# Patient Record
Sex: FEMALE | Race: WHITE | NOT HISPANIC OR LATINO | Employment: OTHER | URBAN - NONMETROPOLITAN AREA
[De-identification: names, ages, dates, MRNs, and addresses within clinical notes are randomized per-mention and may not be internally consistent; named-entity substitution may affect disease eponyms.]

---

## 2017-01-09 RX ORDER — LOSARTAN POTASSIUM 100 MG/1
100 TABLET ORAL DAILY
Qty: 30 TABLET | Refills: 5 | Status: SHIPPED | OUTPATIENT
Start: 2017-01-09 | End: 2017-03-06 | Stop reason: SDUPTHER

## 2017-01-25 ENCOUNTER — OFFICE VISIT (OUTPATIENT)
Dept: INTERNAL MEDICINE | Facility: CLINIC | Age: 82
End: 2017-01-25

## 2017-01-25 VITALS
BODY MASS INDEX: 19.78 KG/M2 | WEIGHT: 126 LBS | OXYGEN SATURATION: 97 % | TEMPERATURE: 97.4 F | HEIGHT: 67 IN | SYSTOLIC BLOOD PRESSURE: 120 MMHG | DIASTOLIC BLOOD PRESSURE: 76 MMHG | HEART RATE: 68 BPM | RESPIRATION RATE: 14 BRPM

## 2017-01-25 DIAGNOSIS — K21.9 GASTROESOPHAGEAL REFLUX DISEASE WITHOUT ESOPHAGITIS: ICD-10-CM

## 2017-01-25 DIAGNOSIS — E03.9 HYPOTHYROIDISM, UNSPECIFIED TYPE: ICD-10-CM

## 2017-01-25 DIAGNOSIS — E55.9 VITAMIN D DEFICIENCY: ICD-10-CM

## 2017-01-25 DIAGNOSIS — F41.9 ANXIETY: ICD-10-CM

## 2017-01-25 DIAGNOSIS — R63.4 WEIGHT LOSS, NON-INTENTIONAL: ICD-10-CM

## 2017-01-25 DIAGNOSIS — I10 ESSENTIAL HYPERTENSION: ICD-10-CM

## 2017-01-25 DIAGNOSIS — D64.9 ANEMIA, UNSPECIFIED TYPE: ICD-10-CM

## 2017-01-25 DIAGNOSIS — E78.5 HYPERLIPIDEMIA, UNSPECIFIED HYPERLIPIDEMIA TYPE: ICD-10-CM

## 2017-01-25 DIAGNOSIS — I25.10 CHRONIC CORONARY ARTERY DISEASE: Primary | ICD-10-CM

## 2017-01-25 DIAGNOSIS — J43.8 OTHER EMPHYSEMA (HCC): ICD-10-CM

## 2017-01-25 PROCEDURE — 99214 OFFICE O/P EST MOD 30 MIN: CPT | Performed by: INTERNAL MEDICINE

## 2017-01-25 RX ORDER — MIRTAZAPINE 15 MG/1
15 TABLET, FILM COATED ORAL NIGHTLY
Qty: 30 TABLET | Refills: 1 | Status: SHIPPED | OUTPATIENT
Start: 2017-01-25 | End: 2017-02-17

## 2017-01-25 NOTE — MR AVS SNAPSHOT
Mi Tejeda   1/25/2017 10:30 AM   Office Visit    Provider:  Adiel Awad MD   Department:  [facility]   Dept Phone:  [phone]                Your Full Care Plan              Today's Medication Changes          These changes are accurate as of: 1/25/17 11:59 PM.  If you have any questions, ask your nurse or doctor.               New Medication(s)Ordered:     mirtazapine 15 MG tablet   Commonly known as:  REMERON   Take 1 tablet by mouth Every Night.   Started by:  Adiel Awad MD            Where to Get Your Medications      These medications were sent to [id number] - [place], [place] - [address] [facility] [phone]  - [phone]   [address] [facility] [address]     Phone:  [phone]     mirtazapine 15 MG tablet                  Your Updated Medication List          This list is accurate as of: 1/25/17 11:59 PM.  Always use your most recent med list.                albuterol 108 (90 BASE) MCG/ACT inhaler   Commonly known as:  PROAIR HFA   Inhale 2 puffs Every 4 (Four) Hours As Needed for wheezing or shortness of air.       baclofen 10 MG tablet   Commonly known as:  LIORESAL   Take 1 tablet by mouth 3 (three) times a day.       budesonide-formoterol 160-4.5 MCG/ACT inhaler   Commonly known as:  SYMBICORT   Inhale 2 puffs 2 (Two) Times a Day.       ferrous gluconate 324 (37.5 FE) MG tablet tablet   Take 1 tablet by mouth Daily With Breakfast.       hydrochlorothiazide 25 MG tablet   Commonly known as:  HYDRODIURIL       HYDROcodone-acetaminophen 7.5-325 MG per tablet   Commonly known as:  NORCO       levothyroxine 100 MCG tablet   Commonly known as:  SYNTHROID, LEVOTHROID   Take 1 tablet by mouth Daily.       losartan 100 MG tablet   Commonly known as:  COZAAR   Take 1 tablet by mouth Daily.       metoprolol succinate XL 50 MG 24 hr tablet   Commonly known as:  TOPROL-XL       mirtazapine 15 MG tablet   Commonly known as:   REMERON   Take 1 tablet by mouth Every Night.       Morphine 30 MG 12 hr tablet   Commonly known as:  MS CONTIN       pantoprazole 40 MG EC tablet   Commonly known as:  PROTONIX   Take 1 tablet by mouth daily.       pentoxifylline 400 MG CR tablet   Commonly known as:  TRENtal       sertraline 25 MG tablet   Commonly known as:  ZOLOFT   Take 1 tablet by mouth Daily.       sulfamethoxazole-trimethoprim 400-80 MG tablet   Commonly known as:  BACTRIM,SEPTRA       tiotropium 18 MCG per inhalation capsule   Commonly known as:  SPIRIVA HANDIHALER   Place 2 puffs into inhaler and inhale Daily.       vitamin D3 5000 UNITS capsule capsule   Take 1 capsule by mouth Daily.               You Were Diagnosed With        Codes Comments    Chronic coronary artery disease    -  Primary ICD-10-CM: I25.10  ICD-9-CM: 414.00     Hyperlipidemia, unspecified hyperlipidemia type     ICD-10-CM: E78.5  ICD-9-CM: 272.4     Essential hypertension     ICD-10-CM: I10  ICD-9-CM: 401.9     Other emphysema     ICD-10-CM: J43.8  ICD-9-CM: 492.8     Gastroesophageal reflux disease without esophagitis     ICD-10-CM: K21.9  ICD-9-CM: 530.81     Vitamin D deficiency     ICD-10-CM: E55.9  ICD-9-CM: 268.9     Hypothyroidism, unspecified type     ICD-10-CM: E03.9  ICD-9-CM: 244.9     Anemia, unspecified type     ICD-10-CM: D64.9  ICD-9-CM: 285.9     Weight loss, non-intentional     ICD-10-CM: R63.4  ICD-9-CM: 783.21     Anxiety     ICD-10-CM: F41.9  ICD-9-CM: 300.00       Instructions     None    Patient Instructions History      MyChart Signup     Our records indicate that your [employer] account has been deactivated. If you would like to reactivate your account, please email [email or web address] or call [phone] to talk to our [facility] staff.             Other Info from Your Visit           Your Appointments     Feb 17, 2017 10:15 AM EST   Follow Up with Adiel Awad MD   [facility] (--)    [address] [address] [phone]           Arrive 15 minutes prior to appointment.              Allergies     Niacin      Statins        Reason for Visit     Hypertension Here for follow up - needs anxiety med - no benzos      Vital Signs     Blood Pressure Pulse Temperature Respirations Height Weight    120/76 68 97.4 °F (36.3 °C) 14 66.5\" (168.9 cm) 126 lb (57.2 kg)    Oxygen Saturation Body Mass Index Smoking Status             97% 20.03 kg/m2 Current Every Day Smoker         Problems and Diagnoses Noted     Anemia    Anxiety problem    Heart disease due to blocked artery    Chronic airway obstruction    Acid reflux disease    High cholesterol or triglycerides    High blood pressure    Underactive thyroid    Vitamin D deficiency    Unintentional weight loss      "

## 2017-01-25 NOTE — PROGRESS NOTES
Subjective   Mi Tejeda is a 81 y.o. female.     Chief Complaint   Patient presents with   • Hypertension     Here for follow up - needs anxiety med - no benzos       History of Present Illness   swallow problem early satiety epigastric improved.  EGD showed gastritis heart burn recently though better now. History of coronary artery disease CABG denies any chest pain mildly short of breath at baseline. COPD stable on medication. Patient still smokes. Weight is stable now.. Depression anxiety stable on medication. Low back pain follow-up with pain clinic patient is taking narcotic pain medicine    Current Outpatient Prescriptions:   •  albuterol (PROAIR HFA) 108 (90 BASE) MCG/ACT inhaler, Inhale 2 puffs Every 4 (Four) Hours As Needed for wheezing or shortness of air., Disp: 2 inhaler, Rfl: 5  •  baclofen (LIORESAL) 10 MG tablet, Take 1 tablet by mouth 3 (three) times a day., Disp: 60 tablet, Rfl: 1  •  budesonide-formoterol (SYMBICORT) 160-4.5 MCG/ACT inhaler, Inhale 2 puffs 2 (Two) Times a Day., Disp: 1 inhaler, Rfl: 5  •  Cholecalciferol (VITAMIN D3) 5000 UNITS capsule capsule, Take 1 capsule by mouth Daily., Disp: 90 capsule, Rfl: 3  •  ferrous gluconate 324 (37.5 FE) MG tablet tablet, Take 1 tablet by mouth Daily With Breakfast., Disp: 90 tablet, Rfl: 3  •  hydrochlorothiazide (HYDRODIURIL) 25 MG tablet, Take  by mouth., Disp: , Rfl:   •  HYDROcodone-acetaminophen (NORCO) 7.5-325 MG per tablet, Take 1 tablet by mouth every 8 (eight) hours as needed for moderate pain (4-6) (TAKE 1 TABLET AS NEEDED 2-3 TIMES DAY)., Disp: , Rfl:   •  levothyroxine (SYNTHROID, LEVOTHROID) 100 MCG tablet, Take 1 tablet by mouth Daily., Disp: 90 tablet, Rfl: 3  •  losartan (COZAAR) 100 MG tablet, Take 1 tablet by mouth Daily., Disp: 30 tablet, Rfl: 5  •  metoprolol succinate XL (TOPROL-XL) 50 MG 24 hr tablet, Take  by mouth., Disp: , Rfl:   •  morphine (MS CONTIN) 30 MG 12 hr tablet, Take  by mouth., Disp: , Rfl:   •   pentoxifylline (TRENTal) 400 MG CR tablet, Take  by mouth 2 (two) times a day., Disp: , Rfl:   •  sertraline (ZOLOFT) 25 MG tablet, Take 1 tablet by mouth Daily., Disp: 90 tablet, Rfl: 3  •  sulfamethoxazole-trimethoprim (BACTRIM,SEPTRA) 400-80 MG tablet, Take  by mouth 2 (two) times a day., Disp: , Rfl:   •  tiotropium (SPIRIVA HANDIHALER) 18 MCG per inhalation capsule, Place 2 puffs into inhaler and inhale Daily., Disp: 30 capsule, Rfl: 11  •  pantoprazole (PROTONIX) 40 MG EC tablet, Take 1 tablet by mouth daily., Disp: 30 tablet, Rfl: 5    The following portions of the patient's history were reviewed and updated as appropriate: allergies, current medications, past family history, past medical history, past social history, past surgical history and problem list.    Review of Systems   Constitutional: Negative.    Respiratory: Positive for shortness of breath and wheezing.    Cardiovascular: Negative.    Gastrointestinal: Negative.    Musculoskeletal: Negative.    Skin: Negative.    Neurological: Negative.    Psychiatric/Behavioral: Negative.        Objective   Physical Exam   Constitutional: She is oriented to person, place, and time. She appears well-nourished.   Neck: Neck supple.   Cardiovascular: Normal rate, regular rhythm and normal heart sounds.    Pulmonary/Chest: Effort normal and breath sounds normal.   Abdominal: Bowel sounds are normal.   Neurological: She is alert and oriented to person, place, and time.   Skin: Skin is warm.   Psychiatric: She has a normal mood and affect.       All tests have been reviewed.    Assessment/Plan   There are no diagnoses linked to this encounter.          CAD CABG history no cp EKG old MI , refuse stress test and EKG, no sig soa, only baseline. Unable to take a cholesterol medicine patient is allergic to it. PCK9, insurance not covering  weight loss 3 Ib again watch  early satirety improved seen GI, s/p EGD  pulmonary CT scan showed a small nodules, repeat NSC  CT scan  does show 4.3 cm uterine fibroid s/p ultrasound utero mass, seen by gyn, stable  TIA history of possible TIA left eye cholesterol deposits by ophthalmologist. carotid duplex 50%, CTA neck artery 70% need repeat next, echocardiogram unremarkable, unable to tolerate plavix sick in stomach, take ASA  Abnormal EKG short of breath with exertion refused stress test,   Claudication possible PAD history of stents left leg artery. ultrasound again stenosis continue pentoxifylline  Tobacco encourage patient to quit   muscle cramp improved continue flexeril  plantar faciatis banks's neuroma, seen dr Caceres continue to followup. continue Neurontin  LBParthritis follow up with pain clinic and on Pain medicine, on MS  Fe def anemia normal after fe supplement obtain colon report from dr leung done 4/2013,, SBFT normal. EGD ulcer per patietnt 10/31/13, continue iron supplements.  colonoscopy 4/9/2013.colon polyp(tubular adenoma), AVM, and divertic  vit D low,continue supplement.   hyperglycemia good diet  idioppathic neuropathy continue gabapentin  depression/anxiety continue medications improved on neurontin, xanax d/c'd by pain doctor, start remeron --  empty bladder problem stable now  dizzy resolved  weight stable  HTN continue continue medicine  hypothyroidism continue medication   LBP spinal stenosis. continue medicine from pain clinic on narcotics  copd severe restrictive disease on PFT, unable to use spiriva and advair due to ins reason.  refuse prevnar 13, refuse zostavax, flushot 2016, pneumovax done--  Muscle cramp in toes follow up with podiatrist. Baclofen no help, stretch exercise  pending dexa---  Hypernatremia and high BUN, increase fluids intake--  Urine WBC 3-5 without sx, watch for now  Followup in 3 weeks after labs

## 2017-02-15 ENCOUNTER — LAB (OUTPATIENT)
Dept: LAB | Facility: HOSPITAL | Age: 82
End: 2017-02-15

## 2017-02-15 DIAGNOSIS — D64.9 ANEMIA, UNSPECIFIED TYPE: ICD-10-CM

## 2017-02-15 DIAGNOSIS — I10 ESSENTIAL HYPERTENSION: ICD-10-CM

## 2017-02-15 LAB
ALBUMIN SERPL-MCNC: 4 G/DL (ref 3.2–4.8)
ALBUMIN/GLOB SERPL: 1.6 G/DL (ref 1.5–2.5)
ALP SERPL-CCNC: 91 U/L (ref 25–100)
ALT SERPL W P-5'-P-CCNC: 18 U/L (ref 7–40)
ANION GAP SERPL CALCULATED.3IONS-SCNC: 7 MMOL/L (ref 3–11)
AST SERPL-CCNC: 23 U/L (ref 0–33)
BASOPHILS # BLD AUTO: 0.03 10*3/MM3 (ref 0–0.2)
BASOPHILS NFR BLD AUTO: 0.4 % (ref 0–1)
BILIRUB SERPL-MCNC: 0.3 MG/DL (ref 0.3–1.2)
BUN BLD-MCNC: 28 MG/DL (ref 9–23)
BUN/CREAT SERPL: 35 (ref 7–25)
CALCIUM SPEC-SCNC: 9.9 MG/DL (ref 8.7–10.4)
CHLORIDE SERPL-SCNC: 106 MMOL/L (ref 99–109)
CO2 SERPL-SCNC: 30 MMOL/L (ref 20–31)
CREAT BLD-MCNC: 0.8 MG/DL (ref 0.6–1.3)
CRP SERPL-MCNC: 1.1 MG/DL (ref 0–10)
DEPRECATED RDW RBC AUTO: 45.6 FL (ref 37–54)
EOSINOPHIL # BLD AUTO: 0.16 10*3/MM3 (ref 0.1–0.3)
EOSINOPHIL NFR BLD AUTO: 2 % (ref 0–3)
ERYTHROCYTE [DISTWIDTH] IN BLOOD BY AUTOMATED COUNT: 12.9 % (ref 11.3–14.5)
FERRITIN SERPL-MCNC: 62 NG/ML (ref 10–291)
GFR SERPL CREATININE-BSD FRML MDRD: 69 ML/MIN/1.73
GLOBULIN UR ELPH-MCNC: 2.5 GM/DL
GLUCOSE BLD-MCNC: 92 MG/DL (ref 70–100)
HCT VFR BLD AUTO: 41.6 % (ref 34.5–44)
HGB BLD-MCNC: 13.5 G/DL (ref 11.5–15.5)
IMM GRANULOCYTES # BLD: 0.01 10*3/MM3 (ref 0–0.03)
IMM GRANULOCYTES NFR BLD: 0.1 % (ref 0–0.6)
IRON 24H UR-MRATE: 50 MCG/DL (ref 50–175)
IRON SATN MFR SERPL: 15 % (ref 15–50)
LYMPHOCYTES # BLD AUTO: 2.47 10*3/MM3 (ref 0.6–4.8)
LYMPHOCYTES NFR BLD AUTO: 31.3 % (ref 24–44)
MCH RBC QN AUTO: 31.3 PG (ref 27–31)
MCHC RBC AUTO-ENTMCNC: 32.5 G/DL (ref 32–36)
MCV RBC AUTO: 96.3 FL (ref 80–99)
MONOCYTES # BLD AUTO: 0.59 10*3/MM3 (ref 0–1)
MONOCYTES NFR BLD AUTO: 7.5 % (ref 0–12)
NEUTROPHILS # BLD AUTO: 4.63 10*3/MM3 (ref 1.5–8.3)
NEUTROPHILS NFR BLD AUTO: 58.7 % (ref 41–71)
PLATELET # BLD AUTO: 238 10*3/MM3 (ref 150–450)
PMV BLD AUTO: 11.1 FL (ref 6–12)
POTASSIUM BLD-SCNC: 3.8 MMOL/L (ref 3.5–5.5)
PROT SERPL-MCNC: 6.5 G/DL (ref 5.7–8.2)
RBC # BLD AUTO: 4.32 10*6/MM3 (ref 3.89–5.14)
SODIUM BLD-SCNC: 143 MMOL/L (ref 132–146)
TIBC SERPL-MCNC: 339 MCG/DL (ref 250–450)
WBC NRBC COR # BLD: 7.89 10*3/MM3 (ref 3.5–10.8)

## 2017-02-15 PROCEDURE — 82728 ASSAY OF FERRITIN: CPT | Performed by: INTERNAL MEDICINE

## 2017-02-15 PROCEDURE — 86140 C-REACTIVE PROTEIN: CPT | Performed by: INTERNAL MEDICINE

## 2017-02-15 PROCEDURE — 80053 COMPREHEN METABOLIC PANEL: CPT | Performed by: INTERNAL MEDICINE

## 2017-02-15 PROCEDURE — 85025 COMPLETE CBC W/AUTO DIFF WBC: CPT | Performed by: INTERNAL MEDICINE

## 2017-02-15 PROCEDURE — 83550 IRON BINDING TEST: CPT | Performed by: INTERNAL MEDICINE

## 2017-02-15 PROCEDURE — 83540 ASSAY OF IRON: CPT | Performed by: INTERNAL MEDICINE

## 2017-02-17 ENCOUNTER — OFFICE VISIT (OUTPATIENT)
Dept: INTERNAL MEDICINE | Facility: CLINIC | Age: 82
End: 2017-02-17

## 2017-02-17 VITALS
SYSTOLIC BLOOD PRESSURE: 142 MMHG | WEIGHT: 126 LBS | OXYGEN SATURATION: 97 % | DIASTOLIC BLOOD PRESSURE: 88 MMHG | TEMPERATURE: 97.6 F | RESPIRATION RATE: 14 BRPM | BODY MASS INDEX: 19.78 KG/M2 | HEART RATE: 64 BPM | HEIGHT: 67 IN

## 2017-02-17 DIAGNOSIS — E78.5 HYPERLIPIDEMIA, UNSPECIFIED HYPERLIPIDEMIA TYPE: ICD-10-CM

## 2017-02-17 DIAGNOSIS — E03.9 HYPOTHYROIDISM, UNSPECIFIED TYPE: ICD-10-CM

## 2017-02-17 DIAGNOSIS — I77.9 PERIPHERAL ARTERIAL OCCLUSIVE DISEASE (HCC): ICD-10-CM

## 2017-02-17 DIAGNOSIS — E55.9 VITAMIN D DEFICIENCY: ICD-10-CM

## 2017-02-17 DIAGNOSIS — D64.9 ANEMIA, UNSPECIFIED TYPE: ICD-10-CM

## 2017-02-17 DIAGNOSIS — I25.10 CHRONIC CORONARY ARTERY DISEASE: Primary | ICD-10-CM

## 2017-02-17 DIAGNOSIS — I10 ESSENTIAL HYPERTENSION: ICD-10-CM

## 2017-02-17 DIAGNOSIS — R91.8 MULTIPLE PULMONARY NODULES: ICD-10-CM

## 2017-02-17 DIAGNOSIS — F41.9 ANXIETY: ICD-10-CM

## 2017-02-17 DIAGNOSIS — J43.8 OTHER EMPHYSEMA (HCC): ICD-10-CM

## 2017-02-17 DIAGNOSIS — K21.9 GASTROESOPHAGEAL REFLUX DISEASE WITHOUT ESOPHAGITIS: ICD-10-CM

## 2017-02-17 PROCEDURE — 99214 OFFICE O/P EST MOD 30 MIN: CPT | Performed by: INTERNAL MEDICINE

## 2017-02-17 RX ORDER — HYDROXYZINE HYDROCHLORIDE 25 MG/1
25 TABLET, FILM COATED ORAL 3 TIMES DAILY PRN
Qty: 30 TABLET | Refills: 1 | Status: SHIPPED | OUTPATIENT
Start: 2017-02-17 | End: 2017-05-02

## 2017-02-17 RX ORDER — AZITHROMYCIN 250 MG/1
TABLET, FILM COATED ORAL
Qty: 6 TABLET | Refills: 0 | Status: SHIPPED | OUTPATIENT
Start: 2017-02-17 | End: 2017-03-31

## 2017-02-17 NOTE — PROGRESS NOTES
Subjective   Mi Tejeda is a 81 y.o. female.     Chief Complaint   Patient presents with   • Hypertension     Here for follow up - discuss meds   • Fatigue   • URI       Fatigue   Associated symptoms include congestion, coughing, fatigue, headaches and a sore throat. Pertinent negatives include no chest pain.   URI    This is a new problem. The current episode started in the past 7 days. The problem has been unchanged. There has been no fever. Associated symptoms include congestion, coughing, diarrhea, headaches, a plugged ear sensation, rhinorrhea, sinus pain, a sore throat and wheezing. Pertinent negatives include no chest pain or dysuria. She has tried NSAIDs for the symptoms. The treatment provided mild relief.    Patient here for follow-up.  Complaining nervousness is still Xanax discontinued by pain clinic anxious and nervous and caused itchy.  Low back pain spinal stenosis stable on pain medicine.  Patient is smoking again.  COPD stable on medications.  Iron deficient anemia resolved after supplement iron supplement.  Hypernatremia resolved after increase fluids.      Current Outpatient Prescriptions:   •  albuterol (PROAIR HFA) 108 (90 BASE) MCG/ACT inhaler, Inhale 2 puffs Every 4 (Four) Hours As Needed for wheezing or shortness of air., Disp: 2 inhaler, Rfl: 5  •  baclofen (LIORESAL) 10 MG tablet, Take 1 tablet by mouth 3 (three) times a day., Disp: 60 tablet, Rfl: 1  •  budesonide-formoterol (SYMBICORT) 160-4.5 MCG/ACT inhaler, Inhale 2 puffs 2 (Two) Times a Day., Disp: 1 inhaler, Rfl: 5  •  Cholecalciferol (VITAMIN D3) 5000 UNITS capsule capsule, Take 1 capsule by mouth Daily., Disp: 90 capsule, Rfl: 3  •  ferrous gluconate 324 (37.5 FE) MG tablet tablet, Take 1 tablet by mouth Daily With Breakfast., Disp: 90 tablet, Rfl: 3  •  hydrochlorothiazide (HYDRODIURIL) 25 MG tablet, Take  by mouth., Disp: , Rfl:   •  HYDROcodone-acetaminophen (NORCO) 7.5-325 MG per tablet, Take 1 tablet by mouth every 8  (eight) hours as needed for moderate pain (4-6) (TAKE 1 TABLET AS NEEDED 2-3 TIMES DAY)., Disp: , Rfl:   •  levothyroxine (SYNTHROID, LEVOTHROID) 100 MCG tablet, Take 1 tablet by mouth Daily., Disp: 90 tablet, Rfl: 3  •  losartan (COZAAR) 100 MG tablet, Take 1 tablet by mouth Daily., Disp: 30 tablet, Rfl: 5  •  metoprolol succinate XL (TOPROL-XL) 50 MG 24 hr tablet, Take  by mouth., Disp: , Rfl:   •  morphine (MS CONTIN) 30 MG 12 hr tablet, Take  by mouth., Disp: , Rfl:   •  pantoprazole (PROTONIX) 40 MG EC tablet, Take 1 tablet by mouth daily., Disp: 30 tablet, Rfl: 5  •  pentoxifylline (TRENTal) 400 MG CR tablet, Take  by mouth 2 (two) times a day., Disp: , Rfl:   •  tiotropium (SPIRIVA HANDIHALER) 18 MCG per inhalation capsule, Place 2 puffs into inhaler and inhale Daily., Disp: 30 capsule, Rfl: 11    The following portions of the patient's history were reviewed and updated as appropriate: allergies, current medications, past family history, past medical history, past social history, past surgical history and problem list.    Review of Systems   Constitutional: Positive for fatigue.   HENT: Positive for congestion, rhinorrhea and sore throat.    Respiratory: Positive for cough and wheezing.    Cardiovascular: Negative.  Negative for chest pain.   Gastrointestinal: Positive for diarrhea.   Genitourinary: Negative for dysuria.   Musculoskeletal: Negative.    Skin: Negative.    Neurological: Positive for headaches.   Psychiatric/Behavioral: Negative.        Objective   Physical Exam   Constitutional: She is oriented to person, place, and time. She appears well-developed and well-nourished.   HENT:   Head: Normocephalic and atraumatic.   Tm bulge   Eyes: Conjunctivae are normal. Pupils are equal, round, and reactive to light.   Neck: Normal range of motion. Neck supple.   Cardiovascular: Normal rate, regular rhythm and normal heart sounds.    Pulmonary/Chest: Effort normal and breath sounds normal.   Abdominal: Bowel  sounds are normal.   Musculoskeletal: She exhibits tenderness.   Neurological: She is alert and oriented to person, place, and time.   Skin: Skin is warm.   Psychiatric:   anxious       All tests have been reviewed.    Assessment/Plan   There are no diagnoses linked to this encounter.          CAD CABG history no cp EKG old MI , refuse stress test and EKG, no sig soa, only baseline. Unable to take a cholesterol medicine patient is allergic to it. PCK9, insurance not covering  weight loss 3 Ib again watch  early satirety improved seen GI, s/p EGD  pulmonary CT scan showed a small nodules, repeat NSC  CT scan does show 4.3 cm uterine fibroid s/p ultrasound utero mass, seen by gyn, stable  TIA history of possible TIA left eye cholesterol deposits by ophthalmologist. carotid duplex 50%, CTA neck artery 70% need repeat next, echocardiogram unremarkable, unable to tolerate plavix sick in stomach, take ASA  Abnormal EKG short of breath with exertion refused stress test,   Claudication possible PAD history of stents left leg artery. ultrasound again stenosis continue pentoxifylline  Tobacco encourage patient to quit   muscle cramp improved continue flexeril  plantar faciatis banks's neuroma, seen dr Caceres continue to followup. continue Neurontin  LBParthritis follow up with pain clinic and on Pain medicine, on MS  Fe def anemia normal after fe supplement obtain colon report from dr leung done 4/2013,, SBFT normal. EGD ulcer per patietnt 10/31/13, continue iron supplements.  colonoscopy 4/9/2013.colon polyp(tubular adenoma), AVM, and divertic  vit D low,continue supplement.   hyperglycemia good diet  idioppathic neuropathy continue gabapentin  depression/anxiety continue medications improved on neurontin, xanax d/c'd by pain doctor, remeron no help, xanax d/c'd by pain clinic, hydroxyzine start  empty bladder problem stable now  dizzy resolved  weight stable  HTN continue continue medicine  hypothyroidism continue medication    LBP spinal stenosis. continue medicine from pain clinic on narcotics  copd severe restrictive disease on PFT, unable to use spiriva and advair due to ins reason.  refuse prevnar 13, refuse zostavax, flushot 2016, pneumovax done--  Muscle cramp in toes follow up with podiatrist. Baclofen no help, stretch exercise  Osteopenia on dexa vit d and calcium continue  Hypernatremia and high BUN, increase fluids intake--  Urine WBC 3-5 without sx, watch for now  sinisitis zpak mucinex and zyrtec tylehol  Followup in 6 weeks after labs

## 2017-02-27 ENCOUNTER — HOSPITAL ENCOUNTER (EMERGENCY)
Facility: HOSPITAL | Age: 82
Discharge: HOME | End: 2017-02-27
Attending: STUDENT IN AN ORGANIZED HEALTH CARE EDUCATION/TRAINING PROGRAM | Admitting: STUDENT IN AN ORGANIZED HEALTH CARE EDUCATION/TRAINING PROGRAM

## 2017-02-27 ENCOUNTER — APPOINTMENT (OUTPATIENT)
Dept: CT IMAGING | Facility: HOSPITAL | Age: 82
End: 2017-02-27

## 2017-02-27 VITALS
RESPIRATION RATE: 16 BRPM | TEMPERATURE: 98.4 F | DIASTOLIC BLOOD PRESSURE: 89 MMHG | HEART RATE: 59 BPM | SYSTOLIC BLOOD PRESSURE: 184 MMHG | BODY MASS INDEX: 21.34 KG/M2 | WEIGHT: 125 LBS | OXYGEN SATURATION: 95 % | HEIGHT: 64 IN

## 2017-02-27 DIAGNOSIS — K21.9 GASTROESOPHAGEAL REFLUX DISEASE, ESOPHAGITIS PRESENCE NOT SPECIFIED: Primary | ICD-10-CM

## 2017-02-27 LAB
ANION GAP SERPL CALCULATED.3IONS-SCNC: 9 MMOL/L
BASOPHILS # BLD AUTO: 0.05 10*3/MM3 (ref 0–0.2)
BASOPHILS NFR BLD AUTO: 0.5 % (ref 0–2.5)
BUN BLD-MCNC: 29 MG/DL (ref 7–20)
BUN/CREAT SERPL: 29 (ref 7.1–23.5)
CALCIUM SPEC-SCNC: 9.4 MG/DL (ref 8.4–10.2)
CHLORIDE SERPL-SCNC: 105 MMOL/L (ref 98–107)
CO2 SERPL-SCNC: 32 MMOL/L (ref 26–30)
CREAT BLD-MCNC: 1 MG/DL (ref 0.6–1.3)
DEPRECATED RDW RBC AUTO: 43.6 FL (ref 37–54)
EOSINOPHIL # BLD AUTO: 0.19 10*3/MM3 (ref 0–0.7)
EOSINOPHIL NFR BLD AUTO: 2.1 % (ref 0–7)
ERYTHROCYTE [DISTWIDTH] IN BLOOD BY AUTOMATED COUNT: 12.5 % (ref 11.5–14.5)
GFR SERPL CREATININE-BSD FRML MDRD: 53 ML/MIN/1.73
GLUCOSE BLD-MCNC: 96 MG/DL (ref 74–98)
HCT VFR BLD AUTO: 39.1 % (ref 37–47)
HGB BLD-MCNC: 12.8 G/DL (ref 12–16)
IMM GRANULOCYTES # BLD: 0.03 10*3/MM3 (ref 0–0.06)
IMM GRANULOCYTES NFR BLD: 0.3 % (ref 0–0.6)
LYMPHOCYTES # BLD AUTO: 3.16 10*3/MM3 (ref 0.6–3.4)
LYMPHOCYTES NFR BLD AUTO: 34.3 % (ref 10–50)
MCH RBC QN AUTO: 31 PG (ref 27–31)
MCHC RBC AUTO-ENTMCNC: 32.7 G/DL (ref 30–37)
MCV RBC AUTO: 94.7 FL (ref 81–99)
MONOCYTES # BLD AUTO: 0.83 10*3/MM3 (ref 0–0.9)
MONOCYTES NFR BLD AUTO: 9 % (ref 0–12)
NEUTROPHILS # BLD AUTO: 4.95 10*3/MM3 (ref 2–6.9)
NEUTROPHILS NFR BLD AUTO: 53.8 % (ref 37–80)
NRBC BLD MANUAL-RTO: 0 /100 WBC (ref 0–0)
PLATELET # BLD AUTO: 202 10*3/MM3 (ref 130–400)
PMV BLD AUTO: 10.7 FL (ref 6–12)
POTASSIUM BLD-SCNC: 4 MMOL/L (ref 3.5–5.1)
RBC # BLD AUTO: 4.13 10*6/MM3 (ref 4.2–5.4)
SODIUM BLD-SCNC: 142 MMOL/L (ref 137–145)
WBC NRBC COR # BLD: 9.21 10*3/MM3 (ref 4.8–10.8)

## 2017-02-27 PROCEDURE — 74176 CT ABD & PELVIS W/O CONTRAST: CPT

## 2017-02-27 PROCEDURE — 85025 COMPLETE CBC W/AUTO DIFF WBC: CPT | Performed by: PHYSICIAN ASSISTANT

## 2017-02-27 PROCEDURE — 99285 EMERGENCY DEPT VISIT HI MDM: CPT

## 2017-02-27 PROCEDURE — 80048 BASIC METABOLIC PNL TOTAL CA: CPT | Performed by: PHYSICIAN ASSISTANT

## 2017-02-27 PROCEDURE — 93005 ELECTROCARDIOGRAM TRACING: CPT | Performed by: PHYSICIAN ASSISTANT

## 2017-02-27 RX ORDER — MAGNESIUM HYDROXIDE/ALUMINUM HYDROXICE/SIMETHICONE 120; 1200; 1200 MG/30ML; MG/30ML; MG/30ML
30 SUSPENSION ORAL ONCE
Status: COMPLETED | OUTPATIENT
Start: 2017-02-27 | End: 2017-02-27

## 2017-02-27 RX ORDER — SODIUM CHLORIDE 0.9 % (FLUSH) 0.9 %
10 SYRINGE (ML) INJECTION AS NEEDED
Status: DISCONTINUED | OUTPATIENT
Start: 2017-02-27 | End: 2017-02-28 | Stop reason: HOSPADM

## 2017-02-27 RX ADMIN — LIDOCAINE HYDROCHLORIDE 15 ML: 20 SOLUTION ORAL; TOPICAL at 22:27

## 2017-02-27 RX ADMIN — ALUMINUM HYDROXIDE, MAGNESIUM HYDROXIDE, AND SIMETHICONE 30 ML: 200; 200; 20 SUSPENSION ORAL at 22:27

## 2017-02-28 LAB
HOLD SPECIMEN: NORMAL
WHOLE BLOOD HOLD SPECIMEN: NORMAL

## 2017-03-06 RX ORDER — LOSARTAN POTASSIUM 100 MG/1
100 TABLET ORAL DAILY
Qty: 30 TABLET | Refills: 5 | Status: SHIPPED | OUTPATIENT
Start: 2017-03-06 | End: 2018-03-28 | Stop reason: SDUPTHER

## 2017-03-07 ENCOUNTER — TRANSCRIBE ORDERS (OUTPATIENT)
Dept: CT IMAGING | Facility: HOSPITAL | Age: 82
End: 2017-03-07

## 2017-03-07 ENCOUNTER — TRANSCRIBE ORDERS (OUTPATIENT)
Dept: GENERAL RADIOLOGY | Facility: HOSPITAL | Age: 82
End: 2017-03-07

## 2017-03-07 DIAGNOSIS — M51.16 NEURITIS OR RADICULITIS DUE TO RUPTURE OF LUMBAR INTERVERTEBRAL DISC: Primary | ICD-10-CM

## 2017-03-08 RX ORDER — NISOLDIPINE 8.5 MG/1
8.5 TABLET, FILM COATED, EXTENDED RELEASE ORAL DAILY
Qty: 30 TABLET | Refills: 5 | Status: SHIPPED | OUTPATIENT
Start: 2017-03-08 | End: 2017-12-15 | Stop reason: SDUPTHER

## 2017-03-14 ENCOUNTER — HOSPITAL ENCOUNTER (OUTPATIENT)
Dept: CT IMAGING | Facility: HOSPITAL | Age: 82
Discharge: HOME | End: 2017-03-14
Attending: ORTHOPAEDIC SURGERY

## 2017-03-14 ENCOUNTER — HOSPITAL ENCOUNTER (OUTPATIENT)
Dept: GENERAL RADIOLOGY | Facility: HOSPITAL | Age: 82
Discharge: HOME | End: 2017-03-14
Attending: ORTHOPAEDIC SURGERY | Admitting: ORTHOPAEDIC SURGERY

## 2017-03-14 DIAGNOSIS — M51.16 NEURITIS OR RADICULITIS DUE TO RUPTURE OF LUMBAR INTERVERTEBRAL DISC: ICD-10-CM

## 2017-03-14 LAB
APTT PPP: 29.1 SECONDS (ref 25–36)
INR PPP: 1.06 (ref 0.9–1.1)
PLATELET # BLD AUTO: 217 10*3/MM3 (ref 130–400)
PROTHROMBIN TIME: 11.6 SECONDS (ref 9.3–12.1)

## 2017-03-14 PROCEDURE — 0 IOPAMIDOL 41 % SOLUTION: Performed by: ORTHOPAEDIC SURGERY

## 2017-03-14 PROCEDURE — 72132 CT LUMBAR SPINE W/DYE: CPT

## 2017-03-14 PROCEDURE — 85610 PROTHROMBIN TIME: CPT | Performed by: RADIOLOGY

## 2017-03-14 PROCEDURE — 72265 MYELOGRAPHY L-S SPINE: CPT

## 2017-03-14 PROCEDURE — 85730 THROMBOPLASTIN TIME PARTIAL: CPT | Performed by: RADIOLOGY

## 2017-03-14 PROCEDURE — 85049 AUTOMATED PLATELET COUNT: CPT | Performed by: RADIOLOGY

## 2017-03-14 RX ORDER — LIDOCAINE HYDROCHLORIDE 10 MG/ML
20 INJECTION, SOLUTION INFILTRATION; PERINEURAL ONCE
Status: COMPLETED | OUTPATIENT
Start: 2017-03-14 | End: 2017-03-14

## 2017-03-14 RX ADMIN — LIDOCAINE HYDROCHLORIDE 10 ML: 10 INJECTION, SOLUTION INFILTRATION; PERINEURAL at 13:15

## 2017-03-14 RX ADMIN — IOPAMIDOL 20 ML: 408 INJECTION, SOLUTION INTRATHECAL at 13:00

## 2017-03-31 ENCOUNTER — OFFICE VISIT (OUTPATIENT)
Dept: INTERNAL MEDICINE | Facility: CLINIC | Age: 82
End: 2017-03-31

## 2017-03-31 VITALS
HEART RATE: 56 BPM | RESPIRATION RATE: 14 BRPM | OXYGEN SATURATION: 97 % | DIASTOLIC BLOOD PRESSURE: 88 MMHG | HEIGHT: 64 IN | WEIGHT: 123 LBS | TEMPERATURE: 97.7 F | SYSTOLIC BLOOD PRESSURE: 130 MMHG | BODY MASS INDEX: 21 KG/M2

## 2017-03-31 DIAGNOSIS — M72.2 PLANTAR FASCIITIS: ICD-10-CM

## 2017-03-31 DIAGNOSIS — K21.9 GASTROESOPHAGEAL REFLUX DISEASE WITHOUT ESOPHAGITIS: ICD-10-CM

## 2017-03-31 DIAGNOSIS — E55.9 VITAMIN D DEFICIENCY: ICD-10-CM

## 2017-03-31 DIAGNOSIS — F41.9 ANXIETY: ICD-10-CM

## 2017-03-31 DIAGNOSIS — I10 ESSENTIAL HYPERTENSION: ICD-10-CM

## 2017-03-31 DIAGNOSIS — J43.8 OTHER EMPHYSEMA (HCC): ICD-10-CM

## 2017-03-31 DIAGNOSIS — E03.9 HYPOTHYROIDISM, UNSPECIFIED TYPE: ICD-10-CM

## 2017-03-31 DIAGNOSIS — Z23 NEED FOR PNEUMOCOCCAL VACCINE: ICD-10-CM

## 2017-03-31 DIAGNOSIS — I25.10 CHRONIC CORONARY ARTERY DISEASE: Primary | ICD-10-CM

## 2017-03-31 DIAGNOSIS — R91.8 MULTIPLE PULMONARY NODULES: ICD-10-CM

## 2017-03-31 DIAGNOSIS — E78.5 HYPERLIPIDEMIA, UNSPECIFIED HYPERLIPIDEMIA TYPE: ICD-10-CM

## 2017-03-31 DIAGNOSIS — M48.061 SPINAL STENOSIS OF LUMBAR REGION: ICD-10-CM

## 2017-03-31 PROCEDURE — 99214 OFFICE O/P EST MOD 30 MIN: CPT | Performed by: INTERNAL MEDICINE

## 2017-03-31 RX ORDER — ALBUTEROL SULFATE 90 UG/1
2 AEROSOL, METERED RESPIRATORY (INHALATION) EVERY 4 HOURS PRN
Qty: 2 INHALER | Refills: 11 | Status: SHIPPED | OUTPATIENT
Start: 2017-03-31 | End: 2017-10-04 | Stop reason: SDUPTHER

## 2017-03-31 RX ORDER — CLOPIDOGREL BISULFATE 75 MG/1
75 TABLET ORAL DAILY
Qty: 30 TABLET | Refills: 11 | Status: SHIPPED | OUTPATIENT
Start: 2017-03-31 | End: 2017-05-03 | Stop reason: HOSPADM

## 2017-03-31 RX ORDER — BUDESONIDE AND FORMOTEROL FUMARATE DIHYDRATE 160; 4.5 UG/1; UG/1
2 AEROSOL RESPIRATORY (INHALATION)
Qty: 1 INHALER | Refills: 5 | Status: SHIPPED | OUTPATIENT
Start: 2017-03-31 | End: 2017-05-02

## 2017-03-31 RX ORDER — BUPROPION HYDROCHLORIDE 150 MG/1
150 TABLET ORAL DAILY
Qty: 30 TABLET | Refills: 2 | Status: SHIPPED | OUTPATIENT
Start: 2017-03-31 | End: 2017-12-13 | Stop reason: SDUPTHER

## 2017-03-31 NOTE — PROGRESS NOTES
Subjective   Mi Tejeda is a 81 y.o. female.     Chief Complaint   Patient presents with   • Hypertension     Here for follow up        Hypertension     Abdominal Pain   This is a new problem. The current episode started more than 1 month ago. The onset quality is sudden. The problem occurs intermittently. The problem has been unchanged. The pain is located in the epigastric region. The pain is moderate. The quality of the pain is dull. The abdominal pain does not radiate. Associated symptoms include constipation and weight loss. Pertinent negatives include no anorexia, belching, diarrhea, hematochezia, nausea or vomiting. The pain is aggravated by eating. The pain is relieved by nothing. She has tried proton pump inhibitors for the symptoms. The treatment provided no relief.    Patient here for follow-up.  Low back pain still taking narcotic pain medicine.  Hypertension stable medication.  Hypothyroidism stable medication.  Coronary artery disease a CABG patient decline any new cholesterol medication.  Continues to complain anxious nervous.  COPD occasional wheezing request more medication patient is not taking Symbicort or Spiriva.      Current Outpatient Prescriptions:   •  albuterol (PROAIR HFA) 108 (90 BASE) MCG/ACT inhaler, Inhale 2 puffs Every 4 (Four) Hours As Needed for wheezing or shortness of air., Disp: 2 inhaler, Rfl: 5  •  baclofen (LIORESAL) 10 MG tablet, Take 1 tablet by mouth 3 (three) times a day., Disp: 60 tablet, Rfl: 1  •  budesonide-formoterol (SYMBICORT) 160-4.5 MCG/ACT inhaler, Inhale 2 puffs 2 (Two) Times a Day., Disp: 1 inhaler, Rfl: 5  •  Cholecalciferol (VITAMIN D3) 5000 UNITS capsule capsule, Take 1 capsule by mouth Daily., Disp: 90 capsule, Rfl: 3  •  ferrous gluconate 324 (37.5 FE) MG tablet tablet, Take 1 tablet by mouth Daily With Breakfast., Disp: 90 tablet, Rfl: 3  •  hydrochlorothiazide (HYDRODIURIL) 25 MG tablet, Take  by mouth., Disp: , Rfl:   •  HYDROcodone-acetaminophen  (NORCO) 7.5-325 MG per tablet, Take 1 tablet by mouth every 8 (eight) hours as needed for moderate pain (4-6) (TAKE 1 TABLET AS NEEDED 2-3 TIMES DAY)., Disp: , Rfl:   •  hydrOXYzine (ATARAX) 25 MG tablet, Take 1 tablet by mouth 3 (Three) Times a Day As Needed for itching., Disp: 30 tablet, Rfl: 1  •  levothyroxine (SYNTHROID, LEVOTHROID) 100 MCG tablet, Take 1 tablet by mouth Daily., Disp: 90 tablet, Rfl: 3  •  losartan (COZAAR) 100 MG tablet, Take 1 tablet by mouth Daily., Disp: 30 tablet, Rfl: 5  •  metoprolol succinate XL (TOPROL-XL) 50 MG 24 hr tablet, Take  by mouth., Disp: , Rfl:   •  morphine (MS CONTIN) 30 MG 12 hr tablet, Take  by mouth., Disp: , Rfl:   •  nisoldipine (SULAR) 8.5 MG 24 hr tablet, Take 1 tablet by mouth Daily., Disp: 30 tablet, Rfl: 5  •  pantoprazole (PROTONIX) 40 MG EC tablet, Take 1 tablet by mouth daily., Disp: 30 tablet, Rfl: 5  •  pentoxifylline (TRENTal) 400 MG CR tablet, Take  by mouth 2 (two) times a day., Disp: , Rfl:   •  tiotropium (SPIRIVA HANDIHALER) 18 MCG per inhalation capsule, Place 2 puffs into inhaler and inhale Daily., Disp: 30 capsule, Rfl: 11    The following portions of the patient's history were reviewed and updated as appropriate: allergies, current medications, past family history, past medical history, past social history, past surgical history and problem list.    Review of Systems   Constitutional: Positive for weight loss.   Respiratory: Negative.    Cardiovascular: Negative.    Gastrointestinal: Positive for abdominal pain and constipation. Negative for anorexia, diarrhea, hematochezia, nausea and vomiting.   Musculoskeletal: Negative.    Skin: Negative.    Neurological: Negative.    Psychiatric/Behavioral: Negative.        Objective   Physical Exam   Constitutional: She is oriented to person, place, and time. She appears well-nourished.   Neck: Neck supple.   Cardiovascular: Normal rate, regular rhythm and normal heart sounds.    Pulmonary/Chest: Effort  normal and breath sounds normal.   Abdominal: Bowel sounds are normal.   Neurological: She is alert and oriented to person, place, and time.   Skin: Skin is warm.   Psychiatric: She has a normal mood and affect.       All tests have been reviewed.    Assessment/Plan   There are no diagnoses linked to this encounter.          CAD CABG history no cp EKG old MI , refuse stress test and EKG, no sig soa, only baseline. Unable to take a cholesterol medicine patient is allergic to it.refuse PCK9 even ins covers.start plavix, refer to cardio--  weight loss 2 Ib again watch  early satirety improved seen GI, s/p EGD, now epigastric pain pending to see dr duarte, continue protonix--  pulmonary CT scan showed a small nodules, repeat NSC  CT scan does show 4.3 cm uterine fibroid s/p ultrasound utero mass, seen by gyn, stable  TIA history of possible TIA left eye cholesterol deposits by ophthalmologist. carotid duplex 50%, CTA neck artery 70% need repeat next, echocardiogram unremarkable, unable to tolerate plavix sick in stomach, take ASA  Abnormal EKG short of breath with exertion refused stress test,   Claudication possible PAD history of stents left leg artery. ultrasound again stenosis continue pentoxifylline  Tobacco encourage patient to quit   muscle cramp improved continue flexeril  plantar faciatis banks's neuroma, seen dr Caceres continue to followup. continue Neurontin  LBParthritis follow up with pain clinic and on Pain medicine, on MS  Fe def anemia normal after fe supplement obtain colon report from dr leung done 4/2013,, SBFT normal. EGD ulcer per patietnt 10/31/13, continue iron supplements.  colonoscopy 4/9/2013.colon polyp(tubular adenoma), AVM, and divertic  vit D low,continue supplement.   hyperglycemia good diet  idioppathic neuropathy continue gabapentin  depression/anxiety continue medications improved on neurontin, xanax d/c'd by pain doctor, remeron no help, xanax d/c'd by pain clinic,continue hydroxyzine  start, wellbutrin--  empty bladder problem stable now  dizzy resolved  weight stable  HTN continue continue medicine  hypothyroidism continue medication   LBP spinal stenosis. continue medicine from pain clinic on narcotics  copd severe restrictive disease on PFT, unable to use spiriva, start symbicort  refuse prevnar 13, refuse zostavax, flushot 2016, pneumovax done--  Muscle cramp in toes follow up with podiatrist. Baclofen no help, stretch exercise  Osteopenia on dexa vit d and calcium continue   high BUN, increase fluids intake--  Urine WBC 3-5 without sx, watch for now   Followup in 3 mo

## 2017-04-12 RX ORDER — METOPROLOL SUCCINATE 50 MG/1
50 TABLET, EXTENDED RELEASE ORAL DAILY
Qty: 90 TABLET | Refills: 3 | Status: SHIPPED | OUTPATIENT
Start: 2017-04-12 | End: 2018-04-26 | Stop reason: SDUPTHER

## 2017-04-17 ENCOUNTER — OFFICE VISIT (OUTPATIENT)
Dept: GASTROENTEROLOGY | Facility: CLINIC | Age: 82
End: 2017-04-17

## 2017-04-17 VITALS
SYSTOLIC BLOOD PRESSURE: 137 MMHG | WEIGHT: 124 LBS | TEMPERATURE: 97 F | HEART RATE: 58 BPM | DIASTOLIC BLOOD PRESSURE: 63 MMHG | RESPIRATION RATE: 14 BRPM | HEIGHT: 64 IN | BODY MASS INDEX: 21.17 KG/M2

## 2017-04-17 DIAGNOSIS — K30 INDIGESTION: ICD-10-CM

## 2017-04-17 DIAGNOSIS — R19.06 EPIGASTRIC FULLNESS: ICD-10-CM

## 2017-04-17 DIAGNOSIS — K59.00 CONSTIPATION, UNSPECIFIED CONSTIPATION TYPE: ICD-10-CM

## 2017-04-17 DIAGNOSIS — R10.13 EPIGASTRIC PAIN: Primary | ICD-10-CM

## 2017-04-17 DIAGNOSIS — R63.4 WEIGHT LOSS: ICD-10-CM

## 2017-04-17 PROCEDURE — 99214 OFFICE O/P EST MOD 30 MIN: CPT | Performed by: INTERNAL MEDICINE

## 2017-04-17 NOTE — PROGRESS NOTES
Chief Complaint   Patient presents with   • Follow-up     History of Present Illness    There is history of epigastric abdominal pain off and on for the last several years.  The pain is gradual in onset, moderate in severity and burning in character.  There is associated indigestion.  Frequency being 2-3 times a week.  The pain may last for several minutes.  There is no radiation of abdominal pain.  Eating worsens the abdominal pain.  No definite relieving factors of abdominal pain.  Of interest that the patient has been taking pantoprazole in the morning.  It use to work well however for the last few months the patient has abdominal pain despite taking Protonix.  For the last several months her symptoms have worsened.  The patient also complains of epigastric abdominal fullness and perhaps early satiety over the last few months.  There is unintentional weight loss of about 80 pounds over 3 years.  The patient also has reflux type symptoms.  However she never had retrosternal burning sensation or regurgitative symptoms.  Most of her reflux is described as indigestion.  The patient denies associated dysphagia.  There is no odynophagia.    The patient has a history of constipation off and on for the last several years. Severity is moderate. This is described as hard stools perhaps one bowel movement twice a week. The patient takes occasional stool softeners and laxatives to have a bowel movement. There is no associated rectal pain.  There is no recent change in bowel habits.  The patient has history of anemia. There is no history of overt GI bleed (Hematemesis, melena or hematochezia).  There is no liver or pancreatic disease.     Review of Systems   Constitutional: Negative for appetite change, chills, fatigue, fever and unexpected weight change.   HENT: Negative for mouth sores, nosebleeds and trouble swallowing.    Eyes: Negative for discharge and redness.   Respiratory: Negative for apnea, cough and shortness of  breath.    Cardiovascular: Negative for chest pain, palpitations and leg swelling.   Gastrointestinal: Positive for abdominal pain. Negative for abdominal distention, anal bleeding, blood in stool, constipation, diarrhea, nausea and vomiting.   Endocrine: Positive for cold intolerance. Negative for heat intolerance and polydipsia.   Genitourinary: Negative for dysuria, hematuria and urgency.   Musculoskeletal: Positive for arthralgias. Negative for joint swelling and myalgias.   Skin: Negative for rash.   Allergic/Immunologic: Negative for food allergies and immunocompromised state.   Neurological: Positive for dizziness. Negative for seizures, syncope and headaches.   Hematological: Negative for adenopathy. Bruises/bleeds easily.   Psychiatric/Behavioral: Negative for dysphoric mood. The patient is not nervous/anxious and is not hyperactive.      Patient Active Problem List   Diagnosis   • Anxiety   • Anemia   • Chronic coronary artery disease   • Carotid bruit   • Chronic obstructive pulmonary disease   • Dysphagia   • Diverticulum of esophagus   • Fatigue   • Gastroesophageal reflux disease   • Hyperlipidemia   • Hypertension   • Hypothyroidism   • Multiple pulmonary nodules   • Peripheral arterial occlusive disease   • Plantar fasciitis   • Spinal stenosis   • Temporary cerebral vascular dysfunction   • Uterine leiomyoma   • Vitamin D deficiency   • Abnormal electrocardiogram   • Cramp in lower leg   • Lower esophageal ring     Past Medical History:   Diagnosis Date   • Abdominal pain     Improving. Likely secondary to gastritis.   • Abnormal electrocardiogram    • Abnormal urinalysis    • Acute UTI    • Anemia    • Anxiety    • Asthma    • Atrial fibrillation    • Backache    • CAD (coronary artery disease)    • COPD (chronic obstructive pulmonary disease)    • Depression    • DVT (deep venous thrombosis)    • Fracture    • Gastric ulcer    • High cholesterol    • HTN (hypertension)    • Hypertriglyceridemia     • Hypothyroidism    • Multiple lung nodules    • Osteoarthritis    • Peripheral neuropathy    • Personal history of tobacco use    • Plantar fasciitis    • Pneumonia    • Sinus problem    • Sinusitis    • TIA (transient ischemic attack)    • Viral gastroenteritis    • Vitamin D deficiency    • Weight loss, non-intentional      Past Surgical History:   Procedure Laterality Date   • CARDIAC SURGERY     • CATARACT EXTRACTION Bilateral 2009   • CHOLECYSTECTOMY  2002   • CORONARY ARTERY BYPASS GRAFT  1998    2 vessels   • JOINT REPLACEMENT Left 2010    hip     Family History   Problem Relation Age of Onset   • Cancer Mother      uterus   • Cancer Sister      colon; kidney   • Cancer Brother      prostate   • Arthritis Other    • Cancer Other    • Kidney disease Other    • Stroke Other    • Hypertension Other      Social History   Substance Use Topics   • Smoking status: Current Every Day Smoker     Types: Cigarettes   • Smokeless tobacco: Never Used   • Alcohol use No       Current Outpatient Prescriptions:   •  albuterol (PROAIR HFA) 108 (90 BASE) MCG/ACT inhaler, Inhale 2 puffs Every 4 (Four) Hours As Needed for Wheezing or Shortness of Air., Disp: 2 inhaler, Rfl: 11  •  baclofen (LIORESAL) 10 MG tablet, Take 1 tablet by mouth 3 (three) times a day., Disp: 60 tablet, Rfl: 1  •  budesonide-formoterol (SYMBICORT) 160-4.5 MCG/ACT inhaler, Inhale 2 puffs 2 (Two) Times a Day., Disp: 1 inhaler, Rfl: 5  •  buPROPion XL (WELLBUTRIN XL) 150 MG 24 hr tablet, Take 1 tablet by mouth Daily., Disp: 30 tablet, Rfl: 2  •  Cholecalciferol (VITAMIN D3) 5000 UNITS capsule capsule, Take 1 capsule by mouth Daily., Disp: 90 capsule, Rfl: 3  •  clopidogrel (PLAVIX) 75 MG tablet, Take 1 tablet by mouth Daily., Disp: 30 tablet, Rfl: 11  •  ferrous gluconate 324 (37.5 FE) MG tablet tablet, Take 1 tablet by mouth Daily With Breakfast., Disp: 90 tablet, Rfl: 3  •  hydrochlorothiazide (HYDRODIURIL) 25 MG tablet, Take  by mouth., Disp: , Rfl:   •  " HYDROcodone-acetaminophen (NORCO) 7.5-325 MG per tablet, Take 1 tablet by mouth every 8 (eight) hours as needed for moderate pain (4-6) (TAKE 1 TABLET AS NEEDED 2-3 TIMES DAY)., Disp: , Rfl:   •  hydrOXYzine (ATARAX) 25 MG tablet, Take 1 tablet by mouth 3 (Three) Times a Day As Needed for itching., Disp: 30 tablet, Rfl: 1  •  levothyroxine (SYNTHROID, LEVOTHROID) 100 MCG tablet, Take 1 tablet by mouth Daily., Disp: 90 tablet, Rfl: 3  •  losartan (COZAAR) 100 MG tablet, Take 1 tablet by mouth Daily., Disp: 30 tablet, Rfl: 5  •  metoprolol succinate XL (TOPROL-XL) 50 MG 24 hr tablet, Take 1 tablet by mouth Daily., Disp: 90 tablet, Rfl: 3  •  morphine (MS CONTIN) 30 MG 12 hr tablet, Take  by mouth., Disp: , Rfl:   •  nisoldipine (SULAR) 8.5 MG 24 hr tablet, Take 1 tablet by mouth Daily., Disp: 30 tablet, Rfl: 5  •  pantoprazole (PROTONIX) 40 MG EC tablet, Take 1 tablet by mouth daily., Disp: 30 tablet, Rfl: 5  •  pentoxifylline (TRENTal) 400 MG CR tablet, Take  by mouth 2 (two) times a day., Disp: , Rfl:   •  tiotropium (SPIRIVA HANDIHALER) 18 MCG per inhalation capsule, Place 1 capsule into inhaler and inhale Daily., Disp: 30 capsule, Rfl: 11    Allergies   Allergen Reactions   • Keflex [Cephalexin] Nausea Only   • Niacin    • Statins        Blood pressure 137/63, pulse 58, temperature 97 °F (36.1 °C), resp. rate 14, height 64\" (162.6 cm), weight 124 lb (56.2 kg).    Physical Exam   Constitutional: She is oriented to person, place, and time. She appears well-developed and well-nourished. No distress.   HENT:   Head: Normocephalic and atraumatic.   Right Ear: Hearing and external ear normal.   Left Ear: Hearing and external ear normal.   Nose: Nose normal.   Mouth/Throat: Oropharynx is clear and moist and mucous membranes are normal. Mucous membranes are not pale, not dry and not cyanotic. No oral lesions. No oropharyngeal exudate.   Eyes: Conjunctivae and EOM are normal. Right eye exhibits no discharge. Left eye " exhibits no discharge. No scleral icterus.   Neck: Trachea normal. Neck supple. No JVD present. No edema present. No thyroid mass and no thyromegaly present.   Cardiovascular: Normal rate, regular rhythm, S2 normal and normal heart sounds.  Exam reveals no gallop, no S3 and no friction rub.    No murmur heard.  Pulmonary/Chest: Effort normal and breath sounds normal. No respiratory distress. She has no wheezes. She has no rales. She exhibits no tenderness.   Abdominal: Soft. Normal appearance and bowel sounds are normal. She exhibits no distension, no ascites and no mass. There is no splenomegaly or hepatomegaly. There is tenderness in the epigastric area. There is no rigidity, no rebound and no guarding. No hernia.   Musculoskeletal: She exhibits no tenderness or deformity.       Vascular Status -  Her exam exhibits no right foot edema. Her exam exhibits no left foot edema.  Lymphadenopathy:     She has no cervical adenopathy.        Left: No supraclavicular adenopathy present.   Neurological: She is alert and oriented to person, place, and time. She has normal strength. No cranial nerve deficit or sensory deficit. She exhibits normal muscle tone. Coordination normal.   Skin: No rash noted. She is not diaphoretic. No cyanosis. No pallor. Nails show no clubbing.   Psychiatric: She has a normal mood and affect. Her behavior is normal. Judgment and thought content normal.   Nursing note and vitals reviewed.    Stigmata of chronic liver disease:  None.  Asterixis:  None.       Laboratory tests:  Upon review of medical records:    Dated February 15, 2017 sodium 143 potassium 3.8 chloride 106 CO2 30 BUN 28 serum creatinine 0.80 glucose 92.  Calcium 9.9.  Albumin 4.00.  T bili 0.3 AST 23 ALT 18 alkaline phosphatase 91.  C-reactive protein 1.10.  Serum iron 50.  TIBC 339.  Iron saturation 15%.  Ferritin 62.00.  WBC 7.89 hemoglobin 13.5 hematocrit 41.6 MCV 96.3 platelet count 238.    Abdominal Imaging:  Upon Review of  medical records:    The patient underwent a CT of the abdomen and pelvis without contrast dated July 24, 2015 which revealed: Abdomen: Clear lung bases. Heart size is normal. The limited noncontrast images of the liver are normal. Patient is status post cholecystectomy. The spleen is normal. No adrenal masses are seen. The aorta is normal in caliber. There is no significant free fluid or adenopathy. There is no nephrolithiasis. There is no hydronephrosis. Extensive vascular calcifications are present within the renal arteries. Pelvis: Appendix is normal. There are colonic diverticula present without evidence of diverticulitis. The urinary bladder is unremarkable. There is a 4.3 cm mass adjacent to the uterus which is 6 suspected to reflect a fibroid, however this should be followed up with pelvic ultrasound. There is no free fluid, free air or adenopathy. Bone windows reveal left hip arthroplasty. There are no acute osseous abnormalities.     Dated February 27, 2017 the patient underwent a CT of the abdomen and pelvis without contrast which revealed: No renal or ureteral stone or hydronephrosis.  Bladder is normal in contour.  Liver, spleen, pancreas and adrenal glands appear normal.  The bowel gas pattern is nonobstructive.  No focal inflammatory changes are present.  The appendix is not visualized but there are no secondary signs to suggest appendicitis.  There is scattered diverticulosis without diverticulitis.  There is diffuse vascular calcifications.    Procedures:  Upon review of medical records:    Dated April 10, 2013 the patient underwent a colonoscopy by Dr. Thompson from surgical service which revealed: Colon polyp.  Arteriovenous malformations, cecum.  Diverticulosis coli.  Colon, polyp at 10 cm, biopsy revealed tubular adenoma.    EGD dated 10/31/2013: Schatzki's ring post dilation to 18 mm. Small sliding hiatal hernia (less than 3 cm). Severe, erythematous, erosive gastritis 0.5 cm clean base prepyloric  ulcer. Erythematous duodenitis. Second portion of duodenum biopsies revealed focal acute duodenitis. Stomach, antrum and body, biopsies revealed chronic reactive antritis. CFV stain-negative for Helicobacter.     EGD dated 12/14/2015: Schatzki's ring status post dilation to 18 mm. Small sliding hiatal hernia less than 3 cm. Early diverticulum within the distal esophagus. Erythematous gastritis. Bile reflux within the stomach. Significant tortuosity and tertiary contractions of the esophageal body were noted. This test does no explain the cause of anemia. The patient is at high risk for pill esophagitis. Second portion of duodenum biopsy revealed preserved villous architecture with no significant histopathologic change. No parasites noted. No villous blunting or increased intraepithelial lymphocytes suggestive of celiac disease. Antrum and body, biopsies, and angulus revealed minimal chronic gastritis with reactive features. No intestinal metaplasia or dysplasia identified. No Helicobacter pylori -like organisms noted on routine stain. Due to limited chronic inflammation present and lack of active inflammation, special stains for Helicobacter pylori were not undertaken. If there is a high clinical index of suspicion of Helicobacter infection, please notify this laboratory and additional studies will be performed. Esophagus, biopsies, distal and proximal revealed squamous mucosa showing mild basal layer hyperplasia and minimal increase in intraepithelial leukocytes without eosinophils. Histologic features are mild and felt to be nonspecific. No glandular mucosa or intestinal metaplasia identified.         Assessment and Plan:      Mi was seen today for follow-up.    Diagnoses and all orders for this visit:    Epigastric pain  Comments:  Possible peptic ulcer disease.  With history of weight loss, epigastric abdominal fullness a possible underlying neoplastic disease.    Indigestion  Comments:  Recurrent  long-standing indigestion with recent worsening.    Constipation, unspecified constipation type  Comments:  Multifactorial.    Weight loss  Comments:  Progressive unintentional weight loss.    Epigastric fullness  Comments:  A possible gastric neoplastic disease.        Plan  and Patient Instructions:    Patient Instructions   1. The patient should take thyroid medication first thing in the morning on an empty stomach, wait for half hour, then take pantoprazole, wait for half hour and then eat.  2. Pantoprazole 40 mg 1 by mouth every morning one half hour before breakfast.  3. Iron supplements with vitamin C.  Orange juice upsets her stomach.  4. Upper endoscopy (EGD) counseling:  Description of the procedure, risks, benefits, alternatives and options including nonoperative options were discussed with the patient in detail.  The patient understands and wishes to proceed.  5. The patient has been advised to hold Plavix (clopidogrel) 3 days prior to her upper endoscopy.  The patient however may take one baby aspirin upon discontinuation of Plavix.  Of note the patient has history suggestive of TIAs in the past.  6. The patient was counseled for smoking cessation (Smoking cessation counseling/symptomatic/07392/3 minutes).          Yared Castellanos MD

## 2017-04-17 NOTE — PATIENT INSTRUCTIONS
1. The patient should take thyroid medication first thing in the morning on an empty stomach, wait for half hour, then take pantoprazole, wait for half hour and then eat.  2. Pantoprazole 40 mg 1 by mouth every morning one half hour before breakfast.  3. Iron supplements with vitamin C.  Orange juice upsets her stomach.  4. Upper endoscopy (EGD) counseling:  Description of the procedure, risks, benefits, alternatives and options including nonoperative options were discussed with the patient in detail.  The patient understands and wishes to proceed.  5. The patient has been advised to hold Plavix (clopidogrel) 3 days prior to her upper endoscopy.  The patient however may take one baby aspirin upon discontinuation of Plavix.  Of note the patient has history suggestive of TIAs in the past.  6. The patient was counseled for smoking cessation (Smoking cessation counseling/symptomatic/64302/3 minutes).

## 2017-04-27 ENCOUNTER — PREP FOR SURGERY (OUTPATIENT)
Dept: GASTROENTEROLOGY | Facility: CLINIC | Age: 82
End: 2017-04-27

## 2017-04-27 DIAGNOSIS — R63.4 WEIGHT LOSS: Primary | ICD-10-CM

## 2017-04-27 DIAGNOSIS — R19.06 EPIGASTRIC FULLNESS: ICD-10-CM

## 2017-04-27 RX ORDER — SODIUM CHLORIDE 9 MG/ML
70 INJECTION, SOLUTION INTRAVENOUS CONTINUOUS PRN
Status: CANCELLED | OUTPATIENT
Start: 2017-05-03

## 2017-04-27 RX ORDER — SODIUM CHLORIDE 0.9 % (FLUSH) 0.9 %
1-10 SYRINGE (ML) INJECTION AS NEEDED
Status: CANCELLED | OUTPATIENT
Start: 2017-05-03

## 2017-05-01 RX ORDER — SODIUM CHLORIDE 9 MG/ML
70 INJECTION, SOLUTION INTRAVENOUS CONTINUOUS PRN
Status: CANCELLED | OUTPATIENT
Start: 2017-05-01

## 2017-05-01 RX ORDER — SODIUM CHLORIDE 0.9 % (FLUSH) 0.9 %
1-10 SYRINGE (ML) INJECTION AS NEEDED
Status: CANCELLED | OUTPATIENT
Start: 2017-05-01

## 2017-05-03 ENCOUNTER — ANESTHESIA (OUTPATIENT)
Dept: GASTROENTEROLOGY | Facility: HOSPITAL | Age: 82
End: 2017-05-03

## 2017-05-03 ENCOUNTER — ANESTHESIA EVENT (OUTPATIENT)
Dept: GASTROENTEROLOGY | Facility: HOSPITAL | Age: 82
End: 2017-05-03

## 2017-05-03 ENCOUNTER — HOSPITAL ENCOUNTER (OUTPATIENT)
Facility: HOSPITAL | Age: 82
Setting detail: HOSPITAL OUTPATIENT SURGERY
Discharge: HOME | End: 2017-05-03
Attending: INTERNAL MEDICINE | Admitting: INTERNAL MEDICINE

## 2017-05-03 VITALS
DIASTOLIC BLOOD PRESSURE: 57 MMHG | RESPIRATION RATE: 18 BRPM | HEIGHT: 65 IN | BODY MASS INDEX: 20.49 KG/M2 | SYSTOLIC BLOOD PRESSURE: 196 MMHG | WEIGHT: 123 LBS | OXYGEN SATURATION: 93 % | HEART RATE: 55 BPM | TEMPERATURE: 97.2 F

## 2017-05-03 DIAGNOSIS — R63.4 WEIGHT LOSS: ICD-10-CM

## 2017-05-03 DIAGNOSIS — R19.06 EPIGASTRIC FULLNESS: ICD-10-CM

## 2017-05-03 PROCEDURE — 25010000002 PROPOFOL 10 MG/ML EMULSION: Performed by: NURSE ANESTHETIST, CERTIFIED REGISTERED

## 2017-05-03 PROCEDURE — 43239 EGD BIOPSY SINGLE/MULTIPLE: CPT | Performed by: INTERNAL MEDICINE

## 2017-05-03 PROCEDURE — 88305 TISSUE EXAM BY PATHOLOGIST: CPT | Performed by: INTERNAL MEDICINE

## 2017-05-03 PROCEDURE — 25010000002 ONDANSETRON PER 1 MG: Performed by: NURSE ANESTHETIST, CERTIFIED REGISTERED

## 2017-05-03 RX ORDER — PROPOFOL 10 MG/ML
VIAL (ML) INTRAVENOUS AS NEEDED
Status: DISCONTINUED | OUTPATIENT
Start: 2017-05-03 | End: 2017-05-03 | Stop reason: SURG

## 2017-05-03 RX ORDER — SODIUM CHLORIDE 9 MG/ML
70 INJECTION, SOLUTION INTRAVENOUS CONTINUOUS PRN
Status: DISCONTINUED | OUTPATIENT
Start: 2017-05-03 | End: 2017-05-03 | Stop reason: HOSPADM

## 2017-05-03 RX ORDER — SODIUM CHLORIDE 0.9 % (FLUSH) 0.9 %
1-10 SYRINGE (ML) INJECTION AS NEEDED
Status: DISCONTINUED | OUTPATIENT
Start: 2017-05-03 | End: 2017-05-03 | Stop reason: HOSPADM

## 2017-05-03 RX ORDER — ONDANSETRON 2 MG/ML
INJECTION INTRAMUSCULAR; INTRAVENOUS AS NEEDED
Status: DISCONTINUED | OUTPATIENT
Start: 2017-05-03 | End: 2017-05-03 | Stop reason: SURG

## 2017-05-03 RX ORDER — PANTOPRAZOLE SODIUM 40 MG/10ML
40 INJECTION, POWDER, LYOPHILIZED, FOR SOLUTION INTRAVENOUS ONCE
Status: DISCONTINUED | OUTPATIENT
Start: 2017-05-03 | End: 2017-05-03 | Stop reason: HOSPADM

## 2017-05-03 RX ADMIN — PROPOFOL 30 MG: 10 INJECTION, EMULSION INTRAVENOUS at 10:35

## 2017-05-03 RX ADMIN — PROPOFOL 30 MG: 10 INJECTION, EMULSION INTRAVENOUS at 10:37

## 2017-05-03 RX ADMIN — SODIUM CHLORIDE 70 ML/HR: 9 INJECTION, SOLUTION INTRAVENOUS at 09:25

## 2017-05-03 RX ADMIN — LIDOCAINE HYDROCHLORIDE 60 MG: 20 INJECTION, SOLUTION INTRAVENOUS at 10:35

## 2017-05-03 RX ADMIN — ONDANSETRON 4 MG: 2 INJECTION INTRAMUSCULAR; INTRAVENOUS at 10:31

## 2017-05-08 LAB
LAB AP CASE REPORT: NORMAL
Lab: NORMAL
PATH REPORT.FINAL DX SPEC: NORMAL

## 2017-05-18 ENCOUNTER — TELEPHONE (OUTPATIENT)
Dept: INTERNAL MEDICINE | Facility: CLINIC | Age: 82
End: 2017-05-18

## 2017-05-19 RX ORDER — PANTOPRAZOLE SODIUM 40 MG/1
40 TABLET, DELAYED RELEASE ORAL DAILY
Qty: 30 TABLET | Refills: 11 | Status: SHIPPED | OUTPATIENT
Start: 2017-05-19 | End: 2018-05-18 | Stop reason: SDUPTHER

## 2017-06-19 RX ORDER — HYDROCHLOROTHIAZIDE 25 MG/1
TABLET ORAL
Qty: 30 TABLET | Refills: 5 | Status: SHIPPED | OUTPATIENT
Start: 2017-06-19 | End: 2018-07-20 | Stop reason: SDUPTHER

## 2017-06-30 ENCOUNTER — OFFICE VISIT (OUTPATIENT)
Dept: INTERNAL MEDICINE | Facility: CLINIC | Age: 82
End: 2017-06-30

## 2017-06-30 VITALS
SYSTOLIC BLOOD PRESSURE: 118 MMHG | TEMPERATURE: 97.8 F | HEIGHT: 65 IN | RESPIRATION RATE: 14 BRPM | BODY MASS INDEX: 20.33 KG/M2 | HEART RATE: 64 BPM | OXYGEN SATURATION: 96 % | WEIGHT: 122 LBS | DIASTOLIC BLOOD PRESSURE: 60 MMHG

## 2017-06-30 DIAGNOSIS — J43.8 OTHER EMPHYSEMA (HCC): ICD-10-CM

## 2017-06-30 DIAGNOSIS — D64.9 ANEMIA, UNSPECIFIED TYPE: ICD-10-CM

## 2017-06-30 DIAGNOSIS — I10 ESSENTIAL HYPERTENSION: ICD-10-CM

## 2017-06-30 DIAGNOSIS — I25.10 CHRONIC CORONARY ARTERY DISEASE: Primary | ICD-10-CM

## 2017-06-30 DIAGNOSIS — E03.9 HYPOTHYROIDISM, UNSPECIFIED TYPE: ICD-10-CM

## 2017-06-30 DIAGNOSIS — F41.9 ANXIETY: ICD-10-CM

## 2017-06-30 DIAGNOSIS — E78.5 HYPERLIPIDEMIA, UNSPECIFIED HYPERLIPIDEMIA TYPE: ICD-10-CM

## 2017-06-30 DIAGNOSIS — E55.9 VITAMIN D DEFICIENCY: ICD-10-CM

## 2017-06-30 PROCEDURE — 99214 OFFICE O/P EST MOD 30 MIN: CPT | Performed by: INTERNAL MEDICINE

## 2017-06-30 RX ORDER — HYDROXYZINE HYDROCHLORIDE 25 MG/1
TABLET, FILM COATED ORAL
Refills: 1 | COMMUNITY
Start: 2017-05-18 | End: 2019-05-15

## 2017-06-30 NOTE — PROGRESS NOTES
Subjective   Mi Tejeda is a 81 y.o. female.     Chief Complaint   Patient presents with   • Hypertension     Her for follow up        History of Present Illness   Patient here for follow-up.  History of coronary artery disease a CABG denies any chest pain except  baseline short of breath.  Weight lost 1 pound.  Patient declined to see cardiologist.  Upper endoscopy showed gastritis stomach ulcer patient is follow-up with gastroenterologist.  Patient still smokes cigarettes.  Depression anxiety on medication stable.  Hypertension stable medication.  Hypothyroidism stable medication.  COPD improved after Symbicort.    Current Outpatient Prescriptions:   •  albuterol (PROAIR HFA) 108 (90 BASE) MCG/ACT inhaler, Inhale 2 puffs Every 4 (Four) Hours As Needed for Wheezing or Shortness of Air., Disp: 2 inhaler, Rfl: 11  •  buPROPion XL (WELLBUTRIN XL) 150 MG 24 hr tablet, Take 1 tablet by mouth Daily. (Patient taking differently: Take 150 mg by mouth Daily As Needed.), Disp: 30 tablet, Rfl: 2  •  Cholecalciferol (VITAMIN D3) 5000 UNITS capsule capsule, Take 1 capsule by mouth Daily., Disp: 90 capsule, Rfl: 3  •  ferrous gluconate 324 (37.5 FE) MG tablet tablet, Take 1 tablet by mouth Daily With Breakfast., Disp: 90 tablet, Rfl: 3  •  hydrochlorothiazide (HYDRODIURIL) 25 MG tablet, 1QD, Disp: 30 tablet, Rfl: 5  •  HYDROcodone-acetaminophen (NORCO) 7.5-325 MG per tablet, Take 1 tablet by mouth 2 (Two) Times a Day As Needed for Mild Pain (1-3) or Moderate Pain (4-6)., Disp: , Rfl:   •  hydrOXYzine (ATARAX) 25 MG tablet, , Disp: , Rfl: 1  •  levothyroxine (SYNTHROID, LEVOTHROID) 100 MCG tablet, Take 1 tablet by mouth Daily., Disp: 90 tablet, Rfl: 3  •  losartan (COZAAR) 100 MG tablet, Take 1 tablet by mouth Daily., Disp: 30 tablet, Rfl: 5  •  metoprolol succinate XL (TOPROL-XL) 50 MG 24 hr tablet, Take 1 tablet by mouth Daily., Disp: 90 tablet, Rfl: 3  •  morphine (MS CONTIN) 30 MG 12 hr tablet, Take 30 mg by mouth 2  (Two) Times a Day., Disp: , Rfl:   •  nisoldipine (SULAR) 8.5 MG 24 hr tablet, Take 1 tablet by mouth Daily., Disp: 30 tablet, Rfl: 5  •  pantoprazole (PROTONIX) 40 MG EC tablet, Take 1 tablet by mouth Daily., Disp: 30 tablet, Rfl: 11    The following portions of the patient's history were reviewed and updated as appropriate: allergies, current medications, past family history, past medical history, past social history, past surgical history and problem list.    Review of Systems   Constitutional: Negative.    Respiratory: Positive for shortness of breath.    Cardiovascular: Negative.  Negative for chest pain.   Gastrointestinal: Negative.    Musculoskeletal: Negative.    Skin: Negative.    Neurological: Negative.    Psychiatric/Behavioral: Negative.        Objective   Physical Exam   Constitutional: She is oriented to person, place, and time. She appears well-nourished.   Neck: Neck supple.   Cardiovascular: Normal rate, regular rhythm and normal heart sounds.    Pulmonary/Chest: Effort normal and breath sounds normal.   Abdominal: Bowel sounds are normal.   Neurological: She is alert and oriented to person, place, and time.   Skin: Skin is warm.   Psychiatric: She has a normal mood and affect.       All tests have been reviewed.    Assessment/Plan   There are no diagnoses linked to this encounter.          CAD CABG history no cp EKG old MI , refuse stress test and EKG, no sig soa. Unable to take a cholesterol medicine patient is allergic to it.refuse PCK9 even ins covers.start plavix, refuses to cardio--  weight loss 2 Ib again watch  early satirety improved seen GI, s/p EGD5/2017: 0.75 cm clean base gastric ulcer at the body of the stomach. Risks of bleeding less than 5%.Non-bleeding duodenal bulb vascular ectasia.Erythematous gastritis.Small sliding hiatal hernia less than 3 cm.Nonobstructive Schatzki's-type ring, now epigastric pain pending to see dr duarte, continue protonix--  pulmonary CT scan showed a small  nodules, repeat NSC  CT scan does show 4.3 cm uterine fibroid s/p ultrasound utero mass, seen by gyn, stable  TIA history of possible TIA left eye cholesterol deposits by ophthalmologist. carotid duplex 50%, CTA neck artery 70% need repeat next, echocardiogram unremarkable, unable to tolerate plavix sick in stomach, take ASA  Abnormal EKG short of breath with exertion refused stress test,   Claudication possible PAD history of stents left leg artery. ultrasound again stenosis continue pentoxifylline  Tobacco encourage patient to quit   muscle cramp improved continue flexeril  plantar faciatis banks's neuroma, seen dr Caceres continue to followup. continue Neurontin  LBParthritis follow up with pain clinic and on Pain medicine, on MS  Fe def anemia normal after fe supplement obtain colon report from dr leung done 4/2013,, SBFT normal, continue iron supplements.  colonoscopy 4/9/2013.colon polyp(tubular adenoma), AVM, and divertic  vit D low,continue supplement.   hyperglycemia good diet  idioppathic neuropathy continue gabapentin  depression/anxiety continue medications improved on neurontin, xanax d/c'd by pain doctor, remeron no help, xanax d/c'd by pain clinic,continue hydroxyzine wellbutrin--  empty bladder problem stable now  dizzy resolved  weight stable  HTN continue medicine  hypothyroidism continue medication   LBP spinal stenosis. continue medicine from pain clinic on narcotics  copd severe restrictive disease on PFT, unable to use spiriva, continue symbicort  refuse prevnar 13, refuse zostavax, flushot 2016, pneumovax done--  Muscle cramp in toes follow up with podiatrist. Baclofen no help, stretch exercise  Osteopenia on dexa vit d and calcium continue  high BUN, increase fluids intake--  Urine WBC 3-5 without sx, watch for now   Followup in 3 mo wellness

## 2017-07-18 ENCOUNTER — OFFICE VISIT (OUTPATIENT)
Dept: GASTROENTEROLOGY | Facility: CLINIC | Age: 82
End: 2017-07-18

## 2017-07-18 VITALS
WEIGHT: 121 LBS | HEART RATE: 71 BPM | SYSTOLIC BLOOD PRESSURE: 117 MMHG | BODY MASS INDEX: 20.16 KG/M2 | HEIGHT: 65 IN | TEMPERATURE: 97 F | DIASTOLIC BLOOD PRESSURE: 66 MMHG | RESPIRATION RATE: 14 BRPM

## 2017-07-18 DIAGNOSIS — K31.819 ANGIODYSPLASIA OF DUODENUM: ICD-10-CM

## 2017-07-18 DIAGNOSIS — K59.03 DRUG-INDUCED CONSTIPATION: ICD-10-CM

## 2017-07-18 DIAGNOSIS — K25.7 CHRONIC GASTRIC ULCER: Primary | ICD-10-CM

## 2017-07-18 PROCEDURE — 99214 OFFICE O/P EST MOD 30 MIN: CPT | Performed by: INTERNAL MEDICINE

## 2017-07-18 NOTE — PROGRESS NOTES
Chief Complaint   Patient presents with   • Follow-up       History of Present Illness     The patient came back for follow visit today.  The patient feels better.  Her upper abdominal pain has resolved.  There is no history of overt GI bleed (hematemesis melena or hematochezia).  The patient denies nausea or vomiting.  There is no history of reflux.  The patient denies dysphagia or odynophagia.  There is no history of recent significant weight loss.  There is no history of liver or pancreatic disease.    The patient has a history of constipation off and on for the last several years. Severity is moderate. This is described as hard stools perhaps one bowel movement twice a week. The patient takes  stool softeners and laxatives to have a bowel movement. There is no associated rectal pain. There is no history of diarrhea.  There is no liver or pancreatic disease.  Review of Systems   Constitutional: Negative for appetite change, chills, fatigue, fever and unexpected weight change.   HENT: Negative for mouth sores, nosebleeds and trouble swallowing.    Eyes: Negative for discharge and redness.   Respiratory: Negative for apnea, cough and shortness of breath.    Cardiovascular: Negative for chest pain, palpitations and leg swelling.   Gastrointestinal: Negative for abdominal distention, abdominal pain, anal bleeding, blood in stool, constipation, diarrhea, nausea and vomiting.   Endocrine: Negative for cold intolerance, heat intolerance and polydipsia.   Genitourinary: Negative for dysuria, hematuria and urgency.   Musculoskeletal: Positive for arthralgias. Negative for joint swelling and myalgias.   Skin: Negative for rash.   Allergic/Immunologic: Negative for food allergies and immunocompromised state.   Neurological: Positive for weakness. Negative for dizziness, seizures, syncope and headaches.   Hematological: Negative for adenopathy. Bruises/bleeds easily.   Psychiatric/Behavioral: Negative for dysphoric mood. The  patient is not nervous/anxious and is not hyperactive.      Patient Active Problem List   Diagnosis   • Anxiety   • Anemia   • Chronic coronary artery disease   • Carotid bruit   • Chronic obstructive pulmonary disease   • Dysphagia   • Diverticulum of esophagus   • Fatigue   • Gastroesophageal reflux disease   • Hyperlipidemia   • Hypertension   • Hypothyroidism   • Multiple pulmonary nodules   • Peripheral arterial occlusive disease   • Plantar fasciitis   • Spinal stenosis   • Temporary cerebral vascular dysfunction   • Uterine leiomyoma   • Vitamin D deficiency   • Abnormal electrocardiogram   • Cramp in lower leg   • Lower esophageal ring     Past Medical History:   Diagnosis Date   • Abdominal pain     Improving. Likely secondary to gastritis.   • Abnormal electrocardiogram    • Abnormal urinalysis    • Acute UTI    • Anemia    • Anomaly cardiac     REPORTS WILL SHOW ON LEFT VENTRICLE IN A 12 LEAD EKG AS BEING A MI BUT REPORTS SHE HAS NEVER HAD THIS    • Anxiety    • Aphagia     HX OF THIS, REPORTS WAS CLEARED OF A CVA BUT WAS TOLD WAS RELATED TO FATIGUE   • Asthma    • Atrial fibrillation    • Backache    • CAD (coronary artery disease)    • COPD (chronic obstructive pulmonary disease)    • Depression    • Dysphagia     REPORTS RESOLVED AFTER DILITATION   • Fracture    • Full dentures    • Gastric ulcer    • High cholesterol    • HTN (hypertension)    • Hypertriglyceridemia    • Hypothyroidism    • Multiple lung nodules    • Osteoarthritis    • Peripheral neuropathy    • Personal history of tobacco use    • Plantar fasciitis    • Pneumonia    • Sinus problem    • Sinusitis    • TIA (transient ischemic attack)    • Viral gastroenteritis    • Vitamin D deficiency    • Wears glasses    • Weight loss, non-intentional      Past Surgical History:   Procedure Laterality Date   • CARDIAC SURGERY     • CATARACT EXTRACTION Bilateral 2009   • CHOLECYSTECTOMY  2002   • COLONOSCOPY     • CORONARY ARTERY BYPASS GRAFT  1998     2 vessels   • ENDOSCOPY     • ENDOSCOPY N/A 5/3/2017    Procedure: ESOPHAGOGASTRODUODENOSCOPY with biopsies;  Surgeon: Yared Castellanos MD;  Location: [facility] ENDOSCOPY;  Service:    • JOINT REPLACEMENT Left 2010    hip     Family History   Problem Relation Age of Onset   • Cancer Mother      uterus   • Cancer Sister      colon; kidney   • Cancer Brother      prostate   • Arthritis Other    • Cancer Other    • Kidney disease Other    • Stroke Other    • Hypertension Other      Social History   Substance Use Topics   • Smoking status: Current Every Day Smoker     Packs/day: 1.00     Years: 60.00     Types: Cigarettes, Electronic Cigarette   • Smokeless tobacco: Former User   • Alcohol use No       Current Outpatient Prescriptions:   •  albuterol (PROAIR HFA) 108 (90 BASE) MCG/ACT inhaler, Inhale 2 puffs Every 4 (Four) Hours As Needed for Wheezing or Shortness of Air., Disp: 2 inhaler, Rfl: 11  •  buPROPion XL (WELLBUTRIN XL) 150 MG 24 hr tablet, Take 1 tablet by mouth Daily. (Patient taking differently: Take 150 mg by mouth Daily As Needed.), Disp: 30 tablet, Rfl: 2  •  Cholecalciferol (VITAMIN D3) 5000 UNITS capsule capsule, Take 1 capsule by mouth Daily., Disp: 90 capsule, Rfl: 3  •  ferrous gluconate 324 (37.5 FE) MG tablet tablet, Take 1 tablet by mouth Daily With Breakfast., Disp: 90 tablet, Rfl: 3  •  hydrochlorothiazide (HYDRODIURIL) 25 MG tablet, 1QD, Disp: 30 tablet, Rfl: 5  •  HYDROcodone-acetaminophen (NORCO) 7.5-325 MG per tablet, Take 1 tablet by mouth 2 (Two) Times a Day As Needed for Mild Pain (1-3) or Moderate Pain (4-6)., Disp: , Rfl:   •  hydrOXYzine (ATARAX) 25 MG tablet, , Disp: , Rfl: 1  •  levothyroxine (SYNTHROID, LEVOTHROID) 100 MCG tablet, Take 1 tablet by mouth Daily., Disp: 90 tablet, Rfl: 3  •  losartan (COZAAR) 100 MG tablet, Take 1 tablet by mouth Daily., Disp: 30 tablet, Rfl: 5  •  metoprolol succinate XL (TOPROL-XL) 50 MG 24 hr tablet, Take 1 tablet by mouth Daily., Disp: 90 tablet,  "Rfl: 3  •  morphine (MS CONTIN) 30 MG 12 hr tablet, Take 30 mg by mouth 2 (Two) Times a Day., Disp: , Rfl:   •  nisoldipine (SULAR) 8.5 MG 24 hr tablet, Take 1 tablet by mouth Daily., Disp: 30 tablet, Rfl: 5  •  pantoprazole (PROTONIX) 40 MG EC tablet, Take 1 tablet by mouth Daily., Disp: 30 tablet, Rfl: 11    Allergies   Allergen Reactions   • Keflex [Cephalexin] Nausea Only   • Niacin    • Statins        Blood pressure 117/66, pulse 71, temperature 97 °F (36.1 °C), resp. rate 14, height 64.5\" (163.8 cm), weight 121 lb (54.9 kg).    Physical Exam   Constitutional: She is oriented to person, place, and time. She appears well-developed and well-nourished. No distress.   HENT:   Head: Normocephalic and atraumatic.   Right Ear: Hearing and external ear normal.   Left Ear: Hearing and external ear normal.   Nose: Nose normal.   Mouth/Throat: Oropharynx is clear and moist and mucous membranes are normal. Mucous membranes are not pale, not dry and not cyanotic. No oral lesions. No oropharyngeal exudate.   Eyes: Conjunctivae and EOM are normal. Right eye exhibits no discharge. Left eye exhibits no discharge. No scleral icterus.   Neck: Trachea normal. Neck supple. No JVD present. No edema present. No thyroid mass and no thyromegaly present.   Cardiovascular: Normal rate, regular rhythm, S2 normal and normal heart sounds.  Exam reveals no gallop, no S3 and no friction rub.    No murmur heard.  Pulmonary/Chest: Effort normal and breath sounds normal. No respiratory distress. She has no wheezes. She has no rales. She exhibits no tenderness.   Abdominal: Soft. Normal appearance and bowel sounds are normal. She exhibits no distension, no ascites and no mass. There is no splenomegaly or hepatomegaly. There is no tenderness. There is no rigidity, no rebound and no guarding. No hernia.   Musculoskeletal: She exhibits no tenderness or deformity.       Vascular Status -  Her exam exhibits no right foot edema. Her exam exhibits no " left foot edema.  Lymphadenopathy:     She has no cervical adenopathy.        Left: No supraclavicular adenopathy present.   Neurological: She is alert and oriented to person, place, and time. She has normal strength. No cranial nerve deficit or sensory deficit. She exhibits normal muscle tone. Coordination normal.   Skin: No rash noted. She is not diaphoretic. No cyanosis. No pallor. Nails show no clubbing.   Psychiatric: She has a normal mood and affect. Her behavior is normal. Judgment and thought content normal.   Nursing note and vitals reviewed.    Stigmata of chronic liver disease:  Positive palmar erythema.    Asterixis:  None.    Laboratory Testing:  Upon review of medical records:    Dated February 15, 2017 sodium 143 potassium 3.8 chloride 106 CO2 30 BUN 28 serum creatinine 0.80 glucose 92. Calcium 9.9. Albumin 4.00. T bili 0.3 AST 23 ALT 18 alkaline phosphatase 91. C-reactive protein 1.10. Serum iron 50. TIBC 339. Iron saturation 15%. Ferritin 62.00. WBC 7.89 hemoglobin 13.5 hematocrit 41.6 MCV 96.3 platelet count 238.     Abdominal Imaging:  Upon Review of medical records:     The patient underwent a CT of the abdomen and pelvis without contrast dated July 24, 2015 which revealed: Abdomen: Clear lung bases. Heart size is normal. The limited noncontrast images of the liver are normal. Patient is status post cholecystectomy. The spleen is normal. No adrenal masses are seen. The aorta is normal in caliber. There is no significant free fluid or adenopathy. There is no nephrolithiasis. There is no hydronephrosis. Extensive vascular calcifications are present within the renal arteries. Pelvis: Appendix is normal. There are colonic diverticula present without evidence of diverticulitis. The urinary bladder is unremarkable. There is a 4.3 cm mass adjacent to the uterus which is 6 suspected to reflect a fibroid, however this should be followed up with pelvic ultrasound. There is no free fluid, free air or  adenopathy. Bone windows reveal left hip arthroplasty. There are no acute osseous abnormalities.     Dated February 27, 2017 the patient underwent a CT of the abdomen and pelvis without contrast which revealed: No renal or ureteral stone or hydronephrosis. Bladder is normal in contour. Liver, spleen, pancreas and adrenal glands appear normal. The bowel gas pattern is nonobstructive. No focal inflammatory changes are present. The appendix is not visualized but there are no secondary signs to suggest appendicitis. There is scattered diverticulosis without diverticulitis. There is diffuse vascular calcifications.     Procedures:  Upon review of medical records:     Dated April 10, 2013 the patient underwent a colonoscopy by Dr. Thompson from surgical service which revealed: Colon polyp. Arteriovenous malformations, cecum. Diverticulosis coli. Colon, polyp at 10 cm, biopsy revealed tubular adenoma.        EGD dated 12/14/2015: Schatzki's ring status post dilation to 18 mm. Small sliding hiatal hernia less than 3 cm. Early diverticulum within the distal esophagus. Erythematous gastritis. Bile reflux within the stomach. Significant tortuosity and tertiary contractions of the esophageal body were noted. This test does no explain the cause of anemia. The patient is at high risk for pill esophagitis. Second portion of duodenum biopsy revealed preserved villous architecture with no significant histopathologic change. No parasites noted. No villous blunting or increased intraepithelial lymphocytes suggestive of celiac disease. Antrum and body, biopsies, and angulus revealed minimal chronic gastritis with reactive features. No intestinal metaplasia or dysplasia identified. No Helicobacter pylori -like organisms noted on routine stain. Due to limited chronic inflammation present and lack of active inflammation, special stains for Helicobacter pylori were not undertaken. If there is a high clinical index of suspicion of Helicobacter  infection, please notify this laboratory and additional studies will be performed. Esophagus, biopsies, distal and proximal revealed squamous mucosa showing mild basal layer hyperplasia and minimal increase in intraepithelial leukocytes without eosinophils. Histologic features are mild and felt to be nonspecific. No glandular mucosa or intestinal metaplasia identified.     Dated May 3, 2017 the patient underwent an upper endoscopy which revealed: 0.75 cm clean base gastric ulcer at the body of the stomach. Risks of bleeding less than 5%. Non-bleeding duodenal bulb vascular ectasia.  Erythematous gastritis.  Small sliding hiatal hernia less than 3 cm.  Nonobstructive Schatzki's-type ring.  Second portion, duodenum, biopsies revealed benign small bowel mucosa with no diagnostic abnormality.  Stomach, body, ulcer, biopsies revealed compatible with reactive gastropathy.     Assessment and Plan:      Mi was seen today for follow-up.    Diagnoses and all orders for this visit:    Chronic gastric ulcer  Comments:  Improving.    Angiodysplasia of duodenum  Comments:  This may be contributing to iron deficiency.    Drug-induced constipation  Comments:  Multifactorial including opioid medication.              Plan  and Patient Instructions:    Patient Instructions   1. Low-fat-high-fiber diet with liberal water intake.  2. The patient may take Longoria magnesium caplet one at night and if necessary one twice a day.  Adjust the dose to have 1 bowel movement a day or every other day.  3. Pantoprazole 40 mg 1 by mouth every morning one half hour before breakfast.  4. It is generally recommended that the patient should undergo an upper endoscopy in 3 months to insure gastric ulcer healing.  However, the patient will think about it and will get back to us.  5. Discussed with the patient in detail.  Opportunity was given to ask questions.  6. Follow-up:  The patient will call back.        Yared Castellanos MD

## 2017-07-18 NOTE — PATIENT INSTRUCTIONS
1. Low-fat-high-fiber diet with liberal water intake.  2. The patient may take Longoria magnesium caplet one at night and if necessary one twice a day.  Adjust the dose to have 1 bowel movement a day or every other day.  3. Pantoprazole 40 mg 1 by mouth every morning one half hour before breakfast.  4. It is generally recommended that the patient should undergo an upper endoscopy in 3 months to insure gastric ulcer healing.  However, the patient will think about it and will get back to us.  5. Discussed with the patient in detail.  Opportunity was given to ask questions.  6. Follow-up:  The patient will call back.

## 2017-08-21 ENCOUNTER — OFFICE VISIT (OUTPATIENT)
Dept: INTERNAL MEDICINE | Facility: CLINIC | Age: 82
End: 2017-08-21

## 2017-08-21 VITALS
BODY MASS INDEX: 20.36 KG/M2 | HEART RATE: 59 BPM | SYSTOLIC BLOOD PRESSURE: 114 MMHG | RESPIRATION RATE: 12 BRPM | HEIGHT: 65 IN | OXYGEN SATURATION: 97 % | DIASTOLIC BLOOD PRESSURE: 60 MMHG | TEMPERATURE: 98 F | WEIGHT: 122.2 LBS

## 2017-08-21 DIAGNOSIS — S80.812A CAT SCRATCH OF LOWER LEG, LEFT, INITIAL ENCOUNTER: Primary | ICD-10-CM
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
## 2017-08-21 PROCEDURE — 99213 OFFICE O/P EST LOW 20 MIN: CPT | Performed by: NURSE PRACTITIONER

## 2017-08-21 NOTE — PROGRESS NOTES
Chief Complaint / Reason:      Chief Complaint   Patient presents with   • cat scratch back of left LE     x 3 months       Subjective     HPI  Patient presents today with complaints of Scratch on the back of her left lower extremity.  The onset was 3 months ago and it has not healed.  She states that the site did look worse that she has tried Peroxide, Neosporin, Epsom salt, and alcohol with minimal relief.  She states that she has not been running fever, no weight loss, or any neurological symptoms.  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 Patient lives alone and does have some nieces close by but normally will not contact them unless she really needs them.  She states she is still independent.  History taken from: patient    PMH/FH/Social History were reviewed and updated appropriately in the electronic medical record.     Review of Systems:   Review of Systems   Constitutional: Negative.    HENT: Negative.    Respiratory: Negative.    Cardiovascular: Negative.    Gastrointestinal: Negative.    Genitourinary: Negative.    Skin: Positive for color change and wound.   Hematological: Negative for adenopathy. Bruises/bleeds easily.     All other systems were reviewed and are negative.  Exceptions are noted in the subjective or above.      Objective     Vital Signs  Vitals:    08/21/17 1500   BP: 114/60   Pulse: 59   Resp: 12   Temp: 98 °F (36.7 °C)   SpO2: 97%       Body mass index is 20.65 kg/(m^2).    Physical Exam   Constitutional: She is oriented to person, place, and time. She appears well-developed and well-nourished. No distress.   HENT:   Head: Normocephalic.   Mouth/Throat: Mucous membranes are pale and dry.   Oral mucosa very dry   Neck: Neck supple.   Cardiovascular: Regular rhythm, normal heart sounds and intact distal pulses.  Bradycardia present.     Pulmonary/Chest: Effort normal and breath sounds normal.   Abdominal: Bowel sounds are normal.   Neurological: She is alert and oriented to person, place, and time.   Skin: Skin is warm and dry.        Psychiatric: She has a normal mood and affect. Her behavior is normal. Judgment and thought content normal.   Nursing note and vitals reviewed.      Medication Review:     Current Outpatient Prescriptions:   •  albuterol (PROAIR HFA) 108 (90 BASE) MCG/ACT inhaler, Inhale 2 puffs Every 4 (Four) Hours As Needed for Wheezing or Shortness of Air., Disp: 2 inhaler, Rfl: 11  •  buPROPion XL (WELLBUTRIN XL) 150 MG 24 hr tablet, Take 1 tablet by mouth Daily. (Patient taking differently: Take 150 mg by mouth Daily As Needed.), Disp: 30 tablet, Rfl: 2  •  Cholecalciferol (VITAMIN D3) 5000 UNITS capsule capsule, Take 1 capsule by mouth Daily., Disp: 90 capsule, Rfl: 3  •  ferrous gluconate 324 (37.5 FE) MG tablet tablet, Take 1 tablet by mouth Daily With Breakfast., Disp: 90 tablet, Rfl: 3  •  hydrochlorothiazide (HYDRODIURIL) 25 MG tablet, 1QD, Disp: 30 tablet, Rfl: 5  •  HYDROcodone-acetaminophen (NORCO) 7.5-325 MG per tablet, Take 1 tablet by mouth 2 (Two) Times a Day As Needed for Mild Pain (1-3) or Moderate Pain (4-6)., Disp: , Rfl:   •  hydrOXYzine (ATARAX) 25 MG tablet, , Disp: , Rfl: 1  •  levothyroxine (SYNTHROID, LEVOTHROID) 100 MCG tablet, Take 1 tablet by mouth Daily., Disp: 90 tablet, Rfl: 3  •  losartan (COZAAR) 100 MG tablet, Take 1 tablet by mouth Daily., Disp: 30 tablet, Rfl: 5  •  metoprolol succinate XL (TOPROL-XL) 50 MG 24 hr tablet, Take 1 tablet by mouth Daily., Disp: 90 tablet, Rfl: 3  •  morphine (MS CONTIN) 30 MG 12 hr tablet, Take 30 mg by mouth 2 (Two) Times a Day., Disp: , Rfl:   •  nisoldipine (SULAR) 8.5 MG 24 hr tablet, Take 1 tablet by mouth Daily., Disp: 30 tablet, Rfl: 5  •  pantoprazole (PROTONIX) 40 MG EC tablet, Take 1 tablet by mouth Daily., Disp: 30 tablet, Rfl: 11    Assessment/Plan    Mi was seen today for cat scratch back of left leg.    Diagnoses and all orders for this visit:    Cat scratch of lower leg, left, initial encounter  -     Ambulatory Referral to Dermatology  Patient has several skin lesions that need to be assessed  Advised patient to get Tdap especially being around cats and higher risk of falls.  Increase fluid intake.  Discussed worsening signs and symptoms.  Recommend patient use good hand washing and avoid scratching inflamed areas.  Discussed worsening signs and symptoms and safety.  Follow up in 4 weeks and PRN  Rachel Byrnes, CHARITO  08/21/2017

## 2017-10-06 RX ORDER — ALBUTEROL SULFATE 90 UG/1
2 AEROSOL, METERED RESPIRATORY (INHALATION) EVERY 4 HOURS PRN
Qty: 2 INHALER | Refills: 11 | Status: SHIPPED | OUTPATIENT
Start: 2017-10-06 | End: 2018-01-19 | Stop reason: SDUPTHER

## 2017-11-07 ENCOUNTER — OFFICE VISIT (OUTPATIENT)
Dept: INTERNAL MEDICINE | Facility: CLINIC | Age: 82
End: 2017-11-07

## 2017-11-07 VITALS
OXYGEN SATURATION: 95 % | SYSTOLIC BLOOD PRESSURE: 122 MMHG | RESPIRATION RATE: 14 BRPM | HEART RATE: 64 BPM | DIASTOLIC BLOOD PRESSURE: 68 MMHG | WEIGHT: 120 LBS | HEIGHT: 65 IN | BODY MASS INDEX: 19.99 KG/M2 | TEMPERATURE: 98 F

## 2017-11-07 DIAGNOSIS — J43.8 OTHER EMPHYSEMA (HCC): ICD-10-CM

## 2017-11-07 DIAGNOSIS — R91.1 LUNG NODULE: ICD-10-CM

## 2017-11-07 DIAGNOSIS — E55.9 VITAMIN D DEFICIENCY: ICD-10-CM

## 2017-11-07 DIAGNOSIS — D64.9 ANEMIA, UNSPECIFIED TYPE: ICD-10-CM

## 2017-11-07 DIAGNOSIS — I65.21 STENOSIS OF RIGHT CAROTID ARTERY: ICD-10-CM

## 2017-11-07 DIAGNOSIS — I25.10 CHRONIC CORONARY ARTERY DISEASE: Primary | ICD-10-CM

## 2017-11-07 DIAGNOSIS — M48.00 SPINAL STENOSIS, UNSPECIFIED SPINAL REGION: ICD-10-CM

## 2017-11-07 DIAGNOSIS — F41.9 ANXIETY: ICD-10-CM

## 2017-11-07 DIAGNOSIS — K21.9 GASTROESOPHAGEAL REFLUX DISEASE WITHOUT ESOPHAGITIS: ICD-10-CM

## 2017-11-07 DIAGNOSIS — I10 ESSENTIAL HYPERTENSION: ICD-10-CM

## 2017-11-07 DIAGNOSIS — E78.5 HYPERLIPIDEMIA, UNSPECIFIED HYPERLIPIDEMIA TYPE: ICD-10-CM

## 2017-11-07 PROCEDURE — G0439 PPPS, SUBSEQ VISIT: HCPCS | Performed by: INTERNAL MEDICINE

## 2017-11-07 PROCEDURE — 99213 OFFICE O/P EST LOW 20 MIN: CPT | Performed by: INTERNAL MEDICINE

## 2017-11-07 NOTE — PROGRESS NOTES
QUICK REFERENCE INFORMATION:  The ABCs of the Annual Wellness Visit    Subsequent Medicare Wellness Visit    HEALTH RISK ASSESSMENT    1935    Recent Hospitalizations:  No hospitalization(s) within the last year..        Current Medical Providers:  Patient Care Team:  Adiel Awad MD as PCP - General  Adiel Awad MD as PCP - Family Medicine  Laz Vallejo MD as PCP - Claims Attributed        Smoking Status:  History   Smoking Status   • Current Every Day Smoker   • Packs/day: 1.00   • Years: 60.00   • Types: Cigarettes, Electronic Cigarette   Smokeless Tobacco   • Former User       Alcohol Consumption:  History   Alcohol Use No       Depression Screen:   PHQ-2/PHQ-9 Depression Screening 11/7/2017   Little interest or pleasure in doing things 1   Feeling down, depressed, or hopeless 1   Trouble falling or staying asleep, or sleeping too much 3   Feeling tired or having little energy 3   Poor appetite or overeating 0   Feeling bad about yourself - or that you are a failure or have let yourself or your family down 0   Trouble concentrating on things, such as reading the newspaper or watching television 0   Moving or speaking so slowly that other people could have noticed. Or the opposite - being so fidgety or restless that you have been moving around a lot more than usual 0   Thoughts that you would be better off dead, or of hurting yourself in some way 0   Total Score 8   If you checked off any problems, how difficult have these problems made it for you to do your work, take care of things at home, or get along with other people? Not difficult at all       Health Habits and Functional and Cognitive Screening:  Functional & Cognitive Status 11/7/2017   Do you have difficulty preparing food and eating? No   Do you have difficulty bathing yourself, getting dressed or grooming yourself? No   Do you have difficulty using the toilet? No   Do you have difficulty moving around from place to place? No   Do you have  trouble with steps or getting out of a bed or a chair? No   In the past year have you fallen or experienced a near fall? No   Current Diet Well Balanced Diet   Dental Exam Up to date   Eye Exam Up to date   Exercise (times per week) 0 times per week   Current Exercise Activities Include None   Do you need help using the phone?  No   Are you deaf or do you have serious difficulty hearing?  No   Do you need help with transportation? No   Do you need help shopping? No   Do you need help preparing meals?  No   Do you need help with housework?  No   Do you need help with laundry? No   Do you need help taking your medications? No   Do you need help managing money? No   Have you felt unusual stress, anger or loneliness in the last month? No   Who do you live with? Alone   If you need help, do you have trouble finding someone available to you? No   Have you been bothered in the last four weeks by sexual problems? No   Do you have difficulty concentrating, remembering or making decisions? No           Does the patient have evidence of cognitive impairment? No    Aspirin use counseling: Contraindicated from taking ASA      Recent Lab Results:  CMP:  Lab Results   Component Value Date     (H) 11/05/2015    BUN 29 (H) 02/27/2017    CREATININE 1.00 02/27/2017    EGFRIFNONA 53 (L) 02/27/2017    BCR 29.0 (H) 02/27/2017     02/27/2017    K 4.0 02/27/2017    CO2 32.0 (H) 02/27/2017    CALCIUM 9.4 02/27/2017    ALBUMIN 4.00 02/15/2017    LABIL2 1.6 02/15/2017    BILITOT 0.3 02/15/2017    ALKPHOS 91 02/15/2017    AST 23 02/15/2017    ALT 18 02/15/2017     Lipid Panel:  Lab Results   Component Value Date    CHOL 199 10/20/2016    TRIG 140 10/20/2016    HDL 48 10/20/2016    LDLDIRECT 129 10/20/2016     HbA1c:       Visual Acuity:  No exam data present    Age-appropriate Screening Schedule:  Refer to the list below for future screening recommendations based on patient's age, sex and/or medical conditions. Orders for these  recommended tests are listed in the plan section. The patient has been provided with a written plan.    Health Maintenance   Topic Date Due   • PNEUMOCOCCAL VACCINES (65+ LOW/MEDIUM RISK) (2 of 2 - PCV13) 11/13/2016   • INFLUENZA VACCINE  08/01/2017   • LIPID PANEL  10/20/2017   • MAMMOGRAM  10/25/2018   • DXA SCAN  01/27/2019   • TDAP/TD VACCINES (2 - Td) 10/25/2026   • ZOSTER VACCINE  Addressed        Subjective   History of Present Illness    Mi Tejeda is a 81 y.o. female who presents for an Subsequent Wellness Visit.Patient here for annual wellness check and also medical problems follow-up.  Coronary artery disease is stable without chest pain.  Neck artery stenosis and needs a repeat.  Lung nodule needs to repeat.  Patient still smokes tobacco.  Anemia needs blood tests.  Hyperglycemia we will do A1c.  Hypertension stable medication.  Hypothyroidism is stable medication.  Patient still complains stiffness arthritis naproxen helps.    The following portions of the patient's history were reviewed and updated as appropriate: allergies, current medications, past family history, past medical history, past social history, past surgical history and problem list.    Outpatient Medications Prior to Visit   Medication Sig Dispense Refill   • albuterol (PROAIR HFA) 108 (90 Base) MCG/ACT inhaler Inhale 2 puffs Every 4 (Four) Hours As Needed for Wheezing or Shortness of Air. 2 inhaler 11   • buPROPion XL (WELLBUTRIN XL) 150 MG 24 hr tablet Take 1 tablet by mouth Daily. (Patient taking differently: Take 150 mg by mouth Daily As Needed.) 30 tablet 2   • Cholecalciferol (VITAMIN D3) 5000 UNITS capsule capsule Take 1 capsule by mouth Daily. 90 capsule 3   • ferrous gluconate 324 (37.5 FE) MG tablet tablet Take 1 tablet by mouth Daily With Breakfast. 90 tablet 3   • hydrochlorothiazide (HYDRODIURIL) 25 MG tablet 1QD 30 tablet 5   • HYDROcodone-acetaminophen (NORCO) 7.5-325 MG per tablet Take 1 tablet by mouth 2 (Two)  Times a Day As Needed for Mild Pain (1-3) or Moderate Pain (4-6).     • hydrOXYzine (ATARAX) 25 MG tablet   1   • levothyroxine (SYNTHROID, LEVOTHROID) 100 MCG tablet Take 1 tablet by mouth Daily. 90 tablet 3   • losartan (COZAAR) 100 MG tablet Take 1 tablet by mouth Daily. 30 tablet 5   • metoprolol succinate XL (TOPROL-XL) 50 MG 24 hr tablet Take 1 tablet by mouth Daily. 90 tablet 3   • morphine (MS CONTIN) 30 MG 12 hr tablet Take 30 mg by mouth 2 (Two) Times a Day.     • nisoldipine (SULAR) 8.5 MG 24 hr tablet Take 1 tablet by mouth Daily. 30 tablet 5   • pantoprazole (PROTONIX) 40 MG EC tablet Take 1 tablet by mouth Daily. 30 tablet 11     No facility-administered medications prior to visit.        Patient Active Problem List   Diagnosis   • Anxiety   • Anemia   • Chronic coronary artery disease   • Carotid bruit   • Chronic obstructive pulmonary disease   • Dysphagia   • Diverticulum of esophagus   • Fatigue   • Gastroesophageal reflux disease   • Hyperlipidemia   • Hypertension   • Hypothyroidism   • Multiple pulmonary nodules   • Peripheral arterial occlusive disease   • Plantar fasciitis   • Spinal stenosis   • Temporary cerebral vascular dysfunction   • Uterine leiomyoma   • Vitamin D deficiency   • Abnormal electrocardiogram   • Cramp in lower leg   • Lower esophageal ring       Advance Care Planning:  has an advance directive - a copy HAS NOT been provided    Identification of Risk Factors:  Risk factors include: tobacco use.    Review of Systems   Constitutional: Negative.    Respiratory: Negative.    Cardiovascular: Negative.    Gastrointestinal: Negative.    Musculoskeletal: Negative.    Skin: Negative.    Neurological: Negative.    Psychiatric/Behavioral: Negative.        Compared to one year ago, the patient feels her physical health is the same.  Compared to one year ago, the patient feels her mental health is the same.    Objective     Physical Exam   Constitutional: She is oriented to person,  "place, and time. She appears well-nourished.   Neck: Neck supple.   Cardiovascular: Normal rate, regular rhythm and normal heart sounds.    Right carotid bruit   Pulmonary/Chest: Effort normal and breath sounds normal.   Abdominal: Bowel sounds are normal.   Neurological: She is alert and oriented to person, place, and time.   Skin: Skin is warm.   Psychiatric: She has a normal mood and affect.       Vitals:    11/07/17 1119   BP: 122/68   Pulse: 64   Resp: 14   Temp: 98 °F (36.7 °C)   SpO2: 95%   Weight: 120 lb (54.4 kg)   Height: 64.5\" (163.8 cm)       Body mass index is 20.28 kg/(m^2).  Discussed the patient's BMI with her. The BMI is in the acceptable range.    Assessment/Plan   Patient Self-Management and Personalized Health Advice  The patient has been provided with information about: exercise and preventive services including:   · Advance directive.    Visit Diagnoses:  No diagnosis found.    No orders of the defined types were placed in this encounter.      Outpatient Encounter Prescriptions as of 11/7/2017   Medication Sig Dispense Refill   • albuterol (PROAIR HFA) 108 (90 Base) MCG/ACT inhaler Inhale 2 puffs Every 4 (Four) Hours As Needed for Wheezing or Shortness of Air. 2 inhaler 11   • buPROPion XL (WELLBUTRIN XL) 150 MG 24 hr tablet Take 1 tablet by mouth Daily. (Patient taking differently: Take 150 mg by mouth Daily As Needed.) 30 tablet 2   • Cholecalciferol (VITAMIN D3) 5000 UNITS capsule capsule Take 1 capsule by mouth Daily. 90 capsule 3   • ferrous gluconate 324 (37.5 FE) MG tablet tablet Take 1 tablet by mouth Daily With Breakfast. 90 tablet 3   • hydrochlorothiazide (HYDRODIURIL) 25 MG tablet 1QD 30 tablet 5   • HYDROcodone-acetaminophen (NORCO) 7.5-325 MG per tablet Take 1 tablet by mouth 2 (Two) Times a Day As Needed for Mild Pain (1-3) or Moderate Pain (4-6).     • hydrOXYzine (ATARAX) 25 MG tablet   1   • levothyroxine (SYNTHROID, LEVOTHROID) 100 MCG tablet Take 1 tablet by mouth Daily. 90 " tablet 3   • losartan (COZAAR) 100 MG tablet Take 1 tablet by mouth Daily. 30 tablet 5   • metoprolol succinate XL (TOPROL-XL) 50 MG 24 hr tablet Take 1 tablet by mouth Daily. 90 tablet 3   • morphine (MS CONTIN) 30 MG 12 hr tablet Take 30 mg by mouth 2 (Two) Times a Day.     • nisoldipine (SULAR) 8.5 MG 24 hr tablet Take 1 tablet by mouth Daily. 30 tablet 5   • pantoprazole (PROTONIX) 40 MG EC tablet Take 1 tablet by mouth Daily. 30 tablet 11     No facility-administered encounter medications on file as of 11/7/2017.        Reviewed use of high risk medication in the elderly: yes  Reviewed for potential of harmful drug interactions in the elderly: no    Follow Up:  No Follow-up on file.     An After Visit Summary and PPPS with all of these plans were given to the patient.        CAD CABG history no cp EKG old MI , refuse stress test and EKG. Unable to take a cholesterol medicine patient is allergic to it.refuse PCK9 even ins covers.cont plavix, refuses to cardio--  early satirety improved seen GI, s/p EGD5/2017: 0.75 cm clean base gastric ulcer at the body of the stomach. Risks of bleeding less than 5%.Non-bleeding duodenal bulb vascular ectasia.Erythematous gastritis.Small sliding hiatal hernia less than 3 cm.Nonobstructive Schatzki's-type ring, now epigastric pain pending to see dr duarte, continue protonix--  pulmonary CT scan showed a small nodules, repeat  CT scan does show 4.3 cm uterine fibroid s/p ultrasound utero mass, seen by gyn, stable  TIA history of possible TIA left eye cholesterol deposits by ophthalmologist. carotid duplex 50%, CTA neck artery 70% repeat , echocardiogram unremarkable, able to tolerate plavix sick, ASA hurts stomach.   Claudication possible PAD history of stents left leg artery. ultrasound again stenosis continue pentoxifylline  Tobacco encourage patient to quit   muscle cramp improved continue flexeril  plantar faciatis banks's neuroma, seen dr Caceres continue to followup. continue  Neurontin  LBParthritis follow up with pain clinic and on Pain medicine, on MS  Fe def anemia normal after fe supplement obtain colon report from dr leung done 4/2013,, SBFT normal, continue iron supplements.  colonoscopy 4/9/2013.colon polyp(tubular adenoma), AVM, and divertic  vit D low,continue supplement.   hyperglycemia good diet  idioppathic neuropathy continue gabapentin  depression/anxiety continue medications improved on neurontin, xanax d/c'd by pain doctor, remeron no help, xanax d/c'd by pain clinic,continue hydroxyzine wellbutrin--  empty bladder problem stable now  HTN continue medicine  hypothyroidism continue medication   LBP spinal stenosis. continue medicine from pain clinic on narcotics  copd severe restrictive disease on PFT, unable to use spiriva, continue symbicort  refuse prevnar 13, refuse zostavax, flushot 2016, pneumovax done--  Muscle cramp in toes follow up with podiatrist. Baclofen no help, stretch exercise  Osteopenia on dexa vit d and calcium continue  Followup in 1 mo after tests

## 2017-11-14 ENCOUNTER — HOSPITAL ENCOUNTER (OUTPATIENT)
Dept: CT IMAGING | Facility: HOSPITAL | Age: 82
Discharge: HOME | End: 2017-11-14
Attending: INTERNAL MEDICINE | Admitting: INTERNAL MEDICINE

## 2017-11-14 ENCOUNTER — HOSPITAL ENCOUNTER (OUTPATIENT)
Dept: CT IMAGING | Facility: HOSPITAL | Age: 82
Discharge: HOME | End: 2017-11-14
Attending: INTERNAL MEDICINE

## 2017-11-14 DIAGNOSIS — I65.21 STENOSIS OF RIGHT CAROTID ARTERY: ICD-10-CM

## 2017-11-14 DIAGNOSIS — R91.1 LUNG NODULE: ICD-10-CM

## 2017-11-14 LAB — CREAT BLDA-MCNC: 1.1 MG/DL (ref 0.6–1.3)

## 2017-11-14 PROCEDURE — 82565 ASSAY OF CREATININE: CPT

## 2017-11-14 PROCEDURE — 70498 CT ANGIOGRAPHY NECK: CPT

## 2017-11-14 PROCEDURE — 0 IOPAMIDOL PER 1 ML: Performed by: INTERNAL MEDICINE

## 2017-11-14 PROCEDURE — 71250 CT THORAX DX C-: CPT

## 2017-11-14 RX ADMIN — IOPAMIDOL 70 ML: 755 INJECTION, SOLUTION INTRAVENOUS at 10:45

## 2017-12-08 LAB
25(OH)D3+25(OH)D2 SERPL-MCNC: 52.4 NG/ML
ALBUMIN SERPL-MCNC: 3.8 G/DL (ref 3.5–5)
ALBUMIN/GLOB SERPL: 1.4 G/DL (ref 1–2)
ALP SERPL-CCNC: 92 U/L (ref 38–126)
ALT SERPL-CCNC: 33 U/L (ref 13–69)
AST SERPL-CCNC: 25 U/L (ref 15–46)
BASOPHILS # BLD AUTO: 0.06 10*3/MM3 (ref 0–0.2)
BASOPHILS NFR BLD AUTO: 0.6 % (ref 0–2.5)
BILIRUB SERPL-MCNC: 0.3 MG/DL (ref 0.2–1.3)
BUN SERPL-MCNC: 35 MG/DL (ref 7–20)
BUN/CREAT SERPL: 31.8 (ref 7.1–23.5)
CALCIUM SERPL-MCNC: 9.8 MG/DL (ref 8.4–10.2)
CHLORIDE SERPL-SCNC: 108 MMOL/L (ref 98–107)
CHOLEST SERPL-MCNC: 185 MG/DL (ref 0–199)
CK SERPL-CCNC: 32 U/L (ref 30–170)
CO2 SERPL-SCNC: 25 MMOL/L (ref 26–30)
CREAT SERPL-MCNC: 1.1 MG/DL (ref 0.6–1.3)
EOSINOPHIL # BLD AUTO: 0.29 10*3/MM3 (ref 0–0.7)
EOSINOPHIL NFR BLD AUTO: 2.7 % (ref 0–7)
ERYTHROCYTE [DISTWIDTH] IN BLOOD BY AUTOMATED COUNT: 13.5 % (ref 11.5–14.5)
FERRITIN SERPL-MCNC: 30 NG/ML (ref 11.1–264)
GFR SERPLBLD CREATININE-BSD FMLA CKD-EPI: 48 ML/MIN/1.73
GFR SERPLBLD CREATININE-BSD FMLA CKD-EPI: 58 ML/MIN/1.73
GLOBULIN SER CALC-MCNC: 2.7 GM/DL
GLUCOSE SERPL-MCNC: 82 MG/DL (ref 74–98)
HCT VFR BLD AUTO: 40.7 % (ref 37–47)
HDLC SERPL-MCNC: 37 MG/DL (ref 40–60)
HGB BLD-MCNC: 12.8 G/DL (ref 12–16)
IMM GRANULOCYTES # BLD: 0.04 10*3/MM3 (ref 0–0.06)
IMM GRANULOCYTES NFR BLD: 0.4 % (ref 0–0.6)
IRON SATN MFR SERPL: 13 % (ref 11–46)
IRON SERPL-MCNC: 43 MCG/DL (ref 37–181)
LDLC SERPL CALC-MCNC: 121 MG/DL (ref 0–99)
LYMPHOCYTES # BLD AUTO: 2.47 10*3/MM3 (ref 0.6–3.4)
LYMPHOCYTES NFR BLD AUTO: 23.4 % (ref 10–50)
MCH RBC QN AUTO: 30.5 PG (ref 27–31)
MCHC RBC AUTO-ENTMCNC: 31.4 G/DL (ref 30–37)
MCV RBC AUTO: 96.9 FL (ref 81–99)
MONOCYTES # BLD AUTO: 0.91 10*3/MM3 (ref 0–0.9)
MONOCYTES NFR BLD AUTO: 8.6 % (ref 0–12)
NEUTROPHILS # BLD AUTO: 6.8 10*3/MM3 (ref 2–6.9)
NEUTROPHILS NFR BLD AUTO: 64.3 % (ref 37–80)
NRBC BLD AUTO-RTO: 0 /100 WBC (ref 0–0)
PLATELET # BLD AUTO: 256 10*3/MM3 (ref 130–400)
POTASSIUM SERPL-SCNC: 4.6 MMOL/L (ref 3.5–5.1)
PROT SERPL-MCNC: 6.5 G/DL (ref 6.3–8.2)
RBC # BLD AUTO: 4.2 10*6/MM3 (ref 4.2–5.4)
SODIUM SERPL-SCNC: 145 MMOL/L (ref 137–145)
TIBC SERPL-MCNC: 339 MCG/DL (ref 261–497)
TRIGL SERPL-MCNC: 135 MG/DL
TSH SERPL DL<=0.005 MIU/L-ACNC: 1.77 MIU/ML (ref 0.47–4.68)
UIBC SERPL-MCNC: 296 MCG/DL
VLDLC SERPL CALC-MCNC: 27 MG/DL
WBC # BLD AUTO: 10.57 10*3/MM3 (ref 4.8–10.8)

## 2017-12-13 ENCOUNTER — OFFICE VISIT (OUTPATIENT)
Dept: INTERNAL MEDICINE | Facility: CLINIC | Age: 82
End: 2017-12-13

## 2017-12-13 VITALS
TEMPERATURE: 98.2 F | SYSTOLIC BLOOD PRESSURE: 140 MMHG | DIASTOLIC BLOOD PRESSURE: 82 MMHG | RESPIRATION RATE: 14 BRPM | OXYGEN SATURATION: 92 % | WEIGHT: 122 LBS | HEIGHT: 65 IN | BODY MASS INDEX: 20.33 KG/M2 | HEART RATE: 68 BPM

## 2017-12-13 DIAGNOSIS — D64.9 ANEMIA, UNSPECIFIED TYPE: ICD-10-CM

## 2017-12-13 DIAGNOSIS — I10 ESSENTIAL HYPERTENSION: ICD-10-CM

## 2017-12-13 DIAGNOSIS — K21.9 GASTROESOPHAGEAL REFLUX DISEASE WITHOUT ESOPHAGITIS: ICD-10-CM

## 2017-12-13 DIAGNOSIS — E78.5 HYPERLIPIDEMIA, UNSPECIFIED HYPERLIPIDEMIA TYPE: ICD-10-CM

## 2017-12-13 DIAGNOSIS — R09.89 CAROTID BRUIT, UNSPECIFIED LATERALITY: ICD-10-CM

## 2017-12-13 DIAGNOSIS — I25.10 CHRONIC CORONARY ARTERY DISEASE: Primary | ICD-10-CM

## 2017-12-13 DIAGNOSIS — J43.8 OTHER EMPHYSEMA (HCC): ICD-10-CM

## 2017-12-13 DIAGNOSIS — M48.00 SPINAL STENOSIS, UNSPECIFIED SPINAL REGION: ICD-10-CM

## 2017-12-13 PROCEDURE — 99214 OFFICE O/P EST MOD 30 MIN: CPT | Performed by: INTERNAL MEDICINE

## 2017-12-13 RX ORDER — BUPROPION HYDROCHLORIDE 150 MG/1
150 TABLET ORAL DAILY
Qty: 30 TABLET | Refills: 11 | Status: SHIPPED | OUTPATIENT
Start: 2017-12-13 | End: 2020-01-29

## 2017-12-13 RX ORDER — CLOPIDOGREL BISULFATE 75 MG/1
75 TABLET ORAL DAILY
Qty: 30 TABLET | Refills: 3 | Status: SHIPPED | OUTPATIENT
Start: 2017-12-13 | End: 2019-05-15

## 2017-12-13 RX ORDER — BUPROPION HYDROCHLORIDE 150 MG/1
150 TABLET ORAL DAILY
Qty: 30 TABLET | Refills: 11 | Status: SHIPPED | OUTPATIENT
Start: 2017-12-13 | End: 2017-12-13 | Stop reason: SDUPTHER

## 2017-12-13 NOTE — PROGRESS NOTES
Subjective   Mi Tejeda is a 82 y.o. female.     Chief Complaint   Patient presents with   • Follow-up   • Hypertension       History of Present Illness   Patient here for follow-up.  Coronary artery disease patient denies any chest pain.  Patient still smokes.  Back pain stable medication.  Anemia resolved after iron supplement.  Vitamin D low stable on supplement.  Depression anxiety stable medication.  Hypertension stable medication.  Hypothyroidism stable medication.    Current Outpatient Prescriptions:   •  albuterol (PROAIR HFA) 108 (90 Base) MCG/ACT inhaler, Inhale 2 puffs Every 4 (Four) Hours As Needed for Wheezing or Shortness of Air., Disp: 2 inhaler, Rfl: 11  •  buPROPion XL (WELLBUTRIN XL) 150 MG 24 hr tablet, Take 1 tablet by mouth Daily., Disp: 30 tablet, Rfl: 11  •  Cholecalciferol (VITAMIN D3) 5000 UNITS capsule capsule, Take 1 capsule by mouth Daily., Disp: 90 capsule, Rfl: 3  •  ferrous gluconate 324 (37.5 FE) MG tablet tablet, Take 1 tablet by mouth Daily With Breakfast., Disp: 90 tablet, Rfl: 3  •  hydrochlorothiazide (HYDRODIURIL) 25 MG tablet, 1QD, Disp: 30 tablet, Rfl: 5  •  HYDROcodone-acetaminophen (NORCO) 7.5-325 MG per tablet, Take 1 tablet by mouth 2 (Two) Times a Day As Needed for Mild Pain (1-3) or Moderate Pain (4-6)., Disp: , Rfl:   •  hydrOXYzine (ATARAX) 25 MG tablet, , Disp: , Rfl: 1  •  levothyroxine (SYNTHROID, LEVOTHROID) 100 MCG tablet, Take 1 tablet by mouth Daily., Disp: 90 tablet, Rfl: 3  •  losartan (COZAAR) 100 MG tablet, Take 1 tablet by mouth Daily., Disp: 30 tablet, Rfl: 5  •  metoprolol succinate XL (TOPROL-XL) 50 MG 24 hr tablet, Take 1 tablet by mouth Daily., Disp: 90 tablet, Rfl: 3  •  morphine (MS CONTIN) 30 MG 12 hr tablet, Take 30 mg by mouth 2 (Two) Times a Day., Disp: , Rfl:   •  nisoldipine (SULAR) 8.5 MG 24 hr tablet, Take 1 tablet by mouth Daily., Disp: 30 tablet, Rfl: 5  •  pantoprazole (PROTONIX) 40 MG EC tablet, Take 1 tablet by mouth Daily., Disp:  30 tablet, Rfl: 11    The following portions of the patient's history were reviewed and updated as appropriate: allergies, current medications, past family history, past medical history, past social history, past surgical history and problem list.    Review of Systems   Constitutional: Negative.    Respiratory: Negative.    Cardiovascular: Negative.    Gastrointestinal: Negative.    Musculoskeletal: Negative.    Skin: Negative.    Neurological: Negative.    Psychiatric/Behavioral: Negative.        Objective   Physical Exam   Constitutional: She is oriented to person, place, and time. She appears well-nourished.   Neck: Neck supple.   Cardiovascular: Normal rate, regular rhythm and normal heart sounds.    Pulmonary/Chest: Effort normal and breath sounds normal.   Abdominal: Bowel sounds are normal.   Neurological: She is alert and oriented to person, place, and time.   Skin: Skin is warm.   Psychiatric: She has a normal mood and affect.       All tests have been reviewed.    Assessment/Plan   There are no diagnoses linked to this encounter.            CAD CABG history no cp EKG old MI , refused stress test and EKG. Unable to take a cholesterol medicine patient is allergic to it.refuse PCK9 even ins covers.cont plavix, refuses to cardio--  early satirety improved seen GI, s/p EGD5/2017: 0.75 cm clean base gastric ulcer at the body of the stomach. Risks of bleeding less than 5%.Non-bleeding duodenal bulb vascular ectasia.Erythematous gastritis.Small sliding hiatal hernia less than 3 cm.Nonobstructive Schatzki's-type ring, now epigastric pain pending to see dr duarte, no dysphagia continue protonix--  pulmonary CT scan showed a small nodules, repeat sl increase in size, repeat 5/10/2018  CT scan does show 4.3 cm uterine fibroid s/p ultrasound utero mass, seen by gyn, stable  TIA history of possible TIA left eye cholesterol deposits by ophthalmologist. carotid duplex 50%, CTA neck artery 70% repeat , echocardiogram  unremarkable, able to tolerate plavix, self discontinued.  Encourage patient to resume Plavix.  ASA hurts stomach.   Claudication possible PAD history of stents left leg artery. ultrasound again stenosis continue pentoxifylline  Tobacco encourage patient to quit   muscle cramp improved continue flexeril  plantar faciatis banks's neuroma, seen dr Caceres continue to followup. continue Neurontin  LBParthritis follow up with pain clinic and on Pain medicine, on MS  Fe def anemia normal after fe supplement obtain colon report from dr leung done 4/2013,, SBFT normal, continue iron supplements.  colonoscopy 4/9/2013.colon polyp(tubular adenoma), AVM, and divertic  vit D low,continue supplement.   hyperglycemia good diet  idioppathic neuropathy continue gabapentin  depression/anxiety continue medications improved on neurontin, xanax d/c'd by pain doctor, remeron no help, xanax d/c'd by pain clinic,continue hydroxyzine wellbutrin--  empty bladder problem stable now  HTN continue medicine  hypothyroidism continue medication   LBP spinal stenosis. continue medicine from pain clinic on narcotics  copd severe restrictive disease on PFT, unable to use spiriva, continue symbicort  refuse prevnar 13, refuse zostavax, flushot 2016, pneumovax done--  Muscle cramp in toes follow up with podiatrist. Baclofen no help, stretch exercise  Osteopenia on dexa vit d and calcium continue  Followup in 4 mo

## 2017-12-15 RX ORDER — NISOLDIPINE 8.5 MG/1
TABLET, FILM COATED, EXTENDED RELEASE ORAL
Qty: 30 TABLET | Refills: 5 | Status: SHIPPED | OUTPATIENT
Start: 2017-12-15 | End: 2018-12-19 | Stop reason: SDUPTHER

## 2017-12-15 RX ORDER — LEVOTHYROXINE SODIUM 0.1 MG/1
TABLET ORAL
Qty: 90 TABLET | Refills: 3 | Status: SHIPPED | OUTPATIENT
Start: 2017-12-15 | End: 2019-03-20 | Stop reason: SDUPTHER

## 2017-12-15 RX ORDER — FERROUS GLUCONATE 324(37.5)
TABLET ORAL
Qty: 90 TABLET | Refills: 3 | Status: SHIPPED | OUTPATIENT
Start: 2017-12-15 | End: 2018-12-19 | Stop reason: SDUPTHER

## 2017-12-22 ENCOUNTER — OFFICE VISIT (OUTPATIENT)
Dept: NEUROLOGY | Facility: CLINIC | Age: 82
End: 2017-12-22

## 2017-12-22 ENCOUNTER — TELEPHONE (OUTPATIENT)
Dept: INTERNAL MEDICINE | Facility: CLINIC | Age: 82
End: 2017-12-22

## 2017-12-22 VITALS
SYSTOLIC BLOOD PRESSURE: 128 MMHG | HEART RATE: 59 BPM | HEIGHT: 65 IN | OXYGEN SATURATION: 94 % | BODY MASS INDEX: 20.33 KG/M2 | DIASTOLIC BLOOD PRESSURE: 74 MMHG | WEIGHT: 122 LBS

## 2017-12-22 DIAGNOSIS — G47.61 PERIODIC LIMB MOVEMENT DISORDER (PLMD): ICD-10-CM

## 2017-12-22 DIAGNOSIS — G25.81 RESTLESS LEGS SYNDROME (RLS): Primary | ICD-10-CM

## 2017-12-22 PROCEDURE — 99214 OFFICE O/P EST MOD 30 MIN: CPT | Performed by: PSYCHIATRY & NEUROLOGY

## 2017-12-22 RX ORDER — ROPINIROLE 0.5 MG/1
TABLET, FILM COATED ORAL
Qty: 30 TABLET | Refills: 3 | Status: SHIPPED | OUTPATIENT
Start: 2017-12-22 | End: 2019-05-15

## 2017-12-23 NOTE — PROGRESS NOTES
[facility] NEUROLOGY [place] CONSULTATION   History of Present Illness     Date: 12/22/2017    Patient Identification  Mi Tejeda is a 82 y.o. female.    Patient information was obtained from patient and relative(s).  History/Exam limitations: none.    CONSULTATION requested by: Adiel Awad MD      Chief Complaint   Extremity Pain (NP, in office today for consult. Pt c/o pain in bilateral lower extermities. Sx started in approx 2010, have worsened) and Back Pain      History of Present Illness   Patient is a delightful 82-year-old referred to [facility] neurology [place] for evaluation of painful sensation in her feet is 2010.  Patient described a cramp feeling in bilateral lower extremities been progressively worse.  Patient reported that it usually occur when she is lying down or sitting down.  Her symptoms only relief when she is standing up and moving around.  Patient had extensive blood work and they have been unremarkable.  CT scan of her lumbar sacral spine in March 7, 2017 showed no evidence of canal stenosis or nerve root compression degenerative changes is noted with mild disc bulging at L2-3 and L3 4 and L4 5 and L5-S1.  Her ferritin level is 30 can lead to Restless Legs Syndrome.    PMH:   Past Medical History:   Diagnosis Date   • Abdominal pain     Improving. Likely secondary to gastritis.   • Abnormal electrocardiogram    • Abnormal urinalysis    • Acute UTI    • Anemia    • Anomaly cardiac     REPORTS WILL SHOW ON LEFT VENTRICLE IN A 12 LEAD EKG AS BEING A MI BUT REPORTS SHE HAS NEVER HAD THIS    • Anxiety    • Aphagia     HX OF THIS, REPORTS WAS CLEARED OF A CVA BUT WAS TOLD WAS RELATED TO FATIGUE   • Asthma    • Atrial fibrillation    • Backache    • CAD (coronary artery disease)    • COPD (chronic obstructive pulmonary disease)    • Depression    • Dysphagia     REPORTS RESOLVED AFTER DILITATION   • Fracture    • Full dentures    • Gastric ulcer    • High cholesterol    • HTN  (hypertension)    • Hypertriglyceridemia    • Hypothyroidism    • Multiple lung nodules    • Osteoarthritis    • Peripheral neuropathy    • Personal history of tobacco use    • Plantar fasciitis    • Pneumonia    • Sinus problem    • Sinusitis    • TIA (transient ischemic attack)    • Viral gastroenteritis    • Vitamin D deficiency    • Wears glasses    • Weight loss, non-intentional        Past Surgical History:   Past Surgical History:   Procedure Laterality Date   • CARDIAC SURGERY     • CATARACT EXTRACTION Bilateral 2009   • CHOLECYSTECTOMY  2002   • COLONOSCOPY     • CORONARY ARTERY BYPASS GRAFT  1998    2 vessels   • CORONARY ARTERY BYPASS GRAFT     • ENDOSCOPY     • ENDOSCOPY N/A 5/3/2017    Procedure: ESOPHAGOGASTRODUODENOSCOPY with biopsies;  Surgeon: Yared Castellanos MD;  Location: [facility] ENDOSCOPY;  Service:    • JOINT REPLACEMENT Left 2010    hip       Family Hisotry:   Family History   Problem Relation Age of Onset   • Cancer Mother      uterus   • Cancer Sister      colon; kidney   • Hypertension Sister    • Stroke Sister    • Cancer Brother      prostate   • Arthritis Other    • Cancer Other    • Kidney disease Other    • Stroke Other    • Hypertension Other        Social History:   Social History     Social History   • Marital status:      Spouse name: N/A   • Number of children: N/A   • Years of education: N/A     Occupational History   • Not on file.     Social History Main Topics   • Smoking status: Current Every Day Smoker     Packs/day: 1.00     Years: 60.00     Types: Cigarettes, Electronic Cigarette   • Smokeless tobacco: Former User   • Alcohol use No   • Drug use: No   • Sexual activity: Defer     Other Topics Concern   • Not on file     Social History Narrative       Medications:   Current Outpatient Prescriptions   Medication Sig Dispense Refill   • albuterol (PROAIR HFA) 108 (90 Base) MCG/ACT inhaler Inhale 2 puffs Every 4 (Four) Hours As Needed for Wheezing or Shortness of Air. 2  inhaler 11   • buPROPion XL (WELLBUTRIN XL) 150 MG 24 hr tablet Take 1 tablet by mouth Daily. 30 tablet 11   • Cholecalciferol (VITAMIN D3) 5000 UNITS capsule capsule Take 1 capsule by mouth Daily. 90 capsule 3   • clopidogrel (PLAVIX) 75 MG tablet Take 1 tablet by mouth Daily. 30 tablet 3   • ferrous gluconate 324 (37.5 Fe) MG tablet tablet TAKE ONE TABLET DAILY WITH BREAKFAST 90 tablet 3   • hydrochlorothiazide (HYDRODIURIL) 25 MG tablet 1QD 30 tablet 5   • HYDROcodone-acetaminophen (NORCO) 7.5-325 MG per tablet Take 1 tablet by mouth 2 (Two) Times a Day As Needed for Mild Pain (1-3) or Moderate Pain (4-6).     • hydrOXYzine (ATARAX) 25 MG tablet   1   • levothyroxine (SYNTHROID, LEVOTHROID) 100 MCG tablet TAKE ONE TABLET EVERY DAY 90 tablet 3   • losartan (COZAAR) 100 MG tablet Take 1 tablet by mouth Daily. 30 tablet 5   • metoprolol succinate XL (TOPROL-XL) 50 MG 24 hr tablet Take 1 tablet by mouth Daily. 90 tablet 3   • morphine (MS CONTIN) 30 MG 12 hr tablet Take 30 mg by mouth 2 (Two) Times a Day.     • nisoldipine (SULAR) 8.5 MG 24 hr tablet TAKE ONE TABLET EVERY DAY 30 tablet 5   • pantoprazole (PROTONIX) 40 MG EC tablet Take 1 tablet by mouth Daily. 30 tablet 11     No current facility-administered medications for this visit.        Allergy:   Allergies   Allergen Reactions   • Keflex [Cephalexin] Nausea Only   • Niacin    • Statins        Review of Systems:  Review of Systems   Constitutional: Negative for chills and fever.   HENT: Negative for congestion, ear pain, hearing loss, rhinorrhea and sore throat.    Eyes: Negative for pain, discharge and redness.   Respiratory: Negative for cough, shortness of breath, wheezing and stridor.    Cardiovascular: Negative for chest pain, palpitations and leg swelling.   Gastrointestinal: Negative for abdominal pain, constipation, nausea and vomiting.   Endocrine: Negative for cold intolerance, heat intolerance and polyphagia.   Genitourinary: Negative for dysuria,  "flank pain, frequency and urgency.   Musculoskeletal: Positive for back pain and myalgias. Negative for joint swelling, neck pain and neck stiffness.   Skin: Negative for pallor, rash and wound.   Allergic/Immunologic: Negative for environmental allergies.   Neurological: Negative for dizziness, tremors, seizures, syncope, facial asymmetry, speech difficulty, weakness, light-headedness, numbness and headaches.   Hematological: Negative for adenopathy.   Psychiatric/Behavioral: Positive for sleep disturbance. Negative for confusion and hallucinations. The patient is not nervous/anxious.        Physical Exam     Vitals:    12/22/17 1110   BP: 128/74   Pulse: 59   SpO2: 94%   Weight: 55.3 kg (122 lb)   Height: 163.8 cm (64.5\")     GENERAL: Patient is pleasant, cooperative, appears to be stated age.  Body habitus is endomorphic.  SKIN AND EXTREMITIES:  No skin rashes or lesions are noted.  No cyanosis, clubbing or edema of the extremities.    HEAD:  Head is normocephalic and atraumatic.    NECK: Neck are non-tender without thyromegaly or adenopathy.  Carotic upstrokes are 1+/4.  No cranial or cervical bruits.  The neck is supple with a full range of motion.   ENT: palate elevate symmetrically, no evidence of high arch palate, tongue midline erythema in posterior pharynx, Mallampati Classification Class III   CARDIOVASCULAR:  Regular rate and rhythm with normal S1 and S2 without rub or gallop.  RESPIRATORY:  Clear to auscultation without wheezes or crackle   ABDOMEN:  Soft and non-tender, positive bowel sound without hepatosplenomegaly  BACK:  Back is straight without midline defect.    PSYCH:  Higher cortical function/mental status:  The patient is alert.  She is oriented x3 to time, place and person.  Recent and the remote memory appear normal.  The patient has a good fund of knowledge.  There is no visual or auditory hallucination or suicidal or homicidal ideation.  SPEECH:There is no gross evidence of aphasia, " dysarthria or agnosia.      CRANIAL NERVES:  Pupils are 4mm, equal round reactive to light, reacting briskly to 2mm without afferent pupillary defect.  Visual fields are intact to confrontation testing.  Fundoscopic examination reveals sharp disk margins with normal vasculature.  No papilledema, hemorrhages or exudates.  Extraocular movements are full and smooth with normal pursuits and saccades.  No nystagmus noted.  The face is symmetric. palate elevate symmetrically, Tongue midline, positive gag reflex. The remainder of the cranial nerves are intact and symmetrical.    MOTOR: Strength is 5/5 throughout with normal tone and bulk with the following exceptions, 4/5 intrinsic muscles of the hands and feet.  No involuntary movements noted.    Deep Tendon Reflexes: are 2/4 and symmetrical in the upper extremities, 2/4 and symmetrical at the knees and 1/4 and symmetrical at the Achilles tendon.  Plantar responses were down-going bilaterally.    SENSATION:  Intact to pinprick, light touch, vibration and proprioception.  Coordination:  The patient normally performs finger-nose-finger, heel-to-knee-to-shin and rapid alternating movements in symmetrical fashion.    COORDINATION AND GAIT:  The patient walks with a narrow-based gait.  Patient is able to heel-toe and tandem walk forward and backwards without difficulty.  Romberg and monopedal  Romberg are negative.    MUSCULOSKELETAL: Range of motion normal, no clubbing, cyanosis, or edema.  No joint swelling.              Records Reviewed: I have personally reviewed her previous medical record.    Mi was seen today for extremity pain and back pain.    Diagnoses and all orders for this visit:    Restless legs syndrome (RLS)    Periodic limb movement disorder (PLMD)    Other orders  -     Calcium, Ionized; Future  -     rOPINIRole (REQUIP) 0.5 MG tablet; Take 1 tablet by mouth 3 (Three) Times a Day. Take 1 hour before bedtime.        Discussion:  1.  We'll check an ionized  calcium level  2.  Encourage patient should take iron supplements with vitamin C  3.  We'll recheck her ferritin level in 3 months  4.  Counseled patient extensively on restless leg syndrome  I have counseled patient extensively on the pathophysiology of Restless Legs Syndrome(RLS).  I have explained to the patient how D3 receptor dysfunction can give rise to RLS.  We have also discussed how iron deficiency can contribute to RLS.   Nevertheless, the first line of defense against restless legs syndrome is to avoid substances or foods that may be causing or worsening the problem such as alcohol, caffeine , and nicotine. In addition, we have reviewed all medications could exacerbate the problem and checking Ferritin level today.      I have also reviewed with my patient any underlying medical conditions that could give rise to secondary RLS, such as anemia , diabetes , nutritional deficiencies, kidney disease, thyroid  disease, varicose veins, or Parkinson's disease.     We have discussed FDA approved pharmacologic intervention such as dopamine agonists.  These are most often the first line treatment used to treat RLS including Mirapex (pramipexole), Neupro patch (rotigotine), and Requip (ropinirole). I have also counseled patient on side effects of Dopamine agonists include daytime sleepiness, nausea, hypotension and lightheadedness.    We have also discussed the use of non-dopaminergic medication such as Horizant (gabapentin enacarbil), as an adjunct to relieve the symptoms of RLS especially painful RLS symptoms.  5.  Would like to start this patient on Requip 0.5 mg 1 by mouth 2 hours before bedtime    This Document is signed by Alpesh Manrique MD, FAAN, FAASM December 22, 201710:34 PM

## 2018-01-16 DIAGNOSIS — M25.551 RIGHT HIP PAIN: Primary | ICD-10-CM

## 2018-01-17 ENCOUNTER — OFFICE VISIT (OUTPATIENT)
Dept: ORTHOPEDIC SURGERY | Facility: CLINIC | Age: 83
End: 2018-01-17

## 2018-01-17 VITALS — WEIGHT: 119 LBS | HEIGHT: 65 IN | RESPIRATION RATE: 16 BRPM | BODY MASS INDEX: 19.83 KG/M2

## 2018-01-17 DIAGNOSIS — M51.36 LUMBAR DEGENERATIVE DISC DISEASE: ICD-10-CM

## 2018-01-17 DIAGNOSIS — Z96.642 HISTORY OF TOTAL HIP ARTHROPLASTY, LEFT: ICD-10-CM

## 2018-01-17 DIAGNOSIS — M16.11 PRIMARY OSTEOARTHRITIS OF RIGHT HIP: Primary | ICD-10-CM

## 2018-01-17 DIAGNOSIS — M25.551 ARTHRALGIA OF RIGHT HIP: ICD-10-CM

## 2018-01-17 PROCEDURE — 73502 X-RAY EXAM HIP UNI 2-3 VIEWS: CPT | Performed by: ORTHOPAEDIC SURGERY

## 2018-01-17 PROCEDURE — 99213 OFFICE O/P EST LOW 20 MIN: CPT | Performed by: ORTHOPAEDIC SURGERY

## 2018-01-17 NOTE — PROGRESS NOTES
Subjective   Patient ID: Mi Tejeda is a 82 y.o. right hand dominant female is being seen for orthopaedic evaluation today for right hip pain Pain of the Right Hip         Pain   This is a new problem. The current episode started more than 1 month ago. The problem occurs intermittently. The problem has been gradually worsening. Associated symptoms include arthralgias. Pertinent negatives include no abdominal pain, chest pain, fever, joint swelling or rash. The symptoms are aggravated by walking, standing and bending. She has tried rest and oral narcotics (Patient takes chronic pain medication morphine and hydrocodone for back pain prescribed by Dr. Vallejo . This doesn't help with her right hip pain.) for the symptoms. The treatment provided mild relief.   Patient has used icy hot in the past which has helped with the pain.  She reports that previous to her left total hip replacement several years ago in 2010 that in the couple years prior she did receive left hip joint injections under fluoroscopy that provided good sustained relief for her left hip prior to the eventual hip replacement.    Pain Score: 9  Pain Location: Hip  Pain Orientation: Right  Pain Descriptors: Aching, Radiating  Pain Frequency: Intermittent  Pain Onset: Gradual  Date Pain First Started: (S)  (six months)  Aggravating Factors: Walking, Standing, Squatting  Pain Intervention(s): Rest  Result of Injury: No  Work-Related Injury: No       Past Medical History:   Diagnosis Date   • Abdominal pain     Improving. Likely secondary to gastritis.   • Abnormal electrocardiogram    • Abnormal urinalysis    • Acute UTI    • Anemia    • Anomaly cardiac     REPORTS WILL SHOW ON LEFT VENTRICLE IN A 12 LEAD EKG AS BEING A MI BUT REPORTS SHE HAS NEVER HAD THIS    • Anxiety    • Aphagia     HX OF THIS, REPORTS WAS CLEARED OF A CVA BUT WAS TOLD WAS RELATED TO FATIGUE   • Arthritis    • Asthma    • Atrial fibrillation    • Backache    • Bursitis of right  shoulder    • CAD (coronary artery disease)    • COPD (chronic obstructive pulmonary disease)    • Depression    • Diverticulitis    • Dysphagia     REPORTS RESOLVED AFTER DILITATION   • Fracture    • Full dentures    • Gastric ulcer    • GERD (gastroesophageal reflux disease)    • Gout    • High cholesterol    • HTN (hypertension)    • Hypertriglyceridemia    • Hypothyroidism    • Multiple lung nodules    • Osteoarthritis    • Osteoporosis    • Peripheral neuropathy    • Personal history of tobacco use    • Plantar fasciitis    • Pneumonia    • Sinus problem    • Sinusitis    • Tear of meniscus of knee     right   • TIA (transient ischemic attack)    • Viral gastroenteritis    • Vitamin D deficiency    • Wears glasses    • Weight loss, non-intentional         Past Surgical History:   Procedure Laterality Date   • CARDIAC SURGERY     • CATARACT EXTRACTION Bilateral 2009   • CHOLECYSTECTOMY  2002   • COLONOSCOPY     • CORONARY ARTERY BYPASS GRAFT  1998    2 vessels   • CORONARY ARTERY BYPASS GRAFT     • ENDOSCOPY     • ENDOSCOPY N/A 5/3/2017    Procedure: ESOPHAGOGASTRODUODENOSCOPY with biopsies;  Surgeon: aYred Castellanos MD;  Location: [facility] ENDOSCOPY;  Service:    • TOTAL HIP ARTHROPLASTY Left 02/02/2010    PREETI Stanley MD       Family History   Problem Relation Age of Onset   • Cancer Mother      uterus   • Cancer Sister      colon; kidney   • Hypertension Sister    • Stroke Sister    • Cancer Brother      prostate   • Arthritis Other    • Cancer Other    • Kidney disease Other    • Stroke Other    • Hypertension Other    • Gout Other        Social History     Social History   • Marital status:      Spouse name: N/A   • Number of children: N/A   • Years of education: N/A     Occupational History   • Not on file.     Social History Main Topics   • Smoking status: Current Every Day Smoker     Packs/day: 1.00     Years: 65.00     Types: Cigarettes   • Smokeless tobacco: Never Used   • Alcohol use No   • Drug  "use: No   • Sexual activity: Defer     Other Topics Concern   • Not on file     Social History Narrative       Allergies   Allergen Reactions   • Keflex [Cephalexin] Nausea Only   • Niacin    • Percocet [Oxycodone-Acetaminophen]      (NOTE: does tolerate hydrocodone and morphine)   • Statins        Review of Systems   Constitutional: Negative for fever.   HENT: Negative for voice change.    Eyes: Negative for visual disturbance.   Respiratory: Negative for shortness of breath.    Cardiovascular: Negative for chest pain.   Gastrointestinal: Negative for abdominal distention and abdominal pain.   Genitourinary: Negative for dysuria.   Musculoskeletal: Positive for arthralgias. Negative for gait problem and joint swelling.   Skin: Negative for rash.   Neurological: Negative for speech difficulty.   Hematological: Does not bruise/bleed easily.   Psychiatric/Behavioral: Negative for confusion.       Objective   Resp 16  Ht 163.8 cm (64.5\")  Wt 54 kg (119 lb)  BMI 20.11 kg/m2   Physical Exam   Constitutional: She appears well-developed. No distress.   Neurological: She is alert.   Skin: Skin is warm and dry. No rash noted. No erythema.   Psychiatric: She has a normal mood and affect. Her speech is normal.   Vitals reviewed.    Right Hip Exam     Tenderness   The patient is experiencing tenderness in the posterior and anterior.    Range of Motion   Flexion: 100     Muscle Strength   The patient has normal right hip strength.    Tests   MARCO A: positive (for hip anterior, groin and posterior joint pain, not lateral bursal pain)    Other   Erythema: absent  Pulse: present      Left Hip Exam     Tenderness   The patient is experiencing no tenderness.         Range of Motion   The patient has normal left hip ROM.    Muscle Strength   The patient has normal left hip strength.     Tests   MARCO A: negative    Other   Erythema: absent  Scars: present (well healed)  Pulse: present      Back Exam     Tenderness   The patient is " experiencing tenderness in the lumbar (occasional).    Muscle Strength   The patient has normal back strength.    Tests   Straight leg raise right: negative  Straight leg raise left: negative        Extremity DVT signs are Negative on physical exam with negative Teresa sign, with no calf pain, with no palpable cords, with no increased pain with passive stretch/extension and with no skin tone change   Neurologic Exam     Mental Status   Attention: normal.   Speech: speech is normal   Level of consciousness: alert  Knowledge: good.     Motor Exam   Overall muscle tone: normal    Gait, Coordination, and Reflexes     Gait  Gait: (stable cane assist ambulation.)     Left Hip Exam     Range of Motion   Normal left hip ROM    Muscle Strength   Normal left hip strength    Right Hip Exam     Muscle Strength   Normal right hip strength    Back Exam     Muscle Strength   Normal back strength         Assessment/Plan   Independent Review of Radiographic Studies:    Indication to evaluate right hip pain and with prior comparison views, shows no acute fracture or disclocation evident.  Indication to evaluate both hips joint condition, and compared with prior images, shows evident chronic advanced right hip osteoarthritis with bone-on-bone wear of the weight bearing superior and lateral dome of the hip.  Continued good position and alignment of the left total hip replacement implants with no radiographic sign of loosening or any concern.  Laboratory and Other Studies:  No new results reviewed today.   Medical Decision Making:    No complications of procedure and treatments.  Excellent progress 8 years after left total hip replacement.  May resume routine activity, work, sports and/or exercise as tolerated.  Limited progress with persistent and/or progressive symptoms of right hip advanced osteoarthritis.  Continue current management and any additional treatments and workup as outlined in plan.  Initial visit for right hip  pain  Procedures  [x] No procedures were performed in office today.     Mi was seen today for pain.    Diagnoses and all orders for this visit:    Primary osteoarthritis of right hip  -     FL guided pain management large joint    Arthralgia of right hip    History of total hip arthroplasty, left    Lumbar degenerative disc disease       Regular exercise as tolerated  Orthopedic activities reviewed and patient expressed appreciation  Discussion of orthopedic goals  Risk, benefits, and merits of treatment alternatives reviewed with the patient and questions answered  Call or notify for any adverse effect from injection therapy  Ice, heat, and/or modalities as beneficial  Guided on proper techniques for mobility, strength, agility and/or conditioning exercises  continue use of cane for safety and support.  Discussed option of and recommendation for right hip injection under fluoroscopy and patient would like to proceed.    Recommendations/Plan:  Exercise, medications, injections, other patient advice, and return appointment as noted.  Brace: No brace was given at today's visit  Referral: No referrals made at today's visit  Test/Studies: No additional studies ordered.  Surgery: No surgery proposed at this visit. and For intractable painful limiting condition, patient to consider elective surgical options.  Work/Activity Status: May perform usual activities as tolerated and No strenuous activity.  Patient is encouraged to call or return for any issues or concerns.    Return in about 3 months (around 4/17/2018) for Recheck.  Patient agreeable to call or return sooner for any concerns.      Scribed for Derick Stanley MD by Majo Menendez R.N.. 1/17/2018  5:11 PM

## 2018-01-19 RX ORDER — ALBUTEROL SULFATE 90 UG/1
2 AEROSOL, METERED RESPIRATORY (INHALATION) EVERY 4 HOURS PRN
Qty: 2 INHALER | Refills: 11 | Status: SHIPPED | OUTPATIENT
Start: 2018-01-19 | End: 2018-01-23 | Stop reason: SDUPTHER

## 2018-01-23 RX ORDER — ALBUTEROL SULFATE 90 UG/1
2 AEROSOL, METERED RESPIRATORY (INHALATION) EVERY 4 HOURS PRN
Qty: 3 INHALER | Refills: 3 | Status: SHIPPED | OUTPATIENT
Start: 2018-01-23 | End: 2018-03-19 | Stop reason: SDUPTHER

## 2018-01-26 ENCOUNTER — HOSPITAL ENCOUNTER (OUTPATIENT)
Dept: GENERAL RADIOLOGY | Facility: HOSPITAL | Age: 83
Discharge: HOME | End: 2018-01-26
Attending: ORTHOPAEDIC SURGERY | Admitting: ORTHOPAEDIC SURGERY

## 2018-01-26 PROCEDURE — 25010000002 METHYLPREDNISOLONE PER 80 MG: Performed by: ORTHOPAEDIC SURGERY

## 2018-01-26 PROCEDURE — 20610 DRAIN/INJ JOINT/BURSA W/O US: CPT | Performed by: ORTHOPAEDIC SURGERY

## 2018-01-26 PROCEDURE — 77002 NEEDLE LOCALIZATION BY XRAY: CPT | Performed by: ORTHOPAEDIC SURGERY

## 2018-01-26 PROCEDURE — 77002 NEEDLE LOCALIZATION BY XRAY: CPT

## 2018-01-26 RX ORDER — LIDOCAINE HYDROCHLORIDE 10 MG/ML
20 INJECTION, SOLUTION INFILTRATION; PERINEURAL ONCE
Status: COMPLETED | OUTPATIENT
Start: 2018-01-26 | End: 2018-01-26

## 2018-01-26 RX ORDER — METHYLPREDNISOLONE ACETATE 80 MG/ML
80 INJECTION, SUSPENSION INTRA-ARTICULAR; INTRALESIONAL; INTRAMUSCULAR; SOFT TISSUE ONCE
Status: COMPLETED | OUTPATIENT
Start: 2018-01-26 | End: 2018-01-26

## 2018-01-26 RX ADMIN — METHYLPREDNISOLONE ACETATE 80 MG: 80 INJECTION, SUSPENSION INTRA-ARTICULAR; INTRALESIONAL; INTRAMUSCULAR; SOFT TISSUE at 15:00

## 2018-01-26 RX ADMIN — LIDOCAINE HYDROCHLORIDE 9 ML: 10 INJECTION, SOLUTION INFILTRATION; PERINEURAL at 15:15

## 2018-01-26 NOTE — POST-PROCEDURE NOTE
[facility]  [address], [address]  [place] [address]  [phone]        PROCEDURE REPORT        DIAGNOSIS:  Right hip osteoarthritis, symptomatic    PROCEDURE: Right  hip injection under flouroscopy      Mi Tejeda with date of birth 1935 presents to [facility] Radiology Department today for injection therapy.        Patient presents to [facility] Radiology Department Flouroscopy Suite on 1/26/2018 for planned elective right hip injection under flouroscopy for symptomatic osteoarthritis.    Procedure:     After consent was obtained, and using ethyl chloride topical local anesthetic, the right hip was then prepped and draped with sterile technique. With an anterior hip approach, flouroscopy guidance, and care to stay lateral of the femoral artery, the hip joint was entered via a 20 gauge spinal needle.  A mixture of 80 mg methylprednisolone in one ml plus 9 ml of 1% plain Lidocaine was injected and the needle withdrawn. The procedure was well tolerated and without complication. The patient noted relief of focal hip joint pain.  The patient did remain stable and with baseline ambulation. The patient is asked to rest the joint for a few more days before resuming full regular activities. It may be painful for the first few days. Watch for fever, skin issues, increased swelling or persistent pain in the joint. Call or return to clinic if such symptoms occur, other concerns or if there is lack of improvement as anticipated.    Impression: Symptomatic right hip osteoarthritis.      Recommendations/Plan:      Treatment and patient advice as noted here and in office visit report.  Orthopedic activities reviewed and patient expressed appreciation.  Discussion of orthopedic goals.   Risk, benefits, and merits of treatment options reviewed and questions answered.  Call or notify for any adverse effect from injection therapy.    Exercise: As tolerated.  No strenuous  activity for a few days as appropriate.  Brace:  No brace was given at today's visit  Referral: No referrals made at today's visit  Studies: No additional studies ordered.  Surgery: No surgery proposed at this visit.  Activity:  May perform usual activities as tolerated.      Patient will return to our clinic at scheduled appointment.  Patient agreeable to call or return sooner for any concerns.

## 2018-03-19 ENCOUNTER — TELEPHONE (OUTPATIENT)
Dept: INTERNAL MEDICINE | Facility: CLINIC | Age: 83
End: 2018-03-19

## 2018-03-19 RX ORDER — ALBUTEROL SULFATE 90 UG/1
2 AEROSOL, METERED RESPIRATORY (INHALATION) EVERY 4 HOURS PRN
Qty: 3 INHALER | Refills: 3 | Status: SHIPPED | OUTPATIENT
Start: 2018-03-19 | End: 2018-03-20 | Stop reason: SDUPTHER

## 2018-03-20 RX ORDER — ALBUTEROL SULFATE 90 UG/1
2 AEROSOL, METERED RESPIRATORY (INHALATION) EVERY 4 HOURS PRN
Qty: 54 G | Refills: 3 | Status: SHIPPED | OUTPATIENT
Start: 2018-03-20 | End: 2019-08-21 | Stop reason: SDUPTHER

## 2018-03-20 RX ORDER — ALBUTEROL SULFATE 90 UG/1
2 AEROSOL, METERED RESPIRATORY (INHALATION) EVERY 4 HOURS PRN
Qty: 3 INHALER | Refills: 3 | Status: SHIPPED | OUTPATIENT
Start: 2018-03-20 | End: 2018-03-20 | Stop reason: SDUPTHER

## 2018-03-20 NOTE — TELEPHONE ENCOUNTER
Medication refill request for a 90 day supply to the patients mail order pharmacy. Patient has been notified that this has been taken care of.

## 2018-03-20 NOTE — TELEPHONE ENCOUNTER
Needs her inhaler called in for a 90 day supply.  She gets charged more if it is not a 90 day supply.

## 2018-03-29 RX ORDER — LOSARTAN POTASSIUM 100 MG/1
TABLET ORAL
Qty: 30 TABLET | Refills: 5 | Status: SHIPPED | OUTPATIENT
Start: 2018-03-29 | End: 2019-05-02 | Stop reason: SDUPTHER

## 2018-04-12 ENCOUNTER — OFFICE VISIT (OUTPATIENT)
Dept: INTERNAL MEDICINE | Facility: CLINIC | Age: 83
End: 2018-04-12

## 2018-04-12 VITALS
RESPIRATION RATE: 14 BRPM | HEART RATE: 96 BPM | DIASTOLIC BLOOD PRESSURE: 60 MMHG | BODY MASS INDEX: 19.66 KG/M2 | SYSTOLIC BLOOD PRESSURE: 110 MMHG | WEIGHT: 118 LBS | OXYGEN SATURATION: 94 % | TEMPERATURE: 97.3 F | HEIGHT: 65 IN

## 2018-04-12 DIAGNOSIS — J43.8 OTHER EMPHYSEMA (HCC): ICD-10-CM

## 2018-04-12 DIAGNOSIS — E78.5 HYPERLIPIDEMIA, UNSPECIFIED HYPERLIPIDEMIA TYPE: ICD-10-CM

## 2018-04-12 DIAGNOSIS — I25.10 CHRONIC CORONARY ARTERY DISEASE: Primary | ICD-10-CM

## 2018-04-12 DIAGNOSIS — R42 DIZZINESS: ICD-10-CM

## 2018-04-12 DIAGNOSIS — R91.1 LUNG NODULE: ICD-10-CM

## 2018-04-12 DIAGNOSIS — F41.9 ANXIETY: ICD-10-CM

## 2018-04-12 DIAGNOSIS — M48.00 SPINAL STENOSIS, UNSPECIFIED SPINAL REGION: ICD-10-CM

## 2018-04-12 DIAGNOSIS — K21.9 GASTROESOPHAGEAL REFLUX DISEASE WITHOUT ESOPHAGITIS: ICD-10-CM

## 2018-04-12 DIAGNOSIS — N39.0 URINARY TRACT INFECTION WITHOUT HEMATURIA, SITE UNSPECIFIED: ICD-10-CM

## 2018-04-12 DIAGNOSIS — R41.3 MEMORY LOSS: ICD-10-CM

## 2018-04-12 DIAGNOSIS — I10 ESSENTIAL HYPERTENSION: ICD-10-CM

## 2018-04-12 DIAGNOSIS — D64.9 ANEMIA, UNSPECIFIED TYPE: ICD-10-CM

## 2018-04-12 DIAGNOSIS — E55.9 VITAMIN D DEFICIENCY: ICD-10-CM

## 2018-04-12 LAB
BILIRUB BLD-MCNC: ABNORMAL MG/DL
CLARITY, POC: CLEAR
COLOR UR: YELLOW
GLUCOSE UR STRIP-MCNC: NEGATIVE MG/DL
KETONES UR QL: NEGATIVE
LEUKOCYTE EST, POC: ABNORMAL
NITRITE UR-MCNC: NEGATIVE MG/ML
PH UR: 5 [PH] (ref 5–8)
PROT UR STRIP-MCNC: ABNORMAL MG/DL
RBC # UR STRIP: NEGATIVE /UL
SP GR UR: 1.02 (ref 1–1.03)
UROBILINOGEN UR QL: ABNORMAL

## 2018-04-12 PROCEDURE — 81003 URINALYSIS AUTO W/O SCOPE: CPT | Performed by: INTERNAL MEDICINE

## 2018-04-12 PROCEDURE — 99214 OFFICE O/P EST MOD 30 MIN: CPT | Performed by: INTERNAL MEDICINE

## 2018-04-12 RX ORDER — CIPROFLOXACIN 500 MG/1
500 TABLET, FILM COATED ORAL EVERY 12 HOURS SCHEDULED
Qty: 14 TABLET | Refills: 0 | Status: SHIPPED | OUTPATIENT
Start: 2018-04-12 | End: 2018-05-24

## 2018-04-12 NOTE — PROGRESS NOTES
Subjective   Mi Tejeda is a 82 y.o. female.     Chief Complaint   Patient presents with   • Follow-up       History of Present Illness   Patient here for follow-up.  Patient complains of dysuria recently denies any frequency or urgency no fever chill no flank pain increase water no help.  Weight is stable now.  Pulmonary nodule due for repeat.  Patient still smokes.  On test stable.  Hyperglycemia on diet to stable now.  Depression anxiety stable medication.  Hypertension stable medication.  Hypothyroidism stable medication.  Low back pain stable on medication.  Patient complains mild shaking and memory loss dizziness denies any loss of control of bladder.  Also hip pain improved after steroid shot by orthopedist.    Current Outpatient Prescriptions:   •  albuterol (PROAIR HFA) 108 (90 Base) MCG/ACT inhaler, Inhale 2 puffs Every 4 (Four) Hours As Needed for Wheezing or Shortness of Air., Disp: 54 g, Rfl: 3  •  buPROPion XL (WELLBUTRIN XL) 150 MG 24 hr tablet, Take 1 tablet by mouth Daily., Disp: 30 tablet, Rfl: 11  •  Cholecalciferol (VITAMIN D3) 5000 units capsule capsule, TAKE ONE CAPSULE BY MOUTH EVERY DAY, Disp: 90 capsule, Rfl: 3  •  clopidogrel (PLAVIX) 75 MG tablet, Take 1 tablet by mouth Daily., Disp: 30 tablet, Rfl: 3  •  ferrous gluconate 324 (37.5 Fe) MG tablet tablet, TAKE ONE TABLET DAILY WITH BREAKFAST, Disp: 90 tablet, Rfl: 3  •  hydrochlorothiazide (HYDRODIURIL) 25 MG tablet, 1QD, Disp: 30 tablet, Rfl: 5  •  HYDROcodone-acetaminophen (NORCO) 7.5-325 MG per tablet, Take 1 tablet by mouth 2 (Two) Times a Day As Needed for Mild Pain (1-3) or Moderate Pain (4-6)., Disp: , Rfl:   •  hydrOXYzine (ATARAX) 25 MG tablet, , Disp: , Rfl: 1  •  levothyroxine (SYNTHROID, LEVOTHROID) 100 MCG tablet, TAKE ONE TABLET EVERY DAY, Disp: 90 tablet, Rfl: 3  •  losartan (COZAAR) 100 MG tablet, TAKE ONE TABLET EVERY DAY, Disp: 30 tablet, Rfl: 5  •  metoprolol succinate XL (TOPROL-XL) 50 MG 24 hr tablet, Take 1  tablet by mouth Daily., Disp: 90 tablet, Rfl: 3  •  morphine (MS CONTIN) 30 MG 12 hr tablet, Take 30 mg by mouth 2 (Two) Times a Day., Disp: , Rfl:   •  nisoldipine (SULAR) 8.5 MG 24 hr tablet, TAKE ONE TABLET EVERY DAY, Disp: 30 tablet, Rfl: 5  •  pantoprazole (PROTONIX) 40 MG EC tablet, Take 1 tablet by mouth Daily., Disp: 30 tablet, Rfl: 11  •  rOPINIRole (REQUIP) 0.5 MG tablet, Take one pill two hours before bedtime., Disp: 30 tablet, Rfl: 3    The following portions of the patient's history were reviewed and updated as appropriate: allergies, current medications, past family history, past medical history, past social history, past surgical history and problem list.    Review of Systems   Constitutional: Negative.    Respiratory: Negative.    Cardiovascular: Negative.    Gastrointestinal: Negative.    Genitourinary: Positive for dysuria.   Musculoskeletal: Positive for arthralgias.   Skin: Negative.    Neurological: Positive for dizziness.        Memory problem   Psychiatric/Behavioral: Negative.        Objective   Physical Exam   Constitutional: She is oriented to person, place, and time. She appears well-nourished.   Neck: Neck supple.   Cardiovascular: Normal rate, regular rhythm and normal heart sounds.    Pulmonary/Chest: Effort normal and breath sounds normal.   Abdominal: Bowel sounds are normal.   Musculoskeletal: She exhibits tenderness.   Neurological: She is alert and oriented to person, place, and time.   CN 2-12 normal, muscle strengh normal   Skin: Skin is warm.   Psychiatric: She has a normal mood and affect.       All tests have been reviewed.    Assessment/Plan   There are no diagnoses linked to this encounter.             CAD CABG history no cp EKG old MI , refused stress test and EKG. Unable to take a cholesterol medicine patient is allergic to it.refuse PCK9 even ins covers.cont plavix, refuses to cardio--  early satirety improved seen GI, s/p EGD5/2017: 0.75 cm clean base gastric ulcer at  the body of the stomach. Risks of bleeding less than 5%.Non-bleeding duodenal bulb vascular ectasia.Erythematous gastritis.Small sliding hiatal hernia less than 3 cm.Nonobstructive Schatzki's-type ring, now epigastric pain pending to see dr duarte, no dysphagia continue protonix--  pulmonary CT scan showed a small nodules, repeat sl increase in size, repeat   CT scan does show 4.3 cm uterine fibroid s/p ultrasound utero mass, seen by gyn, stable  TIA history of possible TIA left eye cholesterol deposits by ophthalmologist. carotid duplex 50%, CTA neck artery 70% repeat , echocardiogram unremarkable, able to tolerate plavix, self discontinued.  Encourage patient to resume Plavix.  ASA hurts stomach.   Claudication possible PAD history of stents left leg artery. ultrasound again stenosis continue pentoxifylline  Tobacco encourage patient to quit   muscle cramp improved continue flexeril  plantar faciatis banks's neuroma, seen dr Caceres continue to followup. continue Neurontin  LBP arthritis follow up with pain clinic and on Pain medicine, on MS  Fe def anemia normal after fe supplement obtain colon report from dr leung done 4/2013,, SBFT normal, continue iron supplements.  colonoscopy 4/9/2013.colon polyp(tubular adenoma), AVM, and divertic  vit D low,continue supplement.   hyperglycemia good diet  idioppathic neuropathy continue gabapentin  depression/anxiety continue medications improved on neurontin, xanax d/c'd by pain doctor, remeron no help, xanax d/c'd by pain clinic,continue hydroxyzine wellbutrin--  empty bladder problem stable now  HTN continue medicine  hypothyroidism continue medication   LBP spinal stenosis. continue medicine from pain clinic on narcotics  copd severe restrictive disease on PFT, unable to use spiriva, continue symbicort  refuse prevnar 13, refuse zostavax, flushot 2017, pneumovax done--  Muscle cramp in toes follow up with podiatrist. Baclofen no help, stretch exercise  Hip pain follow  ortho  Mild tremor watch for now  Memory problem and dizzy, do CT head  Dysuria , do lab showed UTI start cipro  Osteopenia on dexa vit d and calcium continue  Followup in 1 mo

## 2018-04-26 RX ORDER — METOPROLOL SUCCINATE 50 MG/1
TABLET, EXTENDED RELEASE ORAL
Qty: 90 TABLET | Refills: 3 | Status: SHIPPED | OUTPATIENT
Start: 2018-04-26 | End: 2019-05-02 | Stop reason: SDUPTHER

## 2018-05-02 ENCOUNTER — HOSPITAL ENCOUNTER (OUTPATIENT)
Dept: CT IMAGING | Facility: HOSPITAL | Age: 83
End: 2018-05-02
Attending: INTERNAL MEDICINE

## 2018-05-02 ENCOUNTER — HOSPITAL ENCOUNTER (OUTPATIENT)
Dept: CT IMAGING | Facility: HOSPITAL | Age: 83
Discharge: HOME | End: 2018-05-02
Attending: INTERNAL MEDICINE | Admitting: INTERNAL MEDICINE

## 2018-05-02 DIAGNOSIS — R41.3 MEMORY LOSS: ICD-10-CM

## 2018-05-02 DIAGNOSIS — R42 DIZZINESS: ICD-10-CM

## 2018-05-02 PROCEDURE — 70450 CT HEAD/BRAIN W/O DYE: CPT

## 2018-05-02 PROCEDURE — 71250 CT THORAX DX C-: CPT

## 2018-05-18 RX ORDER — PANTOPRAZOLE SODIUM 40 MG/1
TABLET, DELAYED RELEASE ORAL
Qty: 30 TABLET | Refills: 11 | Status: SHIPPED | OUTPATIENT
Start: 2018-05-18 | End: 2019-05-02 | Stop reason: SDUPTHER

## 2018-05-24 ENCOUNTER — OFFICE VISIT (OUTPATIENT)
Dept: INTERNAL MEDICINE | Facility: CLINIC | Age: 83
End: 2018-05-24

## 2018-05-24 VITALS
SYSTOLIC BLOOD PRESSURE: 130 MMHG | RESPIRATION RATE: 14 BRPM | DIASTOLIC BLOOD PRESSURE: 80 MMHG | HEART RATE: 58 BPM | BODY MASS INDEX: 20.16 KG/M2 | OXYGEN SATURATION: 96 % | HEIGHT: 65 IN | WEIGHT: 121 LBS | TEMPERATURE: 97.2 F

## 2018-05-24 DIAGNOSIS — I77.9 PERIPHERAL ARTERIAL OCCLUSIVE DISEASE (HCC): ICD-10-CM

## 2018-05-24 DIAGNOSIS — M48.00 SPINAL STENOSIS, UNSPECIFIED SPINAL REGION: ICD-10-CM

## 2018-05-24 DIAGNOSIS — F41.9 ANXIETY: ICD-10-CM

## 2018-05-24 DIAGNOSIS — I25.10 CHRONIC CORONARY ARTERY DISEASE: Primary | ICD-10-CM

## 2018-05-24 DIAGNOSIS — J43.8 OTHER EMPHYSEMA (HCC): ICD-10-CM

## 2018-05-24 DIAGNOSIS — I10 ESSENTIAL HYPERTENSION: ICD-10-CM

## 2018-05-24 DIAGNOSIS — E78.5 HYPERLIPIDEMIA, UNSPECIFIED HYPERLIPIDEMIA TYPE: ICD-10-CM

## 2018-05-24 DIAGNOSIS — D64.9 ANEMIA, UNSPECIFIED TYPE: ICD-10-CM

## 2018-05-24 PROCEDURE — 99214 OFFICE O/P EST MOD 30 MIN: CPT | Performed by: INTERNAL MEDICINE

## 2018-05-24 NOTE — PROGRESS NOTES
Subjective   Mi Tejeda is a 82 y.o. female.     Chief Complaint   Patient presents with   • Follow-up       History of Present Illness   Patient here for follow-up.  Lung nodule CT scan showed a stable.  CT of the head that showed microvascular changes.  Patient self discontinued Plavix due to bruise easy.  Coronary artery disease is stable now.  Patient still smokes.  Hyperglycemia on diet to stable.  Hypertension stable medication.  Hypothyroidism stable medication.  Dysuria resolved after Cipro.    Current Outpatient Prescriptions:   •  albuterol (PROAIR HFA) 108 (90 Base) MCG/ACT inhaler, Inhale 2 puffs Every 4 (Four) Hours As Needed for Wheezing or Shortness of Air., Disp: 54 g, Rfl: 3  •  buPROPion XL (WELLBUTRIN XL) 150 MG 24 hr tablet, Take 1 tablet by mouth Daily., Disp: 30 tablet, Rfl: 11  •  Cholecalciferol (VITAMIN D3) 5000 units capsule capsule, TAKE ONE CAPSULE BY MOUTH EVERY DAY, Disp: 90 capsule, Rfl: 3  •  clopidogrel (PLAVIX) 75 MG tablet, Take 1 tablet by mouth Daily., Disp: 30 tablet, Rfl: 3  •  ferrous gluconate 324 (37.5 Fe) MG tablet tablet, TAKE ONE TABLET DAILY WITH BREAKFAST, Disp: 90 tablet, Rfl: 3  •  hydrochlorothiazide (HYDRODIURIL) 25 MG tablet, 1QD, Disp: 30 tablet, Rfl: 5  •  HYDROcodone-acetaminophen (NORCO) 7.5-325 MG per tablet, Take 1 tablet by mouth 2 (Two) Times a Day As Needed for Mild Pain (1-3) or Moderate Pain (4-6)., Disp: , Rfl:   •  hydrOXYzine (ATARAX) 25 MG tablet, , Disp: , Rfl: 1  •  levothyroxine (SYNTHROID, LEVOTHROID) 100 MCG tablet, TAKE ONE TABLET EVERY DAY, Disp: 90 tablet, Rfl: 3  •  losartan (COZAAR) 100 MG tablet, TAKE ONE TABLET EVERY DAY, Disp: 30 tablet, Rfl: 5  •  metoprolol succinate XL (TOPROL-XL) 50 MG 24 hr tablet, TAKE ONE TABLET EVERY DAY, Disp: 90 tablet, Rfl: 3  •  morphine (MS CONTIN) 30 MG 12 hr tablet, Take 30 mg by mouth 2 (Two) Times a Day., Disp: , Rfl:   •  nisoldipine (SULAR) 8.5 MG 24 hr tablet, TAKE ONE TABLET EVERY DAY, Disp:  30 tablet, Rfl: 5  •  pantoprazole (PROTONIX) 40 MG EC tablet, TAKE ONE TABLET BY MOUTH EVERY DAY, Disp: 30 tablet, Rfl: 11  •  rOPINIRole (REQUIP) 0.5 MG tablet, Take one pill two hours before bedtime., Disp: 30 tablet, Rfl: 3    The following portions of the patient's history were reviewed and updated as appropriate: allergies, current medications, past family history, past medical history, past social history, past surgical history and problem list.    Review of Systems   Constitutional: Negative.    Respiratory: Negative.    Cardiovascular: Negative.    Gastrointestinal: Negative.    Musculoskeletal: Negative.    Skin: Negative.    Neurological: Negative.    Psychiatric/Behavioral: Negative.        Objective   Physical Exam   Constitutional: She is oriented to person, place, and time. She appears well-nourished.   Neck: Neck supple.   Cardiovascular: Normal rate, regular rhythm and normal heart sounds.    Pulmonary/Chest: Effort normal and breath sounds normal.   Abdominal: Bowel sounds are normal.   Neurological: She is alert and oriented to person, place, and time.   Skin: Skin is warm.   Psychiatric: She has a normal mood and affect.       All tests have been reviewed.    Assessment/Plan   There are no diagnoses linked to this encounter.           CAD CABG history no cp EKG old MI , refused stress test and EKG. Unable to take a cholesterol medicine patient is allergic to it.refuse PCK9 even ins covers.cont plavix, refuses to cardio--  early satirety improved seen GI, s/p EGD5/2017: 0.75 cm clean base gastric ulcer at the body of the stomach. Risks of bleeding less than 5%.Non-bleeding duodenal bulb vascular ectasia.Erythematous gastritis.Small sliding hiatal hernia less than 3 cm.Nonobstructive Schatzki's-type ring, now epigastric pain pending to see dr duarte, no dysphagia continue protonix--  pulmonary CT scan showed a small nodules, repeat stable  CT scan does show 4.3 cm uterine fibroid s/p ultrasound utero  mass, seen by gyn, stable  TIA history of possible TIA left eye cholesterol deposits by ophthalmologist. carotid duplex 50%, CTA neck artery 70% repeat , echocardiogram unremarkable, able to tolerate plavix, restart.   patient to resume Plavix.  ASA hurts stomach.   CT head showed microvasc changes start plavix  Claudication possible PAD history of stents left leg artery. ultrasound again stenosis continue pentoxifylline  Tobacco encourage patient to quit   muscle cramp improved continue flexeril  plantar faciatis banks's neuroma, seen dr Caceres continue to followup. continue Neurontin  LBP arthritis follow up with pain clinic and on Pain medicine, on MS  Fe def anemia normal after fe supplement obtain colon report from dr leung done 4/2013,, SBFT normal, continue iron supplements.  colonoscopy 4/9/2013.colon polyp(tubular adenoma), AVM, and divertic  vit D low,continue supplement.   hyperglycemia good diet  idioppathic neuropathy continue gabapentin  depression/anxiety continue medications improved on neurontin, xanax d/c'd by pain doctor, remeron no help, xanax d/c'd by pain clinic,continue hydroxyzine wellbutrin--  empty bladder problem stable now  HTN continue medicine  hypothyroidism continue medication   LBP spinal stenosis. continue medicine from pain clinic on narcotics  copd severe restrictive disease on PFT, unable to use spiriva, continue symbicort  refuse prevnar 13, refuse zostavax and shingrix,  flushot 2017, pneumovax done--  Muscle cramp in toes follow up with podiatrist. Baclofen no help, stretch exercise  Hip pain follow ortho  Mild tremor watch for now  Memory problem and dizzy,reviewed  CT head  Dysuria , resolved after cipro  Osteopenia on dexa vit d and calcium continue  Followup in 6 mo, do lab now

## 2018-06-28 DIAGNOSIS — M16.11 PRIMARY OSTEOARTHRITIS OF RIGHT HIP: Primary | ICD-10-CM

## 2018-07-20 ENCOUNTER — HOSPITAL ENCOUNTER (OUTPATIENT)
Dept: GENERAL RADIOLOGY | Facility: HOSPITAL | Age: 83
Discharge: HOME | End: 2018-07-20
Attending: ORTHOPAEDIC SURGERY | Admitting: ORTHOPAEDIC SURGERY

## 2018-07-20 PROCEDURE — 25010000002 METHYLPREDNISOLONE PER 80 MG: Performed by: ORTHOPAEDIC SURGERY

## 2018-07-20 PROCEDURE — 77002 NEEDLE LOCALIZATION BY XRAY: CPT

## 2018-07-20 RX ORDER — LIDOCAINE HYDROCHLORIDE 10 MG/ML
20 INJECTION, SOLUTION INFILTRATION; PERINEURAL ONCE
Status: COMPLETED | OUTPATIENT
Start: 2018-07-20 | End: 2018-07-20

## 2018-07-20 RX ORDER — HYDROCHLOROTHIAZIDE 25 MG/1
TABLET ORAL
Qty: 30 TABLET | Refills: 5 | Status: SHIPPED | OUTPATIENT
Start: 2018-07-20 | End: 2020-01-29

## 2018-07-20 RX ORDER — METHYLPREDNISOLONE ACETATE 80 MG/ML
80 INJECTION, SUSPENSION INTRA-ARTICULAR; INTRALESIONAL; INTRAMUSCULAR; SOFT TISSUE ONCE
Status: COMPLETED | OUTPATIENT
Start: 2018-07-20 | End: 2018-07-20

## 2018-07-20 RX ADMIN — LIDOCAINE HYDROCHLORIDE 9 ML: 10 INJECTION, SOLUTION INFILTRATION; PERINEURAL at 15:15

## 2018-07-20 RX ADMIN — METHYLPREDNISOLONE ACETATE 80 MG: 80 INJECTION, SUSPENSION INTRA-ARTICULAR; INTRALESIONAL; INTRAMUSCULAR; SOFT TISSUE at 14:54

## 2018-08-23 ENCOUNTER — OFFICE VISIT (OUTPATIENT)
Dept: INTERNAL MEDICINE | Facility: CLINIC | Age: 83
End: 2018-08-23

## 2018-08-23 VITALS
SYSTOLIC BLOOD PRESSURE: 122 MMHG | HEART RATE: 60 BPM | TEMPERATURE: 97 F | BODY MASS INDEX: 18.66 KG/M2 | DIASTOLIC BLOOD PRESSURE: 80 MMHG | WEIGHT: 112 LBS | HEIGHT: 65 IN | RESPIRATION RATE: 14 BRPM | OXYGEN SATURATION: 95 %

## 2018-08-23 DIAGNOSIS — R42 VERTIGO: Primary | ICD-10-CM

## 2018-08-23 DIAGNOSIS — R63.4 WEIGHT LOSS: ICD-10-CM

## 2018-08-23 LAB
ALBUMIN SERPL-MCNC: 3.4 G/DL (ref 3.5–5)
ALBUMIN/GLOB SERPL: 1.3 G/DL (ref 1–2)
ALP SERPL-CCNC: 88 U/L (ref 38–126)
ALT SERPL-CCNC: 23 U/L (ref 13–69)
AST SERPL-CCNC: 32 U/L (ref 15–46)
BASOPHILS # BLD AUTO: 0.03 10*3/MM3 (ref 0–0.2)
BASOPHILS NFR BLD AUTO: 0.4 % (ref 0–2.5)
BILIRUB SERPL-MCNC: 0.4 MG/DL (ref 0.2–1.3)
BUN SERPL-MCNC: 32 MG/DL (ref 7–20)
BUN/CREAT SERPL: 35.6 (ref 7.1–23.5)
CALCIUM SERPL-MCNC: 9.7 MG/DL (ref 8.4–10.2)
CHLORIDE SERPL-SCNC: 107 MMOL/L (ref 98–107)
CO2 SERPL-SCNC: 29 MMOL/L (ref 26–30)
CREAT SERPL-MCNC: 0.9 MG/DL (ref 0.6–1.3)
EOSINOPHIL # BLD AUTO: 0.13 10*3/MM3 (ref 0–0.7)
EOSINOPHIL NFR BLD AUTO: 1.6 % (ref 0–7)
ERYTHROCYTE [DISTWIDTH] IN BLOOD BY AUTOMATED COUNT: 13 % (ref 11.5–14.5)
ERYTHROCYTE [SEDIMENTATION RATE] IN BLOOD BY WESTERGREN METHOD: 6 MM/HR (ref 0–20)
GLOBULIN SER CALC-MCNC: 2.7 GM/DL
GLUCOSE SERPL-MCNC: 111 MG/DL (ref 74–98)
HCT VFR BLD AUTO: 42 % (ref 37–47)
HGB BLD-MCNC: 13.3 G/DL (ref 12–16)
IMM GRANULOCYTES # BLD: 0.02 10*3/MM3 (ref 0–0.06)
IMM GRANULOCYTES NFR BLD: 0.2 % (ref 0–0.6)
LYMPHOCYTES # BLD AUTO: 2.18 10*3/MM3 (ref 0.6–3.4)
LYMPHOCYTES NFR BLD AUTO: 26.3 % (ref 10–50)
MCH RBC QN AUTO: 30.9 PG (ref 27–31)
MCHC RBC AUTO-ENTMCNC: 31.7 G/DL (ref 30–37)
MCV RBC AUTO: 97.4 FL (ref 81–99)
MONOCYTES # BLD AUTO: 0.73 10*3/MM3 (ref 0–0.9)
MONOCYTES NFR BLD AUTO: 8.8 % (ref 0–12)
NEUTROPHILS # BLD AUTO: 5.21 10*3/MM3 (ref 2–6.9)
NEUTROPHILS NFR BLD AUTO: 62.7 % (ref 37–80)
NRBC BLD AUTO-RTO: 0 /100 WBC (ref 0–0)
PLATELET # BLD AUTO: 206 10*3/MM3 (ref 130–400)
POTASSIUM SERPL-SCNC: 4.2 MMOL/L (ref 3.5–5.1)
PROT SERPL-MCNC: 6.1 G/DL (ref 6.3–8.2)
RBC # BLD AUTO: 4.31 10*6/MM3 (ref 4.2–5.4)
SODIUM SERPL-SCNC: 143 MMOL/L (ref 137–145)
TSH SERPL DL<=0.005 MIU/L-ACNC: 2.15 MIU/ML (ref 0.47–4.68)
WBC # BLD AUTO: 8.3 10*3/MM3 (ref 4.8–10.8)

## 2018-08-23 PROCEDURE — 99214 OFFICE O/P EST MOD 30 MIN: CPT | Performed by: INTERNAL MEDICINE

## 2018-08-23 NOTE — PROGRESS NOTES
Subjective   Mi Tejeda is a 82 y.o. female.     Chief Complaint   Patient presents with   • Ear Problem     right side       History of Present Illness   2 months had ear pain now resolved. C/o now dizzy when moving head right to left, room spin. No syncope no soa no chest pain . No sinus congestion , but positive for running nose.   Weight loss 9 ib due to stress in life, selling house.no appetitie, forget eating     Current Outpatient Prescriptions:   •  albuterol (PROAIR HFA) 108 (90 Base) MCG/ACT inhaler, Inhale 2 puffs Every 4 (Four) Hours As Needed for Wheezing or Shortness of Air., Disp: 54 g, Rfl: 3  •  buPROPion XL (WELLBUTRIN XL) 150 MG 24 hr tablet, Take 1 tablet by mouth Daily., Disp: 30 tablet, Rfl: 11  •  Cholecalciferol (VITAMIN D3) 5000 units capsule capsule, TAKE ONE CAPSULE BY MOUTH EVERY DAY, Disp: 90 capsule, Rfl: 3  •  clopidogrel (PLAVIX) 75 MG tablet, Take 1 tablet by mouth Daily., Disp: 30 tablet, Rfl: 3  •  ferrous gluconate 324 (37.5 Fe) MG tablet tablet, TAKE ONE TABLET DAILY WITH BREAKFAST, Disp: 90 tablet, Rfl: 3  •  hydrochlorothiazide (HYDRODIURIL) 25 MG tablet, TAKE ONE TABLET BY MOUTH EVERY DAY, Disp: 30 tablet, Rfl: 5  •  HYDROcodone-acetaminophen (NORCO) 7.5-325 MG per tablet, Take 1 tablet by mouth 2 (Two) Times a Day As Needed for Mild Pain (1-3) or Moderate Pain (4-6)., Disp: , Rfl:   •  hydrOXYzine (ATARAX) 25 MG tablet, , Disp: , Rfl: 1  •  levothyroxine (SYNTHROID, LEVOTHROID) 100 MCG tablet, TAKE ONE TABLET EVERY DAY, Disp: 90 tablet, Rfl: 3  •  losartan (COZAAR) 100 MG tablet, TAKE ONE TABLET EVERY DAY, Disp: 30 tablet, Rfl: 5  •  metoprolol succinate XL (TOPROL-XL) 50 MG 24 hr tablet, TAKE ONE TABLET EVERY DAY, Disp: 90 tablet, Rfl: 3  •  morphine (MS CONTIN) 30 MG 12 hr tablet, Take 30 mg by mouth 2 (Two) Times a Day., Disp: , Rfl:   •  nisoldipine (SULAR) 8.5 MG 24 hr tablet, TAKE ONE TABLET EVERY DAY, Disp: 30 tablet, Rfl: 5  •  pantoprazole (PROTONIX) 40 MG EC  tablet, TAKE ONE TABLET BY MOUTH EVERY DAY, Disp: 30 tablet, Rfl: 11  •  rOPINIRole (REQUIP) 0.5 MG tablet, Take one pill two hours before bedtime., Disp: 30 tablet, Rfl: 3    The following portions of the patient's history were reviewed and updated as appropriate: allergies, current medications, past family history, past medical history, past social history, past surgical history and problem list.    Review of Systems   Constitutional: Negative.    HENT: Positive for ear pain and rhinorrhea. Negative for postnasal drip and sinus pain.    Respiratory: Negative.  Negative for cough, chest tightness and shortness of breath.    Cardiovascular: Negative.  Negative for chest pain.   Gastrointestinal: Negative.    Musculoskeletal: Negative.    Skin: Negative.    Neurological: Positive for dizziness.   Psychiatric/Behavioral: Negative.        Objective   Physical Exam   Constitutional: She is oriented to person, place, and time. She appears well-developed and well-nourished.   HENT:   Head: Normocephalic.   Right Ear: External ear normal.   Left Ear: External ear normal.   Neck: Neck supple.   Cardiovascular: Normal rate, regular rhythm and normal heart sounds.    Pulmonary/Chest: Effort normal and breath sounds normal.   Abdominal: Bowel sounds are normal.   Neurological: She is alert and oriented to person, place, and time.   Skin: Skin is warm.   Psychiatric: She has a normal mood and affect.       All tests have been reviewed.    Assessment/Plan   Diagnoses and all orders for this visit:    Vertigo  -     Ambulatory Referral to Physical Therapy    Weight loss  -     CT Abdomen Pelvis With & Without Contrast; Future  -     CBC & Differential  -     Comprehensive Metabolic Panel  -     TSH  -     Sedimentation Rate      Increase fluids and hydration.        3 weeks with others

## 2018-08-28 ENCOUNTER — APPOINTMENT (OUTPATIENT)
Dept: CT IMAGING | Facility: HOSPITAL | Age: 83
End: 2018-08-28
Attending: INTERNAL MEDICINE

## 2018-11-08 ENCOUNTER — OFFICE VISIT (OUTPATIENT)
Dept: INTERNAL MEDICINE | Facility: CLINIC | Age: 83
End: 2018-11-08

## 2018-11-08 VITALS
DIASTOLIC BLOOD PRESSURE: 65 MMHG | WEIGHT: 117 LBS | SYSTOLIC BLOOD PRESSURE: 135 MMHG | HEART RATE: 62 BPM | HEIGHT: 65 IN | TEMPERATURE: 98.2 F | OXYGEN SATURATION: 100 % | BODY MASS INDEX: 19.49 KG/M2

## 2018-11-08 DIAGNOSIS — I25.10 CHRONIC CORONARY ARTERY DISEASE: Primary | ICD-10-CM

## 2018-11-08 DIAGNOSIS — K22.2 LOWER ESOPHAGEAL RING: ICD-10-CM

## 2018-11-08 DIAGNOSIS — E78.5 HYPERLIPIDEMIA, UNSPECIFIED HYPERLIPIDEMIA TYPE: ICD-10-CM

## 2018-11-08 DIAGNOSIS — K21.9 GASTROESOPHAGEAL REFLUX DISEASE WITHOUT ESOPHAGITIS: ICD-10-CM

## 2018-11-08 DIAGNOSIS — N39.0 URINARY TRACT INFECTION WITHOUT HEMATURIA, SITE UNSPECIFIED: ICD-10-CM

## 2018-11-08 DIAGNOSIS — I10 ESSENTIAL HYPERTENSION: ICD-10-CM

## 2018-11-08 DIAGNOSIS — R41.3 MEMORY LOSS: ICD-10-CM

## 2018-11-08 DIAGNOSIS — I67.82 TEMPORARY CEREBRAL VASCULAR DYSFUNCTION: ICD-10-CM

## 2018-11-08 DIAGNOSIS — R09.89 CAROTID BRUIT, UNSPECIFIED LATERALITY: ICD-10-CM

## 2018-11-08 DIAGNOSIS — R91.8 MULTIPLE PULMONARY NODULES: ICD-10-CM

## 2018-11-08 DIAGNOSIS — Q39.6 DIVERTICULUM OF ESOPHAGUS: ICD-10-CM

## 2018-11-08 DIAGNOSIS — R94.31 ABNORMAL ELECTROCARDIOGRAM: ICD-10-CM

## 2018-11-08 DIAGNOSIS — J43.8 OTHER EMPHYSEMA (HCC): ICD-10-CM

## 2018-11-08 DIAGNOSIS — E55.9 VITAMIN D DEFICIENCY: ICD-10-CM

## 2018-11-08 DIAGNOSIS — F41.9 ANXIETY: ICD-10-CM

## 2018-11-08 DIAGNOSIS — R53.83 FATIGUE, UNSPECIFIED TYPE: ICD-10-CM

## 2018-11-08 DIAGNOSIS — I77.9 PERIPHERAL ARTERIAL OCCLUSIVE DISEASE (HCC): ICD-10-CM

## 2018-11-08 DIAGNOSIS — M48.00 SPINAL STENOSIS, UNSPECIFIED SPINAL REGION: ICD-10-CM

## 2018-11-08 LAB
BILIRUB BLD-MCNC: ABNORMAL MG/DL
CLARITY, POC: ABNORMAL
COLOR UR: YELLOW
GLUCOSE UR STRIP-MCNC: NEGATIVE MG/DL
KETONES UR QL: NEGATIVE
LEUKOCYTE EST, POC: ABNORMAL
NITRITE UR-MCNC: NEGATIVE MG/ML
PH UR: 5 [PH] (ref 5–8)
PROT UR STRIP-MCNC: ABNORMAL MG/DL
RBC # UR STRIP: NEGATIVE /UL
SP GR UR: 1.02 (ref 1–1.03)
UROBILINOGEN UR QL: ABNORMAL

## 2018-11-08 PROCEDURE — G0439 PPPS, SUBSEQ VISIT: HCPCS | Performed by: INTERNAL MEDICINE

## 2018-11-08 PROCEDURE — 99213 OFFICE O/P EST LOW 20 MIN: CPT | Performed by: INTERNAL MEDICINE

## 2018-11-08 PROCEDURE — 81003 URINALYSIS AUTO W/O SCOPE: CPT | Performed by: INTERNAL MEDICINE

## 2018-11-08 RX ORDER — NICOTINE 21 MG/24HR
1 PATCH, TRANSDERMAL 24 HOURS TRANSDERMAL EVERY 24 HOURS
Qty: 21 PATCH | Refills: 2 | Status: SHIPPED | OUTPATIENT
Start: 2018-11-08 | End: 2018-11-08 | Stop reason: SDUPTHER

## 2018-11-08 RX ORDER — NICOTINE 21 MG/24HR
1 PATCH, TRANSDERMAL 24 HOURS TRANSDERMAL EVERY 24 HOURS
Qty: 21 PATCH | Refills: 2 | Status: SHIPPED | OUTPATIENT
Start: 2018-11-08 | End: 2020-06-25

## 2018-11-08 RX ORDER — SULFAMETHOXAZOLE AND TRIMETHOPRIM 800; 160 MG/1; MG/1
1 TABLET ORAL 2 TIMES DAILY
Qty: 14 TABLET | Refills: 0 | Status: SHIPPED | OUTPATIENT
Start: 2018-11-08 | End: 2019-02-12

## 2018-11-08 NOTE — PROGRESS NOTES
QUICK REFERENCE INFORMATION:  The ABCs of the Annual Wellness Visit    Subsequent Medicare Wellness Visit    HEALTH RISK ASSESSMENT    1935    Recent Hospitalizations:  No hospitalization(s) within the last year..        Current Medical Providers:  Patient Care Team:  Adiel Awad MD as PCP - General  Adiel Awad MD as PCP - Family Medicine  aLz Vallejo MD as PCP - Claims Attributed        Smoking Status:  History   Smoking Status   • Current Every Day Smoker   • Packs/day: 1.00   • Years: 65.00   • Types: Cigarettes   Smokeless Tobacco   • Never Used       Alcohol Consumption:  History   Alcohol Use No       Depression Screen:   PHQ-2/PHQ-9 Depression Screening 11/8/2018   Little interest or pleasure in doing things 0   Feeling down, depressed, or hopeless 1   Trouble falling or staying asleep, or sleeping too much -   Feeling tired or having little energy -   Poor appetite or overeating -   Feeling bad about yourself - or that you are a failure or have let yourself or your family down -   Trouble concentrating on things, such as reading the newspaper or watching television -   Moving or speaking so slowly that other people could have noticed. Or the opposite - being so fidgety or restless that you have been moving around a lot more than usual -   Thoughts that you would be better off dead, or of hurting yourself in some way -   Total Score 1   If you checked off any problems, how difficult have these problems made it for you to do your work, take care of things at home, or get along with other people? -       Health Habits and Functional and Cognitive Screening:  Functional & Cognitive Status 11/8/2018   Do you have difficulty preparing food and eating? No   Do you have difficulty bathing yourself, getting dressed or grooming yourself? No   Do you have difficulty using the toilet? No   Do you have difficulty moving around from place to place? Yes   Do you have trouble with steps or getting out of a  bed or a chair? No   In the past year have you fallen or experienced a near fall? No   Current Diet Well Balanced Diet   Dental Exam (No Data)   Eye Exam Up to date   Exercise (times per week) 7 times per week   Current Exercise Activities Include Housecleaning   Do you need help using the phone?  No   Are you deaf or do you have serious difficulty hearing?  No   Do you need help with transportation? No   Do you need help shopping? No   Do you need help preparing meals?  No   Do you need help with housework?  No   Do you need help with laundry? No   Do you need help taking your medications? No   Do you need help managing money? No   Do you ever drive or ride in a car without wearing a seat belt? No   Have you felt unusual stress, anger or loneliness in the last month? Yes   Who do you live with? Alone   If you need help, do you have trouble finding someone available to you? No   Have you been bothered in the last four weeks by sexual problems? -   Do you have difficulty concentrating, remembering or making decisions? Yes           Does the patient have evidence of cognitive impairment? No    Aspirin use counseling: Does not need ASA (and currently is not on it)      Recent Lab Results:  CMP:  Lab Results   Component Value Date     (H) 08/23/2018    BUN 32 (H) 08/23/2018    CREATININE 0.90 08/23/2018    EGFRIFNONA 60 (L) 08/23/2018    EGFRIFAFRI 73 08/23/2018    BCR 35.6 (H) 08/23/2018     08/23/2018    K 4.2 08/23/2018    CO2 29.0 08/23/2018    CALCIUM 9.7 08/23/2018    PROTENTOTREF 6.1 (L) 08/23/2018    ALBUMIN 3.40 (L) 08/23/2018    LABGLOBREF 2.7 08/23/2018    LABIL2 1.3 08/23/2018    BILITOT 0.4 08/23/2018    ALKPHOS 88 08/23/2018    AST 32 08/23/2018    ALT 23 08/23/2018     Lipid Panel:  Lab Results   Component Value Date    CHOL 199 10/20/2016    TRIG 135 12/08/2017    HDL 37 (L) 12/08/2017    VLDL 27 12/08/2017     HbA1c:       Visual Acuity:  No exam data present    Age-appropriate Screening  Schedule:  Refer to the list below for future screening recommendations based on patient's age, sex and/or medical conditions. Orders for these recommended tests are listed in the plan section. The patient has been provided with a written plan.    Health Maintenance   Topic Date Due   • PNEUMOCOCCAL VACCINES (65+ LOW/MEDIUM RISK) (2 of 2 - PCV13) 11/13/2016   • ZOSTER VACCINE (2 of 2) 12/20/2016   • MAMMOGRAM  10/25/2018   • LIPID PANEL  12/08/2018   • DXA SCAN  01/27/2019   • TDAP/TD VACCINES (2 - Td) 10/25/2026   • INFLUENZA VACCINE  Addressed        Subjective   History of Present Illness    Mi Tejeda is a 82 y.o. female who presents for an Subsequent Wellness Visit.Patient here for Medicare wellness also has some medical problems to be addressed and followed up.  Coronary artery disease a patient declined to take any cholesterol medications.  Early satiety resolved weight gain 5 pound.  TIA history patient is taking Plavix.  Patient complains burning sensation upon urination off and I'll for 3 months.  Mild the frequency urgency.  Hypertension stable medication.  Lung nodule needs to be followed up.  Hypothyroidism stable medication.  Anemia resolved.  Memory normal  .  The patient is also interested in smoking cessation requesting nicotine patch  The following portions of the patient's history were reviewed and updated as appropriate: allergies, current medications, past family history, past medical history, past social history, past surgical history and problem list.    Outpatient Medications Prior to Visit   Medication Sig Dispense Refill   • albuterol (PROAIR HFA) 108 (90 Base) MCG/ACT inhaler Inhale 2 puffs Every 4 (Four) Hours As Needed for Wheezing or Shortness of Air. 54 g 3   • buPROPion XL (WELLBUTRIN XL) 150 MG 24 hr tablet Take 1 tablet by mouth Daily. 30 tablet 11   • Cholecalciferol (VITAMIN D3) 5000 units capsule capsule TAKE ONE CAPSULE BY MOUTH EVERY DAY 90 capsule 3   • clopidogrel  (PLAVIX) 75 MG tablet Take 1 tablet by mouth Daily. 30 tablet 3   • ferrous gluconate 324 (37.5 Fe) MG tablet tablet TAKE ONE TABLET DAILY WITH BREAKFAST 90 tablet 3   • hydrochlorothiazide (HYDRODIURIL) 25 MG tablet TAKE ONE TABLET BY MOUTH EVERY DAY 30 tablet 5   • HYDROcodone-acetaminophen (NORCO) 7.5-325 MG per tablet Take 1 tablet by mouth 2 (Two) Times a Day As Needed for Mild Pain (1-3) or Moderate Pain (4-6).     • hydrOXYzine (ATARAX) 25 MG tablet   1   • levothyroxine (SYNTHROID, LEVOTHROID) 100 MCG tablet TAKE ONE TABLET EVERY DAY 90 tablet 3   • losartan (COZAAR) 100 MG tablet TAKE ONE TABLET EVERY DAY 30 tablet 5   • metoprolol succinate XL (TOPROL-XL) 50 MG 24 hr tablet TAKE ONE TABLET EVERY DAY 90 tablet 3   • morphine (MS CONTIN) 30 MG 12 hr tablet Take 30 mg by mouth 2 (Two) Times a Day.     • nisoldipine (SULAR) 8.5 MG 24 hr tablet TAKE ONE TABLET EVERY DAY 30 tablet 5   • pantoprazole (PROTONIX) 40 MG EC tablet TAKE ONE TABLET BY MOUTH EVERY DAY 30 tablet 11   • rOPINIRole (REQUIP) 0.5 MG tablet Take one pill two hours before bedtime. 30 tablet 3     No facility-administered medications prior to visit.        Patient Active Problem List   Diagnosis   • Anxiety   • Anemia   • Chronic coronary artery disease   • Carotid bruit   • Chronic obstructive pulmonary disease (CMS/HCC)   • Diverticulum of esophagus   • Fatigue   • Gastroesophageal reflux disease   • Hyperlipidemia   • Hypertension   • Multiple pulmonary nodules   • Peripheral arterial occlusive disease (CMS/HCC)   • Plantar fasciitis   • Spinal stenosis   • Temporary cerebral vascular dysfunction   • Uterine leiomyoma   • Vitamin D deficiency   • Weight loss   • Abnormal electrocardiogram   • Cramp in lower leg   • Lower esophageal ring   • Vertigo   • Memory loss       Advance Care Planning:  has an advance directive - a copy HAS NOT been provided    Identification of Risk Factors:  Risk factors include: tobacco use.    Review of Systems  "  Constitutional: Negative.    Respiratory: Negative.    Cardiovascular: Negative.    Gastrointestinal: Negative.    Skin: Negative.    Psychiatric/Behavioral: Negative.        Compared to one year ago, the patient feels her physical health is the same.  Compared to one year ago, the patient feels her mental health is the same.    Objective     Physical Exam   Constitutional: She is oriented to person, place, and time. She appears well-nourished.   Neck: Neck supple.   Cardiovascular: Normal rate, regular rhythm and normal heart sounds.    Pulmonary/Chest: Effort normal and breath sounds normal.   Abdominal: Bowel sounds are normal.   Neurological: She is alert and oriented to person, place, and time.   Skin: Skin is warm.   Psychiatric: She has a normal mood and affect.       Vitals:    11/08/18 1025   BP: 135/65   BP Location: Right arm   Patient Position: Sitting   Pulse: 62   Temp: 98.2 °F (36.8 °C)   SpO2: 100%   Weight: 53.1 kg (117 lb)   Height: 163.8 cm (64.5\")       Patient's Body mass index is 19.77 kg/m². BMI is below normal parameters. Recommendations include: no follow-up required.      Assessment/Plan   Patient Self-Management and Personalized Health Advice  The patient has been provided with information about: diet and exercise and preventive services including:   · Advance directive.    Visit Diagnoses:  No diagnosis found.    No orders of the defined types were placed in this encounter.      Outpatient Encounter Prescriptions as of 11/8/2018   Medication Sig Dispense Refill   • albuterol (PROAIR HFA) 108 (90 Base) MCG/ACT inhaler Inhale 2 puffs Every 4 (Four) Hours As Needed for Wheezing or Shortness of Air. 54 g 3   • buPROPion XL (WELLBUTRIN XL) 150 MG 24 hr tablet Take 1 tablet by mouth Daily. 30 tablet 11   • Cholecalciferol (VITAMIN D3) 5000 units capsule capsule TAKE ONE CAPSULE BY MOUTH EVERY DAY 90 capsule 3   • clopidogrel (PLAVIX) 75 MG tablet Take 1 tablet by mouth Daily. 30 tablet 3   • " ferrous gluconate 324 (37.5 Fe) MG tablet tablet TAKE ONE TABLET DAILY WITH BREAKFAST 90 tablet 3   • hydrochlorothiazide (HYDRODIURIL) 25 MG tablet TAKE ONE TABLET BY MOUTH EVERY DAY 30 tablet 5   • HYDROcodone-acetaminophen (NORCO) 7.5-325 MG per tablet Take 1 tablet by mouth 2 (Two) Times a Day As Needed for Mild Pain (1-3) or Moderate Pain (4-6).     • hydrOXYzine (ATARAX) 25 MG tablet   1   • levothyroxine (SYNTHROID, LEVOTHROID) 100 MCG tablet TAKE ONE TABLET EVERY DAY 90 tablet 3   • losartan (COZAAR) 100 MG tablet TAKE ONE TABLET EVERY DAY 30 tablet 5   • metoprolol succinate XL (TOPROL-XL) 50 MG 24 hr tablet TAKE ONE TABLET EVERY DAY 90 tablet 3   • morphine (MS CONTIN) 30 MG 12 hr tablet Take 30 mg by mouth 2 (Two) Times a Day.     • nisoldipine (SULAR) 8.5 MG 24 hr tablet TAKE ONE TABLET EVERY DAY 30 tablet 5   • pantoprazole (PROTONIX) 40 MG EC tablet TAKE ONE TABLET BY MOUTH EVERY DAY 30 tablet 11   • rOPINIRole (REQUIP) 0.5 MG tablet Take one pill two hours before bedtime. 30 tablet 3     No facility-administered encounter medications on file as of 11/8/2018.        Reviewed use of high risk medication in the elderly: yes  Reviewed for potential of harmful drug interactions in the elderly: no    Follow Up:  No Follow-up on file.     An After Visit Summary and PPPS with all of these plans were given to the patient.        CAD CABG history no cp EKG old MI , refused stress test and EKG. Unable to take a cholesterol medicine patient is allergic to it.refuse PCK9 even ins covers.cont plavix, refuses to cardio--  early satirety resolved.  seen GI, s/p EGD5/2017: 0.75 cm clean base gastric ulcer at the body of the stomach. Risks of bleeding less than 5%.Non-bleeding duodenal bulb vascular ectasia.Erythematous gastritis.Small sliding hiatal hernia less than 3 cm.Nonobstructive Schatzki's-type ring, now epigastric pain pending to see dr duarte, no dysphagia continue protonix  pulmonary CT scan showed a small  nodules, repeat stable  CT scan does show 4.3 cm uterine fibroid s/p ultrasound utero mass, seen by gyn, stable  TIA history of possible TIA left eye cholesterol deposits by ophthalmologist. carotid duplex 50%, CTA neck artery 70% repeat , echocardiogram unremarkable, able to tolerate plavix, restart.   patient to resume Plavix.  ASA hurts stomach.   CT head showed microvasc changes continue plavix  Claudication possible PAD history of stents left leg artery. ultrasound again stenosis continue pentoxifylline  Tobacco, weaning off, start nicotine patch--  muscle cramp improved continue flexeril  plantar faciatis banks's neuroma, seen dr Caceres continue to followup. continue Neurontin  LBP arthritis follow up with pain clinic and on Pain medicine, on MS  Fe def anemia normal after fe supplement obtain colon report from dr leung done 4/2013,, SBFT normal, continue iron supplements.  colonoscopy 4/9/2013.colon polyp(tubular adenoma), AVM, and divertic  vit D low,continue supplement.   hyperglycemia good diet  idioppathic neuropathy continue gabapentin  depression/anxiety continue medications improved on neurontin, xanax d/c'd by pain doctor, remeron no help, xanax d/c'd by pain clinic,continue hydroxyzine wellbutrin--  empty bladder problem stable now  HTN continue medicine  hypothyroidism continue medication   LBP spinal stenosis. continue medicine from pain clinic on narcotics  copd severe restrictive disease on PFT, unable to use spiriva, continue symbicort  refuse prevnar 13, refuse zostavax and shingrix, refuses flushot, pneumovax done--  Muscle cramp in toes follow up with podiatrist. Baclofen no help, stretch exercise  Hip pain follow ortho  Mild tremor watch for now  Memory problem and dizzy,reviewed  CT head  Dysuria , resolved after cipro  Osteopenia on dexa vit d and calcium continue  Followup in 3 mo,

## 2018-11-12 DIAGNOSIS — M16.11 PRIMARY OSTEOARTHRITIS OF RIGHT HIP: Primary | ICD-10-CM

## 2018-11-13 ENCOUNTER — APPOINTMENT (OUTPATIENT)
Dept: CT IMAGING | Facility: HOSPITAL | Age: 83
End: 2018-11-13
Attending: INTERNAL MEDICINE

## 2018-11-15 ENCOUNTER — APPOINTMENT (OUTPATIENT)
Dept: CT IMAGING | Facility: HOSPITAL | Age: 83
End: 2018-11-15

## 2018-11-16 ENCOUNTER — HOSPITAL ENCOUNTER (OUTPATIENT)
Dept: GENERAL RADIOLOGY | Facility: HOSPITAL | Age: 83
Discharge: HOME | End: 2018-11-16
Attending: ORTHOPAEDIC SURGERY | Admitting: ORTHOPAEDIC SURGERY

## 2018-11-16 DIAGNOSIS — M16.11 PRIMARY OSTEOARTHRITIS OF RIGHT HIP: ICD-10-CM

## 2018-11-16 PROCEDURE — 77002 NEEDLE LOCALIZATION BY XRAY: CPT

## 2018-11-16 PROCEDURE — 25010000002 METHYLPREDNISOLONE PER 80 MG: Performed by: ORTHOPAEDIC SURGERY

## 2018-11-16 RX ORDER — METHYLPREDNISOLONE ACETATE 80 MG/ML
80 INJECTION, SUSPENSION INTRA-ARTICULAR; INTRALESIONAL; INTRAMUSCULAR; SOFT TISSUE ONCE
Status: COMPLETED | OUTPATIENT
Start: 2018-11-16 | End: 2018-11-16

## 2018-11-16 RX ORDER — LIDOCAINE HYDROCHLORIDE 10 MG/ML
9 INJECTION, SOLUTION INFILTRATION; PERINEURAL ONCE
Status: COMPLETED | OUTPATIENT
Start: 2018-11-16 | End: 2018-11-16

## 2018-11-16 RX ADMIN — LIDOCAINE HYDROCHLORIDE 9 ML: 10 INJECTION, SOLUTION INFILTRATION; PERINEURAL at 15:04

## 2018-11-16 RX ADMIN — METHYLPREDNISOLONE ACETATE 80 MG: 80 INJECTION, SUSPENSION INTRA-ARTICULAR; INTRALESIONAL; INTRAMUSCULAR; SOFT TISSUE at 15:05

## 2018-11-17 NOTE — POST-PROCEDURE NOTE
[facility]  [address], [address]  [place] [address]  [phone]        PROCEDURE REPORT        DIAGNOSIS:  Right hip osteoarthritis, symptomatic    PROCEDURE: Right  hip injection under flouroscopy      iM Tejeda with date of birth 1935 presents to [facility] Radiology Department today for injection therapy.        Patient presents to [facility] Radiology Department Flouroscopy Suite on 11/16/2018 for planned elective right hip injection under flouroscopy for symptomatic osteoarthritis.    Procedure:     After consent was obtained, and using ethyl chloride topical local anesthetic, the right hip was then prepped and draped with sterile technique. With an anterior hip approach, flouroscopy guidance, and care to stay lateral of the femoral artery, the hip joint was entered via a 20 gauge spinal needle.  A mixture of 80 mg methylprednisolone in one ml plus 9 ml of 1% plain Lidocaine was injected and the needle withdrawn. The procedure was well tolerated and without complication. The patient noted relief of focal hip joint pain.  The patient did remain stable and with baseline ambulation. The patient is asked to rest the joint for a few more days before resuming full regular activities. It may be painful for the first few days. Watch for fever, skin issues, increased swelling or persistent pain in the joint. Call or return to clinic if such symptoms occur, other concerns or if there is lack of improvement as anticipated.    Impression: Symptomatic right hip osteoarthritis.      Recommendations/Plan:      Treatment and patient advice as noted here and in office visit report.  Orthopedic activities reviewed and patient expressed appreciation.  Discussion of orthopedic goals.   Risk, benefits, and merits of treatment options reviewed and questions answered.  Call or notify for any adverse effect from injection therapy.    Exercise: As tolerated.  No strenuous  activity for a few days as appropriate.  Brace:  No brace was given at today's visit  Referral: No referrals made at today's visit  Studies: No additional studies ordered.  Surgery: No surgery proposed at this visit.  Activity:  May perform usual activities as tolerated.      Patient will return to our clinic at scheduled appointment.  Patient agreeable to call or return sooner for any concerns.

## 2018-11-26 ENCOUNTER — HOSPITAL ENCOUNTER (OUTPATIENT)
Dept: CT IMAGING | Facility: HOSPITAL | Age: 83
Discharge: HOME | End: 2018-11-26
Attending: INTERNAL MEDICINE | Admitting: INTERNAL MEDICINE

## 2018-11-26 DIAGNOSIS — R91.8 MULTIPLE PULMONARY NODULES: ICD-10-CM

## 2018-11-26 PROCEDURE — 71250 CT THORAX DX C-: CPT

## 2018-12-14 DIAGNOSIS — M25.561 ARTHRALGIA OF RIGHT KNEE: Primary | ICD-10-CM

## 2018-12-17 ENCOUNTER — OFFICE VISIT (OUTPATIENT)
Dept: ORTHOPEDIC SURGERY | Facility: CLINIC | Age: 83
End: 2018-12-17

## 2018-12-17 VITALS — BODY MASS INDEX: 20.01 KG/M2 | HEIGHT: 65 IN | RESPIRATION RATE: 18 BRPM | WEIGHT: 120.1 LBS

## 2018-12-17 DIAGNOSIS — M51.36 LUMBAR DEGENERATIVE DISC DISEASE: ICD-10-CM

## 2018-12-17 DIAGNOSIS — Z96.642 HISTORY OF TOTAL HIP ARTHROPLASTY, LEFT: ICD-10-CM

## 2018-12-17 DIAGNOSIS — M17.11 PRIMARY OSTEOARTHRITIS OF RIGHT KNEE: ICD-10-CM

## 2018-12-17 DIAGNOSIS — M25.561 ARTHRALGIA OF RIGHT KNEE: Primary | ICD-10-CM

## 2018-12-17 DIAGNOSIS — M16.11 PRIMARY OSTEOARTHRITIS OF RIGHT HIP: ICD-10-CM

## 2018-12-17 PROCEDURE — 73560 X-RAY EXAM OF KNEE 1 OR 2: CPT | Performed by: ORTHOPAEDIC SURGERY

## 2018-12-17 PROCEDURE — 20610 DRAIN/INJ JOINT/BURSA W/O US: CPT | Performed by: ORTHOPAEDIC SURGERY

## 2018-12-17 PROCEDURE — 99213 OFFICE O/P EST LOW 20 MIN: CPT | Performed by: ORTHOPAEDIC SURGERY

## 2018-12-17 RX ORDER — LIDOCAINE HYDROCHLORIDE 10 MG/ML
1 INJECTION, SOLUTION INFILTRATION; PERINEURAL
Status: COMPLETED | OUTPATIENT
Start: 2018-12-17 | End: 2018-12-17

## 2018-12-17 RX ORDER — METHYLPREDNISOLONE ACETATE 40 MG/ML
40 INJECTION, SUSPENSION INTRA-ARTICULAR; INTRALESIONAL; INTRAMUSCULAR; SOFT TISSUE
Status: COMPLETED | OUTPATIENT
Start: 2018-12-17 | End: 2018-12-17

## 2018-12-17 RX ADMIN — LIDOCAINE HYDROCHLORIDE 1 ML: 10 INJECTION, SOLUTION INFILTRATION; PERINEURAL at 09:37

## 2018-12-17 RX ADMIN — METHYLPREDNISOLONE ACETATE 40 MG: 40 INJECTION, SUSPENSION INTRA-ARTICULAR; INTRALESIONAL; INTRAMUSCULAR; SOFT TISSUE at 09:37

## 2018-12-17 NOTE — PROGRESS NOTES
Subjective   Patient ID: Mi Tejeda is a 83 y.o.  female is being seen for orthopaedic evaluation today for follow up of right hip osteoarthritis and new complaint of increased right knee pain    Pain of the Right Knee       CHIEF COMPLAINT:    Recent months of increased right knee joint pain.    She has fair progress with her right hip osteoarthritis treated with exercise and injection therapy.  No new right hip issues.    History of Present Illness    New Condition or Injury: right knee pain:    Right knee pain x 5 months. No known injury. She had prior left knee pain and symptoms of a meniscus tear though the left knee is better.      Progress Update of Prior Treated Condition - right hip osteoarthritis pain:    Patient reports that with treatments and since the last visit, overall pain in right hip is decreased, strength is unchanged, and range of motion is unchanged.  Patient is not currently using pain medication for hip pain.  Physical therapy has been performed at home.  Injection therapy has  been effective..   Patient does not  present with recent labs, imaging or other studies for review.      Past Medical History:   Diagnosis Date   • Abdominal pain     Improving. Likely secondary to gastritis.   • Abnormal electrocardiogram    • Abnormal urinalysis    • Acute UTI    • Anemia    • Anomaly cardiac     REPORTS WILL SHOW ON LEFT VENTRICLE IN A 12 LEAD EKG AS BEING A MI BUT REPORTS SHE HAS NEVER HAD THIS    • Anxiety    • Aphagia     HX OF THIS, REPORTS WAS CLEARED OF A CVA BUT WAS TOLD WAS RELATED TO FATIGUE   • Arthritis    • Asthma    • Atrial fibrillation (CMS/HCC)    • Backache    • Bursitis of right shoulder    • CAD (coronary artery disease)    • COPD (chronic obstructive pulmonary disease) (CMS/HCC)    • Depression    • Diverticulitis    • Dysphagia     REPORTS RESOLVED AFTER DILITATION   • Fracture    • Full dentures    • Gastric ulcer    • GERD (gastroesophageal reflux disease)    • Gout     • High cholesterol    • HTN (hypertension)    • Hypertriglyceridemia    • Hypothyroidism    • Multiple lung nodules    • Osteoarthritis    • Osteoporosis    • Peripheral neuropathy    • Personal history of tobacco use    • Plantar fasciitis    • Pneumonia    • Sinus problem    • Sinusitis    • Tear of meniscus of knee     right   • TIA (transient ischemic attack)    • Uterine leiomyoma    • Viral gastroenteritis    • Vitamin D deficiency    • Wears glasses    • Weight loss, non-intentional         Past Surgical History:   Procedure Laterality Date   • CARDIAC SURGERY     • CATARACT EXTRACTION Bilateral 2009   • CHOLECYSTECTOMY  2002   • COLONOSCOPY     • CORONARY ARTERY BYPASS GRAFT  1998    2 vessels   • CORONARY ARTERY BYPASS GRAFT     • ENDOSCOPY     • ENDOSCOPY N/A 5/3/2017    Procedure: ESOPHAGOGASTRODUODENOSCOPY with biopsies;  Surgeon: Yared Castellanos MD;  Location: [facility] ENDOSCOPY;  Service:    • ESOPHAGOGASTRODUODENOSCOPY with biopsies N/A 5/3/2017    Performed by Yared Castellanos MD at [facility] ENDOSCOPY   • TOTAL HIP ARTHROPLASTY Left 02/02/2010    PREETI Stanley MD       Family History   Problem Relation Age of Onset   • Cancer Mother         uterus   • Cancer Sister         colon; kidney   • Hypertension Sister    • Stroke Sister    • Cancer Brother         prostate   • Arthritis Other    • Cancer Other    • Kidney disease Other    • Stroke Other    • Hypertension Other    • Gout Other        Social History     Socioeconomic History   • Marital status:      Spouse name: Not on file   • Number of children: Not on file   • Years of education: Not on file   • Highest education level: Not on file   Social Needs   • Financial resource strain: Not on file   • Food insecurity - worry: Not on file   • Food insecurity - inability: Not on file   • Transportation needs - medical: Not on file   • Transportation needs - non-medical: Not on file   Occupational History   • Not on file   Tobacco Use   • Smoking  "status: Current Every Day Smoker     Packs/day: 1.00     Years: 65.00     Pack years: 65.00     Types: Cigarettes   • Smokeless tobacco: Never Used   Substance and Sexual Activity   • Alcohol use: No   • Drug use: No   • Sexual activity: Defer   Other Topics Concern   • Not on file   Social History Narrative   • Not on file       Allergies   Allergen Reactions   • Ciprofloxacin GI Intolerance   • Keflex [Cephalexin] Nausea Only   • Niacin    • Percocet [Oxycodone-Acetaminophen]      (NOTE: does tolerate hydrocodone and morphine)   • Statins        Review of Systems   Constitutional: Negative for fever.   Musculoskeletal: Positive for arthralgias and back pain. Negative for gait problem (stable baseline cane assist ambulation.) and joint swelling.       Objective   Resp 18   Ht 163.8 cm (64.5\")   Wt 54.5 kg (120 lb 1.6 oz)   BMI 20.30 kg/m²    Physical Exam   Constitutional: She appears well-developed. No distress.   Musculoskeletal: She exhibits no deformity.        Right knee: She exhibits no effusion.   Neurological: She is alert.   Skin: Skin is warm and dry. No rash noted. No erythema.   Psychiatric: She has a normal mood and affect. Her speech is normal.   Vitals reviewed.    Right Knee Exam     Tenderness   The patient is experiencing tenderness in the lateral retinaculum.    Range of Motion   Extension: 0   Flexion: 130     Tests   Ashanti:  Medial - negative   Varus: negative Valgus: negative  Drawer:  Anterior - 1+    Posterior - 1+  Patellar apprehension: negative    Other   Erythema: absent  Pulse: present  Effusion: no effusion present        Extremity DVT signs are Negative on physical exam with negative Teresa sign, with no calf pain, with no palpable cords and with no skin tone change   Neurologic Exam     Mental Status   Attention: normal.   Speech: speech is normal   Level of consciousness: alert  Knowledge: good.     Motor Exam   Overall muscle tone: normal    Gait, Coordination, and Reflexes "     Gait  Gait: (stable cane assist ambulation.  No antalgic limp at this time.)       Assessment/Plan   Independent Review of Radiographic Studies:    AP standing both knees and lateral right knee, indication to evaluate pain, no comparison views, shows no acute fracture or dislocation evident.  Also, indication to evaluate joint condition, no comparison views available, shows evident chronic advanced anteromedial osteoarthritis.  There is a sizable oval density noted adjacent to the medial femoral condyle of the right knee on the AP view.  I cannot appreciate a palpable mass in this area or any asymmetry with her other knee on clinical exam.  This could represent a loose body or a calcified mass adjacent to the medial collateral ligament consistent with old injury.  Do not suspect a bone tumor or malignant process given the benign appearance on the radiograph.  Laboratory and Other Studies:  No new results reviewed today.   Medical Decision Making:    No complications of procedure and treatments.  Stable neurovascular exam.  Fair progress though ongoing with residual symptoms.  Continue current management and any additional treatments and workup as outlined in plan.    Large Joint Arthrocentesis: R knee  Date/Time: 12/17/2018 9:37 AM  Consent given by: patient  Site marked: site marked  Timeout: Immediately prior to procedure a time out was called to verify the correct patient, procedure, equipment, support staff and site/side marked as required   Supporting Documentation  Indications: pain   Procedure Details  Location: knee - R knee  Preparation: Patient was prepped and draped in the usual sterile fashion  Needle size: 22 G  Approach: anterolateral  Medications administered: 40 mg methylPREDNISolone acetate 40 MG/ML; 1 mL lidocaine 1 %  Patient tolerance: patient tolerated the procedure well with no immediate complications      2 mL plain Lidocaine 1% used in injection     Mi was seen today for  pain.    Diagnoses and all orders for this visit:    Arthralgia of right knee  -     Large Joint Arthrocentesis: R knee    Primary osteoarthritis of right knee    Primary osteoarthritis of right hip    History of total hip arthroplasty, left    Lumbar degenerative disc disease      Discussion of orthopaedic goals and activities and patient and/or guardian expressed appreciation.  Call or notify for any adverse effect from injection therapy  Ice, heat, and/or modalities as beneficial  Watch for signs and symptoms of infection  Guided on proper techniques for mobility, strength, agility and/or conditioning exercises    Recommendations/Plan:  Exercise, medications, injections, other patient advice, and return appointment as noted.  Brace: No brace was given at today's visit  Referral: No referrals made at today's visit  Test/Studies: No additional studies ordered.  Surgery: No surgery proposed at this visit.  Work/Activity Status: May perform usual activities as tolerated, May return to routine exercise and physical work as tolerated. and No strenuous activity.  Patient is encouraged to call or return for any issues or concerns.    Return in about 3 months (around 3/17/2019) for Recheck.  Patient agreeable to call or return sooner for any concerns.

## 2018-12-19 RX ORDER — NISOLDIPINE 8.5 MG/1
TABLET, FILM COATED, EXTENDED RELEASE ORAL
Qty: 30 TABLET | Refills: 5 | Status: SHIPPED | OUTPATIENT
Start: 2018-12-19 | End: 2019-08-21 | Stop reason: SDUPTHER

## 2018-12-19 RX ORDER — FERROUS GLUCONATE 324(37.5)
TABLET ORAL
Qty: 90 TABLET | Refills: 3 | Status: SHIPPED | OUTPATIENT
Start: 2018-12-19 | End: 2019-05-15

## 2019-01-21 ENCOUNTER — PRIOR AUTHORIZATION (OUTPATIENT)
Dept: INTERNAL MEDICINE | Facility: CLINIC | Age: 84
End: 2019-01-21

## 2019-01-28 ENCOUNTER — HOSPITAL ENCOUNTER (EMERGENCY)
Facility: HOSPITAL | Age: 84
Discharge: HOME | End: 2019-01-28
Attending: EMERGENCY MEDICINE | Admitting: EMERGENCY MEDICINE

## 2019-01-28 ENCOUNTER — APPOINTMENT (OUTPATIENT)
Dept: GENERAL RADIOLOGY | Facility: HOSPITAL | Age: 84
End: 2019-01-28

## 2019-01-28 VITALS
RESPIRATION RATE: 16 BRPM | BODY MASS INDEX: 21 KG/M2 | WEIGHT: 123 LBS | HEIGHT: 64 IN | TEMPERATURE: 98 F | SYSTOLIC BLOOD PRESSURE: 208 MMHG | DIASTOLIC BLOOD PRESSURE: 70 MMHG | HEART RATE: 55 BPM | OXYGEN SATURATION: 97 %

## 2019-01-28 DIAGNOSIS — S93.602A FOOT SPRAIN, LEFT, INITIAL ENCOUNTER: Primary | ICD-10-CM

## 2019-01-28 PROCEDURE — 99283 EMERGENCY DEPT VISIT LOW MDM: CPT

## 2019-01-28 PROCEDURE — 73630 X-RAY EXAM OF FOOT: CPT

## 2019-01-29 ENCOUNTER — EPISODE CHANGES (OUTPATIENT)
Dept: CASE MANAGEMENT | Facility: OTHER | Age: 84
End: 2019-01-29

## 2019-01-31 ENCOUNTER — PATIENT OUTREACH (OUTPATIENT)
Dept: CASE MANAGEMENT | Facility: OTHER | Age: 84
End: 2019-01-31

## 2019-01-31 NOTE — OUTREACH NOTE
Care Management Plan 1/31/2019   Lifestyle Goals Routine follow-up with doctor(s)   Barriers Disease education   Self Management Medication Adherence   Annual Wellness Visit:  Patient Has Completed   Annual Wellness Visit:  AWV completed 11-8-19   Care Gaps Addressed Pneumonia Vaccine;Mammogram   Care Gaps Addressed Patient states she had the Pneumonia shot a couple of years ago.- had Bone Density test last year.- discussed zoster vaccine and Mammogram, patient to discuss with her PCP.   Specific Disease Process Teaching COPD   Does patient have depression diagnosis? Yes   Advanced Directives: Patient Has   Ed Visits past 12 months: 1   Hospitalizations past 12 months None   Medication Adherence Medications understood   Health Literacy Good     The main concerns and/or symptoms the patient would like to address are:   Spoke with patient regarding ED visit 1-28-19.  Stated her left foot is okay; ED found no fracture; currently foot has no pain, no swelling.  States she put ice on it and wrapped it in a ACE wrap, and it is better.  Reports independence with ADL's, Mobility (uses a Walker), and Medications.  Voiced compliance most every day with medications as ordered; fills a pill-box weekly.  Lives alone; has family to call if assistance needed.  Drives self to [employer].  Reports her breathing is good; denies SOA or cough; states she feels better since quitting smoking in October.      Education/instruction provided by Care Coordinator:   Discussed COPD management.  Reviewed Gaps in Care:  AWV is up to date; other Care Gaps discussed (see above).  Discussed Advanced Directives; patient already has this completed; CA encouraged she take a copy to her PCP.  Discussed MyChart; patient states she is active with MyChart; no questions regarding use.  Reviewed next PCP appt:  2-12-19 with Dr. Awad; patient voiced understanding and agreement.  No other questions or concerns voiced at this time.  Patient declined the 24/7 Nurse  phone line number.  Patient has met Care Plan goals and exhibits strong sense of health self-management and adequate support system.    Follow Up Outreach Due:   As needed.    Su Mcghee RN

## 2019-02-03 NOTE — ED PROVIDER NOTES
Subjective   History of Present Illness  This is an 83-year-old female who presents complaints of left foot pain.  She states that approximately a week ago she had a stepstool that fell over and landed on top of her left foot.  She's been walking on it without too much difficulty but she said it still quite painful and last night she thought she felt something pop.  Therefore she came today for further evaluation.  She has tried ice and she takes pain medication at home already which does seem to help.  It wears off the foot still hurts.  She has a slightly elevated blood pressure today and she states she thinks that is because she was nervous to come to the ER.  She denies chest pain or shortness of breath.  No headache or double or blurry vision.  She denies any fevers or chills.  There is been no redness, warmth or swelling on the foot.  .Review of Systems   All other systems reviewed and are negative.      Past Medical History:   Diagnosis Date   • Abdominal pain     Improving. Likely secondary to gastritis.   • Abnormal electrocardiogram    • Abnormal urinalysis    • Acute UTI    • Anemia    • Anomaly cardiac     REPORTS WILL SHOW ON LEFT VENTRICLE IN A 12 LEAD EKG AS BEING A MI BUT REPORTS SHE HAS NEVER HAD THIS    • Anxiety    • Aphagia     HX OF THIS, REPORTS WAS CLEARED OF A CVA BUT WAS TOLD WAS RELATED TO FATIGUE   • Arthritis    • Asthma    • Atrial fibrillation (CMS/HCC)    • Backache    • Bursitis of right shoulder    • CAD (coronary artery disease)    • COPD (chronic obstructive pulmonary disease) (CMS/HCC)    • Depression    • Diverticulitis    • Dysphagia     REPORTS RESOLVED AFTER DILITATION   • Fracture    • Full dentures    • Gastric ulcer    • GERD (gastroesophageal reflux disease)    • Gout    • High cholesterol    • HTN (hypertension)    • Hypertriglyceridemia    • Hypothyroidism    • Multiple lung nodules    • Osteoarthritis    • Osteoporosis    • Peripheral neuropathy    • Personal history of  tobacco use    • Plantar fasciitis    • Pneumonia    • Sinus problem    • Sinusitis    • Tear of meniscus of knee     right   • TIA (transient ischemic attack)    • Uterine leiomyoma    • Viral gastroenteritis    • Vitamin D deficiency    • Wears glasses    • Weight loss, non-intentional        Allergies   Allergen Reactions   • Ciprofloxacin GI Intolerance   • Keflex [Cephalexin] Nausea Only   • Niacin    • Percocet [Oxycodone-Acetaminophen]      (NOTE: does tolerate hydrocodone and morphine)   • Statins        Past Surgical History:   Procedure Laterality Date   • CARDIAC SURGERY     • CATARACT EXTRACTION Bilateral 2009   • CHOLECYSTECTOMY  2002   • COLONOSCOPY     • CORONARY ARTERY BYPASS GRAFT  1998    2 vessels   • CORONARY ARTERY BYPASS GRAFT     • ENDOSCOPY     • ENDOSCOPY N/A 5/3/2017    Procedure: ESOPHAGOGASTRODUODENOSCOPY with biopsies;  Surgeon: Yared Castellanos MD;  Location: [facility] ENDOSCOPY;  Service:    • TOTAL HIP ARTHROPLASTY Left 02/02/2010    PREETI Stanley MD       Family History   Problem Relation Age of Onset   • Cancer Mother         uterus   • Cancer Sister         colon; kidney   • Hypertension Sister    • Stroke Sister    • Cancer Brother         prostate   • Arthritis Other    • Cancer Other    • Kidney disease Other    • Stroke Other    • Hypertension Other    • Gout Other        Social History     Socioeconomic History   • Marital status:      Spouse name: Not on file   • Number of children: Not on file   • Years of education: Not on file   • Highest education level: Not on file   Tobacco Use   • Smoking status: Current Every Day Smoker     Packs/day: 1.00     Years: 65.00     Pack years: 65.00     Types: Cigarettes   • Smokeless tobacco: Never Used   Substance and Sexual Activity   • Alcohol use: No   • Drug use: No   • Sexual activity: Defer           Objective   Physical Exam   Constitutional: She appears well-developed and well-nourished.   Elderly female in no acute distress.    HENT:   Head: Normocephalic and atraumatic.   Mouth/Throat: Oropharynx is clear and moist.   Eyes: Conjunctivae and EOM are normal. Pupils are equal, round, and reactive to light.   Neck: Normal range of motion. Neck supple.   Cardiovascular: Normal rate.   Pulmonary/Chest: Effort normal.   Musculoskeletal: Normal range of motion. She exhibits tenderness. She exhibits no edema or deformity.   Dorsal aspect of the left foot has pain with palpation, no palpable deformity, no swelling, no redness.   Nursing note and vitals reviewed.      Procedures           ED Course  ED Course as of Feb 03 1008   Mon Jan 28, 2019   1548 PROCEDURE: XR FOOT 3+ VW LEFT-     History: Step stool fell on the left foot.     COMPARISON: None.     FINDINGS:  A 3 view exam demonstrates no acute fracture or dislocation.  There is a hallux valgus deformity. No soft tissue abnormality is seen.          IMPRESSION:  No acute fracture.      [CM]      ED Course User Index  [CM] Alyssia Thomas APRN         X-rays negative.  Explained to the patient that this is likely a foot sprain and she'll need to try to rest it some.  We placed an Ace wrap.  She will follow-up with her PCP if it continues to bother her.          MDM      Final diagnoses:   Foot sprain, left, initial encounter            Alyssia Thomas APRN  02/03/19 1028

## 2019-02-04 ENCOUNTER — EPISODE CHANGES (OUTPATIENT)
Dept: CASE MANAGEMENT | Facility: OTHER | Age: 84
End: 2019-02-04

## 2019-02-12 ENCOUNTER — OFFICE VISIT (OUTPATIENT)
Dept: INTERNAL MEDICINE | Facility: CLINIC | Age: 84
End: 2019-02-12

## 2019-02-12 VITALS
WEIGHT: 123 LBS | DIASTOLIC BLOOD PRESSURE: 60 MMHG | BODY MASS INDEX: 21 KG/M2 | OXYGEN SATURATION: 98 % | SYSTOLIC BLOOD PRESSURE: 148 MMHG | HEART RATE: 60 BPM | HEIGHT: 64 IN

## 2019-02-12 DIAGNOSIS — F41.9 ANXIETY: ICD-10-CM

## 2019-02-12 DIAGNOSIS — E55.9 VITAMIN D DEFICIENCY: ICD-10-CM

## 2019-02-12 DIAGNOSIS — M48.00 SPINAL STENOSIS, UNSPECIFIED SPINAL REGION: ICD-10-CM

## 2019-02-12 DIAGNOSIS — K21.9 GASTROESOPHAGEAL REFLUX DISEASE WITHOUT ESOPHAGITIS: ICD-10-CM

## 2019-02-12 DIAGNOSIS — J43.8 OTHER EMPHYSEMA (HCC): ICD-10-CM

## 2019-02-12 DIAGNOSIS — E78.5 HYPERLIPIDEMIA, UNSPECIFIED HYPERLIPIDEMIA TYPE: Primary | ICD-10-CM

## 2019-02-12 DIAGNOSIS — I10 ESSENTIAL HYPERTENSION: ICD-10-CM

## 2019-02-12 PROCEDURE — 99214 OFFICE O/P EST MOD 30 MIN: CPT | Performed by: INTERNAL MEDICINE

## 2019-02-12 NOTE — PROGRESS NOTES
Subjective   Mi Tejeda is a 83 y.o. female.     Chief Complaint   Patient presents with   • Follow-up   • Hypertension       History of Present Illness   Patient here for follow-up of.  Complaining right thigh leg pain sharp in nature worse last 2 weeks patient does have lower back pain see orthopedist pain clinic on medications.  Depression improved on medication.  Weight is stable.  Tobacco quit 4 months on nicotine patch.  Blood pressure much improved on medication now.  GERD stable on medication.  Anemia stable.  Vitamin D on supplemental stable.  Hypothyroidism stable on medication.      Current Outpatient Medications:   •  albuterol (PROAIR HFA) 108 (90 Base) MCG/ACT inhaler, Inhale 2 puffs Every 4 (Four) Hours As Needed for Wheezing or Shortness of Air., Disp: 54 g, Rfl: 3  •  buPROPion XL (WELLBUTRIN XL) 150 MG 24 hr tablet, Take 1 tablet by mouth Daily., Disp: 30 tablet, Rfl: 11  •  Cholecalciferol (VITAMIN D3) 5000 units capsule capsule, TAKE ONE CAPSULE BY MOUTH EVERY DAY, Disp: 90 capsule, Rfl: 3  •  clopidogrel (PLAVIX) 75 MG tablet, Take 1 tablet by mouth Daily., Disp: 30 tablet, Rfl: 3  •  ferrous gluconate 324 (37.5 Fe) MG tablet tablet, TAKE ONE TABLET DAILY WITH BREAKFAST, Disp: 90 tablet, Rfl: 3  •  hydrochlorothiazide (HYDRODIURIL) 25 MG tablet, TAKE ONE TABLET BY MOUTH EVERY DAY, Disp: 30 tablet, Rfl: 5  •  HYDROcodone-acetaminophen (NORCO) 7.5-325 MG per tablet, Take 1 tablet by mouth 2 (Two) Times a Day As Needed for Mild Pain (1-3) or Moderate Pain (4-6)., Disp: , Rfl:   •  hydrOXYzine (ATARAX) 25 MG tablet, , Disp: , Rfl: 1  •  levothyroxine (SYNTHROID, LEVOTHROID) 100 MCG tablet, TAKE ONE TABLET EVERY DAY, Disp: 90 tablet, Rfl: 3  •  losartan (COZAAR) 100 MG tablet, TAKE ONE TABLET EVERY DAY, Disp: 30 tablet, Rfl: 5  •  metoprolol succinate XL (TOPROL-XL) 50 MG 24 hr tablet, TAKE ONE TABLET EVERY DAY, Disp: 90 tablet, Rfl: 3  •  morphine (MS CONTIN) 30 MG 12 hr tablet, Take 30 mg by  mouth 2 (Two) Times a Day., Disp: , Rfl:   •  nicotine (NICODERM CQ) 14 MG/24HR patch, Place 1 patch on the skin as directed by provider Daily., Disp: 21 patch, Rfl: 2  •  nisoldipine (SULAR) 8.5 MG 24 hr tablet, TAKE ONE TABLET BY MOUTH EVERY DAY, Disp: 30 tablet, Rfl: 5  •  pantoprazole (PROTONIX) 40 MG EC tablet, TAKE ONE TABLET BY MOUTH EVERY DAY, Disp: 30 tablet, Rfl: 11  •  rOPINIRole (REQUIP) 0.5 MG tablet, Take one pill two hours before bedtime., Disp: 30 tablet, Rfl: 3    The following portions of the patient's history were reviewed and updated as appropriate: allergies, current medications, past family history, past medical history, past social history, past surgical history and problem list.    Review of Systems   Constitutional: Negative.    Respiratory: Negative.    Cardiovascular: Negative.    Gastrointestinal: Negative.    Musculoskeletal: Positive for back pain.   Skin: Negative.    Neurological: Negative.    Psychiatric/Behavioral: Negative.        Objective   Physical Exam   Constitutional: She is oriented to person, place, and time. She appears well-nourished.   Neck: Neck supple.   Cardiovascular: Normal rate, regular rhythm and normal heart sounds.   Pulmonary/Chest: Effort normal and breath sounds normal.   Abdominal: Bowel sounds are normal.   Musculoskeletal: She exhibits tenderness.   Neurological: She is alert and oriented to person, place, and time.   Skin: Skin is warm.   Psychiatric: She has a normal mood and affect.       All tests have been reviewed.    Assessment/Plan   There are no diagnoses linked to this encounter.          right thigh numb and sharp pain patient is seeing orthopedist  CAD CABG history no cp EKG old MI , refused stress test and EKG. Unable to take a cholesterol medicine patient is allergic to it.refuse PCK9 even ins covers.cont plavix, refuses to cardio  early satirety resolved.  seen GI, s/p EGD5/2017: 0.75 cm clean base gastric ulcer at the body of the stomach.  Risks of bleeding less than 5%.Non-bleeding duodenal bulb vascular ectasia.Erythematous gastritis.Small sliding hiatal hernia less than 3 cm.Nonobstructive Schatzki's-type ring, now epigastric pain pending to see dr duarte, no dysphagia continue protonix  pulmonary CT scan showed a small nodules, repeat stable, no need of repeat  CT scan does show 4.3 cm uterine fibroid s/p ultrasound utero mass, seen by gyn, stable  TIA history of possible TIA left eye cholesterol deposits by ophthalmologist. carotid duplex 50%, CTA neck artery 70% repeat , echocardiogram unremarkable, able to tolerate plavix, restart.   patient to resume Plavix.  ASA hurts stomach.   CT head showed microvasc changes continue plavix  Claudication possible PAD history of stents left leg artery. ultrasound again stenosis continue pentoxifylline  Tobacco, QUIT 10/2018 on nicotine patch  muscle cramp improved continue flexeril  plantar faciatis banks's neuroma, seen dr Caceres continue to followup. continue Neurontin  LBP arthritis follow up with pain clinic and on Pain medicine, on MS  Fe def anemia normal after fe supplement obtain colon report from dr leung done 4/2013,, SBFT normal, continue iron supplements.  colonoscopy 4/9/2013.colon polyp(tubular adenoma), AVM, and divertic  vit D low,continue supplement.   hyperglycemia good diet  idioppathic neuropathy continue gabapentin  depression/anxiety continue medications improved on neurontin, xanax d/c'd by pain doctor, remeron no help, xanax d/c'd by pain clinic,continue hydroxyzine wellbutrin  empty bladder problem stable now  HTN continue medicine stable  hypothyroidism continue medication   LBP spinal stenosis. continue medicine from pain clinic on narcotics  copd severe restrictive disease on PFT, unable to use spiriva, continue symbicort  refuse prevnar 13, refuse zostavax and shingrix, refuses flushot, pneumovax done  Muscle cramp in toes follow up with podiatrist. Baclofen no help, stretch  exercise  Hip pain follow ortho  Mild tremor watch for now  Memory problem and dizzy,reviewed  CT head  Osteopenia on dexa vit d and calcium continue  Followup in 3 mo,

## 2019-02-13 ENCOUNTER — OFFICE VISIT (OUTPATIENT)
Dept: ORTHOPEDIC SURGERY | Facility: CLINIC | Age: 84
End: 2019-02-13

## 2019-02-13 VITALS — WEIGHT: 123.4 LBS | HEIGHT: 64 IN | RESPIRATION RATE: 18 BRPM | BODY MASS INDEX: 21.07 KG/M2

## 2019-02-13 DIAGNOSIS — M17.11 PRIMARY OSTEOARTHRITIS OF RIGHT KNEE: Primary | ICD-10-CM

## 2019-02-13 DIAGNOSIS — Z96.642 HISTORY OF TOTAL HIP ARTHROPLASTY, LEFT: ICD-10-CM

## 2019-02-13 DIAGNOSIS — M16.11 PRIMARY OSTEOARTHRITIS OF RIGHT HIP: ICD-10-CM

## 2019-02-13 DIAGNOSIS — M51.36 LUMBAR DEGENERATIVE DISC DISEASE: ICD-10-CM

## 2019-02-13 PROCEDURE — 99213 OFFICE O/P EST LOW 20 MIN: CPT | Performed by: ORTHOPAEDIC SURGERY

## 2019-02-13 RX ORDER — BACLOFEN 10 MG/1
10 TABLET ORAL NIGHTLY
COMMUNITY
End: 2020-12-10 | Stop reason: SDUPTHER

## 2019-02-13 NOTE — PROGRESS NOTES
Subjective   Patient ID: Mi Tejeda is a 83 y.o.  female is here today for orthopaedic evaluation of recovery progress with treatment.  Follow-up of the Right Knee       CHIEF COMPLAINT: right knee , right hip pain    History of Present Illness     Progress Note:  Patient reports that with treatments and since the last visit, overall low back and right leg pain is increased and worse at night.  Focal right hip pain is decreased, strength is unchanged, and range of motion is unchanged.  Patient is currently using Salonpas medication.  Physical therapy has not recently been initiated.  Hip injection therapy has  been effective.. She notes the injection relieved pain for several weeks.  Patient is retired, remains as active as possible.  Patient does not  present with recent past labs, imaging or other studies for review today.      Patient notes hip fluoro injection in December 2018 helped relieve pain. She is c/o pain in the lumbar region, right buttock, radiates down back of right leg, pain in thigh, toes tingle when she takes a step.  A few weeks ago she had a left foot injury and contusion, imaging showed hallus valgus, diffuse DJD and no acute fracture. She states her foot is better.    Past Medical History:   Diagnosis Date   • Abdominal pain     Improving. Likely secondary to gastritis.   • Abnormal electrocardiogram    • Abnormal urinalysis    • Acute UTI    • Anemia    • Anomaly cardiac     REPORTS WILL SHOW ON LEFT VENTRICLE IN A 12 LEAD EKG AS BEING A MI BUT REPORTS SHE HAS NEVER HAD THIS    • Anxiety    • Aphagia     HX OF THIS, REPORTS WAS CLEARED OF A CVA BUT WAS TOLD WAS RELATED TO FATIGUE   • Arthritis    • Asthma    • Atrial fibrillation (CMS/[facility])    • Backache    • Bursitis of right shoulder    • CAD (coronary artery disease)    • COPD (chronic obstructive pulmonary disease) (CMS/HCC)    • Depression    • Diverticulitis    • Dysphagia     REPORTS RESOLVED AFTER DILITATION   • Fracture    •  Full dentures    • Gastric ulcer    • GERD (gastroesophageal reflux disease)    • Gout    • High cholesterol    • HTN (hypertension)    • Hypertriglyceridemia    • Hypothyroidism    • Multiple lung nodules    • Osteoarthritis    • Osteoporosis    • Peripheral neuropathy    • Personal history of tobacco use    • Plantar fasciitis    • Pneumonia    • Sinus problem    • Sinusitis    • Tear of meniscus of knee     right   • TIA (transient ischemic attack)    • Uterine leiomyoma    • Viral gastroenteritis    • Vitamin D deficiency    • Wears glasses    • Weight loss, non-intentional         Past Surgical History:   Procedure Laterality Date   • CARDIAC SURGERY     • CATARACT EXTRACTION Bilateral 2009   • CHOLECYSTECTOMY  2002   • COLONOSCOPY     • CORONARY ARTERY BYPASS GRAFT  1998    2 vessels   • CORONARY ARTERY BYPASS GRAFT     • ENDOSCOPY     • ENDOSCOPY N/A 5/3/2017    Procedure: ESOPHAGOGASTRODUODENOSCOPY with biopsies;  Surgeon: Yared Castellanos MD;  Location: [facility] ENDOSCOPY;  Service:    • TOTAL HIP ARTHROPLASTY Left 02/02/2010    PREETI Stanley MD        Family History   Problem Relation Age of Onset   • Cancer Mother         uterus   • Cancer Sister         colon; kidney   • Hypertension Sister    • Stroke Sister    • Cancer Brother         prostate   • Arthritis Other    • Cancer Other    • Kidney disease Other    • Stroke Other    • Hypertension Other    • Gout Other         Social History     Socioeconomic History   • Marital status:      Spouse name: Not on file   • Number of children: Not on file   • Years of education: Not on file   • Highest education level: Not on file   Social Needs   • Financial resource strain: Not on file   • Food insecurity - worry: Not on file   • Food insecurity - inability: Not on file   • Transportation needs - medical: Not on file   • Transportation needs - non-medical: Not on file   Occupational History   • Occupation: retired   Tobacco Use   • Smoking status: Current  "Every Day Smoker     Packs/day: 1.00     Years: 65.00     Pack years: 65.00     Types: Cigarettes   • Smokeless tobacco: Never Used   Substance and Sexual Activity   • Alcohol use: No   • Drug use: No   • Sexual activity: Defer   Other Topics Concern   • Not on file   Social History Narrative   • Not on file       Allergies   Allergen Reactions   • Ciprofloxacin GI Intolerance   • Keflex [Cephalexin] Nausea Only   • Niacin    • Percocet [Oxycodone-Acetaminophen]      (NOTE: does tolerate hydrocodone and morphine)   • Statins        Review of Systems   Constitutional: Negative for fever.   Respiratory: Negative for shortness of breath.    Cardiovascular: Negative for chest pain.   Gastrointestinal: Negative for abdominal distention and abdominal pain.   Musculoskeletal: Positive for arthralgias, back pain, gait problem ( baseline uses walker, no change) and myalgias. Negative for joint swelling.   Skin: Negative for rash.   Neurological: Positive for numbness (feet, occasional with walking.).   Hematological: Does not bruise/bleed easily.         Objective   Resp 18   Ht 162.6 cm (64\")   Wt 56 kg (123 lb 6.4 oz)   BMI 21.18 kg/m²     Physical Exam   Constitutional: She appears well-developed. No distress.   Neurological: She is alert.   Skin: Skin is warm and dry. No rash noted. No erythema.   Psychiatric: She has a normal mood and affect. Her speech is normal.   Vitals reviewed.    Ortho Exam  No acute ortho exam changes other than recent increased symptoms of right sciatica.  Extremity DVT signs are Negative on physical exam with negative Teresa sign, with no calf pain and with no palpable cords   Neurologic Exam     Mental Status   Attention: normal.   Speech: speech is normal   Level of consciousness: alert  Knowledge: good.     Motor Exam   Overall muscle tone: normal        Assessment/Plan   Independent Review of Radiographic Studies:    No new imaging done today.  Reviewed with patient at a prior " visit.  Laboratory and Other Studies:  No new results reviewed today.   Medical Decision Making:    No complications of procedure and treatments.  Stable neurovascular exam.  Fair progress though ongoing with residual symptoms.  Continue current management and any additional treatments and workup as outlined in plan.    Procedures    Mi was seen today for follow-up.    Diagnoses and all orders for this visit:    Primary osteoarthritis of right knee    Primary osteoarthritis of right hip    Lumbar degenerative disc disease    History of total hip arthroplasty, left       Discussion of orthopaedic goals and activities and patient and/or guardian expressed appreciation.  Regular exercise as tolerated  Guided on proper techniques for mobility, strength, agility and/or conditioning exercises  Ice, heat, and/or modalities as beneficial    Recommendations/Plan:  Exercise, medications, injections, other patient advice, and return appointment as noted.  Brace: No brace was given at today's visit  Referral: No referrals made at today's visit  Test/Studies: No additional studies ordered.  Surgery: Plan non-surgical treatment for current orthopaedic condition.  Work/Activity Status: May perform usual activities as tolerated and No strenuous activity.  Patient is encouraged to call or return for any issues or concerns.     Return in about 3 months (around 5/13/2019) for Recheck.  Patient agreeable to call or return sooner for any concerns.

## 2019-03-21 RX ORDER — LEVOTHYROXINE SODIUM 0.1 MG/1
TABLET ORAL
Qty: 90 TABLET | Refills: 3 | Status: SHIPPED | OUTPATIENT
Start: 2019-03-21 | End: 2020-07-08

## 2019-03-29 ENCOUNTER — OFFICE VISIT (OUTPATIENT)
Dept: INTERNAL MEDICINE | Facility: CLINIC | Age: 84
End: 2019-03-29

## 2019-03-29 VITALS
SYSTOLIC BLOOD PRESSURE: 150 MMHG | TEMPERATURE: 97.6 F | HEIGHT: 64 IN | BODY MASS INDEX: 21.17 KG/M2 | HEART RATE: 55 BPM | OXYGEN SATURATION: 100 % | WEIGHT: 124 LBS | DIASTOLIC BLOOD PRESSURE: 50 MMHG

## 2019-03-29 DIAGNOSIS — N39.0 URINARY TRACT INFECTION WITHOUT HEMATURIA, SITE UNSPECIFIED: ICD-10-CM

## 2019-03-29 DIAGNOSIS — R30.0 BURNING WITH URINATION: Primary | ICD-10-CM

## 2019-03-29 LAB
BILIRUB BLD-MCNC: NEGATIVE MG/DL
BUN SERPL-MCNC: 44 MG/DL (ref 7–20)
BUN/CREAT SERPL: 44 (ref 7.1–23.5)
CALCIUM SERPL-MCNC: 9.9 MG/DL (ref 8.4–10.2)
CHLORIDE SERPL-SCNC: 105 MMOL/L (ref 98–107)
CLARITY, POC: CLEAR
CO2 SERPL-SCNC: 28 MMOL/L (ref 26–30)
COLOR UR: YELLOW
CREAT SERPL-MCNC: 1 MG/DL (ref 0.6–1.3)
GLUCOSE SERPL-MCNC: 92 MG/DL (ref 74–98)
GLUCOSE UR STRIP-MCNC: NEGATIVE MG/DL
KETONES UR QL: NEGATIVE
LEUKOCYTE EST, POC: ABNORMAL
NITRITE UR-MCNC: NEGATIVE MG/ML
PH UR: 5 [PH] (ref 5–8)
POTASSIUM SERPL-SCNC: 3.9 MMOL/L (ref 3.5–5.1)
PROT UR STRIP-MCNC: NEGATIVE MG/DL
RBC # UR STRIP: NEGATIVE /UL
SODIUM SERPL-SCNC: 141 MMOL/L (ref 137–145)
SP GR UR: 1.01 (ref 1–1.03)
UROBILINOGEN UR QL: NORMAL

## 2019-03-29 PROCEDURE — 99213 OFFICE O/P EST LOW 20 MIN: CPT | Performed by: INTERNAL MEDICINE

## 2019-03-29 PROCEDURE — 81003 URINALYSIS AUTO W/O SCOPE: CPT | Performed by: INTERNAL MEDICINE

## 2019-03-29 RX ORDER — SULFAMETHOXAZOLE AND TRIMETHOPRIM 800; 160 MG/1; MG/1
1 TABLET ORAL 2 TIMES DAILY
Qty: 20 TABLET | Refills: 0 | Status: SHIPPED | OUTPATIENT
Start: 2019-03-29 | End: 2019-05-15

## 2019-03-29 RX ORDER — PHENAZOPYRIDINE HYDROCHLORIDE 100 MG/1
100 TABLET, FILM COATED ORAL 3 TIMES DAILY PRN
Qty: 6 TABLET | Refills: 0 | Status: SHIPPED | OUTPATIENT
Start: 2019-03-29 | End: 2019-05-15

## 2019-03-29 NOTE — PROGRESS NOTES
Subjective   Mi Tejeda is a 83 y.o. female.     Chief Complaint   Patient presents with   • Follow-up     HTN, HLD, and COPD; c/o burning with urination       History of Present Illness   Burning upon urination for 5 months , it comes and goes. Hx of abx use no help. No fever or chills, no urine frequency or urgency. Improve fluids intake no help. No flank pain    Current Outpatient Medications:   •  albuterol (PROAIR HFA) 108 (90 Base) MCG/ACT inhaler, Inhale 2 puffs Every 4 (Four) Hours As Needed for Wheezing or Shortness of Air., Disp: 54 g, Rfl: 3  •  baclofen (LIORESAL) 10 MG tablet, Take 10 mg by mouth Every Night., Disp: , Rfl:   •  buPROPion XL (WELLBUTRIN XL) 150 MG 24 hr tablet, Take 1 tablet by mouth Daily., Disp: 30 tablet, Rfl: 11  •  Cholecalciferol (VITAMIN D3) 5000 units capsule capsule, TAKE ONE CAPSULE BY MOUTH EVERY DAY, Disp: 90 capsule, Rfl: 3  •  clopidogrel (PLAVIX) 75 MG tablet, Take 1 tablet by mouth Daily., Disp: 30 tablet, Rfl: 3  •  ferrous gluconate 324 (37.5 Fe) MG tablet tablet, TAKE ONE TABLET DAILY WITH BREAKFAST, Disp: 90 tablet, Rfl: 3  •  hydrochlorothiazide (HYDRODIURIL) 25 MG tablet, TAKE ONE TABLET BY MOUTH EVERY DAY, Disp: 30 tablet, Rfl: 5  •  HYDROcodone-acetaminophen (NORCO) 7.5-325 MG per tablet, Take 1 tablet by mouth 2 (Two) Times a Day As Needed for Mild Pain (1-3) or Moderate Pain (4-6)., Disp: , Rfl:   •  hydrOXYzine (ATARAX) 25 MG tablet, , Disp: , Rfl: 1  •  levothyroxine (SYNTHROID, LEVOTHROID) 100 MCG tablet, TAKE ONE TABLET BY MOUTH EVERY DAY, Disp: 90 tablet, Rfl: 3  •  losartan (COZAAR) 100 MG tablet, TAKE ONE TABLET EVERY DAY, Disp: 30 tablet, Rfl: 5  •  metoprolol succinate XL (TOPROL-XL) 50 MG 24 hr tablet, TAKE ONE TABLET EVERY DAY, Disp: 90 tablet, Rfl: 3  •  morphine (MS CONTIN) 30 MG 12 hr tablet, Take 30 mg by mouth 2 (Two) Times a Day., Disp: , Rfl:   •  nicotine (NICODERM CQ) 14 MG/24HR patch, Place 1 patch on the skin as directed by provider  Daily., Disp: 21 patch, Rfl: 2  •  nisoldipine (SULAR) 8.5 MG 24 hr tablet, TAKE ONE TABLET BY MOUTH EVERY DAY, Disp: 30 tablet, Rfl: 5  •  pantoprazole (PROTONIX) 40 MG EC tablet, TAKE ONE TABLET BY MOUTH EVERY DAY, Disp: 30 tablet, Rfl: 11  •  rOPINIRole (REQUIP) 0.5 MG tablet, Take one pill two hours before bedtime., Disp: 30 tablet, Rfl: 3  •  sulfamethoxazole-trimethoprim (BACTRIM DS) 800-160 MG per tablet, Take 1 tablet by mouth 2 (Two) Times a Day., Disp: 20 tablet, Rfl: 0    The following portions of the patient's history were reviewed and updated as appropriate: allergies, current medications, past family history, past medical history, past social history, past surgical history and problem list.    Review of Systems   Constitutional: Negative.    Respiratory: Negative.    Cardiovascular: Negative.    Gastrointestinal: Negative.    Genitourinary: Positive for dysuria.   Skin: Negative.    Psychiatric/Behavioral: Negative.        Objective   Physical Exam   Constitutional: She is oriented to person, place, and time. She appears well-developed and well-nourished.   Neck: Neck supple.   Cardiovascular: Normal rate, regular rhythm and normal heart sounds.   Pulmonary/Chest: Effort normal and breath sounds normal.   Abdominal: Soft. Bowel sounds are normal.   Neurological: She is alert and oriented to person, place, and time.   Psychiatric: She has a normal mood and affect. Her behavior is normal.       All tests have been reviewed.    Assessment/Plan   Diagnoses and all orders for this visit:    Burning with urination  -     POCT urinalysis dipstick, automated    Urinary tract infection without hematuria, site unspecified    Other orders  -     sulfamethoxazole-trimethoprim (BACTRIM DS) 800-160 MG per tablet; Take 1 tablet by mouth 2 (Two) Times a Day.      Pyridium, do UA and cult and sens    6 weeks

## 2019-03-31 LAB
BACTERIA UR CULT: NORMAL
BACTERIA UR CULT: NORMAL

## 2019-04-02 DIAGNOSIS — M16.11 PRIMARY OSTEOARTHRITIS OF RIGHT HIP: Primary | ICD-10-CM

## 2019-04-12 ENCOUNTER — HOSPITAL ENCOUNTER (OUTPATIENT)
Dept: GENERAL RADIOLOGY | Facility: HOSPITAL | Age: 84
Discharge: HOME | End: 2019-04-12
Admitting: ORTHOPAEDIC SURGERY

## 2019-04-12 PROCEDURE — 25010000002 METHYLPREDNISOLONE PER 80 MG: Performed by: ORTHOPAEDIC SURGERY

## 2019-04-12 PROCEDURE — 77002 NEEDLE LOCALIZATION BY XRAY: CPT

## 2019-04-12 RX ORDER — METHYLPREDNISOLONE ACETATE 80 MG/ML
80 INJECTION, SUSPENSION INTRA-ARTICULAR; INTRALESIONAL; INTRAMUSCULAR; SOFT TISSUE ONCE
Status: COMPLETED | OUTPATIENT
Start: 2019-04-12 | End: 2019-04-12

## 2019-04-12 RX ORDER — LIDOCAINE HYDROCHLORIDE 10 MG/ML
9 INJECTION, SOLUTION INFILTRATION; PERINEURAL ONCE
Status: COMPLETED | OUTPATIENT
Start: 2019-04-12 | End: 2019-04-12

## 2019-04-12 RX ADMIN — METHYLPREDNISOLONE ACETATE 80 MG: 80 INJECTION, SUSPENSION INTRA-ARTICULAR; INTRALESIONAL; INTRAMUSCULAR; SOFT TISSUE at 16:17

## 2019-04-12 RX ADMIN — LIDOCAINE HYDROCHLORIDE 9 ML: 10 INJECTION, SOLUTION INFILTRATION; PERINEURAL at 15:58

## 2019-04-12 NOTE — POST-PROCEDURE NOTE
[facility]  [address], [address]  [place] [address]  [phone]        PROCEDURE REPORT        DIAGNOSIS:  Right hip osteoarthritis, symptomatic    PROCEDURE: Right  hip injection under flouroscopy      Mi Tejeda with date of birth 1935 presents to [facility] Radiology Department today for injection therapy.        Patient presents to [facility] Radiology Department Flouroscopy Suite on 4/12/2019 for planned elective right hip injection under flouroscopy for symptomatic osteoarthritis.    Procedure:     After consent was obtained, and using ethyl chloride topical local anesthetic, the right hip was then prepped and draped with sterile technique. With an anterior hip approach, flouroscopy guidance, and care to stay lateral of the femoral artery, the hip joint was entered via a 20 gauge spinal needle.  A mixture of 80 mg methylprednisolone in one ml plus 9 ml of 1% plain Lidocaine was injected and the needle withdrawn. The procedure was well tolerated and without complication. The patient noted relief of focal hip joint pain.  The patient did remain stable and with baseline ambulation. The patient is asked to rest the joint for a few more days before resuming full regular activities. It may be painful for the first few days. Watch for fever, skin issues, increased swelling or persistent pain in the joint. Call or return to clinic if such symptoms occur, other concerns or if there is lack of improvement as anticipated.    Impression: Symptomatic right hip osteoarthritis.      Recommendations/Plan:      Treatment and patient advice as noted here and in office visit report.  Orthopedic activities reviewed and patient expressed appreciation.  Discussion of orthopedic goals.   Risk, benefits, and merits of treatment options reviewed and questions answered.  Call or notify for any adverse effect from injection therapy.    Exercise: As tolerated.  No strenuous  activity for a few days as appropriate.  Brace:  No brace was given at today's visit  Referral: No referrals made at today's visit  Studies: No additional studies ordered.  Surgery: No surgery proposed at this visit.  Activity:  May perform usual activities as tolerated.      Patient will return to our clinic at scheduled appointment.  Patient agreeable to call or return sooner for any concerns.

## 2019-05-02 RX ORDER — HYDROXYZINE HYDROCHLORIDE 25 MG/1
TABLET, FILM COATED ORAL
Qty: 30 TABLET | Refills: 1 | Status: SHIPPED | OUTPATIENT
Start: 2019-05-02 | End: 2020-12-10

## 2019-05-02 RX ORDER — LOSARTAN POTASSIUM 100 MG/1
TABLET ORAL
Qty: 30 TABLET | Refills: 5 | Status: SHIPPED | OUTPATIENT
Start: 2019-05-02 | End: 2020-03-16 | Stop reason: SDUPTHER

## 2019-05-02 RX ORDER — PANTOPRAZOLE SODIUM 40 MG/1
TABLET, DELAYED RELEASE ORAL
Qty: 30 TABLET | Refills: 11 | Status: SHIPPED | OUTPATIENT
Start: 2019-05-02 | End: 2020-02-26

## 2019-05-02 RX ORDER — METOPROLOL SUCCINATE 50 MG/1
TABLET, EXTENDED RELEASE ORAL
Qty: 90 TABLET | Refills: 3 | Status: SHIPPED | OUTPATIENT
Start: 2019-05-02 | End: 2020-03-10 | Stop reason: ALTCHOICE

## 2019-05-15 ENCOUNTER — OFFICE VISIT (OUTPATIENT)
Dept: ORTHOPEDIC SURGERY | Facility: CLINIC | Age: 84
End: 2019-05-15

## 2019-05-15 ENCOUNTER — OFFICE VISIT (OUTPATIENT)
Dept: GASTROENTEROLOGY | Facility: CLINIC | Age: 84
End: 2019-05-15

## 2019-05-15 VITALS
BODY MASS INDEX: 20.16 KG/M2 | DIASTOLIC BLOOD PRESSURE: 56 MMHG | HEART RATE: 67 BPM | HEIGHT: 65 IN | SYSTOLIC BLOOD PRESSURE: 142 MMHG | RESPIRATION RATE: 18 BRPM | WEIGHT: 121 LBS | TEMPERATURE: 98 F

## 2019-05-15 DIAGNOSIS — I77.9 PERIPHERAL ARTERIAL OCCLUSIVE DISEASE (HCC): ICD-10-CM

## 2019-05-15 DIAGNOSIS — K59.00 CONSTIPATION, UNSPECIFIED CONSTIPATION TYPE: ICD-10-CM

## 2019-05-15 DIAGNOSIS — R12 HEARTBURN: ICD-10-CM

## 2019-05-15 DIAGNOSIS — R10.31 RIGHT LOWER QUADRANT ABDOMINAL PAIN: ICD-10-CM

## 2019-05-15 DIAGNOSIS — R13.10 DYSPHAGIA, UNSPECIFIED TYPE: Primary | ICD-10-CM

## 2019-05-15 DIAGNOSIS — M17.11 PRIMARY OSTEOARTHRITIS OF RIGHT KNEE: Primary | ICD-10-CM

## 2019-05-15 DIAGNOSIS — M25.561 ARTHRALGIA OF RIGHT KNEE: ICD-10-CM

## 2019-05-15 PROCEDURE — 20610 DRAIN/INJ JOINT/BURSA W/O US: CPT | Performed by: ORTHOPAEDIC SURGERY

## 2019-05-15 PROCEDURE — 99214 OFFICE O/P EST MOD 30 MIN: CPT | Performed by: INTERNAL MEDICINE

## 2019-05-15 RX ORDER — METHYLPREDNISOLONE ACETATE 40 MG/ML
40 INJECTION, SUSPENSION INTRA-ARTICULAR; INTRALESIONAL; INTRAMUSCULAR; SOFT TISSUE
Status: COMPLETED | OUTPATIENT
Start: 2019-05-15 | End: 2019-05-15

## 2019-05-15 RX ORDER — METRONIDAZOLE 250 MG/1
250 TABLET ORAL 3 TIMES DAILY
Qty: 21 TABLET | Refills: 0 | Status: SHIPPED | OUTPATIENT
Start: 2019-05-15 | End: 2019-05-22

## 2019-05-15 RX ORDER — LEVOFLOXACIN 500 MG/1
500 TABLET, FILM COATED ORAL DAILY
Qty: 7 TABLET | Refills: 0 | Status: SHIPPED | OUTPATIENT
Start: 2019-05-15 | End: 2019-05-22

## 2019-05-15 RX ORDER — LIDOCAINE HYDROCHLORIDE 10 MG/ML
2 INJECTION, SOLUTION INFILTRATION; PERINEURAL
Status: COMPLETED | OUTPATIENT
Start: 2019-05-15 | End: 2019-05-15

## 2019-05-15 RX ADMIN — METHYLPREDNISOLONE ACETATE 40 MG: 40 INJECTION, SUSPENSION INTRA-ARTICULAR; INTRALESIONAL; INTRAMUSCULAR; SOFT TISSUE at 10:36

## 2019-05-15 RX ADMIN — LIDOCAINE HYDROCHLORIDE 2 ML: 10 INJECTION, SOLUTION INFILTRATION; PERINEURAL at 10:36

## 2019-05-15 NOTE — PROGRESS NOTES
Chief Complaint   Patient presents with   • Difficulty Swallowing     History of Present Illness     The patient has difficulty swallowing off and for the last 6 months . The symptom is rather severe, occurs on daily basis and is associated mostly with solid foods.  The symptoms are somewhat progressive progressive.  The patient points towards the lower substernal area.  There is no associated weight loss.    There is history of RLQ abdominal pain off and on for the last 2 months.  The pain is gradual in onset, moderate in severity and aching in character.  Frequency being 2-3 times a week.  The pain may last for 3-4 days.  There is no radiation of abdominal pain.  Eating worsens the abdominal discomfort.  No definite relieving factors of abdominal pain.    The patient has a history of reflux off and on for the last several years.  The reflux is moderately severe.  Symptoms are described as retrosternal burning sensation, and indigestion.  There is history of occasional regurgitative symptoms.  Frequency being several times per week.  The symptoms are worse at night.  The patient takes acid suppressive therapy with reasonable control of his symptoms.    The patient has a history of constipation off and on for the last years. Severity is moderate. This is described as hard stools perhaps one bowel movement twice a week. The patient takes occasional stool softeners and laxatives to have a bowel movement. There is no associated rectal pain.    The patient has occasional nausea.  She denies vomiting.  There is history of some epigastric abdominal pain whenever she takes NSAIDs.  The patient has history of gastric ulcer in the past.  The patient denies recent weight loss.  There is no hematemesis, melena or hematochezia.  She denies liver or pancreatic disease.     Review of Systems   Constitutional: Positive for fatigue. Negative for appetite change, chills, fever and unexpected weight change.   HENT: Positive for  trouble swallowing. Negative for mouth sores and nosebleeds.    Eyes: Negative for discharge and redness.   Respiratory: Negative for apnea, cough and shortness of breath.    Cardiovascular: Negative for chest pain, palpitations and leg swelling.   Gastrointestinal: Positive for abdominal pain, constipation and nausea. Negative for abdominal distention, anal bleeding, blood in stool, diarrhea and vomiting.   Endocrine: Negative for cold intolerance, heat intolerance and polydipsia.   Genitourinary: Negative for dysuria, hematuria and urgency.   Musculoskeletal: Positive for arthralgias and back pain. Negative for joint swelling and myalgias.   Skin: Negative for rash.   Allergic/Immunologic: Negative for food allergies and immunocompromised state.   Neurological: Negative for dizziness, seizures, syncope and headaches.   Hematological: Negative for adenopathy. Does not bruise/bleed easily.   Psychiatric/Behavioral: Negative for dysphoric mood. The patient is nervous/anxious. The patient is not hyperactive.      Patient Active Problem List   Diagnosis   • Anxiety   • Chronic coronary artery disease   • Carotid bruit   • Chronic obstructive pulmonary disease (CMS/HCC)   • Diverticulum of esophagus   • Fatigue   • Gastroesophageal reflux disease   • Hyperlipidemia   • Hypertension   • Multiple pulmonary nodules   • Peripheral arterial occlusive disease (CMS/HCC)   • Plantar fasciitis   • Spinal stenosis   • Temporary cerebral vascular dysfunction   • Uterine leiomyoma   • Vitamin D deficiency   • Abnormal electrocardiogram   • Cramp in lower leg   • Vertigo   • Memory loss   • Burning with urination   • Urinary tract infection without hematuria     Past Medical History:   Diagnosis Date   • Abdominal pain     Improving. Likely secondary to gastritis.   • Abnormal electrocardiogram    • Abnormal urinalysis    • Acute UTI    • Anemia    • Anomaly cardiac     REPORTS WILL SHOW ON LEFT VENTRICLE IN A 12 LEAD EKG AS BEING A  MI BUT REPORTS SHE HAS NEVER HAD THIS    • Anxiety    • Aphagia     HX OF THIS, REPORTS WAS CLEARED OF A CVA BUT WAS TOLD WAS RELATED TO FATIGUE   • Arthritis    • Asthma    • Atrial fibrillation (CMS/HCC)    • Backache    • Bursitis of right shoulder    • CAD (coronary artery disease)    • COPD (chronic obstructive pulmonary disease) (CMS/HCC)    • Depression    • Diverticulitis    • Dysphagia     REPORTS RESOLVED AFTER DILITATION   • Fracture    • Full dentures    • Gastric ulcer    • GERD (gastroesophageal reflux disease)    • Gout    • High cholesterol    • HTN (hypertension)    • Hypertriglyceridemia    • Hypothyroidism    • Multiple lung nodules    • Osteoarthritis    • Osteoporosis    • Peripheral neuropathy    • Personal history of tobacco use    • Plantar fasciitis    • Pneumonia    • Sinus problem    • Sinusitis    • Tear of meniscus of knee     right   • TIA (transient ischemic attack)    • Uterine leiomyoma    • Viral gastroenteritis    • Vitamin D deficiency    • Wears glasses    • Weight loss, non-intentional      Past Surgical History:   Procedure Laterality Date   • CARDIAC SURGERY     • CATARACT EXTRACTION Bilateral 2009   • CHOLECYSTECTOMY  2002   • COLONOSCOPY     • CORONARY ARTERY BYPASS GRAFT  1998    2 vessels   • CORONARY ARTERY BYPASS GRAFT     • ENDOSCOPY     • ENDOSCOPY N/A 5/3/2017    Procedure: ESOPHAGOGASTRODUODENOSCOPY with biopsies;  Surgeon: Yared Castellanos MD;  Location: [facility] ENDOSCOPY;  Service:    • TOTAL HIP ARTHROPLASTY Left 02/02/2010    PREETI Stanley MD     Family History   Problem Relation Age of Onset   • Cancer Mother         uterus   • Cancer Sister         colon; kidney   • Hypertension Sister    • Stroke Sister    • Cancer Brother         prostate   • Arthritis Other    • Cancer Other    • Kidney disease Other    • Stroke Other    • Hypertension Other    • Gout Other      Social History     Tobacco Use   • Smoking status: Current Every Day Smoker     Packs/day: 1.00      Years: 65.00     Pack years: 65.00     Types: Cigarettes   • Smokeless tobacco: Never Used   Substance Use Topics   • Alcohol use: No       Current Outpatient Medications:   •  albuterol (PROAIR HFA) 108 (90 Base) MCG/ACT inhaler, Inhale 2 puffs Every 4 (Four) Hours As Needed for Wheezing or Shortness of Air., Disp: 54 g, Rfl: 3  •  baclofen (LIORESAL) 10 MG tablet, Take 10 mg by mouth Every Night., Disp: , Rfl:   •  buPROPion XL (WELLBUTRIN XL) 150 MG 24 hr tablet, Take 1 tablet by mouth Daily. (Patient taking differently: Take 150 mg by mouth As Needed.), Disp: 30 tablet, Rfl: 11  •  Cholecalciferol (VITAMIN D3) 5000 units capsule capsule, TAKE ONE CAPSULE BY MOUTH EVERY DAY, Disp: 90 capsule, Rfl: 3  •  hydrochlorothiazide (HYDRODIURIL) 25 MG tablet, TAKE ONE TABLET BY MOUTH EVERY DAY (Patient taking differently: TAKE ONE TABLET BY MOUTH EVERY DAY. PRN), Disp: 30 tablet, Rfl: 5  •  HYDROcodone-acetaminophen (NORCO) 7.5-325 MG per tablet, Take 1 tablet by mouth 2 (Two) Times a Day As Needed for Mild Pain (1-3) or Moderate Pain (4-6)., Disp: , Rfl:   •  hydrOXYzine (ATARAX) 25 MG tablet, TAKE ONE TABLET THREE TIMES DAILY AS NEEDED FOR ITCHING, Disp: 30 tablet, Rfl: 1  •  levothyroxine (SYNTHROID, LEVOTHROID) 100 MCG tablet, TAKE ONE TABLET BY MOUTH EVERY DAY, Disp: 90 tablet, Rfl: 3  •  losartan (COZAAR) 100 MG tablet, TAKE ONE TABLET EVERY DAY, Disp: 30 tablet, Rfl: 5  •  metoprolol succinate XL (TOPROL-XL) 50 MG 24 hr tablet, TAKE ONE TABLET BY MOUTH EVERY DAY, Disp: 90 tablet, Rfl: 3  •  morphine (MS CONTIN) 30 MG 12 hr tablet, Take 30 mg by mouth 2 (Two) Times a Day., Disp: , Rfl:   •  nicotine (NICODERM CQ) 14 MG/24HR patch, Place 1 patch on the skin as directed by provider Daily., Disp: 21 patch, Rfl: 2  •  nisoldipine (SULAR) 8.5 MG 24 hr tablet, TAKE ONE TABLET BY MOUTH EVERY DAY, Disp: 30 tablet, Rfl: 5  •  pantoprazole (PROTONIX) 40 MG EC tablet, TAKE ONE TABLET BY MOUTH EVERY DAY, Disp: 30 tablet, Rfl:  "11  •  levoFLOXacin (LEVAQUIN) 500 MG tablet, Take 1 tablet by mouth Daily., Disp: 7 tablet, Rfl: 0  •  metroNIDAZOLE (FLAGYL) 250 MG tablet, Take 1 tablet by mouth 3 (Three) Times a Day., Disp: 21 tablet, Rfl: 0  •  Probiotic Product (ALIGN) capsule, Take 1 capsule by mouth Daily., Disp: 14 capsule, Rfl: 0    Allergies   Allergen Reactions   • Ciprofloxacin GI Intolerance     No true allergy.   • Keflex [Cephalexin] Nausea Only   • Niacin    • Percocet [Oxycodone-Acetaminophen]      (NOTE: does tolerate hydrocodone and morphine)   • Statins        Blood pressure 142/56, pulse 67, temperature 98 °F (36.7 °C), resp. rate 18, height 163.8 cm (64.5\"), weight 54.9 kg (121 lb).    Physical Exam   Constitutional: She is oriented to person, place, and time. She appears well-developed and well-nourished. No distress.   HENT:   Head: Normocephalic and atraumatic.   Right Ear: Hearing and external ear normal.   Left Ear: Hearing and external ear normal.   Nose: Nose normal.   Mouth/Throat: Oropharynx is clear and moist and mucous membranes are normal. Mucous membranes are not pale, not dry and not cyanotic. No oral lesions. No oropharyngeal exudate.   Eyes: Conjunctivae and EOM are normal. Right eye exhibits no discharge. Left eye exhibits no discharge. No scleral icterus.   Neck: Trachea normal. Neck supple. No JVD present. No edema present. No thyroid mass and no thyromegaly present.   Cardiovascular: Normal rate, regular rhythm, S2 normal and normal heart sounds. Exam reveals no gallop, no S3 and no friction rub.   No murmur heard.  Pulmonary/Chest: Effort normal and breath sounds normal. No respiratory distress. She has no wheezes. She has no rales. She exhibits no tenderness.   Abdominal: Soft. Normal appearance and bowel sounds are normal. She exhibits no distension, no ascites and no mass. There is no splenomegaly or hepatomegaly. There is tenderness in the suprapubic area and left lower quadrant. There is no rigidity, " no rebound and no guarding. No hernia.   Musculoskeletal: She exhibits no tenderness or deformity.     Vascular Status -  Her right foot exhibits no edema. Her left foot exhibits no edema.  Lymphadenopathy:     She has no cervical adenopathy.        Left: No supraclavicular adenopathy present.   Neurological: She is alert and oriented to person, place, and time. She has normal strength. No cranial nerve deficit or sensory deficit. She exhibits normal muscle tone. Coordination normal.   Skin: No rash noted. She is not diaphoretic. No cyanosis. There is pallor. Nails show no clubbing.   Psychiatric: She has a normal mood and affect. Her behavior is normal. Judgment and thought content normal.   Nursing note and vitals reviewed.  Stigmata of chronic liver disease: Positive palmar erythema.  Asterixis:  None.    Laboratory Testing:  Upon review of medical records:     Dated February 15, 2017 sodium 143 potassium 3.8 chloride 106 CO2 30 BUN 28 serum creatinine 0.80 glucose 92. Calcium 9.9. Albumin 4.00. T bili 0.3 AST 23 ALT 18 alkaline phosphatase 91. C-reactive protein 1.10. Serum iron 50. TIBC 339. Iron saturation 15%. Ferritin 62.00. WBC 7.89 hemoglobin 13.5 hematocrit 41.6 MCV 96.3 platelet count 238.    Dated December 8, 2017 serum iron 43.  TIBC 339.  UIBC 296.  Iron saturation 13%.    Dated August 23, 2018 sodium 143 potassium 4.2 chloride 107 CO2 29 BUN 32 serum creatinine 0.90 glucose 111.  Calcium 9.7.  Total protein 6.1.  Albumin 3.40.  T bili 0.4 AST 32 ALT 23 alkaline phosphatase 88.  TSH 2.150.  Sed rate 6.  WBC 8.30 hemoglobin 13.3 hematocrit 42.0 MCV 97.4 and platelet count 206.     Abdominal Imaging:  Upon Review of medical records:     The patient underwent a CT of the abdomen and pelvis without contrast dated July 24, 2015 which revealed: Abdomen: Clear lung bases. Heart size is normal. The limited noncontrast images of the liver are normal. Patient is status post cholecystectomy. The spleen is  normal. No adrenal masses are seen. The aorta is normal in caliber. There is no significant free fluid or adenopathy. There is no nephrolithiasis. There is no hydronephrosis. Extensive vascular calcifications are present within the renal arteries. Pelvis: Appendix is normal. There are colonic diverticula present without evidence of diverticulitis. The urinary bladder is unremarkable. There is a 4.3 cm mass adjacent to the uterus which is 6 suspected to reflect a fibroid, however this should be followed up with pelvic ultrasound. There is no free fluid, free air or adenopathy. Bone windows reveal left hip arthroplasty. There are no acute osseous abnormalities.     Dated February 27, 2017 the patient underwent a CT of the abdomen and pelvis without contrast which revealed: No renal or ureteral stone or hydronephrosis. Bladder is normal in contour. Liver, spleen, pancreas and adrenal glands appear normal. The bowel gas pattern is nonobstructive. No focal inflammatory changes are present. The appendix is not visualized but there are no secondary signs to suggest appendicitis. There is scattered diverticulosis without diverticulitis. There is diffuse vascular calcifications.     Procedures:  Upon review of medical records:     Dated April 10, 2013 the patient underwent a colonoscopy by Dr. Thompson from surgical service which revealed: Colon polyp. Arteriovenous malformations, cecum. Diverticulosis coli. Colon, polyp at 10 cm, biopsy revealed tubular adenoma.      EGD dated 12/14/2015: Schatzki's ring status post dilation to 18 mm. Small sliding hiatal hernia less than 3 cm. Early diverticulum within the distal esophagus. Erythematous gastritis. Bile reflux within the stomach. Significant tortuosity and tertiary contractions of the esophageal body were noted. This test does no explain the cause of anemia. The patient is at high risk for pill esophagitis. Second portion of duodenum biopsy revealed preserved villous  architecture with no significant histopathologic change. No parasites noted. No villous blunting or increased intraepithelial lymphocytes suggestive of celiac disease. Antrum and body, biopsies, and angulus revealed minimal chronic gastritis with reactive features. No intestinal metaplasia or dysplasia identified. No Helicobacter pylori -like organisms noted on routine stain. Due to limited chronic inflammation present and lack of active inflammation, special stains for Helicobacter pylori were not undertaken. If there is a high clinical index of suspicion of Helicobacter infection, please notify this laboratory and additional studies will be performed. Esophagus, biopsies, distal and proximal revealed squamous mucosa showing mild basal layer hyperplasia and minimal increase in intraepithelial leukocytes without eosinophils. Histologic features are mild and felt to be nonspecific. No glandular mucosa or intestinal metaplasia identified.      Dated May 3, 2017 the patient underwent an upper endoscopy which revealed: 0.75 cm clean base gastric ulcer at the body of the stomach. Risks of bleeding less than 5%. Non-bleeding duodenal bulb vascular ectasia.  Erythematous gastritis.  Small sliding hiatal hernia less than 3 cm.  Nonobstructive Schatzki's-type ring.  Second portion, duodenum, biopsies revealed benign small bowel mucosa with no diagnostic abnormality.  Stomach, body, ulcer, biopsies revealed compatible with reactive gastropathy.     Assessment:      ICD-10-CM ICD-9-CM   1. Dysphagia, unspecified type R13.10 787.20   2. Right lower quadrant abdominal pain R10.31 789.03   3. Heartburn R12 787.1   4. Constipation, unspecified constipation type K59.00 564.00         Discussion:  1.     Plan/  Patient Instructions   1. The patient should eat relatively soft diet.    2. The patient should eat in upright position and chew well.  The patient should drink water after to 3 bites and take medications with liberal amounts  "of water.    3. Generally,  medications that have a potential to cause pill esophagitis may be avoided or used in an alternative form. For example Motrin or Aleve can be taken in a gel cap form.  4. After eating and taking medications the patient should remain in upright position for 10-15 minutes.  5. Upper endoscopy (EGD) counseling:  Description of the procedure, risks, benefits, alternatives and options including nonoperative options were discussed with the patient in detail.  The patient understands and wishes to proceed.  6. Low fiber diet (avoid fruits, vegetables, cereals, fiber supplements, nuts and seeds) for 5 days thereafter low-fat high-fiber diet.  7. Levaquin-levofloxacin tablets 500 mg. Take 1 tablet by mouth once a day for 7 days.  Side effects were discussed.  8. Flagyl (metronidazole) tablets 250 mg. Take 1 tablet one by mouth 4 times a day for 7 days.  Side effects were discussed.  9. Avoid laxatives, enemas for next 5 days.  However, for constipation the patient may use \"stool softeners\" 1-3 per day.  10. May take probiotics such as Align.  Take one orally daily while taking antibiotics and 1-2 weeks thereafter.    11. A possibility of colonoscopy may be considered in the future.  This may however be postponed for about 3 to 4 weeks.  Her last colon exam was in 2013 by surgical service-Dr. Thompson.    12. The patient may stop taking iron supplements while taking Levaquin.  Once she finishes the course of antibiotics if desired she may resume iron supplements with orange juice or vitamin C.  13. Protonix (Pantoprazole) tablet 40 mg tablet. Take 1 tablet orally in the morning half an hour before eating every day.       Yared Castellanos MD    "

## 2019-05-15 NOTE — PROGRESS NOTES
Subjective   Mi Tejeda is a 83 y.o. female is here today for injection therapy.  No chief complaint on file.      CHIEF COMPLAINT:    Here for repeat knee injection therapy    History of Present Illness     Since last injection, patient notes moderate relief of symptoms.  No adverse effects of prior injection.  Patient requests repeat injection.    Past Medical History:   Diagnosis Date   • Abdominal pain     Improving. Likely secondary to gastritis.   • Abnormal electrocardiogram    • Abnormal urinalysis    • Acute UTI    • Anemia    • Anomaly cardiac     REPORTS WILL SHOW ON LEFT VENTRICLE IN A 12 LEAD EKG AS BEING A MI BUT REPORTS SHE HAS NEVER HAD THIS    • Anxiety    • Aphagia     HX OF THIS, REPORTS WAS CLEARED OF A CVA BUT WAS TOLD WAS RELATED TO FATIGUE   • Arthritis    • Asthma    • Atrial fibrillation (CMS/HCC)    • Backache    • Bursitis of right shoulder    • CAD (coronary artery disease)    • COPD (chronic obstructive pulmonary disease) (CMS/HCC)    • Depression    • Diverticulitis    • Dysphagia     REPORTS RESOLVED AFTER DILITATION   • Fracture    • Full dentures    • Gastric ulcer    • GERD (gastroesophageal reflux disease)    • Gout    • High cholesterol    • HTN (hypertension)    • Hypertriglyceridemia    • Hypothyroidism    • Multiple lung nodules    • Osteoarthritis    • Osteoporosis    • Peripheral neuropathy    • Personal history of tobacco use    • Plantar fasciitis    • Pneumonia    • Sinus problem    • Sinusitis    • Tear of meniscus of knee     right   • TIA (transient ischemic attack)    • Uterine leiomyoma    • Viral gastroenteritis    • Vitamin D deficiency    • Wears glasses    • Weight loss, non-intentional         Past Surgical History:   Procedure Laterality Date   • CARDIAC SURGERY     • CATARACT EXTRACTION Bilateral 2009   • CHOLECYSTECTOMY  2002   • COLONOSCOPY     • CORONARY ARTERY BYPASS GRAFT  1998    2 vessels   • CORONARY ARTERY BYPASS GRAFT     • ENDOSCOPY     •  ENDOSCOPY N/A 5/3/2017    Procedure: ESOPHAGOGASTRODUODENOSCOPY with biopsies;  Surgeon: Yared Castellanos MD;  Location: [facility] ENDOSCOPY;  Service:    • TOTAL HIP ARTHROPLASTY Left 02/02/2010    PREETI Stanley MD       Allergies   Allergen Reactions   • Ciprofloxacin GI Intolerance   • Keflex [Cephalexin] Nausea Only   • Niacin    • Percocet [Oxycodone-Acetaminophen]      (NOTE: does tolerate hydrocodone and morphine)   • Statins        Review of Systems   Constitutional: Negative for fever.   Respiratory: Negative for shortness of breath.    Cardiovascular: Negative for chest pain.   Gastrointestinal: Negative for abdominal distention and abdominal pain.   Musculoskeletal: Positive for arthralgias and gait problem ( uses walker for stability). Negative for joint swelling.   Skin: Negative for rash.   Hematological: Does not bruise/bleed easily.        Objective   There were no vitals taken for this visit.   Physical Exam   Constitutional: She appears well-developed. No distress.   Neurological: She is alert.   Skin: Skin is warm and dry. No rash noted. No erythema.   Psychiatric: She has a normal mood and affect.   Vitals reviewed.    Skin exam stable with no erythema, ecchymosis or rash.  No new swelling.  No motor or sensory changes.  Distal pulse intact.  Ortho Exam  Neurologic Exam        Large Joint Arthrocentesis: R knee  Date/Time: 5/15/2019 10:36 AM  Consent given by: patient  Site marked: site marked  Timeout: Immediately prior to procedure a time out was called to verify the correct patient, procedure, equipment, support staff and site/side marked as required   Supporting Documentation  Indications: pain   Procedure Details  Location: knee - R knee  Preparation: Patient was prepped and draped in the usual sterile fashion  Needle size: 22 G  Approach: anteromedial  Medications administered: 2 mL lidocaine 1 %; 40 mg methylPREDNISolone acetate 40 MG/ML  Patient tolerance: patient tolerated the procedure well  with no immediate complications        Assessment/Plan   Independent Review of Radiographic Studies:    No new imaging done today.  Laboratory and Other Studies:  No new results reviewed today.   Medical Decision Making:    No complications of procedure and treatments.  Good progress, significantly improved.  Continue current plan, and management.    Diagnoses and all orders for this visit:    Primary osteoarthritis of right knee  -     Large Joint Arthrocentesis: R knee    Arthralgia of right knee  -     Large Joint Arthrocentesis: R knee        Discussion of orthopaedic goals and activities and patient and/or guardian expressed appreciation.  Call or notify for any adverse effect from injection therapy  Ice, heat, and/or modalities as beneficial  Watch for signs and symptoms of infection  Guided on proper techniques for mobility, strength, agility and/or conditioning exercises      Recommendations/Plan:  Brace: No brace was given at today's visit  Referral: No referrals made at today's visit  Test/Studies: No additional studies ordered.  Work/Activity Status: May perform usual activities as tolerated and No strenuous activity.   Continue walker for safety and support.    Return in about 3 months (around 8/15/2019) for Recheck.  Patient is encouraged and agreeable to call or return sooner for any issues or concerns.      Portions of this note have been scribed for Derick Stanley MD by Jasmin Wilkins CMA.. 5/15/2019  10:42 AM

## 2019-05-15 NOTE — PATIENT INSTRUCTIONS
"1. The patient should eat relatively soft diet.    2. The patient should eat in upright position and chew well.  The patient should drink water after to 3 bites and take medications with liberal amounts of water.    3. Generally,  medications that have a potential to cause pill esophagitis may be avoided or used in an alternative form. For example Motrin or Aleve can be taken in a gel cap form.  4. After eating and taking medications the patient should remain in upright position for 10-15 minutes.  5. Upper endoscopy (EGD) counseling:  Description of the procedure, risks, benefits, alternatives and options including nonoperative options were discussed with the patient in detail.  The patient understands and wishes to proceed.  6. Low fiber diet (avoid fruits, vegetables, cereals, fiber supplements, nuts and seeds) for 5 days thereafter low-fat high-fiber diet.  7. Levaquin-levofloxacin tablets 500 mg. Take 1 tablet by mouth once a day for 7 days.  Side effects were discussed.  8. Flagyl (metronidazole) tablets 250 mg. Take 1 tablet one by mouth 4 times a day for 7 days.  Side effects were discussed.  9. Avoid laxatives, enemas for next 5 days.  However, for constipation the patient may use \"stool softeners\" 1-3 per day.  10. May take probiotics such as Align.  Take one orally daily while taking antibiotics and 1-2 weeks thereafter.    11. A possibility of colonoscopy may be considered in the future.  This may however be postponed for about 3 to 4 weeks.  Her last colon exam was in 2013 by surgical service-Dr. Thompson.    12. The patient may stop taking iron supplements while taking Levaquin.  Once she finishes the course of antibiotics if desired she may resume iron supplements with orange juice or vitamin C.  13. Protonix (Pantoprazole) tablet 40 mg tablet. Take 1 tablet orally in the morning half an hour before eating every day.  "

## 2019-05-16 RX ORDER — ALUMINUM ZIRCONIUM OCTACHLOROHYDREX GLY 16 G/100G
1 GEL TOPICAL DAILY
Qty: 14 CAPSULE | Refills: 0 | COMMUNITY
Start: 2019-05-16 | End: 2019-06-27

## 2019-05-20 ENCOUNTER — PREP FOR SURGERY (OUTPATIENT)
Dept: OTHER | Facility: HOSPITAL | Age: 84
End: 2019-05-20

## 2019-05-20 DIAGNOSIS — R12 HEARTBURN: ICD-10-CM

## 2019-05-20 DIAGNOSIS — R13.10 DYSPHAGIA: Primary | ICD-10-CM

## 2019-05-20 DIAGNOSIS — R11.0 NAUSEA: ICD-10-CM

## 2019-05-20 RX ORDER — SODIUM CHLORIDE 9 MG/ML
70 INJECTION, SOLUTION INTRAVENOUS CONTINUOUS PRN
Status: CANCELLED | OUTPATIENT
Start: 2019-05-20

## 2019-05-21 ENCOUNTER — OFFICE VISIT (OUTPATIENT)
Dept: INTERNAL MEDICINE | Facility: CLINIC | Age: 84
End: 2019-05-21

## 2019-05-21 VITALS
HEIGHT: 64 IN | WEIGHT: 123.8 LBS | BODY MASS INDEX: 21.13 KG/M2 | HEART RATE: 60 BPM | TEMPERATURE: 97.8 F | SYSTOLIC BLOOD PRESSURE: 114 MMHG | OXYGEN SATURATION: 98 % | DIASTOLIC BLOOD PRESSURE: 62 MMHG

## 2019-05-21 DIAGNOSIS — R41.3 MEMORY LOSS: ICD-10-CM

## 2019-05-21 DIAGNOSIS — I25.10 CHRONIC CORONARY ARTERY DISEASE: Primary | ICD-10-CM

## 2019-05-21 DIAGNOSIS — E78.5 HYPERLIPIDEMIA, UNSPECIFIED HYPERLIPIDEMIA TYPE: ICD-10-CM

## 2019-05-21 DIAGNOSIS — R53.83 FATIGUE, UNSPECIFIED TYPE: ICD-10-CM

## 2019-05-21 DIAGNOSIS — J43.8 OTHER EMPHYSEMA (HCC): ICD-10-CM

## 2019-05-21 DIAGNOSIS — R25.2 CRAMP IN LOWER LEG: ICD-10-CM

## 2019-05-21 DIAGNOSIS — R94.31 ABNORMAL ELECTROCARDIOGRAM: ICD-10-CM

## 2019-05-21 DIAGNOSIS — F41.9 ANXIETY: ICD-10-CM

## 2019-05-21 DIAGNOSIS — I10 ESSENTIAL HYPERTENSION: ICD-10-CM

## 2019-05-21 DIAGNOSIS — E55.9 VITAMIN D DEFICIENCY: ICD-10-CM

## 2019-05-21 DIAGNOSIS — R91.8 MULTIPLE PULMONARY NODULES: ICD-10-CM

## 2019-05-21 DIAGNOSIS — I67.82 TEMPORARY CEREBRAL VASCULAR DYSFUNCTION: ICD-10-CM

## 2019-05-21 DIAGNOSIS — I77.9 PERIPHERAL ARTERIAL OCCLUSIVE DISEASE (HCC): ICD-10-CM

## 2019-05-21 DIAGNOSIS — D25.9 UTERINE LEIOMYOMA, UNSPECIFIED LOCATION: ICD-10-CM

## 2019-05-21 DIAGNOSIS — R42 VERTIGO: ICD-10-CM

## 2019-05-21 DIAGNOSIS — R09.89 CAROTID BRUIT, UNSPECIFIED LATERALITY: ICD-10-CM

## 2019-05-21 DIAGNOSIS — K21.9 GASTROESOPHAGEAL REFLUX DISEASE WITHOUT ESOPHAGITIS: ICD-10-CM

## 2019-05-21 DIAGNOSIS — M72.2 PLANTAR FASCIITIS: ICD-10-CM

## 2019-05-21 PROBLEM — R13.10 DYSPHAGIA: Status: ACTIVE | Noted: 2019-05-21

## 2019-05-21 PROBLEM — R12 HEARTBURN: Status: ACTIVE | Noted: 2019-05-21

## 2019-05-21 PROBLEM — R11.0 NAUSEA: Status: ACTIVE | Noted: 2019-05-21

## 2019-05-21 PROCEDURE — 99214 OFFICE O/P EST MOD 30 MIN: CPT | Performed by: INTERNAL MEDICINE

## 2019-05-21 RX ORDER — CLOPIDOGREL BISULFATE 75 MG/1
75 TABLET ORAL DAILY
Qty: 30 TABLET | Refills: 6 | Status: SHIPPED | OUTPATIENT
Start: 2019-05-21 | End: 2019-05-23 | Stop reason: HOSPADM

## 2019-05-21 NOTE — PROGRESS NOTES
Subjective   Mi Tejeda is a 83 y.o. female.     Chief Complaint   Patient presents with   • Follow-up     6 week f/u       History of Present Illness   Patient presents today for follow up. Hypertension controlled on med. GERD controlled on protonix. Following with Dr. William in GI. Patient stated she is to have upper endoscopy on 5/23/19. Muscle cramping better, patient controlled on med. Patient stated she still is using nicotine patch, she is not smoking since 10/2018. Anxiety and depression controlled on med. Hypothyroid controlled on med. COPD improved since patient stopped smoking, she stated she is using symbicort as needed. Osteopenia patient on calcium and vit d. Patient denies chest pain, SOA, abdominal pain, fever or chill.     Current Outpatient Medications:   •  albuterol (PROAIR HFA) 108 (90 Base) MCG/ACT inhaler, Inhale 2 puffs Every 4 (Four) Hours As Needed for Wheezing or Shortness of Air., Disp: 54 g, Rfl: 3  •  baclofen (LIORESAL) 10 MG tablet, Take 10 mg by mouth Every Night., Disp: , Rfl:   •  buPROPion XL (WELLBUTRIN XL) 150 MG 24 hr tablet, Take 1 tablet by mouth Daily. (Patient taking differently: Take 150 mg by mouth As Needed.), Disp: 30 tablet, Rfl: 11  •  Cholecalciferol (VITAMIN D3) 5000 units capsule capsule, TAKE ONE CAPSULE BY MOUTH EVERY DAY, Disp: 90 capsule, Rfl: 3  •  hydrochlorothiazide (HYDRODIURIL) 25 MG tablet, TAKE ONE TABLET BY MOUTH EVERY DAY (Patient taking differently: TAKE ONE TABLET BY MOUTH EVERY DAY. PRN), Disp: 30 tablet, Rfl: 5  •  HYDROcodone-acetaminophen (NORCO) 7.5-325 MG per tablet, Take 1 tablet by mouth 2 (Two) Times a Day As Needed for Mild Pain (1-3) or Moderate Pain (4-6)., Disp: , Rfl:   •  hydrOXYzine (ATARAX) 25 MG tablet, TAKE ONE TABLET THREE TIMES DAILY AS NEEDED FOR ITCHING, Disp: 30 tablet, Rfl: 1  •  levoFLOXacin (LEVAQUIN) 500 MG tablet, Take 1 tablet by mouth Daily., Disp: 7 tablet, Rfl: 0  •  levothyroxine (SYNTHROID, LEVOTHROID) 100 MCG  tablet, TAKE ONE TABLET BY MOUTH EVERY DAY, Disp: 90 tablet, Rfl: 3  •  losartan (COZAAR) 100 MG tablet, TAKE ONE TABLET EVERY DAY, Disp: 30 tablet, Rfl: 5  •  metoprolol succinate XL (TOPROL-XL) 50 MG 24 hr tablet, TAKE ONE TABLET BY MOUTH EVERY DAY, Disp: 90 tablet, Rfl: 3  •  metroNIDAZOLE (FLAGYL) 250 MG tablet, Take 1 tablet by mouth 3 (Three) Times a Day., Disp: 21 tablet, Rfl: 0  •  morphine (MS CONTIN) 30 MG 12 hr tablet, Take 30 mg by mouth 2 (Two) Times a Day., Disp: , Rfl:   •  nicotine (NICODERM CQ) 14 MG/24HR patch, Place 1 patch on the skin as directed by provider Daily., Disp: 21 patch, Rfl: 2  •  nisoldipine (SULAR) 8.5 MG 24 hr tablet, TAKE ONE TABLET BY MOUTH EVERY DAY, Disp: 30 tablet, Rfl: 5  •  pantoprazole (PROTONIX) 40 MG EC tablet, TAKE ONE TABLET BY MOUTH EVERY DAY, Disp: 30 tablet, Rfl: 11  •  Probiotic Product (ALIGN) capsule, Take 1 capsule by mouth Daily., Disp: 14 capsule, Rfl: 0    The following portions of the patient's history were reviewed and updated as appropriate: allergies, current medications, past family history, past medical history, past social history, past surgical history and problem list.    Review of Systems   Constitutional: Negative for chills and fever.   HENT: Negative for congestion, rhinorrhea, sinus pressure, sinus pain, sneezing, sore throat and tinnitus.    Respiratory: Negative for cough, chest tightness, shortness of breath and wheezing.    Cardiovascular: Negative for chest pain, palpitations and leg swelling.   Gastrointestinal: Negative for abdominal distention, abdominal pain, blood in stool, constipation, diarrhea, nausea and vomiting.   Genitourinary: Negative for difficulty urinating, dysuria, flank pain, hematuria and urgency.   Musculoskeletal: Negative for back pain.   Skin: Negative for rash.   Neurological: Negative for dizziness, weakness, light-headedness and headaches.       Objective   Physical Exam   Constitutional: She is oriented to person,  place, and time. She appears well-developed and well-nourished. No distress.   HENT:   Head: Normocephalic.   Eyes: Conjunctivae and EOM are normal.   Neck: Normal range of motion.   Cardiovascular: Normal rate and regular rhythm.   Pulmonary/Chest: Effort normal and breath sounds normal. No respiratory distress.   Abdominal: Soft. She exhibits no distension. There is no tenderness.   Musculoskeletal: She exhibits no edema.   Neurological: She is alert and oriented to person, place, and time.   Skin: Skin is warm and dry.   Psychiatric: She has a normal mood and affect. Her behavior is normal.   Nursing note and vitals reviewed.      All tests have been reviewed.    Assessment/Plan   There are no diagnoses linked to this encounter.          right thigh numb and sharp pain patient is seeing orthopedist  CAD CABG history no cp EKG old MI , refused stress test and EKG. Unable to take a cholesterol medicine patient is allergic to it.refuse PCK9 even ins covers--  Not currently seeing cardiologist, no longer on plavix, resume  early satirety resolved.  seen GI, s/p EGD5/2017: 0.75 cm clean base gastric ulcer at the body of the stomach. Risks of bleeding less than 5%.Non-bleeding duodenal bulb vascular ectasia.Erythematous gastritis.Small sliding hiatal hernia less than 3 cm.Nonobstructive Schatzki's-type ring, -- following with Dr. William, [facility], to have endoscopy on 5/23/19   pulmonary CT scan showed a small nodules, repeat stable, no need of repeat  CT scan does show 4.3 cm uterine fibroid s/p ultrasound utero mass, seen by gyn, -- stable, no symptoms  TIA history of possible TIA left eye cholesterol deposits by ophthalmologist. carotid duplex 50%, CTA neck artery 70% repeat , echocardiogram unremarkable -- patient stated she is not on plavix   CT head showed microvasc changes -- not on plavix , resume  Claudication possible PAD history of stents left leg artery. ultrasound again stenosis --   Tobacco, QUIT  10/2018 -- still on nicotine patch  muscle cramp -- improved continue flexeril  plantar faciatis banks's neuroma, -- continue to followup podiatrist   LBP arthritis -- follow up with pain clinic and on Pain medicine, on MS  Fe def anemia normal after fe supplement obtain colon report from dr leung done 4/2013,, SBFT normal,-- continue iron supplements.  colonoscopy 4/9/2013.colon polyp(tubular adenoma), AVM, and divertic -- follow dr krystal GARCIA low--continue supplement.   hyperglycemia -- good diet  depression/anxiety continue medications improved on neurontin, xanax d/c'd by pain doctor, remeron no help, xanax d/c'd by pain clinic,-- continue hydroxyzine wellbutrin  HTN -- continue medicine stable  hypothyroidism -- continue medication   LBP spinal stenosis. -- continue medicine from pain clinic on narcotics  copd severe restrictive disease on PFT, unable to use spiriva, -- continue symbicort as needed, better since stopped smoking  refuse prevnar 13, refuse zostavax and shingrix, refuses flushot, pneumovax done  Muscle cramp in toes follow up with podiatrist. Baclofen no help, stretch exercise  Hip pain -- follow ortho  Mild tremor watch for now  Memory problem, reviewed  CT head -- doing ok  Osteopenia on dexa vit d and calcium continue  Follow up:  3 month

## 2019-05-22 RX ORDER — ASCORBIC ACID 500 MG
500 TABLET ORAL DAILY
COMMUNITY

## 2019-05-23 ENCOUNTER — HOSPITAL ENCOUNTER (OUTPATIENT)
Facility: HOSPITAL | Age: 84
Setting detail: HOSPITAL OUTPATIENT SURGERY
Discharge: HOME | End: 2019-05-23
Attending: INTERNAL MEDICINE | Admitting: INTERNAL MEDICINE

## 2019-05-23 ENCOUNTER — ANESTHESIA (OUTPATIENT)
Dept: GASTROENTEROLOGY | Facility: HOSPITAL | Age: 84
End: 2019-05-23

## 2019-05-23 ENCOUNTER — ANESTHESIA EVENT (OUTPATIENT)
Dept: GASTROENTEROLOGY | Facility: HOSPITAL | Age: 84
End: 2019-05-23

## 2019-05-23 VITALS
HEIGHT: 64 IN | WEIGHT: 123 LBS | BODY MASS INDEX: 21 KG/M2 | TEMPERATURE: 98.1 F | HEART RATE: 60 BPM | RESPIRATION RATE: 18 BRPM | SYSTOLIC BLOOD PRESSURE: 159 MMHG | OXYGEN SATURATION: 100 % | DIASTOLIC BLOOD PRESSURE: 47 MMHG

## 2019-05-23 DIAGNOSIS — R12 HEARTBURN: ICD-10-CM

## 2019-05-23 DIAGNOSIS — R11.0 NAUSEA: ICD-10-CM

## 2019-05-23 DIAGNOSIS — R13.10 DYSPHAGIA: ICD-10-CM

## 2019-05-23 PROCEDURE — 43249 ESOPH EGD DILATION <30 MM: CPT | Performed by: INTERNAL MEDICINE

## 2019-05-23 PROCEDURE — C1726 CATH, BAL DIL, NON-VASCULAR: HCPCS | Performed by: INTERNAL MEDICINE

## 2019-05-23 PROCEDURE — 88305 TISSUE EXAM BY PATHOLOGIST: CPT | Performed by: INTERNAL MEDICINE

## 2019-05-23 PROCEDURE — 25010000002 ONDANSETRON PER 1 MG: Performed by: NURSE ANESTHETIST, CERTIFIED REGISTERED

## 2019-05-23 PROCEDURE — 25010000002 PROPOFOL 10 MG/ML EMULSION: Performed by: NURSE ANESTHETIST, CERTIFIED REGISTERED

## 2019-05-23 PROCEDURE — 43239 EGD BIOPSY SINGLE/MULTIPLE: CPT | Performed by: INTERNAL MEDICINE

## 2019-05-23 RX ORDER — SODIUM CHLORIDE 9 MG/ML
70 INJECTION, SOLUTION INTRAVENOUS CONTINUOUS PRN
Status: DISCONTINUED | OUTPATIENT
Start: 2019-05-23 | End: 2019-05-23 | Stop reason: HOSPADM

## 2019-05-23 RX ORDER — SIMETHICONE 20 MG/.3ML
EMULSION ORAL AS NEEDED
Status: DISCONTINUED | OUTPATIENT
Start: 2019-05-23 | End: 2019-05-23 | Stop reason: HOSPADM

## 2019-05-23 RX ORDER — ONDANSETRON 2 MG/ML
INJECTION INTRAMUSCULAR; INTRAVENOUS AS NEEDED
Status: DISCONTINUED | OUTPATIENT
Start: 2019-05-23 | End: 2019-05-23 | Stop reason: SURG

## 2019-05-23 RX ORDER — PROPOFOL 10 MG/ML
VIAL (ML) INTRAVENOUS AS NEEDED
Status: DISCONTINUED | OUTPATIENT
Start: 2019-05-23 | End: 2019-05-23 | Stop reason: SURG

## 2019-05-23 RX ADMIN — PROPOFOL 20 MG: 10 INJECTION, EMULSION INTRAVENOUS at 12:31

## 2019-05-23 RX ADMIN — ONDANSETRON 4 MG: 2 INJECTION INTRAMUSCULAR; INTRAVENOUS at 12:26

## 2019-05-23 RX ADMIN — PROPOFOL 20 MG: 10 INJECTION, EMULSION INTRAVENOUS at 12:29

## 2019-05-23 RX ADMIN — PROPOFOL 40 MG: 10 INJECTION, EMULSION INTRAVENOUS at 12:26

## 2019-05-23 RX ADMIN — SODIUM CHLORIDE: 9 INJECTION, SOLUTION INTRAVENOUS at 10:15

## 2019-05-23 NOTE — ANESTHESIA PREPROCEDURE EVALUATION
Anesthesia Evaluation     Patient summary reviewed and Nursing notes reviewed   no history of anesthetic complications:  NPO Solid Status: > 8 hours  NPO Liquid Status: > 8 hours           Airway   Mallampati: II  TM distance: >3 FB  Neck ROM: limited  no difficulty expected  Dental    (+) edentulous    Pulmonary     breath sounds clear to auscultation  (+) pneumonia , COPD, asthma, decreased breath sounds,   Smoker: 1-2 x 60 +    ROS comment: Did not abstain, has nicotine patch on   Cardiovascular - normal exam    (+) hypertension well controlled, past MI ( abnormal ekg MI pt denies) , CAD, CABG, dysrhythmias Atrial Fib, angina, orthopnea, AMAYA, PVD, hyperlipidemia,       Neuro/Psych  (+) TIA, numbness, psychiatric history Anxiety and Depression,     (-) seizures, dizziness/light headedness, syncope, tremors    ROS Comment: History of aphagia, resolved within hours, does not believe she had a CVA, TIA   GI/Hepatic/Renal/Endo    (+)  GERD poorly controlled,  hypothyroidism,   (-) hepatitis, liver disease    Musculoskeletal     (+) arthralgias, back pain, chronic pain,   Abdominal  - normal exam   Substance History      OB/GYN          Other   (+) blood dyscrasia ( anemia), arthritis     ROS/Med Hx Other: ekg sr 1st degree avb  Ef 55-65%  r and l 50% carotid blockage                    Anesthesia Plan    ASA 3     MAC   (Discussed risks with pt., increased intraop and postop risk for cv, resp, and neuro events.  Risks and benefits discussed including risk of aspiration, recall and dental damage. All patient questions answered. Will continue with POC.)  intravenous induction   Anesthetic plan, all risks, benefits, and alternatives have been provided, discussed and informed consent has been obtained with: patient.

## 2019-05-23 NOTE — ANESTHESIA POSTPROCEDURE EVALUATION
Patient: Mi Tejeda    Procedure Summary     Date:  05/23/19 Room / Location:  [facility] 2 / [facility]    Anesthesia Start:  1219 Anesthesia Stop:  1247    Procedure:  ESOPHAGOGASTRODUODENOSCOPY WITH BIOPSY AND BALLOON DILITATION (N/A Esophagus) Diagnosis:       Dysphagia      Heartburn      Nausea      (Dysphagia [R13.10])      (Heartburn [R12])      (Nausea [R11.0])    Surgeon:  Yared Castellanos MD Provider:  Saleem Duncan CRNA    Anesthesia Type:  MAC ASA Status:  3          Anesthesia Type: MAC  Last vitals  BP   202/80 @ 1248 5/23/2019   Temp 99   Pulse 85   Resp 14   SpO2 98%     Post Anesthesia Care and Evaluation    Patient location during evaluation: bedside  Patient participation: complete - patient participated  Level of consciousness: sleepy but conscious  Pain management: adequate  Airway patency: patent  Anesthetic complications: No anesthetic complications  PONV Status: none  Cardiovascular status: acceptable  Respiratory status: acceptable, nasal cannula, spontaneous ventilation and nonlabored ventilation  Hydration status: acceptable

## 2019-05-29 LAB
LAB AP CASE REPORT: NORMAL
PATH REPORT.FINAL DX SPEC: NORMAL

## 2019-06-27 ENCOUNTER — OFFICE VISIT (OUTPATIENT)
Dept: GASTROENTEROLOGY | Facility: CLINIC | Age: 84
End: 2019-06-27

## 2019-06-27 VITALS
RESPIRATION RATE: 16 BRPM | TEMPERATURE: 97 F | HEIGHT: 64 IN | HEART RATE: 66 BPM | BODY MASS INDEX: 21.17 KG/M2 | WEIGHT: 124 LBS | DIASTOLIC BLOOD PRESSURE: 66 MMHG | SYSTOLIC BLOOD PRESSURE: 162 MMHG

## 2019-06-27 DIAGNOSIS — K22.2 SCHATZKI'S RING: ICD-10-CM

## 2019-06-27 DIAGNOSIS — R12 HEARTBURN: Primary | ICD-10-CM

## 2019-06-27 DIAGNOSIS — R13.19 OTHER DYSPHAGIA: ICD-10-CM

## 2019-06-27 DIAGNOSIS — K59.00 CONSTIPATION, UNSPECIFIED CONSTIPATION TYPE: ICD-10-CM

## 2019-06-27 PROCEDURE — 99214 OFFICE O/P EST MOD 30 MIN: CPT | Performed by: INTERNAL MEDICINE

## 2019-06-27 RX ORDER — FERROUS GLUCONATE 324(37.5)
TABLET ORAL
COMMUNITY
End: 2020-01-29

## 2019-07-11 NOTE — PATIENT INSTRUCTIONS
1. Antireflux measures.  2. The patient should eat relatively soft diet.    3. The patient should eat in upright position and chew well.  The patient should drink water after to 3 bites and take medications with liberal amounts of water.    4. Generally,  medications that have a potential to cause pill esophagitis may be avoided or used in an alternative form. For example Motrin or Aleve can be taken in a gel cap form.  5. After eating and taking medications the patient should remain in upright position for 10-15 minutes.  6. High-fiber diet with liberal water intake.  7. The patient may take stool softeners such as Umoove stool softener over-the-counter 1 gelcap at night.  8. The patient may call back regarding progress.  A possible colonoscopy may be considered in the future.  The patient will think about it.

## 2019-08-21 ENCOUNTER — OFFICE VISIT (OUTPATIENT)
Dept: INTERNAL MEDICINE | Facility: CLINIC | Age: 84
End: 2019-08-21

## 2019-08-21 VITALS
SYSTOLIC BLOOD PRESSURE: 144 MMHG | HEIGHT: 64 IN | WEIGHT: 124.12 LBS | HEART RATE: 66 BPM | TEMPERATURE: 97.5 F | OXYGEN SATURATION: 99 % | BODY MASS INDEX: 21.19 KG/M2 | DIASTOLIC BLOOD PRESSURE: 72 MMHG

## 2019-08-21 DIAGNOSIS — R41.3 MEMORY LOSS: ICD-10-CM

## 2019-08-21 DIAGNOSIS — R53.83 FATIGUE, UNSPECIFIED TYPE: ICD-10-CM

## 2019-08-21 DIAGNOSIS — N18.30 CHRONIC KIDNEY DISEASE, STAGE III (MODERATE) (HCC): ICD-10-CM

## 2019-08-21 DIAGNOSIS — I10 ESSENTIAL HYPERTENSION: ICD-10-CM

## 2019-08-21 DIAGNOSIS — F41.9 ANXIETY: ICD-10-CM

## 2019-08-21 DIAGNOSIS — I77.9 PERIPHERAL ARTERIAL OCCLUSIVE DISEASE (HCC): ICD-10-CM

## 2019-08-21 DIAGNOSIS — M48.00 SPINAL STENOSIS, UNSPECIFIED SPINAL REGION: ICD-10-CM

## 2019-08-21 DIAGNOSIS — I67.82 TEMPORARY CEREBRAL VASCULAR DYSFUNCTION: ICD-10-CM

## 2019-08-21 DIAGNOSIS — R25.2 CRAMP IN LOWER LEG: ICD-10-CM

## 2019-08-21 DIAGNOSIS — E78.5 HYPERLIPIDEMIA, UNSPECIFIED HYPERLIPIDEMIA TYPE: ICD-10-CM

## 2019-08-21 DIAGNOSIS — K21.9 GASTROESOPHAGEAL REFLUX DISEASE WITHOUT ESOPHAGITIS: ICD-10-CM

## 2019-08-21 DIAGNOSIS — I25.10 CHRONIC CORONARY ARTERY DISEASE: Primary | ICD-10-CM

## 2019-08-21 DIAGNOSIS — I65.29 STENOSIS OF CAROTID ARTERY, UNSPECIFIED LATERALITY: ICD-10-CM

## 2019-08-21 DIAGNOSIS — R79.9 ABNORMAL FINDING OF BLOOD CHEMISTRY: ICD-10-CM

## 2019-08-21 DIAGNOSIS — J43.8 OTHER EMPHYSEMA (HCC): ICD-10-CM

## 2019-08-21 PROBLEM — R11.0 NAUSEA: Status: RESOLVED | Noted: 2019-05-21 | Resolved: 2019-08-21

## 2019-08-21 PROBLEM — R12 HEARTBURN: Status: RESOLVED | Noted: 2019-05-21 | Resolved: 2019-08-21

## 2019-08-21 PROBLEM — R42 VERTIGO: Status: RESOLVED | Noted: 2018-04-12 | Resolved: 2019-08-21

## 2019-08-21 PROBLEM — R30.0 BURNING WITH URINATION: Status: RESOLVED | Noted: 2019-03-29 | Resolved: 2019-08-21

## 2019-08-21 PROBLEM — R13.10 DYSPHAGIA: Status: RESOLVED | Noted: 2019-05-21 | Resolved: 2019-08-21

## 2019-08-21 PROBLEM — N39.0 URINARY TRACT INFECTION WITHOUT HEMATURIA: Status: RESOLVED | Noted: 2019-03-29 | Resolved: 2019-08-21

## 2019-08-21 PROCEDURE — 99214 OFFICE O/P EST MOD 30 MIN: CPT | Performed by: INTERNAL MEDICINE

## 2019-08-21 RX ORDER — NISOLDIPINE 8.5 MG/1
8.5 TABLET, FILM COATED, EXTENDED RELEASE ORAL DAILY
Qty: 90 TABLET | Refills: 3 | Status: SHIPPED | OUTPATIENT
Start: 2019-08-21 | End: 2020-03-10 | Stop reason: ALTCHOICE

## 2019-08-21 RX ORDER — ALBUTEROL SULFATE 90 UG/1
2 AEROSOL, METERED RESPIRATORY (INHALATION) EVERY 4 HOURS PRN
Qty: 54 G | Refills: 3 | Status: SHIPPED | OUTPATIENT
Start: 2019-08-21 | End: 2021-02-02 | Stop reason: SDUPTHER

## 2019-08-21 NOTE — PROGRESS NOTES
Subjective   Mi Tejeda is a 83 y.o. female.     Chief Complaint   Patient presents with   • Follow-up     3 month f/u        History of Present Illness   Patient here for follow-up of.  Patient has multiple vascular problems patient still yet to start Plavix.  Also complains anxious nervous on medication.  Pain clinic is discontinued patient is a Xanax.  Patient requests some medication for as needed use.  Hyperlipidemia stable on medication.  Blood pressure slightly elevated.  Patient does have sedentary lifestyle.  Patient still uses nicotine patch.  COPD stable now.  The GERD stable.  Low back pain patient is seeing pain clinic and received epidural recently again.  Patient also has some hip pain     Current Outpatient Medications:   •  albuterol sulfate HFA (PROAIR HFA) 108 (90 Base) MCG/ACT inhaler, Inhale 2 puffs Every 4 (Four) Hours As Needed for Wheezing or Shortness of Air., Disp: 54 g, Rfl: 3  •  B Complex Vitamins (VITAMIN B COMPLEX PO), Take 1 capsule by mouth Daily., Disp: , Rfl:   •  baclofen (LIORESAL) 10 MG tablet, Take 10 mg by mouth Every Night., Disp: , Rfl:   •  buPROPion XL (WELLBUTRIN XL) 150 MG 24 hr tablet, Take 1 tablet by mouth Daily. (Patient taking differently: Take 150 mg by mouth As Needed.), Disp: 30 tablet, Rfl: 11  •  Calcium Carbonate (CALTRATE 600 PO), Take 1 tablet by mouth Daily., Disp: , Rfl:   •  Cholecalciferol (VITAMIN D3) 5000 units capsule capsule, TAKE ONE CAPSULE BY MOUTH EVERY DAY, Disp: 90 capsule, Rfl: 3  •  ferrous gluconate 324 (37.5 Fe) MG tablet tablet, Take  by mouth Daily With Breakfast., Disp: , Rfl:   •  hydrochlorothiazide (HYDRODIURIL) 25 MG tablet, TAKE ONE TABLET BY MOUTH EVERY DAY (Patient taking differently: TAKE ONE TABLET BY MOUTH EVERY DAY. PRN), Disp: 30 tablet, Rfl: 5  •  HYDROcodone-acetaminophen (NORCO) 7.5-325 MG per tablet, Take 1 tablet by mouth 2 (Two) Times a Day As Needed for Mild Pain (1-3) or Moderate Pain (4-6)., Disp: , Rfl:   •   hydrOXYzine (ATARAX) 25 MG tablet, TAKE ONE TABLET THREE TIMES DAILY AS NEEDED FOR ITCHING, Disp: 30 tablet, Rfl: 1  •  levothyroxine (SYNTHROID, LEVOTHROID) 100 MCG tablet, TAKE ONE TABLET BY MOUTH EVERY DAY, Disp: 90 tablet, Rfl: 3  •  losartan (COZAAR) 100 MG tablet, TAKE ONE TABLET EVERY DAY, Disp: 30 tablet, Rfl: 5  •  metoprolol succinate XL (TOPROL-XL) 50 MG 24 hr tablet, TAKE ONE TABLET BY MOUTH EVERY DAY, Disp: 90 tablet, Rfl: 3  •  morphine (MS CONTIN) 30 MG 12 hr tablet, Take 30 mg by mouth 2 (Two) Times a Day., Disp: , Rfl:   •  Multiple Vitamins-Minerals (CENTRUM SILVER PO), Take 1 tablet by mouth Daily., Disp: , Rfl:   •  nicotine (NICODERM CQ) 14 MG/24HR patch, Place 1 patch on the skin as directed by provider Daily., Disp: 21 patch, Rfl: 2  •  nisoldipine (SULAR) 8.5 MG 24 hr tablet, Take 1 tablet by mouth Daily., Disp: 90 tablet, Rfl: 3  •  pantoprazole (PROTONIX) 40 MG EC tablet, TAKE ONE TABLET BY MOUTH EVERY DAY, Disp: 30 tablet, Rfl: 11  •  vitamin C (ASCORBIC ACID) 500 MG tablet, Take 500 mg by mouth Daily., Disp: , Rfl:   •  VITAMIN E PO, Take 1 tablet by mouth Daily., Disp: , Rfl:     The following portions of the patient's history were reviewed and updated as appropriate: allergies, current medications, past family history, past medical history, past social history, past surgical history and problem list.    Review of Systems   Constitutional: Negative.    Respiratory: Negative.    Cardiovascular: Negative.    Gastrointestinal: Negative.    Musculoskeletal: Positive for arthralgias and back pain.   Skin: Negative.    Neurological: Negative.    Psychiatric/Behavioral: Negative.        Objective   Physical Exam   Constitutional: She is oriented to person, place, and time. She appears well-developed and well-nourished.   Neck: Neck supple.   Cardiovascular: Normal rate, regular rhythm and normal heart sounds.   Pulmonary/Chest: Effort normal and breath sounds normal.   Abdominal: Bowel sounds  are normal.   Musculoskeletal: She exhibits tenderness.   Neurological: She is alert and oriented to person, place, and time.   Skin: Skin is warm.   Psychiatric: She has a normal mood and affect.       All tests have been reviewed.    Assessment/Plan   Diagnoses and all orders for this visit:    Chronic coronary artery disease start plavix    Stenosis of carotid artery, unspecified laterality start plavix    Hyperlipidemia, continue med    Essential hypertension slight high, sedentary life , continue meds for now    Peripheral arterial occlusive disease (CMS/HCC) start plavix    Temporary cerebral vascular dysfunction start plavix    Other emphysema (CMS/HCC) cont med    Gastroesophageal reflux disease without esophagitis continue med    Cramp in lower leg    Anxiety continue med initiate the hydroxyzine as needed use    Hip pain still follow-up with pain clinic.    Memory loss    Spinal stenosis, unspecified spinal region follow up with pain clinic and meds    Fatigue, watch    Other orders  -     albuterol sulfate HFA (PROAIR HFA) 108 (90 Base) MCG/ACT inhaler; Inhale 2 puffs Every 4 (Four) Hours As Needed for Wheezing or Shortness of Air.  -     nisoldipine (SULAR) 8.5 MG 24 hr tablet; Take 1 tablet by mouth Daily.    3 months for wellness check after blood tests.            Below is to historical records for reference only:  Right thigh numb and sharp pain patient is seeing orthopedist  CAD CABG history no cp EKG old MI , refused stress test and EKG. Unable to take a cholesterol medicine patient is allergic to it.refuse PCK9 even ins covers. Not currently seeing cardiologist, no longer on plavix, resume--  early satirety resolved.  seen GI, s/p EGD5/2017: 0.75 cm clean base gastric ulcer at the body of the stomach. Risks of bleeding less than 5%.Non-bleeding duodenal bulb vascular ectasia.Erythematous gastritis.Small sliding hiatal hernia less than 3 cm.Nonobstructive Schatzki's-type ring, following with   [facility], cont protonix, to have endoscopy on 5/23/19   pulmonary CT scan showed a small nodules, repeat stable, no need of repeat  CT scan does show 4.3 cm uterine fibroid s/p ultrasound utero mass, seen by gyn,  stable, no symptoms  TIA history of possible TIA left eye cholesterol deposits by ophthalmologist. carotid duplex 50%, CTA neck artery 70% repeat , echocardiogram unremarkable patient stated she is not on plavix  --  CT head showed microvasc changes -- not on plavix , resume  Claudication possible PAD history of stents left leg artery. ultrasound again stenosis   Tobacco, QUIT 10/2018 -- still on nicotine patch--  muscle cramp -- improved continue flexeril  plantar faciatis banks's neuroma, -- continue to followup podiatrist   LBP arthritis -- follow up with pain clinic and on Pain medicine, on MS  Fe def anemia normal after fe supplement obtain colon report from dr leung done 4/2013,, SBFT normal,-- continue iron supplements.  colonoscopy 4/9/2013.colon polyp(tubular adenoma), AVM, and divertic -- follow dr rice   vit D low--continue supplement.   hyperglycemia -- good diet  depression/anxiety continue medications improved on neurontin, xanax d/c'd by pain doctor, remeron no help, xanax d/c'd by pain clinic,-- continue hydroxyzine wellbutrin  HTN -- continue medicine stable  hypothyroidism -- continue medication   LBP spinal stenosis. -- continue medicine from pain clinic on narcotics  copd severe restrictive disease on PFT, unable to use spiriva, -- continue symbicort as needed, better since stopped smoking  refuse prevnar 13, refuse zostavax and shingrix, refuses flushot, pneumovax done  Muscle cramp in toes follow up with podiatrist. Baclofen no help, stretch exercise  Hip pain -- follow ortho--  Mild tremor watch for now  Memory problem, reviewed  CT head -- doing ok  Osteopenia on dexa vit d and calcium continue

## 2019-09-30 DIAGNOSIS — M25.551 ARTHRALGIA OF RIGHT HIP: Primary | ICD-10-CM

## 2019-10-18 ENCOUNTER — HOSPITAL ENCOUNTER (OUTPATIENT)
Dept: GENERAL RADIOLOGY | Facility: HOSPITAL | Age: 84
Discharge: HOME | End: 2019-10-18
Admitting: ORTHOPAEDIC SURGERY

## 2019-10-18 PROCEDURE — 77002 NEEDLE LOCALIZATION BY XRAY: CPT

## 2019-10-18 PROCEDURE — 25010000002 METHYLPREDNISOLONE PER 80 MG: Performed by: ORTHOPAEDIC SURGERY

## 2019-10-18 PROCEDURE — 25010000003 LIDOCAINE 1 % SOLUTION: Performed by: ORTHOPAEDIC SURGERY

## 2019-10-18 RX ORDER — METHYLPREDNISOLONE ACETATE 80 MG/ML
80 INJECTION, SUSPENSION INTRA-ARTICULAR; INTRALESIONAL; INTRAMUSCULAR; SOFT TISSUE ONCE
Status: COMPLETED | OUTPATIENT
Start: 2019-10-18 | End: 2019-10-18

## 2019-10-18 RX ORDER — LIDOCAINE HYDROCHLORIDE 10 MG/ML
10 INJECTION, SOLUTION INFILTRATION; PERINEURAL ONCE
Status: COMPLETED | OUTPATIENT
Start: 2019-10-18 | End: 2019-10-18

## 2019-10-18 RX ADMIN — LIDOCAINE HYDROCHLORIDE 9 ML: 10 INJECTION, SOLUTION INFILTRATION; PERINEURAL at 15:33

## 2019-10-18 RX ADMIN — METHYLPREDNISOLONE ACETATE 80 MG: 80 INJECTION, SUSPENSION INTRA-ARTICULAR; INTRALESIONAL; INTRAMUSCULAR; SOFT TISSUE at 15:31

## 2019-10-18 NOTE — POST-PROCEDURE NOTE
[facility]  [address], [address]  [place] [address]  [phone]        PROCEDURE REPORT        DIAGNOSIS:  Right hip osteoarthritis, symptomatic    PROCEDURE: Right  hip injection under flouroscopy      Mi Tejeda with date of birth 1935 presents to [facility] Radiology Department today for injection therapy.        Patient presents to [facility] Radiology Department Flouroscopy Suite on 10/18/2019 for planned elective right hip injection under flouroscopy for symptomatic osteoarthritis.    Procedure:     After consent was obtained, and using ethyl chloride topical local anesthetic, the right hip was then prepped and draped with sterile technique. With an anterior hip approach, flouroscopy guidance, and care to stay lateral of the femoral artery, the hip joint was entered via a 20 gauge spinal needle.  A mixture of 80 mg methylprednisolone in one ml plus 9 ml of 1% plain Lidocaine was injected and the needle withdrawn. The procedure was well tolerated and without complication. The patient noted relief of focal hip joint pain.  The patient did remain stable and with baseline ambulation. The patient is asked to rest the joint for a few more days before resuming full regular activities. It may be painful for the first few days. Watch for fever, skin issues, increased swelling or persistent pain in the joint. Call or return to clinic if such symptoms occur, other concerns or if there is lack of improvement as anticipated.    Impression: Symptomatic right hip osteoarthritis.      Recommendations/Plan:      Treatment and patient advice as noted here and in office visit report.  Orthopedic activities reviewed and patient expressed appreciation.  Discussion of orthopedic goals.   Risk, benefits, and merits of treatment options reviewed and questions answered.  Call or notify for any adverse effect from injection therapy.    Exercise: As tolerated.  No strenuous  activity for a few days as appropriate.  Brace:  No brace was given at today's visit  Referral: No referrals made at today's visit  Studies: No additional studies ordered.  Surgery: No surgery proposed at this visit.  Activity:  May perform usual activities as tolerated.      Patient will return to our clinic at scheduled appointment.  Patient agreeable to call or return sooner for any concerns.

## 2019-11-19 LAB
25(OH)D3+25(OH)D2 SERPL-MCNC: 56.1 NG/ML (ref 30–100)
ALBUMIN SERPL-MCNC: 3.8 G/DL (ref 3.5–5.2)
ALBUMIN/GLOB SERPL: 1.4 G/DL
ALP SERPL-CCNC: 121 U/L (ref 39–117)
ALT SERPL-CCNC: 14 U/L (ref 1–33)
AST SERPL-CCNC: 22 U/L (ref 1–32)
BASOPHILS # BLD AUTO: 0.04 10*3/MM3 (ref 0–0.2)
BASOPHILS NFR BLD AUTO: 0.5 % (ref 0–1.5)
BILIRUB SERPL-MCNC: 0.4 MG/DL (ref 0.2–1.2)
BUN SERPL-MCNC: 40 MG/DL (ref 8–23)
BUN/CREAT SERPL: 42.1 (ref 7–25)
CALCIUM SERPL-MCNC: 9.2 MG/DL (ref 8.6–10.5)
CHLORIDE SERPL-SCNC: 107 MMOL/L (ref 98–107)
CHOLEST SERPL-MCNC: 171 MG/DL (ref 0–200)
CO2 SERPL-SCNC: 28.2 MMOL/L (ref 22–29)
CREAT SERPL-MCNC: 0.95 MG/DL (ref 0.57–1)
EOSINOPHIL # BLD AUTO: 0.19 10*3/MM3 (ref 0–0.4)
EOSINOPHIL NFR BLD AUTO: 2.4 % (ref 0.3–6.2)
ERYTHROCYTE [DISTWIDTH] IN BLOOD BY AUTOMATED COUNT: 12.8 % (ref 12.3–15.4)
FERRITIN SERPL-MCNC: 77.8 NG/ML (ref 13–150)
GLOBULIN SER CALC-MCNC: 2.7 GM/DL
GLUCOSE SERPL-MCNC: 93 MG/DL (ref 65–99)
HCT VFR BLD AUTO: 36.7 % (ref 34–46.6)
HDLC SERPL-MCNC: 38 MG/DL (ref 40–60)
HGB BLD-MCNC: 11.9 G/DL (ref 12–15.9)
IMM GRANULOCYTES # BLD AUTO: 0.04 10*3/MM3 (ref 0–0.05)
IMM GRANULOCYTES NFR BLD AUTO: 0.5 % (ref 0–0.5)
IRON SATN MFR SERPL: 16 % (ref 20–50)
IRON SERPL-MCNC: 64 MCG/DL (ref 37–145)
LDLC SERPL CALC-MCNC: 110 MG/DL (ref 0–100)
LYMPHOCYTES # BLD AUTO: 2.79 10*3/MM3 (ref 0.7–3.1)
LYMPHOCYTES NFR BLD AUTO: 35.4 % (ref 19.6–45.3)
MCH RBC QN AUTO: 29.9 PG (ref 26.6–33)
MCHC RBC AUTO-ENTMCNC: 32.4 G/DL (ref 31.5–35.7)
MCV RBC AUTO: 92.2 FL (ref 79–97)
MONOCYTES # BLD AUTO: 0.71 10*3/MM3 (ref 0.1–0.9)
MONOCYTES NFR BLD AUTO: 9 % (ref 5–12)
NEUTROPHILS # BLD AUTO: 4.11 10*3/MM3 (ref 1.7–7)
NEUTROPHILS NFR BLD AUTO: 52.2 % (ref 42.7–76)
NRBC BLD AUTO-RTO: 0 /100 WBC (ref 0–0.2)
PLATELET # BLD AUTO: 210 10*3/MM3 (ref 140–450)
POTASSIUM SERPL-SCNC: 4.1 MMOL/L (ref 3.5–5.2)
PROT SERPL-MCNC: 6.5 G/DL (ref 6–8.5)
RBC # BLD AUTO: 3.98 10*6/MM3 (ref 3.77–5.28)
SODIUM SERPL-SCNC: 147 MMOL/L (ref 136–145)
TIBC SERPL-MCNC: 404 MCG/DL
TRIGL SERPL-MCNC: 116 MG/DL (ref 0–150)
TSH SERPL DL<=0.005 MIU/L-ACNC: 1.53 UIU/ML (ref 0.27–4.2)
UIBC SERPL-MCNC: 340 MCG/DL (ref 112–346)
UNABLE TO VOID: NORMAL
VLDLC SERPL CALC-MCNC: 23.2 MG/DL
WBC # BLD AUTO: 7.88 10*3/MM3 (ref 3.4–10.8)

## 2019-11-21 ENCOUNTER — OFFICE VISIT (OUTPATIENT)
Dept: INTERNAL MEDICINE | Facility: CLINIC | Age: 84
End: 2019-11-21

## 2019-11-21 VITALS
SYSTOLIC BLOOD PRESSURE: 128 MMHG | WEIGHT: 129.4 LBS | HEIGHT: 64 IN | TEMPERATURE: 97 F | DIASTOLIC BLOOD PRESSURE: 66 MMHG | RESPIRATION RATE: 18 BRPM | BODY MASS INDEX: 22.09 KG/M2 | HEART RATE: 55 BPM | OXYGEN SATURATION: 97 %

## 2019-11-21 DIAGNOSIS — Q39.6 DIVERTICULUM OF ESOPHAGUS: ICD-10-CM

## 2019-11-21 DIAGNOSIS — E78.5 HYPERLIPIDEMIA, UNSPECIFIED HYPERLIPIDEMIA TYPE: ICD-10-CM

## 2019-11-21 DIAGNOSIS — M48.00 SPINAL STENOSIS, UNSPECIFIED SPINAL REGION: ICD-10-CM

## 2019-11-21 DIAGNOSIS — M81.0 OSTEOPOROSIS, UNSPECIFIED OSTEOPOROSIS TYPE, UNSPECIFIED PATHOLOGICAL FRACTURE PRESENCE: ICD-10-CM

## 2019-11-21 DIAGNOSIS — E55.9 VITAMIN D DEFICIENCY: ICD-10-CM

## 2019-11-21 DIAGNOSIS — R41.3 MEMORY LOSS: ICD-10-CM

## 2019-11-21 DIAGNOSIS — F41.9 ANXIETY: ICD-10-CM

## 2019-11-21 DIAGNOSIS — I10 ESSENTIAL HYPERTENSION: ICD-10-CM

## 2019-11-21 DIAGNOSIS — I77.9 PERIPHERAL ARTERIAL OCCLUSIVE DISEASE (HCC): ICD-10-CM

## 2019-11-21 DIAGNOSIS — J43.8 OTHER EMPHYSEMA (HCC): ICD-10-CM

## 2019-11-21 DIAGNOSIS — I67.82 TEMPORARY CEREBRAL VASCULAR DYSFUNCTION: ICD-10-CM

## 2019-11-21 DIAGNOSIS — R53.83 FATIGUE, UNSPECIFIED TYPE: ICD-10-CM

## 2019-11-21 DIAGNOSIS — I25.10 CHRONIC CORONARY ARTERY DISEASE: Primary | ICD-10-CM

## 2019-11-21 DIAGNOSIS — Z95.1 HX OF CABG: ICD-10-CM

## 2019-11-21 DIAGNOSIS — K21.9 GASTROESOPHAGEAL REFLUX DISEASE WITHOUT ESOPHAGITIS: ICD-10-CM

## 2019-11-21 DIAGNOSIS — M72.2 PLANTAR FASCIITIS: ICD-10-CM

## 2019-11-21 PROCEDURE — G0439 PPPS, SUBSEQ VISIT: HCPCS | Performed by: INTERNAL MEDICINE

## 2019-11-21 NOTE — PROGRESS NOTES
The ABCs of the Annual Wellness Visit  Subsequent Medicare Wellness Visit    Chief Complaint   Patient presents with   • Medicare Wellness-subsequent       Subjective   History of Present Illness:  Mi Tejeda is a 84 y.o. female who presents for a Subsequent Medicare Wellness Visit.    HEALTH RISK ASSESSMENT    Recent Hospitalizations:  No hospitalization(s) within the last year.    Current Medical Providers:  Patient Care Team:  Adiel Awad MD as PCP - General  Adiel Awad MD as PCP - Family Medicine  Laz Vallejo MD as PCP - Claims Attributed    Smoking Status:  Social History     Tobacco Use   Smoking Status Former Smoker   • Packs/day: 0.00   • Years: 65.00   • Pack years: 0.00   • Types: Cigarettes   • Last attempt to quit: 10/1/2018   • Years since quittin.1   Smokeless Tobacco Never Used   Tobacco Comment    Pt wears nicotine patches       Alcohol Consumption:  Social History     Substance and Sexual Activity   Alcohol Use No       Depression Screen:   PHQ-2/PHQ-9 Depression Screening 2018   Little interest or pleasure in doing things 0   Feeling down, depressed, or hopeless 1   Trouble falling or staying asleep, or sleeping too much -   Feeling tired or having little energy -   Poor appetite or overeating -   Feeling bad about yourself - or that you are a failure or have let yourself or your family down -   Trouble concentrating on things, such as reading the newspaper or watching television -   Moving or speaking so slowly that other people could have noticed. Or the opposite - being so fidgety or restless that you have been moving around a lot more than usual -   Thoughts that you would be better off dead, or of hurting yourself in some way -   Total Score 1   If you checked off any problems, how difficult have these problems made it for you to do your work, take care of things at home, or get along with other people? -       Fall Risk Screen:  MAYCOLADI Fall Risk Assessment has not  been completed.    Health Habits and Functional and Cognitive Screening:  Functional & Cognitive Status 11/21/2019   Do you have difficulty preparing food and eating? No   Do you have difficulty bathing yourself, getting dressed or grooming yourself? No   Do you have difficulty using the toilet? No   Do you have difficulty moving around from place to place? Yes   Do you have trouble with steps or getting out of a bed or a chair? Yes   Current Diet Well Balanced Diet   Dental Exam Not up to date   Eye Exam Not up to date   Exercise (times per week) 0 times per week   Current Exercise Activities Include None   Do you need help using the phone?  No   Are you deaf or do you have serious difficulty hearing?  No   Do you need help with transportation? No   Do you need help shopping? No   Do you need help preparing meals?  No   Do you need help with housework?  No   Do you need help with laundry? No   Do you need help taking your medications? No   Do you need help managing money? No   Do you ever drive or ride in a car without wearing a seat belt? No   Have you felt unusual stress, anger or loneliness in the last month? Yes   Who do you live with? Child   If you need help, do you have trouble finding someone available to you? No   Have you been bothered in the last four weeks by sexual problems? No   Do you have difficulty concentrating, remembering or making decisions? No         Does the patient have evidence of cognitive impairment? No    Asprin use counseling:Contraindicated from taking ASA    Age-appropriate Screening Schedule:  Refer to the list below for future screening recommendations based on patient's age, sex and/or medical conditions. Orders for these recommended tests are listed in the plan section. The patient has been provided with a written plan.    Health Maintenance   Topic Date Due   • PNEUMOCOCCAL VACCINES (65+ LOW/MEDIUM RISK) (2 of 2 - PCV13) 11/13/2016   • ZOSTER VACCINE (2 of 2) 12/20/2016   • DXA  SCAN  01/27/2019   • INFLUENZA VACCINE  08/01/2019   • LIPID PANEL  11/19/2020   • TDAP/TD VACCINES (2 - Td) 10/25/2026   • MAMMOGRAM  Discontinued          The following portions of the patient's history were reviewed and updated as appropriate: allergies, current medications, past family history, past medical history, past social history, past surgical history and problem list.    Outpatient Medications Prior to Visit   Medication Sig Dispense Refill   • albuterol sulfate HFA (PROAIR HFA) 108 (90 Base) MCG/ACT inhaler Inhale 2 puffs Every 4 (Four) Hours As Needed for Wheezing or Shortness of Air. 54 g 3   • B Complex Vitamins (VITAMIN B COMPLEX PO) Take 1 capsule by mouth Daily.     • baclofen (LIORESAL) 10 MG tablet Take 10 mg by mouth Every Night.     • buPROPion XL (WELLBUTRIN XL) 150 MG 24 hr tablet Take 1 tablet by mouth Daily. (Patient taking differently: Take 150 mg by mouth As Needed.) 30 tablet 11   • Calcium Carbonate (CALTRATE 600 PO) Take 1 tablet by mouth Daily.     • Cholecalciferol (VITAMIN D3) 5000 units capsule capsule TAKE ONE CAPSULE BY MOUTH EVERY DAY 90 capsule 3   • ferrous gluconate 324 (37.5 Fe) MG tablet tablet Take  by mouth Daily With Breakfast.     • hydrochlorothiazide (HYDRODIURIL) 25 MG tablet TAKE ONE TABLET BY MOUTH EVERY DAY (Patient taking differently: TAKE ONE TABLET BY MOUTH EVERY DAY. PRN) 30 tablet 5   • HYDROcodone-acetaminophen (NORCO) 7.5-325 MG per tablet Take 1 tablet by mouth 2 (Two) Times a Day As Needed for Mild Pain (1-3) or Moderate Pain (4-6).     • hydrOXYzine (ATARAX) 25 MG tablet TAKE ONE TABLET THREE TIMES DAILY AS NEEDED FOR ITCHING 30 tablet 1   • levothyroxine (SYNTHROID, LEVOTHROID) 100 MCG tablet TAKE ONE TABLET BY MOUTH EVERY DAY 90 tablet 3   • losartan (COZAAR) 100 MG tablet TAKE ONE TABLET EVERY DAY 30 tablet 5   • metoprolol succinate XL (TOPROL-XL) 50 MG 24 hr tablet TAKE ONE TABLET BY MOUTH EVERY DAY 90 tablet 3   • morphine (MS CONTIN) 30 MG 12 hr  tablet Take 30 mg by mouth 2 (Two) Times a Day.     • Multiple Vitamins-Minerals (CENTRUM SILVER PO) Take 1 tablet by mouth Daily.     • nicotine (NICODERM CQ) 14 MG/24HR patch Place 1 patch on the skin as directed by provider Daily. 21 patch 2   • nisoldipine (SULAR) 8.5 MG 24 hr tablet Take 1 tablet by mouth Daily. 90 tablet 3   • pantoprazole (PROTONIX) 40 MG EC tablet TAKE ONE TABLET BY MOUTH EVERY DAY 30 tablet 11   • vitamin C (ASCORBIC ACID) 500 MG tablet Take 500 mg by mouth Daily.     • VITAMIN E PO Take 1 tablet by mouth Daily.       No facility-administered medications prior to visit.        Patient Active Problem List   Diagnosis   • Anxiety   • Chronic coronary artery disease   • Chronic obstructive pulmonary disease (CMS/HCC)   • Diverticulum of esophagus   • Fatigue   • Gastroesophageal reflux disease   • Hyperlipidemia   • Hypertension   • Multiple pulmonary nodules   • Peripheral arterial occlusive disease (CMS/HCC)   • Plantar fasciitis   • Spinal stenosis   • Temporary cerebral vascular dysfunction   • Uterine leiomyoma   • Vitamin D deficiency   • Cramp in lower leg   • Memory loss   • Hx of CABG       Advanced Care Planning:  Patient has an advance directive - a copy has not been provided. Have asked the patient to send this to us to add to record    Review of Systems   Constitutional: Negative.    Respiratory: Negative.    Cardiovascular: Negative.    Gastrointestinal: Negative.    Skin: Negative.    Psychiatric/Behavioral: Negative.        Compared to one year ago, the patient feels her physical health is the same.  Compared to one year ago, the patient feels her mental health is the same.    Reviewed chart for potential of high risk medication in the elderly: yes  Reviewed chart for potential of harmful drug interactions in the elderly:no    Objective         Vitals:    11/21/19 1121   BP: 128/66   Pulse: 55   Resp: 18   Temp: 97 °F (36.1 °C)   TempSrc: Temporal   SpO2: 97%   Weight: 58.7 kg  "(129 lb 6.4 oz)   Height: 162.6 cm (64.02\")   PainSc:   2       Body mass index is 22.2 kg/m².  Discussed the patient's BMI with her. The BMI is in the acceptable range.    Physical Exam   Constitutional: She is oriented to person, place, and time. She appears well-developed and well-nourished.   Neck: Neck supple.   Cardiovascular: Normal rate, regular rhythm and normal heart sounds.   Pulmonary/Chest: Effort normal and breath sounds normal.   Abdominal: Soft. Bowel sounds are normal.   Neurological: She is alert and oriented to person, place, and time.   Psychiatric: She has a normal mood and affect. Her behavior is normal.       Lab Results   Component Value Date    GLU 93 11/19/2019    CHLPL 171 11/19/2019    TRIG 116 11/19/2019    HDL 38 (L) 11/19/2019     (H) 11/19/2019    VLDL 23.2 11/19/2019        Assessment/Plan   Medicare Risks and Personalized Health Plan  CMS Preventative Services Quick Reference  Advance Directive Discussion  Inactivity/Sedentary    The above risks/problems have been discussed with the patient.  Pertinent information has been shared with the patient in the After Visit Summary.  Follow up plans and orders are seen below in the Assessment/Plan Section.    Diagnoses and all orders for this visit:    1. Chronic coronary artery disease (Primary)    2. Hyperlipidemia, unspecified hyperlipidemia type    3. Essential hypertension    4. Peripheral arterial occlusive disease (CMS/HCC)    5. Temporary cerebral vascular dysfunction    6. Other emphysema (CMS/HCC)    7. Vitamin D deficiency    8. Gastroesophageal reflux disease without esophagitis    9. Diverticulum of esophagus    10. Anxiety    11. Fatigue, unspecified type    12. Spinal stenosis, unspecified spinal region    13. Memory loss    14. Multiple pulmonary nodules    15. Plantar fasciitis    16. Hx of CABG      Follow Up:  No Follow-up on file.     An After Visit Summary and PPPS were given to the patient.         Below is to " historical records for reference only:  Right thigh numb and sharp pain patient is seeing orthopedist  CAD CABG history no cp EKG old MI , refused stress test and EKG. Unable to take a cholesterol medicine patient is allergic to it.refuse PCK9 even ins covers. plavix, resume  early satirety resolved.  seen GI, s/p EGD5/2017: 0.75 cm clean base gastric ulcer at the body of the stomach. Risks of bleeding less than 5%.Non-bleeding duodenal bulb vascular ectasia.Erythematous gastritis.Small sliding hiatal hernia less than 3 cm.Nonobstructive Schatzki's-type ring, following with Dr. William, cont protonix, to have endoscopy on 5/23/19   pulmonary CT scan showed a small nodules, repeat stable, no need of repeat  CT scan does show 4.3 cm uterine fibroid s/p ultrasound utero mass, seen by gyn,  stable, no symptoms, rec watch per gyn by patient  TIA history of possible TIA left eye cholesterol deposits by ophthalmologist. carotid duplex 50%, CTA neck artery done 2017, 10 and 40 % otoniel,   CT head showed microvasc changes   Claudication possible PAD history of stents left leg artery. ultrasound again stenosis   Tobacco, QUIT 10/2018   muscle cramp -- improved continue flexeril  plantar faciatis banks's neuroma, -- continue to followup podiatrist   LBP arthritis -- follow up with pain clinic and on Pain medicine, on MS  Fe def anemia normal after fe supplement obtain colon report from dr leung done 4/2013,, SBFT normal,-- continue iron supplements.  colonoscopy 4/9/2013.colon polyp(tubular adenoma), AVM, and divertic -- follow dr william , declines further colonoscopy  vit D low--continue supplement.   hyperglycemia -- good diet  depression/anxiety continue medications improved on neurontin, xanax d/c'd by pain doctor, remeron no help, xanax d/c'd by pain clinic,-- continue hydroxyzine wellbutrin  HTN -- continue medicine stable  hypothyroidism -- continue medication   LBP spinal stenosis. -- continue medicine from pain clinic on  narcotics  copd severe restrictive disease on PFT, unable to use spiriva, -- continue symbicort as needed, better since stopped smoking  refuse prevnar 13, refuse zostavax and shingrix, refuses flushot, pneumovax done  Muscle cramp in toes follow up with podiatrist. Baclofen no help, stretch exercise  Hip pain -- follow ortho--  Mild tremor watch for now  Memory problem, reviewed  CT head -- doing ok  Osteopenia on dexa vit d and calcium continue repeat

## 2019-11-26 ENCOUNTER — APPOINTMENT (OUTPATIENT)
Dept: BONE DENSITY | Facility: HOSPITAL | Age: 84
End: 2019-11-26

## 2019-11-26 PROCEDURE — 77080 DXA BONE DENSITY AXIAL: CPT

## 2019-11-27 DIAGNOSIS — M81.0 OSTEOPOROSIS, UNSPECIFIED OSTEOPOROSIS TYPE, UNSPECIFIED PATHOLOGICAL FRACTURE PRESENCE: Primary | ICD-10-CM

## 2019-12-10 ENCOUNTER — CONSULT (OUTPATIENT)
Dept: CARDIOLOGY | Facility: CLINIC | Age: 84
End: 2019-12-10

## 2019-12-10 VITALS
BODY MASS INDEX: 22.02 KG/M2 | HEART RATE: 54 BPM | WEIGHT: 129 LBS | SYSTOLIC BLOOD PRESSURE: 170 MMHG | OXYGEN SATURATION: 98 % | HEIGHT: 64 IN | DIASTOLIC BLOOD PRESSURE: 60 MMHG

## 2019-12-10 DIAGNOSIS — I35.1 NONRHEUMATIC AORTIC VALVE INSUFFICIENCY: Primary | ICD-10-CM

## 2019-12-10 DIAGNOSIS — I77.9 PERIPHERAL ARTERIAL OCCLUSIVE DISEASE (HCC): ICD-10-CM

## 2019-12-10 DIAGNOSIS — I10 ESSENTIAL HYPERTENSION: ICD-10-CM

## 2019-12-10 DIAGNOSIS — I25.10 CHRONIC CORONARY ARTERY DISEASE: ICD-10-CM

## 2019-12-10 DIAGNOSIS — E78.49 OTHER HYPERLIPIDEMIA: ICD-10-CM

## 2019-12-10 PROCEDURE — 99204 OFFICE O/P NEW MOD 45 MIN: CPT | Performed by: INTERNAL MEDICINE

## 2019-12-10 PROCEDURE — 93000 ELECTROCARDIOGRAM COMPLETE: CPT | Performed by: INTERNAL MEDICINE

## 2019-12-10 NOTE — PROGRESS NOTES
[facility] Cardiology OP Consult Note    Mi Tejeda  [id number]  12/10/2019    Referred By: Adiel Awad MD    Chief Complaint: Re-establish primary cardiac care    History of Present Illness:   Mrs. Mi Tejeda is a 84 y.o. female who presents to the Cardiology Clinic to reestablish cardiac care.  The patient has a past medical history significant for dyslipidemia, hypertension, GERD, hypothyroidism, and COPD with a history of prior tobacco use.  Her cardiac history includes coronary artery disease, with a prior history of two-vessel coronary artery bypass grafting in .  She also has a history of mild to moderate aortic insufficiency, per echocardiogram in .  She presents today to reestablish primary cardiac care.  Patient denies any recent chest pain or anginal symptoms.  She does, however, denies significant chest pain at the time of her coronary bypass surgery.  She denies exertional dyspnea.  The patient states she is overall doing well.  She is able to ambulate without anginal symptoms.  No orthopnea, PND, or lower extremity edema.  No palpitations or syncopal episodes.  Reportedly, due to concern regarding her coronary artery disease she was recently restarted on Plavix.  Prior to Plavix, she was taking a baby aspirin intermittently.  She has no specific complaints at this time.    Past Cardiac Testin. Last Coronary Angio: Remote  2. Prior Stress Testing: Remote  3. Last Echo: 2015    1.  The patient had normal chamber dimensions with borderline left atrial enlargement with mild left ventricular enlargement with normal global LV systolic function with paradoxical septal motion with estimated ejection fraction of 60%.      2. The patient had PAP of 24 mmHg. Right atrial pressure was estimated around 10 mmHg.  RV dimensions and RV function normal.      3.  The patient had mild-to-moderate aortic, trivial-to-mild mitral and trivial tricuspid insufficiency.        4.  Trivial pericardial effusion was suggested.    4. Prior Holter Monitor: None    Review of Systems:   Review of Systems   Constitutional: Negative for activity change, appetite change, chills, diaphoresis, fatigue, fever, unexpected weight gain and unexpected weight loss.   Respiratory: Negative for cough, chest tightness, shortness of breath and wheezing.    Cardiovascular: Negative for chest pain, palpitations and leg swelling.   Gastrointestinal: Negative for abdominal pain, anal bleeding, blood in stool and GERD.   Endocrine: Negative for cold intolerance and heat intolerance.   Genitourinary: Negative for hematuria.   Neurological: Negative for dizziness, syncope, weakness and light-headedness.   Hematological: Does not bruise/bleed easily.   Psychiatric/Behavioral: Negative for depressed mood and stress. The patient is not nervous/anxious.        Past Medical History:   Past Medical History:   Diagnosis Date   • Abdominal pain     states hx of   • Abnormal electrocardiogram    • Abnormal urinalysis    • Acute UTI    • Anemia    • Anomaly cardiac     REPORTS WILL SHOW ON LEFT VENTRICLE IN A 12 LEAD EKG AS BEING A MI BUT REPORTS SHE HAS NEVER HAD THIS    • Anxiety    • Aphagia     HX OF THIS, REPORTS WAS CLEARED OF A CVA BUT WAS TOLD WAS RELATED TO FATIGUE   • Arthritis    • Asthma    • Atrial fibrillation (CMS/HCC)    • Backache    • Bursitis of right shoulder    • CAD (coronary artery disease)    • Cataract, bilateral     s/p removal   • COPD (chronic obstructive pulmonary disease) (CMS/HCC)    • Depression    • Diverticulitis    • Dysphagia     REPORTS RESOLVED AFTER DILITATION   • Fracture     left wrist, 2 fingers on right hand, toes on right foot, right arm   • Full dentures    • Gastric ulcer    • GERD (gastroesophageal reflux disease)    • Gout    • Heart murmur    • High cholesterol    • History of nuclear stress test 2011?   • HTN (hypertension)    • Hypertriglyceridemia    • Hypothyroidism    •  Multiple lung nodules    • Osteoarthritis    • Osteoporosis    • Peripheral neuropathy    • Personal history of tobacco use    • Plantar fasciitis     left   • Pneumonia    • Sinus problem    • Sinusitis    • Tear of meniscus of knee     right   • TIA (transient ischemic attack)     denies 5/22/19.  states had aphasia in E.R., but was ruled out.   • Uterine leiomyoma    • Viral gastroenteritis    • Vitamin D deficiency    • Wears glasses    • Weight loss, non-intentional     80 lb in 5 years       Past Surgical History:   Past Surgical History:   Procedure Laterality Date   • CARDIAC CATHETERIZATION  2007?    no stents   • CARDIAC SURGERY     • CATARACT EXTRACTION Bilateral 2009   • CATARACT EXTRACTION, BILATERAL     • CHOLECYSTECTOMY  2002   • COLONOSCOPY     • CORONARY ARTERY BYPASS GRAFT  1998    2 vessels   • ENDOSCOPY     • ENDOSCOPY N/A 5/3/2017    Procedure: ESOPHAGOGASTRODUODENOSCOPY with biopsies;  Surgeon: Yared Castellanos MD;  Location: [facility] ENDOSCOPY;  Service:    • ENDOSCOPY N/A 5/23/2019    Procedure: ESOPHAGOGASTRODUODENOSCOPY WITH BIOPSY AND BALLOON DILITATION;  Surgeon: Yared Castellanos MD;  Location: [facility] ENDOSCOPY;  Service: Gastroenterology   • MOUTH SURGERY      full extraction   • TOTAL HIP ARTHROPLASTY Left 02/02/2010    PREETI Stanley MD       Family History:   Family History   Problem Relation Age of Onset   • Cancer Mother         uterus   • Cancer Sister         colon; kidney   • Hypertension Sister    • Stroke Sister    • Cancer Brother         prostate   • Arthritis Other    • Cancer Other    • Kidney disease Other    • Stroke Other    • Hypertension Other    • Gout Other    • Cancer Sister        Social History:   Social History     Socioeconomic History   • Marital status:      Spouse name: Not on file   • Number of children: Not on file   • Years of education: Not on file   • Highest education level: Not on file   Occupational History   • Occupation: retired   Tobacco Use   •  Smoking status: Former Smoker     Packs/day: 0.00     Years: 65.00     Pack years: 0.00     Types: Cigarettes     Last attempt to quit: 10/1/2018     Years since quittin.1   • Smokeless tobacco: Never Used   • Tobacco comment: Pt wears nicotine patches   Substance and Sexual Activity   • Alcohol use: Yes     Comment: Seldom   • Drug use: No   • Sexual activity: Defer       Medications:     Current Outpatient Medications:   •  albuterol sulfate HFA (PROAIR HFA) 108 (90 Base) MCG/ACT inhaler, Inhale 2 puffs Every 4 (Four) Hours As Needed for Wheezing or Shortness of Air., Disp: 54 g, Rfl: 3  •  B Complex Vitamins (VITAMIN B COMPLEX PO), Take 1 capsule by mouth Daily., Disp: , Rfl:   •  baclofen (LIORESAL) 10 MG tablet, Take 10 mg by mouth Every Night., Disp: , Rfl:   •  buPROPion XL (WELLBUTRIN XL) 150 MG 24 hr tablet, Take 1 tablet by mouth Daily. (Patient taking differently: Take 150 mg by mouth As Needed.), Disp: 30 tablet, Rfl: 11  •  Calcium Carbonate (CALTRATE 600 PO), Take 1 tablet by mouth Daily., Disp: , Rfl:   •  Cholecalciferol (VITAMIN D3) 5000 units capsule capsule, TAKE ONE CAPSULE BY MOUTH EVERY DAY, Disp: 90 capsule, Rfl: 3  •  Clopidogrel Bisulfate (PLAVIX PO), Take  by mouth., Disp: , Rfl:   •  ferrous gluconate 324 (37.5 Fe) MG tablet tablet, Take  by mouth Daily With Breakfast., Disp: , Rfl:   •  hydrochlorothiazide (HYDRODIURIL) 25 MG tablet, TAKE ONE TABLET BY MOUTH EVERY DAY (Patient taking differently: TAKE ONE TABLET BY MOUTH EVERY DAY. PRN), Disp: 30 tablet, Rfl: 5  •  HYDROcodone-acetaminophen (NORCO) 7.5-325 MG per tablet, Take 1 tablet by mouth 2 (Two) Times a Day As Needed for Mild Pain (1-3) or Moderate Pain (4-6)., Disp: , Rfl:   •  hydrOXYzine (ATARAX) 25 MG tablet, TAKE ONE TABLET THREE TIMES DAILY AS NEEDED FOR ITCHING, Disp: 30 tablet, Rfl: 1  •  levothyroxine (SYNTHROID, LEVOTHROID) 100 MCG tablet, TAKE ONE TABLET BY MOUTH EVERY DAY, Disp: 90 tablet, Rfl: 3  •  losartan  "(COZAAR) 100 MG tablet, TAKE ONE TABLET EVERY DAY, Disp: 30 tablet, Rfl: 5  •  metoprolol succinate XL (TOPROL-XL) 50 MG 24 hr tablet, TAKE ONE TABLET BY MOUTH EVERY DAY, Disp: 90 tablet, Rfl: 3  •  morphine (MS CONTIN) 30 MG 12 hr tablet, Take 30 mg by mouth 2 (Two) Times a Day., Disp: , Rfl:   •  Multiple Vitamins-Minerals (CENTRUM SILVER PO), Take 1 tablet by mouth Daily., Disp: , Rfl:   •  nicotine (NICODERM CQ) 14 MG/24HR patch, Place 1 patch on the skin as directed by provider Daily., Disp: 21 patch, Rfl: 2  •  nisoldipine (SULAR) 8.5 MG 24 hr tablet, Take 1 tablet by mouth Daily., Disp: 90 tablet, Rfl: 3  •  pantoprazole (PROTONIX) 40 MG EC tablet, TAKE ONE TABLET BY MOUTH EVERY DAY, Disp: 30 tablet, Rfl: 11  •  vitamin C (ASCORBIC ACID) 500 MG tablet, Take 500 mg by mouth Daily., Disp: , Rfl:   •  VITAMIN E PO, Take 1 tablet by mouth Daily., Disp: , Rfl:     Allergies:   Allergies   Allergen Reactions   • Ciprofloxacin GI Intolerance     No true allergy.   • Hazelnut Nausea Only   • Keflex [Cephalexin] Nausea Only   • Niacin Rash     \"it makes my skin burn\"   • Oxycodone Dizziness     \"it makes me feel dopey and out of my head\"  NOTE: does tolerate hydrocodone and morphine   • Statins Myalgia       Physical Exam:  Vital Signs:   Vitals:    12/10/19 1037 12/10/19 1050   BP: 166/84 170/60   BP Location: Right arm Left arm   Patient Position: Sitting Sitting   Cuff Size: Adult Adult   Pulse: 54    SpO2: 98%    Weight: 58.5 kg (129 lb)    Height: 162.6 cm (64.02\")        Physical Exam   Constitutional: She is oriented to person, place, and time. She appears well-developed and well-nourished. No distress.   HENT:   Head: Normocephalic and atraumatic.   Moist Mucous Membranes.    Eyes: Pupils are equal, round, and reactive to light. EOM are normal. No scleral icterus.   Neck: No tracheal deviation present.   Cardiovascular: Normal rate, regular rhythm and intact distal pulses. Exam reveals no gallop and no " friction rub.   Murmur heard.  Normal JVD.  Soft diastolic murmur left sternal border.   Pulmonary/Chest: Effort normal and breath sounds normal. No stridor. No respiratory distress. She has no wheezes. She has no rales. She exhibits no tenderness.   Abdominal: Soft. Bowel sounds are normal. She exhibits no distension. There is no tenderness. There is no rebound and no guarding.   Musculoskeletal: Normal range of motion. She exhibits no edema.   Ambulates with a cane.   Lymphadenopathy:     She has no cervical adenopathy.   Neurological: She is alert and oriented to person, place, and time.   Skin: Skin is warm and dry. She is not diaphoretic. No erythema.   Psychiatric: She has a normal mood and affect. Her behavior is normal.       Results Review:   I reviewed the patient's new clinical results.  I personally viewed and interpreted the patient's EKG/Telemetry data      ECG 12 Lead  Date/Time: 12/10/2019 12:02 PM  Performed by: Khoi Wylie MD  Authorized by: Khoi Wylie MD   Comparison: not compared with previous ECG   Rhythm: sinus bradycardia  Rate: bradycardic  QRS axis: normal  Other findings comments: Prior anteroseptal MI.  Nonspecific T wave abnormality.    Clinical impression: abnormal EKG          Assessment / Plan:     1.  Coronary artery disease  --Known history of coronary artery disease, status post two-vessel CABG in 1998  --Currently without chest pain or anginal symptoms  --ECG suggestive of prior anteroseptal MI, however in the setting of prior CABG  --Last echocardiogram shows normal LV systolic function  --Will repeat echocardiogram to reevaluate LV function  --If LV systolic function remains preserved, given the patient does not currently have anginal symptoms will then continue medical management (as the patient does not wish to have further stress testing or invasive procedures, unless absolutely necessary)  --If echocardiogram unremarkable, would then consider changing Plavix  back to daily aspirin at the time of next follow-up appointment  --Not currently on statin therapy, due to history of prior intolerance due to myalgias  --Follow-up in 6 months, or sooner if needed    2.  Essential hypertension  --Hypertensive today with systolic BP in 160s  --Uptitrate antihypertensives at the time of next follow-up, if the patient remains hypertensive    3.  Aortic regurgitation  --Mild to moderate AI noted on last echocardiogram  --Reevaluate with repeat echocardiogram    4.  Peripheral arterial occlusive disease  --Bilateral SFA disease as well as occlusion of bilateral posterior tibial arteries on prior noninvasive evaluation  --Currently denies claudication        Preventative Cardiology:   Tobacco Cessation: N/A  Obstructive Sleep Apnea Screening: N/A  AAA Screening: N/A  Peripheral Arterial Disease Screening: N/A    Follow Up:   Return in about 3 months (around 3/10/2020).      Thank you for allowing me to participate in the care of your patient. Please to not hesitate to contact me with additional questions or concerns.     LATRICE Wylie MD  Interventional Cardiology   12/10/2019  10:37 AM

## 2019-12-30 ENCOUNTER — TELEPHONE (OUTPATIENT)
Dept: CARDIOLOGY | Facility: CLINIC | Age: 84
End: 2019-12-30

## 2019-12-30 NOTE — TELEPHONE ENCOUNTER
----- Message from Khoi Wylie MD sent at 12/30/2019  9:57 AM EST -----  Please let the patient know her echocardiogram looks good overall, and her heart function remains normal.  We will discuss the results in more detail at the time of the next scheduled follow-up appointment.      Thanks

## 2020-01-29 RX ORDER — HYDROCHLOROTHIAZIDE 25 MG/1
TABLET ORAL
Qty: 30 TABLET | Refills: 5 | Status: SHIPPED | OUTPATIENT
Start: 2020-01-29 | End: 2020-06-25

## 2020-01-29 RX ORDER — BUPROPION HYDROCHLORIDE 150 MG/1
TABLET ORAL
Qty: 30 TABLET | Refills: 2 | Status: SHIPPED | OUTPATIENT
Start: 2020-01-29 | End: 2021-02-02

## 2020-01-29 RX ORDER — FERROUS GLUCONATE 324(37.5)
TABLET ORAL
Qty: 90 TABLET | Refills: 3 | Status: SHIPPED | OUTPATIENT
Start: 2020-01-29 | End: 2020-06-25

## 2020-02-10 PROBLEM — M81.0 SENILE OSTEOPOROSIS: Status: ACTIVE | Noted: 2020-02-10

## 2020-02-11 ENCOUNTER — HOSPITAL ENCOUNTER (OUTPATIENT)
Dept: INFUSION THERAPY | Facility: HOSPITAL | Age: 85
Discharge: HOME | End: 2020-02-11
Admitting: INTERNAL MEDICINE

## 2020-02-11 VITALS
OXYGEN SATURATION: 95 % | RESPIRATION RATE: 18 BRPM | DIASTOLIC BLOOD PRESSURE: 65 MMHG | TEMPERATURE: 98.5 F | SYSTOLIC BLOOD PRESSURE: 151 MMHG | HEART RATE: 54 BPM

## 2020-02-11 DIAGNOSIS — M81.0 SENILE OSTEOPOROSIS: Primary | ICD-10-CM

## 2020-02-11 PROCEDURE — 96401 CHEMO ANTI-NEOPL SQ/IM: CPT

## 2020-02-11 PROCEDURE — 25010000002 DENOSUMAB 60 MG/ML SOLUTION PREFILLED SYRINGE: Performed by: INTERNAL MEDICINE

## 2020-02-11 PROCEDURE — 96372 THER/PROPH/DIAG INJ SC/IM: CPT

## 2020-02-11 RX ADMIN — DENOSUMAB 60 MG: 60 INJECTION SUBCUTANEOUS at 11:39

## 2020-02-17 ENCOUNTER — OFFICE VISIT (OUTPATIENT)
Dept: INTERNAL MEDICINE | Facility: CLINIC | Age: 85
End: 2020-02-17

## 2020-02-17 VITALS
OXYGEN SATURATION: 96 % | SYSTOLIC BLOOD PRESSURE: 134 MMHG | WEIGHT: 134.8 LBS | HEIGHT: 64 IN | TEMPERATURE: 97.4 F | HEART RATE: 59 BPM | RESPIRATION RATE: 16 BRPM | DIASTOLIC BLOOD PRESSURE: 64 MMHG | BODY MASS INDEX: 23.01 KG/M2

## 2020-02-17 DIAGNOSIS — J43.8 OTHER EMPHYSEMA (HCC): ICD-10-CM

## 2020-02-17 DIAGNOSIS — E78.49 OTHER HYPERLIPIDEMIA: Primary | ICD-10-CM

## 2020-02-17 DIAGNOSIS — M81.0 SENILE OSTEOPOROSIS: ICD-10-CM

## 2020-02-17 DIAGNOSIS — I10 ESSENTIAL HYPERTENSION: ICD-10-CM

## 2020-02-17 DIAGNOSIS — K21.9 GASTROESOPHAGEAL REFLUX DISEASE WITHOUT ESOPHAGITIS: ICD-10-CM

## 2020-02-17 DIAGNOSIS — I83.812 VARICOSE VEINS OF LEFT LOWER EXTREMITY WITH PAIN: ICD-10-CM

## 2020-02-17 DIAGNOSIS — R91.8 MULTIPLE PULMONARY NODULES: ICD-10-CM

## 2020-02-17 DIAGNOSIS — R60.9 EDEMA, UNSPECIFIED TYPE: ICD-10-CM

## 2020-02-17 DIAGNOSIS — F41.9 ANXIETY: ICD-10-CM

## 2020-02-17 PROCEDURE — 99214 OFFICE O/P EST MOD 30 MIN: CPT | Performed by: INTERNAL MEDICINE

## 2020-02-17 NOTE — PROGRESS NOTES
Subjective   Mi Tejeda is a 84 y.o. female.     Chief Complaint   Patient presents with   • Hypertension   • Hyperlipidemia   • Foot Swelling     pt states that her left foot has been swelling onset 2 mo       History of Present Illness   Patient here for follow-up.  Hyperlipidemia stable on medication.  Hypertension stable on medication.  Emphysema stable now.  Multiple lung nodules patient is due to be followed up.  The GERD stable on medication.  Osteoporosis on bone density scan patient is taking shots.  Anxiety stable.  Patient also complains of left leg swelling edematous for about 2 months.   no pain.    Current Outpatient Medications:   •  albuterol sulfate HFA (PROAIR HFA) 108 (90 Base) MCG/ACT inhaler, Inhale 2 puffs Every 4 (Four) Hours As Needed for Wheezing or Shortness of Air., Disp: 54 g, Rfl: 3  •  B Complex Vitamins (VITAMIN B COMPLEX PO), Take 1 capsule by mouth Daily., Disp: , Rfl:   •  baclofen (LIORESAL) 10 MG tablet, Take 10 mg by mouth Every Night., Disp: , Rfl:   •  buPROPion XL (WELLBUTRIN XL) 150 MG 24 hr tablet, TAKE ONE TABLET BY MOUTH EVERY DAY, Disp: 30 tablet, Rfl: 2  •  Calcium Carbonate (CALTRATE 600 PO), Take 1 tablet by mouth Daily., Disp: , Rfl:   •  Cholecalciferol (VITAMIN D3) 125 MCG (5000 UT) capsule capsule, TAKE ONE CAPSULE BY MOUTH EVERY DAY, Disp: 100 capsule, Rfl: 3  •  Clopidogrel Bisulfate (PLAVIX PO), Take  by mouth., Disp: , Rfl:   •  ferrous gluconate 324 (37.5 Fe) MG tablet tablet, TAKE ONE TABLET DAILY WITH BREAKFAST, Disp: 90 tablet, Rfl: 3  •  hydroCHLOROthiazide (HYDRODIURIL) 25 MG tablet, TAKE ONE TABLET BY MOUTH EVERY DAY, Disp: 30 tablet, Rfl: 5  •  HYDROcodone-acetaminophen (NORCO) 7.5-325 MG per tablet, Take 1 tablet by mouth 2 (Two) Times a Day As Needed for Mild Pain (1-3) or Moderate Pain (4-6)., Disp: , Rfl:   •  hydrOXYzine (ATARAX) 25 MG tablet, TAKE ONE TABLET THREE TIMES DAILY AS NEEDED FOR ITCHING, Disp: 30 tablet, Rfl: 1  •  levothyroxine  (SYNTHROID, LEVOTHROID) 100 MCG tablet, TAKE ONE TABLET BY MOUTH EVERY DAY, Disp: 90 tablet, Rfl: 3  •  losartan (COZAAR) 100 MG tablet, TAKE ONE TABLET EVERY DAY, Disp: 30 tablet, Rfl: 5  •  metoprolol succinate XL (TOPROL-XL) 50 MG 24 hr tablet, TAKE ONE TABLET BY MOUTH EVERY DAY, Disp: 90 tablet, Rfl: 3  •  morphine (MS CONTIN) 30 MG 12 hr tablet, Take 30 mg by mouth 2 (Two) Times a Day., Disp: , Rfl:   •  Multiple Vitamins-Minerals (CENTRUM SILVER PO), Take 1 tablet by mouth Daily., Disp: , Rfl:   •  nicotine (NICODERM CQ) 14 MG/24HR patch, Place 1 patch on the skin as directed by provider Daily., Disp: 21 patch, Rfl: 2  •  nisoldipine (SULAR) 8.5 MG 24 hr tablet, Take 1 tablet by mouth Daily., Disp: 90 tablet, Rfl: 3  •  pantoprazole (PROTONIX) 40 MG EC tablet, TAKE ONE TABLET BY MOUTH EVERY DAY, Disp: 30 tablet, Rfl: 11  •  vitamin C (ASCORBIC ACID) 500 MG tablet, Take 500 mg by mouth Daily., Disp: , Rfl:   •  VITAMIN E PO, Take 1 tablet by mouth Daily., Disp: , Rfl:     The following portions of the patient's history were reviewed and updated as appropriate: allergies, current medications, past family history, past medical history, past social history, past surgical history and problem list.    Review of Systems   Constitutional: Negative.    Respiratory: Negative.    Cardiovascular: Positive for leg swelling.   Gastrointestinal: Negative.    Musculoskeletal: Negative.    Skin: Negative.    Neurological: Negative.    Psychiatric/Behavioral: Negative.        Objective   Physical Exam   Constitutional: She is oriented to person, place, and time. She appears well-nourished.   Neck: Neck supple.   Cardiovascular: Normal rate, regular rhythm and normal heart sounds.   Pulmonary/Chest: Effort normal and breath sounds normal.   Abdominal: Bowel sounds are normal.   Musculoskeletal: She exhibits edema ( left leg edema).   Neurological: She is alert and oriented to person, place, and time.   Skin: Skin is warm.    Psychiatric: She has a normal mood and affect.       All tests have been reviewed.    Assessment/Plan   Diagnoses and all orders for this visit:    Other hyperlipidemia continue medication    Essential hypertension continue blood pressure medicine    Other emphysema (CMS/HCC) continue medicine    Multiple pulmonary nodules  -     CT Chest Without Contrast; Future    Gastroesophageal reflux disease without esophagitis continue medication    Senile osteoporosis continue shots with calcium.    Anxiety stable now    Edema, unspecified type  -     US Venous Doppler Lower Extremity Left (duplex)    Varicose veins of left lower extremity with pain   -     US Venous Doppler Lower Extremity Left (duplex)  4 mo            Below is to historical records for reference only:  Right thigh numb and sharp pain patient is seeing orthopedist  CAD CABG history no cp EKG old MI , refused stress test and EKG. Unable to take a cholesterol medicine patient is allergic to it.refuse PCK9 even ins covers. plavix, resume  early satirety resolved.  seen GI, s/p EGD5/2017: 0.75 cm clean base gastric ulcer at the body of the stomach. Risks of bleeding less than 5%.Non-bleeding duodenal bulb vascular ectasia.Erythematous gastritis.Small sliding hiatal hernia less than 3 cm.Nonobstructive Schatzki's-type ring, following with Dr. William, cont protonix, to have endoscopy on 5/23/19   pulmonary CT scan showed a small nodules, repeat stable, no need of repeat  CT scan does show 4.3 cm uterine fibroid s/p ultrasound utero mass, seen by gyn,  stable, no symptoms, rec watch per gyn by patient  TIA history of possible TIA left eye cholesterol deposits by ophthalmologist. carotid duplex 50%, CTA neck artery done 2017, 10 and 40 % otoniel,   CT head showed microvasc changes   Claudication possible PAD history of stents left leg artery. ultrasound again stenosis   Tobacco, QUIT 10/2018 on nicotine patch  muscle cramp -- improved continue flexeril  plantar  fackaelis banks's neuroma, -- continue to followup podiatrist   LBP arthritis -- follow up with pain clinic and on Pain medicine, on MS  Fe def anemia normal after fe supplement obtain colon report from dr leung done 4/2013,, SBFT normal,-- continue iron supplements.  colonoscopy 4/9/2013.colon polyp(tubular adenoma), AVM, and divertic -- follow dr rice , declines further colonoscopy  vit D low--continue supplement.   hyperglycemia -- good diet  depression/anxiety continue medications improved on neurontin, xanax d/c'd by pain doctor, remeron no help, xanax d/c'd by pain clinic,-- continue hydroxyzine wellbutrin  HTN -- continue medicine stable  hypothyroidism -- continue medication   LBP spinal stenosis. -- continue medicine from pain clinic on narcotics  copd severe restrictive disease on PFT, unable to use spiriva, -- continue symbicort as needed, better since stopped smoking  refuse prevnar 13, refuse zostavax and shingrix, refuses flushot, pneumovax done  Muscle cramp in toes follow up with podiatrist. Baclofen no help, stretch exercise  Hip pain -- follow ortho--leg edema compression hose  Mild tremor watch for now  Memory problem, reviewed  CT head -- doing ok  Osteopenia on dexa vit d and calcium continue repeat

## 2020-02-18 ENCOUNTER — HOSPITAL ENCOUNTER (OUTPATIENT)
Dept: ULTRASOUND IMAGING | Facility: HOSPITAL | Age: 85
Discharge: HOME | End: 2020-02-18
Admitting: INTERNAL MEDICINE

## 2020-02-18 PROCEDURE — 93971 EXTREMITY STUDY: CPT

## 2020-02-19 ENCOUNTER — TELEPHONE (OUTPATIENT)
Dept: INTERNAL MEDICINE | Facility: CLINIC | Age: 85
End: 2020-02-19

## 2020-02-19 NOTE — TELEPHONE ENCOUNTER
Pt called and notified us that she would like to get her CT Chest w/o contrast done downstairs instead of the hospital if that was possible. Can you help with this?

## 2020-02-19 NOTE — TELEPHONE ENCOUNTER
I got the appointment with JASON Ward cancelled and have sent the referral over to [facility] for them to schedule her downstairs.

## 2020-02-24 ENCOUNTER — APPOINTMENT (OUTPATIENT)
Dept: CT IMAGING | Facility: HOSPITAL | Age: 85
End: 2020-02-24

## 2020-02-24 ENCOUNTER — HOSPITAL ENCOUNTER (OUTPATIENT)
Dept: CT IMAGING | Facility: HOSPITAL | Age: 85
Discharge: HOME | End: 2020-02-24
Admitting: INTERNAL MEDICINE

## 2020-02-24 DIAGNOSIS — R91.8 MULTIPLE PULMONARY NODULES: ICD-10-CM

## 2020-02-24 PROCEDURE — 71250 CT THORAX DX C-: CPT

## 2020-02-25 ENCOUNTER — PRIOR AUTHORIZATION (OUTPATIENT)
Dept: INTERNAL MEDICINE | Facility: CLINIC | Age: 85
End: 2020-02-25

## 2020-02-26 RX ORDER — OMEPRAZOLE 20 MG/1
20 CAPSULE, DELAYED RELEASE ORAL DAILY
Qty: 30 CAPSULE | Refills: 2 | Status: SHIPPED | OUTPATIENT
Start: 2020-02-26 | End: 2020-06-25 | Stop reason: ALTCHOICE

## 2020-03-10 ENCOUNTER — OFFICE VISIT (OUTPATIENT)
Dept: CARDIOLOGY | Facility: CLINIC | Age: 85
End: 2020-03-10

## 2020-03-10 VITALS
SYSTOLIC BLOOD PRESSURE: 190 MMHG | OXYGEN SATURATION: 97 % | HEART RATE: 55 BPM | WEIGHT: 129 LBS | HEIGHT: 64 IN | BODY MASS INDEX: 22.02 KG/M2 | DIASTOLIC BLOOD PRESSURE: 100 MMHG

## 2020-03-10 DIAGNOSIS — I25.10 CHRONIC CORONARY ARTERY DISEASE: Primary | ICD-10-CM

## 2020-03-10 DIAGNOSIS — I77.9 PERIPHERAL ARTERIAL OCCLUSIVE DISEASE (HCC): ICD-10-CM

## 2020-03-10 DIAGNOSIS — I10 ESSENTIAL HYPERTENSION: ICD-10-CM

## 2020-03-10 DIAGNOSIS — E78.2 MIXED HYPERLIPIDEMIA: ICD-10-CM

## 2020-03-10 PROCEDURE — 99214 OFFICE O/P EST MOD 30 MIN: CPT | Performed by: INTERNAL MEDICINE

## 2020-03-10 RX ORDER — CARVEDILOL 12.5 MG/1
12.5 TABLET ORAL 2 TIMES DAILY
Qty: 60 TABLET | Refills: 1 | Status: SHIPPED | OUTPATIENT
Start: 2020-03-10 | End: 2020-05-18 | Stop reason: SDUPTHER

## 2020-03-10 RX ORDER — AMLODIPINE BESYLATE 5 MG/1
5 TABLET ORAL DAILY
Qty: 30 TABLET | Refills: 1 | Status: SHIPPED | OUTPATIENT
Start: 2020-03-10 | End: 2020-04-14

## 2020-03-10 NOTE — PROGRESS NOTES
[facility] Cardiology Office Follow Up Note    Mi Tejeda  [id number]  03/10/2020    Primary Care Provider: Adiel Awad MD    Chief Complaint: Regular follow-up    History of Present Illness:   Mrs. Mi Tejeda is a 84 y.o. female who presents to the Cardiology Clinic for regular follow-up.  The patient has a past medical history significant for dyslipidemia, hypertension, GERD, hypothyroidism, and COPD with a history of prior tobacco use.  Her cardiac history includes coronary artery disease, with a prior history of two-vessel coronary artery bypass grafting in .  She also has a history of mild to moderate aortic insufficiency, per echocardiogram in .  She presents today for regular follow-up. Since her last clinic appointment, she denies any episodes of chest pain or exertional chest discomfort. She additionally denies significant exertional dyspnea. No orthopnea, PND, lower extremity edema, or palpitations. Her only complaint today is uncontrolled hypertension. On check today, her systolic Bp is 190 mmHg. She reports her systolic BP typically been in the 160s on home BP checks.  She reports compliance with her home antihypertensives.  No other specific complaints at this time.     Past Cardiac Testin. Last Coronary Angio: Remote  2. Prior Stress Testing: Remote  3. Last Echo:    1.  2015                1.  The patient had normal chamber dimensions with borderline left atrial enlargement with mild left ventricular enlargement with normal global LV systolic function with paradoxical septal motion with estimated ejection fraction of 60%.                  2. The patient had PAP of 24 mmHg. Right atrial pressure was estimated around 10 mmHg.  RV dimensions and RV function normal.                  3.  The patient had mild-to-moderate aortic, trivial-to-mild mitral and trivial tricuspid insufficiency.                   4.  Trivial pericardial effusion was  suggested.     2.  12/27/2019     1.  Normal left ventricular systolic function, LVEF 66%    2.  Borderline left atrial dilation    3.  Mild calcification of the aortic valve, without significant stenosis    4.  RVSP 31 mmHg  4. Prior Holter Monitor: None    Review of Systems:   Review of Systems   Constitutional: Negative for activity change, appetite change, chills, diaphoresis, fatigue, fever, unexpected weight gain and unexpected weight loss.   Respiratory: Negative for cough, chest tightness, shortness of breath and wheezing.    Cardiovascular: Negative for chest pain, palpitations and leg swelling.   Gastrointestinal: Negative for abdominal pain, anal bleeding, blood in stool and GERD.   Endocrine: Negative for cold intolerance and heat intolerance.   Genitourinary: Negative for hematuria.   Neurological: Negative for dizziness, syncope, weakness and light-headedness.   Hematological: Does not bruise/bleed easily.   Psychiatric/Behavioral: Negative for depressed mood and stress. The patient is not nervous/anxious.        I have reviewed and/or updated the patient's past medical, past surgical, family, social history, problem list and allergies as appropriate.     Medications:     Current Outpatient Medications:   •  albuterol sulfate HFA (PROAIR HFA) 108 (90 Base) MCG/ACT inhaler, Inhale 2 puffs Every 4 (Four) Hours As Needed for Wheezing or Shortness of Air., Disp: 54 g, Rfl: 3  •  B Complex Vitamins (VITAMIN B COMPLEX PO), Take 1 capsule by mouth Daily., Disp: , Rfl:   •  baclofen (LIORESAL) 10 MG tablet, Take 10 mg by mouth Every Night., Disp: , Rfl:   •  buPROPion XL (WELLBUTRIN XL) 150 MG 24 hr tablet, TAKE ONE TABLET BY MOUTH EVERY DAY, Disp: 30 tablet, Rfl: 2  •  Calcium Carbonate (CALTRATE 600 PO), Take 1 tablet by mouth Daily., Disp: , Rfl:   •  Cholecalciferol (VITAMIN D3) 125 MCG (5000 UT) capsule capsule, TAKE ONE CAPSULE BY MOUTH EVERY DAY, Disp: 100 capsule, Rfl: 3  •  ferrous gluconate 324 (37.5  "Fe) MG tablet tablet, TAKE ONE TABLET DAILY WITH BREAKFAST, Disp: 90 tablet, Rfl: 3  •  hydroCHLOROthiazide (HYDRODIURIL) 25 MG tablet, TAKE ONE TABLET BY MOUTH EVERY DAY, Disp: 30 tablet, Rfl: 5  •  HYDROcodone-acetaminophen (NORCO) 7.5-325 MG per tablet, Take 1 tablet by mouth 2 (Two) Times a Day As Needed for Mild Pain (1-3) or Moderate Pain (4-6)., Disp: , Rfl:   •  hydrOXYzine (ATARAX) 25 MG tablet, TAKE ONE TABLET THREE TIMES DAILY AS NEEDED FOR ITCHING, Disp: 30 tablet, Rfl: 1  •  levothyroxine (SYNTHROID, LEVOTHROID) 100 MCG tablet, TAKE ONE TABLET BY MOUTH EVERY DAY, Disp: 90 tablet, Rfl: 3  •  losartan (COZAAR) 100 MG tablet, TAKE ONE TABLET EVERY DAY, Disp: 30 tablet, Rfl: 5  •  morphine (MS CONTIN) 30 MG 12 hr tablet, Take 30 mg by mouth 2 (Two) Times a Day., Disp: , Rfl:   •  Multiple Vitamins-Minerals (CENTRUM SILVER PO), Take 1 tablet by mouth Daily., Disp: , Rfl:   •  nicotine (NICODERM CQ) 14 MG/24HR patch, Place 1 patch on the skin as directed by provider Daily., Disp: 21 patch, Rfl: 2  •  omeprazole (priLOSEC) 20 MG capsule, Take 1 capsule by mouth Daily., Disp: 30 capsule, Rfl: 2  •  vitamin C (ASCORBIC ACID) 500 MG tablet, Take 500 mg by mouth Daily., Disp: , Rfl:   •  VITAMIN E PO, Take 1 tablet by mouth Daily., Disp: , Rfl:   •  amLODIPine (NORVASC) 5 MG tablet, Take 1 tablet by mouth Daily., Disp: 30 tablet, Rfl: 1  •  aspirin 81 MG tablet, Take 1 tablet by mouth Daily., Disp: 30 tablet, Rfl: 11  •  carvedilol (COREG) 12.5 MG tablet, Take 1 tablet by mouth 2 (Two) Times a Day., Disp: 60 tablet, Rfl: 1    Physical Exam:  Vital Signs:   Vitals:    03/10/20 1112 03/10/20 1116   BP: (!) 190/120 (!) 190/100   BP Location: Right arm Right arm   Patient Position: Sitting Sitting   Cuff Size: Adult Adult   Pulse: 55    SpO2: 97%    Weight: 58.5 kg (129 lb)    Height: 162.6 cm (64.02\")        Physical Exam   Constitutional: She is oriented to person, place, and time. She appears well-developed and " well-nourished. No distress.   HENT:   Head: Normocephalic and atraumatic.   Moist Mucous Membranes.    Eyes: Pupils are equal, round, and reactive to light. EOM are normal. No scleral icterus.   Neck: No tracheal deviation present.   Cardiovascular: Normal rate, regular rhythm, normal heart sounds and intact distal pulses. Exam reveals no gallop and no friction rub.   No murmur heard.  Normal JVD.     Pulmonary/Chest: Effort normal and breath sounds normal. No stridor. No respiratory distress. She has no wheezes. She has no rales. She exhibits no tenderness.   Abdominal: Soft. Bowel sounds are normal. She exhibits no distension. There is no tenderness. There is no rebound and no guarding.   Musculoskeletal: Normal range of motion. She exhibits no edema.   Lymphadenopathy:     She has no cervical adenopathy.   Neurological: She is alert and oriented to person, place, and time.   Skin: Skin is warm and dry. She is not diaphoretic. No erythema.   Psychiatric: She has a normal mood and affect. Her behavior is normal.       Results Review:   I reviewed the patient's new clinical results.        Assessment / Plan:     1.  Coronary artery disease  --Known history of coronary artery disease, status post two-vessel CABG in 1998  --No current anginal symptoms  --Last echocardiogram in 12/19 with normal LV systolic function  --Will repeat echocardiogram to reevaluate LV function  --Continue daily aspirin  --Not currently on statin therapy, due to history of prior intolerance due to myalgias     2.  Essential hypertension  --BP currently uncontrolled  --Continue HCTZ and losartan  --Discontinue Nisoldipine, start amlodipine 5 mg p.o. Daily  --Change metoprolol to Coreg 12.5 mg p.o. twice daily  --Encouraged to continue home BP log  --Uptitrate amlodipine and Coreg as required to control hypertension  --If BP remains uncontrolled despite up titration of amlodipine and Coreg, would consider addition of Aldactone  --Follow-up in  4 weeks for BP check     3.  Aortic regurgitation  --No significant aortic regurgitation on most recent echocardiogram     4.  Peripheral arterial occlusive disease  --Bilateral SFA disease as well as occlusion of bilateral posterior tibial arteries on prior noninvasive evaluation  --Currently denies claudication      Follow Up:   Return in about 4 weeks (around 4/7/2020).      Thank you for allowing me to participate in the care of your patient. Please to not hesitate to contact me with additional questions or concerns.     LATRICE Wylie MD  Interventional Cardiology   03/10/2020  11:21 AM

## 2020-03-10 NOTE — PATIENT INSTRUCTIONS
Stop Nisoldipine, start Amlodipine 5 mg daily  Stop Metoprolol, start Coreg 12.5mg twice daily  Continue to check your BP at home.

## 2020-03-16 RX ORDER — LOSARTAN POTASSIUM 100 MG/1
100 TABLET ORAL DAILY
Qty: 30 TABLET | Refills: 5 | Status: SHIPPED | OUTPATIENT
Start: 2020-03-16 | End: 2020-05-18

## 2020-04-14 ENCOUNTER — TELEMEDICINE (OUTPATIENT)
Dept: CARDIOLOGY | Facility: CLINIC | Age: 85
End: 2020-04-14

## 2020-04-14 DIAGNOSIS — I77.9 PERIPHERAL ARTERIAL OCCLUSIVE DISEASE (HCC): ICD-10-CM

## 2020-04-14 DIAGNOSIS — I25.10 CHRONIC CORONARY ARTERY DISEASE: Primary | ICD-10-CM

## 2020-04-14 DIAGNOSIS — E78.5 HYPERLIPIDEMIA, UNSPECIFIED HYPERLIPIDEMIA TYPE: ICD-10-CM

## 2020-04-14 DIAGNOSIS — I10 ESSENTIAL HYPERTENSION: ICD-10-CM

## 2020-04-14 PROCEDURE — 99213 OFFICE O/P EST LOW 20 MIN: CPT | Performed by: INTERNAL MEDICINE

## 2020-04-14 RX ORDER — AMLODIPINE BESYLATE 10 MG/1
10 TABLET ORAL DAILY
Qty: 90 TABLET | Refills: 3 | Status: SHIPPED | OUTPATIENT
Start: 2020-04-14 | End: 2021-02-10

## 2020-04-14 NOTE — PROGRESS NOTES
[facility] Cardiology Office Follow Up Note    Mi Tejeda  [id number]  2020    Primary Care Provider: Adiel Awad MD    Chief Complaint: Regular follow-up    History of Present Illness:   Mrs. Mi Tejeda is a 84 y.o. female being seen by Cardiology for follow-up via a telehealth visit.  The patient has a past medical history significant for dyslipidemia, hypertension, GERD, hypothyroidism, and COPD with a history of prior tobacco use.  Her cardiac history includes coronary artery disease, with a prior history of two-vessel coronary artery bypass grafting in .  She also has a history of mild to moderate aortic insufficiency.  She is being seen today for regular follow-up and for follow-up of her hypertension.  Since her last appointment, the patient notes she has done well without any significant changes in her health.  She continues to deny chest pain or exertional chest discomfort.  No significant dyspnea.  No orthopnea, PND, or lower extremity swelling.  She continues to monitor her blood pressure at home, with systolic BP occasionally rising to the 160s.  At this time, she is taking her HCTZ intermittently but reports compliance with her Cozaar, amlodipine, and carvedilol.  No other specific complaints at this time.     Past Cardiac Testin. Last Coronary Angio: Remote  2. Prior Stress Testing: Remote  3. Last Echo:               1.  2015                          1.  The patient had normal chamber dimensions with borderline left atrial enlargement with mild left ventricular enlargement with normal global LV systolic function with paradoxical septal motion with estimated ejection fraction of 60%.                            2. The patient had PAP of 24 mmHg. Right atrial pressure was estimated around 10 mmHg.  RV dimensions and RV function normal.                            3.  The patient had mild-to-moderate aortic, trivial-to-mild mitral and trivial  tricuspid insufficiency.                             4.  Trivial pericardial effusion was suggested.                2.  12/27/2019                           1.  Normal left ventricular systolic function, LVEF 66%                          2.  Borderline left atrial dilation                          3.  Mild calcification of the aortic valve, without significant stenosis.  Mild to moderate aortic regurgitation.                          4.  RVSP 31 mmHg  4. Prior Holter Monitor: None    Review of Systems:   Review of Systems   Constitutional: Negative for activity change, appetite change, chills, diaphoresis, fatigue, fever, unexpected weight gain and unexpected weight loss.   Respiratory: Negative for cough, chest tightness, shortness of breath and wheezing.    Cardiovascular: Negative for chest pain, palpitations and leg swelling.   Gastrointestinal: Negative for abdominal pain, anal bleeding, blood in stool and GERD.   Endocrine: Negative for cold intolerance and heat intolerance.   Genitourinary: Negative for hematuria.   Neurological: Negative for dizziness, syncope, weakness and light-headedness.   Hematological: Does not bruise/bleed easily.   Psychiatric/Behavioral: Negative for depressed mood and stress. The patient is not nervous/anxious.        I have reviewed and/or updated the patient's past medical, past surgical, family, social history, problem list and allergies as appropriate.     Medications:     Current Outpatient Medications:   •  albuterol sulfate HFA (PROAIR HFA) 108 (90 Base) MCG/ACT inhaler, Inhale 2 puffs Every 4 (Four) Hours As Needed for Wheezing or Shortness of Air., Disp: 54 g, Rfl: 3  •  amLODIPine (NORVASC) 10 MG tablet, Take 1 tablet by mouth Daily., Disp: 90 tablet, Rfl: 3  •  aspirin 81 MG tablet, Take 1 tablet by mouth Daily., Disp: 30 tablet, Rfl: 11  •  B Complex Vitamins (VITAMIN B COMPLEX PO), Take 1 capsule by mouth Daily., Disp: , Rfl:   •  baclofen (LIORESAL) 10 MG tablet, Take  10 mg by mouth Every Night., Disp: , Rfl:   •  buPROPion XL (WELLBUTRIN XL) 150 MG 24 hr tablet, TAKE ONE TABLET BY MOUTH EVERY DAY, Disp: 30 tablet, Rfl: 2  •  Calcium Carbonate (CALTRATE 600 PO), Take 1 tablet by mouth Daily., Disp: , Rfl:   •  carvedilol (COREG) 12.5 MG tablet, Take 1 tablet by mouth 2 (Two) Times a Day., Disp: 60 tablet, Rfl: 1  •  Cholecalciferol (VITAMIN D3) 125 MCG (5000 UT) capsule capsule, TAKE ONE CAPSULE BY MOUTH EVERY DAY, Disp: 100 capsule, Rfl: 3  •  ferrous gluconate 324 (37.5 Fe) MG tablet tablet, TAKE ONE TABLET DAILY WITH BREAKFAST, Disp: 90 tablet, Rfl: 3  •  hydroCHLOROthiazide (HYDRODIURIL) 25 MG tablet, TAKE ONE TABLET BY MOUTH EVERY DAY, Disp: 30 tablet, Rfl: 5  •  HYDROcodone-acetaminophen (NORCO) 7.5-325 MG per tablet, Take 1 tablet by mouth 2 (Two) Times a Day As Needed for Mild Pain (1-3) or Moderate Pain (4-6)., Disp: , Rfl:   •  hydrOXYzine (ATARAX) 25 MG tablet, TAKE ONE TABLET THREE TIMES DAILY AS NEEDED FOR ITCHING, Disp: 30 tablet, Rfl: 1  •  levothyroxine (SYNTHROID, LEVOTHROID) 100 MCG tablet, TAKE ONE TABLET BY MOUTH EVERY DAY, Disp: 90 tablet, Rfl: 3  •  losartan (COZAAR) 100 MG tablet, Take 1 tablet by mouth Daily., Disp: 30 tablet, Rfl: 5  •  morphine (MS CONTIN) 30 MG 12 hr tablet, Take 30 mg by mouth 2 (Two) Times a Day., Disp: , Rfl:   •  Multiple Vitamins-Minerals (CENTRUM SILVER PO), Take 1 tablet by mouth Daily., Disp: , Rfl:   •  nicotine (NICODERM CQ) 14 MG/24HR patch, Place 1 patch on the skin as directed by provider Daily., Disp: 21 patch, Rfl: 2  •  omeprazole (priLOSEC) 20 MG capsule, Take 1 capsule by mouth Daily., Disp: 30 capsule, Rfl: 2  •  vitamin C (ASCORBIC ACID) 500 MG tablet, Take 500 mg by mouth Daily., Disp: , Rfl:   •  VITAMIN E PO, Take 1 tablet by mouth Daily., Disp: , Rfl:     Physical Exam:  Vital Signs: There were no vitals filed for this visit.    Physical exam not completed due to telehealth visit.    Results Review:   I reviewed  the patient's new clinical results.    Assessment / Plan:     1.  Coronary artery disease  --Known history of coronary artery disease, status post two-vessel CABG in 1998  --Echocardiogram 12/19 normal LV systolic function  --Remains without anginal symptoms  --Continue daily aspirin  --Not currently on statin therapy, due to history of prior intolerance due to myalgias     2.  Essential hypertension  --Remains uncontrolled with systolic BP primarily in 160s on home BP checks  --Self discontinued HCTZ due to concerns regarding diuretic effects  --Continue current dose of losartan and carvedilol  --Will uptitrate Norvasc to 10 mg p.o. Daily  --If BP remains uncontrolled, will then uptitrate carvedilol     3.  Aortic regurgitation  --Mild to moderate AI with normal LV systolic function and no left ventricular dilation  --Consider repeat echocardiogram in 1 year to reevaluate     4.  Peripheral arterial occlusive disease  --Bilateral SFA disease as well as occlusion of bilateral posterior tibial arteries on prior noninvasive evaluation  --Continues to deny claudication      Follow Up:   Return in about 3 months (around 7/14/2020).    Visit type:Unable to complete visit using a video connection to the patient. A phone visit was used to complete this visits.  Visit time: 30 minutes        Thank you for allowing me to participate in the care of your patient. Please to not hesitate to contact me with additional questions or concerns.     LATRICE Wylie MD  Interventional Cardiology   04/14/2020  2:13 PM

## 2020-04-21 ENCOUNTER — TELEMEDICINE (OUTPATIENT)
Dept: INTERNAL MEDICINE | Facility: CLINIC | Age: 85
End: 2020-04-21

## 2020-04-21 DIAGNOSIS — N39.0 URINARY TRACT INFECTION WITHOUT HEMATURIA, SITE UNSPECIFIED: Primary | ICD-10-CM

## 2020-04-21 PROCEDURE — 99213 OFFICE O/P EST LOW 20 MIN: CPT | Performed by: INTERNAL MEDICINE

## 2020-04-21 RX ORDER — SULFAMETHOXAZOLE AND TRIMETHOPRIM 800; 160 MG/1; MG/1
1 TABLET ORAL 2 TIMES DAILY
Qty: 14 TABLET | Refills: 0 | Status: SHIPPED | OUTPATIENT
Start: 2020-04-21 | End: 2020-06-25

## 2020-04-21 NOTE — PROGRESS NOTES
Subjective   Mi Tejeda is a 84 y.o. female.     Chief Complaint   Patient presents with   • Urinary Tract Infection       History of Present Illness video visit  Burning upon urination low grade fever, 98.7, fatigue, low back pain , urine frequency, urgency , increase fluids no help. For 4 days, no blood in urine    Current Outpatient Medications:   •  albuterol sulfate HFA (PROAIR HFA) 108 (90 Base) MCG/ACT inhaler, Inhale 2 puffs Every 4 (Four) Hours As Needed for Wheezing or Shortness of Air., Disp: 54 g, Rfl: 3  •  amLODIPine (NORVASC) 10 MG tablet, Take 1 tablet by mouth Daily., Disp: 90 tablet, Rfl: 3  •  aspirin 81 MG tablet, Take 1 tablet by mouth Daily., Disp: 30 tablet, Rfl: 11  •  B Complex Vitamins (VITAMIN B COMPLEX PO), Take 1 capsule by mouth Daily., Disp: , Rfl:   •  baclofen (LIORESAL) 10 MG tablet, Take 10 mg by mouth Every Night., Disp: , Rfl:   •  buPROPion XL (WELLBUTRIN XL) 150 MG 24 hr tablet, TAKE ONE TABLET BY MOUTH EVERY DAY, Disp: 30 tablet, Rfl: 2  •  Calcium Carbonate (CALTRATE 600 PO), Take 1 tablet by mouth Daily., Disp: , Rfl:   •  carvedilol (COREG) 12.5 MG tablet, Take 1 tablet by mouth 2 (Two) Times a Day., Disp: 60 tablet, Rfl: 1  •  Cholecalciferol (VITAMIN D3) 125 MCG (5000 UT) capsule capsule, TAKE ONE CAPSULE BY MOUTH EVERY DAY, Disp: 100 capsule, Rfl: 3  •  ferrous gluconate 324 (37.5 Fe) MG tablet tablet, TAKE ONE TABLET DAILY WITH BREAKFAST, Disp: 90 tablet, Rfl: 3  •  hydroCHLOROthiazide (HYDRODIURIL) 25 MG tablet, TAKE ONE TABLET BY MOUTH EVERY DAY, Disp: 30 tablet, Rfl: 5  •  HYDROcodone-acetaminophen (NORCO) 7.5-325 MG per tablet, Take 1 tablet by mouth 2 (Two) Times a Day As Needed for Mild Pain (1-3) or Moderate Pain (4-6)., Disp: , Rfl:   •  hydrOXYzine (ATARAX) 25 MG tablet, TAKE ONE TABLET THREE TIMES DAILY AS NEEDED FOR ITCHING, Disp: 30 tablet, Rfl: 1  •  levothyroxine (SYNTHROID, LEVOTHROID) 100 MCG tablet, TAKE ONE TABLET BY MOUTH EVERY DAY, Disp: 90  tablet, Rfl: 3  •  losartan (COZAAR) 100 MG tablet, Take 1 tablet by mouth Daily., Disp: 30 tablet, Rfl: 5  •  morphine (MS CONTIN) 30 MG 12 hr tablet, Take 30 mg by mouth 2 (Two) Times a Day., Disp: , Rfl:   •  Multiple Vitamins-Minerals (CENTRUM SILVER PO), Take 1 tablet by mouth Daily., Disp: , Rfl:   •  nicotine (NICODERM CQ) 14 MG/24HR patch, Place 1 patch on the skin as directed by provider Daily., Disp: 21 patch, Rfl: 2  •  omeprazole (priLOSEC) 20 MG capsule, Take 1 capsule by mouth Daily., Disp: 30 capsule, Rfl: 2  •  sulfamethoxazole-trimethoprim (Bactrim DS) 800-160 MG per tablet, Take 1 tablet by mouth 2 (Two) Times a Day., Disp: 14 tablet, Rfl: 0  •  vitamin C (ASCORBIC ACID) 500 MG tablet, Take 500 mg by mouth Daily., Disp: , Rfl:   •  VITAMIN E PO, Take 1 tablet by mouth Daily., Disp: , Rfl:     The following portions of the patient's history were reviewed and updated as appropriate: allergies, current medications, past family history, past medical history, past social history, past surgical history and problem list.    Review of Systems   Constitutional: Negative.    Respiratory: Negative.    Cardiovascular: Negative.    Gastrointestinal: Negative.    Genitourinary: Positive for difficulty urinating, dysuria, frequency and urgency. Negative for hematuria and vaginal discharge.   Skin: Negative.    Psychiatric/Behavioral: Negative.        Objective   Physical Exam   Constitutional: She is oriented to person, place, and time.   Neurological: She is alert and oriented to person, place, and time.   Psychiatric: She has a normal mood and affect.       All tests have been reviewed.    Assessment/Plan   Diagnoses and all orders for this visit:    Urinary tract infection without hematuria, site unspecified  -     sulfamethoxazole-trimethoprim (Bactrim DS) 800-160 MG per tablet; Take 1 tablet by mouth 2 (Two) Times a Day.             You have chosen to receive care through a telehealth visit.  Do you consent  to use a video/audio connection for your medical care today? YES

## 2020-05-18 DIAGNOSIS — I10 ESSENTIAL HYPERTENSION: Primary | ICD-10-CM

## 2020-05-18 DIAGNOSIS — I25.10 CHRONIC CORONARY ARTERY DISEASE: ICD-10-CM

## 2020-05-18 RX ORDER — LOSARTAN POTASSIUM 100 MG/1
TABLET ORAL
Qty: 30 TABLET | Refills: 5 | Status: SHIPPED | OUTPATIENT
Start: 2020-05-18 | End: 2020-12-08

## 2020-05-18 RX ORDER — CARVEDILOL 12.5 MG/1
12.5 TABLET ORAL 2 TIMES DAILY
Qty: 180 TABLET | Refills: 2 | Status: SHIPPED | OUTPATIENT
Start: 2020-05-18 | End: 2020-12-10 | Stop reason: ALTCHOICE

## 2020-05-18 RX ORDER — PANTOPRAZOLE SODIUM 40 MG/1
TABLET, DELAYED RELEASE ORAL
Qty: 30 TABLET | Refills: 11 | OUTPATIENT
Start: 2020-05-18

## 2020-06-04 RX ORDER — PANTOPRAZOLE SODIUM 40 MG/1
TABLET, DELAYED RELEASE ORAL
Qty: 30 TABLET | Refills: 11 | OUTPATIENT
Start: 2020-06-04

## 2020-06-04 NOTE — TELEPHONE ENCOUNTER
Camryn from [facility] requested a call back about patient's prescription for  pantoprazole (PROTONIX) 40 MG EC tablet. Camryn stated the patient informed her the patient does not like the omeprazole (priLOSEC) 20 MG capsule and wanted to go back to being on pantoprazole (PROTONIX) 40 MG EC tablet. The patient asked for Camryn to contact Dr. Awad's office with this request for her.    [id number] - [place], [place] - [address] [id number] [place] [phone] FX

## 2020-06-08 ENCOUNTER — OFFICE VISIT (OUTPATIENT)
Dept: INTERNAL MEDICINE | Facility: CLINIC | Age: 85
End: 2020-06-08

## 2020-06-08 VITALS
BODY MASS INDEX: 22.02 KG/M2 | HEIGHT: 64 IN | RESPIRATION RATE: 17 BRPM | TEMPERATURE: 97.7 F | DIASTOLIC BLOOD PRESSURE: 62 MMHG | OXYGEN SATURATION: 99 % | WEIGHT: 129 LBS | SYSTOLIC BLOOD PRESSURE: 107 MMHG | HEART RATE: 68 BPM

## 2020-06-08 DIAGNOSIS — R10.13 EPIGASTRIC PAIN: Primary | ICD-10-CM

## 2020-06-08 PROCEDURE — 99214 OFFICE O/P EST MOD 30 MIN: CPT | Performed by: INTERNAL MEDICINE

## 2020-06-08 NOTE — PROGRESS NOTES
Subjective   Mi Tejeda is a 84 y.o. female.     Chief Complaint   Patient presents with   • Abdominal Pain     feels like lead ball in stomach-pain for last 3-4 days-thinks may be ulcer flare up.   • Sinus Problem     runny nose-allergies       History of Present Illness   Epigastric pain for 3 days, dull pain, nausea and no vomiting, no diarrhea or constipation. No fever or chill. Appetite ok, dark stool.  No on iron pill, but taking galvaicon and pepto, no blood in stool , zofran helps with nausea, not taking PPI     Current Outpatient Medications:   •  albuterol sulfate HFA (PROAIR HFA) 108 (90 Base) MCG/ACT inhaler, Inhale 2 puffs Every 4 (Four) Hours As Needed for Wheezing or Shortness of Air., Disp: 54 g, Rfl: 3  •  amLODIPine (NORVASC) 10 MG tablet, Take 1 tablet by mouth Daily., Disp: 90 tablet, Rfl: 3  •  aspirin 81 MG tablet, Take 1 tablet by mouth Daily., Disp: 30 tablet, Rfl: 11  •  B Complex Vitamins (VITAMIN B COMPLEX PO), Take 1 capsule by mouth Daily., Disp: , Rfl:   •  baclofen (LIORESAL) 10 MG tablet, Take 10 mg by mouth Every Night., Disp: , Rfl:   •  buPROPion XL (WELLBUTRIN XL) 150 MG 24 hr tablet, TAKE ONE TABLET BY MOUTH EVERY DAY, Disp: 30 tablet, Rfl: 2  •  Calcium Carbonate (CALTRATE 600 PO), Take 1 tablet by mouth Daily., Disp: , Rfl:   •  carvedilol (COREG) 12.5 MG tablet, Take 1 tablet by mouth 2 (Two) Times a Day., Disp: 180 tablet, Rfl: 2  •  Cholecalciferol (VITAMIN D3) 125 MCG (5000 UT) capsule capsule, TAKE ONE CAPSULE BY MOUTH EVERY DAY, Disp: 100 capsule, Rfl: 3  •  ferrous gluconate 324 (37.5 Fe) MG tablet tablet, TAKE ONE TABLET DAILY WITH BREAKFAST, Disp: 90 tablet, Rfl: 3  •  HYDROcodone-acetaminophen (NORCO) 7.5-325 MG per tablet, Take 1 tablet by mouth 2 (Two) Times a Day As Needed for Mild Pain (1-3) or Moderate Pain (4-6)., Disp: , Rfl:   •  hydrOXYzine (ATARAX) 25 MG tablet, TAKE ONE TABLET THREE TIMES DAILY AS NEEDED FOR ITCHING, Disp: 30 tablet, Rfl: 1  •   levothyroxine (SYNTHROID, LEVOTHROID) 100 MCG tablet, TAKE ONE TABLET BY MOUTH EVERY DAY, Disp: 90 tablet, Rfl: 3  •  losartan (COZAAR) 100 MG tablet, TAKE ONE TABLET BY MOUTH EVERY DAY, Disp: 30 tablet, Rfl: 5  •  morphine (MS CONTIN) 30 MG 12 hr tablet, Take 30 mg by mouth 2 (Two) Times a Day., Disp: , Rfl:   •  Multiple Vitamins-Minerals (CENTRUM SILVER PO), Take 1 tablet by mouth Daily., Disp: , Rfl:   •  nicotine (NICODERM CQ) 14 MG/24HR patch, Place 1 patch on the skin as directed by provider Daily., Disp: 21 patch, Rfl: 2  •  omeprazole (priLOSEC) 20 MG capsule, Take 1 capsule by mouth Daily., Disp: 30 capsule, Rfl: 2  •  vitamin C (ASCORBIC ACID) 500 MG tablet, Take 500 mg by mouth Daily., Disp: , Rfl:   •  VITAMIN E PO, Take 1 tablet by mouth Daily., Disp: , Rfl:   •  hydroCHLOROthiazide (HYDRODIURIL) 25 MG tablet, TAKE ONE TABLET BY MOUTH EVERY DAY, Disp: 30 tablet, Rfl: 5  •  sulfamethoxazole-trimethoprim (Bactrim DS) 800-160 MG per tablet, Take 1 tablet by mouth 2 (Two) Times a Day., Disp: 14 tablet, Rfl: 0    The following portions of the patient's history were reviewed and updated as appropriate: allergies, current medications, past family history, past medical history, past social history, past surgical history and problem list.    Review of Systems   Constitutional: Negative.    Respiratory: Negative.    Cardiovascular: Negative.    Gastrointestinal: Positive for abdominal pain and nausea. Negative for anal bleeding, blood in stool and vomiting.   Musculoskeletal: Negative.    Skin: Negative.    Neurological: Negative.    Psychiatric/Behavioral: Negative.        Objective   Physical Exam   Constitutional: She is oriented to person, place, and time. She appears well-developed and well-nourished.   Neck: Neck supple.   Cardiovascular: Normal rate, regular rhythm and normal heart sounds.   Pulmonary/Chest: Effort normal and breath sounds normal.   Abdominal: Bowel sounds are normal. There is tenderness  ( epigastric).   Neurological: She is alert and oriented to person, place, and time.   Skin: Skin is warm.   Psychiatric: She has a normal mood and affect.       All tests have been reviewed.    Assessment/Plan   Diagnoses and all orders for this visit:    Epigastric pain  -     CBC & Differential  -     Comprehensive Metabolic Panel  -     Lipase  -     Amylase  -     Urinalysis With Microscopic If Indicated (No Culture) - Urine, Clean Catch  -     Occult Blood, Fecal By Immunoassay - Stool, Per Rectum    resume PPI, bland diet   2 weeks

## 2020-06-09 LAB
ALBUMIN SERPL-MCNC: 3.9 G/DL (ref 3.5–5.2)
ALBUMIN/GLOB SERPL: 1.5 G/DL
ALP SERPL-CCNC: 106 U/L (ref 39–117)
ALT SERPL-CCNC: 12 U/L (ref 1–33)
AMYLASE SERPL-CCNC: 46 U/L (ref 28–100)
APPEARANCE UR: CLEAR
AST SERPL-CCNC: 20 U/L (ref 1–32)
BACTERIA #/AREA URNS HPF: ABNORMAL /HPF
BASOPHILS # BLD AUTO: 0.04 10*3/MM3 (ref 0–0.2)
BASOPHILS NFR BLD AUTO: 0.4 % (ref 0–1.5)
BILIRUB SERPL-MCNC: 0.4 MG/DL (ref 0.2–1.2)
BILIRUB UR QL STRIP: NEGATIVE
BUN SERPL-MCNC: 32 MG/DL (ref 8–23)
BUN/CREAT SERPL: 31.4 (ref 7–25)
CALCIUM SERPL-MCNC: 9.1 MG/DL (ref 8.6–10.5)
CHLORIDE SERPL-SCNC: 102 MMOL/L (ref 98–107)
CO2 SERPL-SCNC: 28.5 MMOL/L (ref 22–29)
COLOR UR: ABNORMAL
CREAT SERPL-MCNC: 1.02 MG/DL (ref 0.57–1)
EOSINOPHIL # BLD AUTO: 0.05 10*3/MM3 (ref 0–0.4)
EOSINOPHIL NFR BLD AUTO: 0.5 % (ref 0.3–6.2)
EPI CELLS #/AREA URNS HPF: ABNORMAL /HPF
ERYTHROCYTE [DISTWIDTH] IN BLOOD BY AUTOMATED COUNT: 12.9 % (ref 12.3–15.4)
GLOBULIN SER CALC-MCNC: 2.6 GM/DL
GLUCOSE SERPL-MCNC: 121 MG/DL (ref 65–99)
GLUCOSE UR QL: NEGATIVE
HCT VFR BLD AUTO: 31 % (ref 34–46.6)
HGB BLD-MCNC: 10.5 G/DL (ref 12–15.9)
HGB UR QL STRIP: NEGATIVE
IMM GRANULOCYTES # BLD AUTO: 0.08 10*3/MM3 (ref 0–0.05)
IMM GRANULOCYTES NFR BLD AUTO: 0.7 % (ref 0–0.5)
KETONES UR QL STRIP: NEGATIVE
LEUKOCYTE ESTERASE UR QL STRIP: ABNORMAL
LIPASE SERPL-CCNC: 47 U/L (ref 13–60)
LYMPHOCYTES # BLD AUTO: 2.19 10*3/MM3 (ref 0.7–3.1)
LYMPHOCYTES NFR BLD AUTO: 19.7 % (ref 19.6–45.3)
MCH RBC QN AUTO: 30.6 PG (ref 26.6–33)
MCHC RBC AUTO-ENTMCNC: 33.9 G/DL (ref 31.5–35.7)
MCV RBC AUTO: 90.4 FL (ref 79–97)
MONOCYTES # BLD AUTO: 1.77 10*3/MM3 (ref 0.1–0.9)
MONOCYTES NFR BLD AUTO: 16 % (ref 5–12)
NEUTROPHILS # BLD AUTO: 6.96 10*3/MM3 (ref 1.7–7)
NEUTROPHILS NFR BLD AUTO: 62.7 % (ref 42.7–76)
NITRITE UR QL STRIP: NEGATIVE
NRBC BLD AUTO-RTO: 0 /100 WBC (ref 0–0.2)
PH UR STRIP: <=5 [PH] (ref 5–8)
PLATELET # BLD AUTO: 255 10*3/MM3 (ref 140–450)
POTASSIUM SERPL-SCNC: 4.1 MMOL/L (ref 3.5–5.2)
PROT SERPL-MCNC: 6.5 G/DL (ref 6–8.5)
PROT UR QL STRIP: ABNORMAL
RBC # BLD AUTO: 3.43 10*6/MM3 (ref 3.77–5.28)
RBC #/AREA URNS HPF: ABNORMAL /HPF
SODIUM SERPL-SCNC: 139 MMOL/L (ref 136–145)
SP GR UR: 1.02 (ref 1–1.03)
UROBILINOGEN UR STRIP-MCNC: ABNORMAL MG/DL
WBC # BLD AUTO: 11.09 10*3/MM3 (ref 3.4–10.8)
WBC #/AREA URNS HPF: ABNORMAL /HPF

## 2020-06-09 RX ORDER — PANTOPRAZOLE SODIUM 40 MG/1
TABLET, DELAYED RELEASE ORAL
Qty: 30 TABLET | Refills: 11 | Status: SHIPPED | OUTPATIENT
Start: 2020-06-09 | End: 2021-03-15 | Stop reason: ALTCHOICE

## 2020-06-10 RX ORDER — SULFAMETHOXAZOLE AND TRIMETHOPRIM 800; 160 MG/1; MG/1
1 TABLET ORAL 2 TIMES DAILY
Qty: 14 TABLET | Refills: 0 | Status: SHIPPED | OUTPATIENT
Start: 2020-06-10 | End: 2020-06-25

## 2020-06-17 ENCOUNTER — OFFICE VISIT (OUTPATIENT)
Dept: INTERNAL MEDICINE | Facility: CLINIC | Age: 85
End: 2020-06-17

## 2020-06-17 VITALS
SYSTOLIC BLOOD PRESSURE: 100 MMHG | HEART RATE: 75 BPM | WEIGHT: 130.8 LBS | BODY MASS INDEX: 22.33 KG/M2 | HEIGHT: 64 IN | DIASTOLIC BLOOD PRESSURE: 70 MMHG | OXYGEN SATURATION: 98 % | TEMPERATURE: 98.7 F

## 2020-06-17 DIAGNOSIS — K21.9 GASTROESOPHAGEAL REFLUX DISEASE WITHOUT ESOPHAGITIS: Primary | ICD-10-CM

## 2020-06-17 DIAGNOSIS — R07.81 COSTAL MARGIN PAIN: ICD-10-CM

## 2020-06-17 DIAGNOSIS — D64.9 ANEMIA, UNSPECIFIED TYPE: ICD-10-CM

## 2020-06-17 PROCEDURE — 99214 OFFICE O/P EST MOD 30 MIN: CPT | Performed by: INTERNAL MEDICINE

## 2020-06-17 NOTE — PROGRESS NOTES
Subjective   Mi Tejeda is a 84 y.o. female.     Chief Complaint   Patient presents with   • Hyperlipidemia     follow up on epigastric pain and regular 4 month check up        History of Present Illness   Abdominal pain much improved after PPI.  Patient here for follow-up.  Weight is stable gained 1 pound.  Blood test that showed hemoglobin still low.  Patient also complains left side waistline between rib cage and the pelvic pain certain position worse.    Current Outpatient Medications:   •  albuterol sulfate HFA (PROAIR HFA) 108 (90 Base) MCG/ACT inhaler, Inhale 2 puffs Every 4 (Four) Hours As Needed for Wheezing or Shortness of Air., Disp: 54 g, Rfl: 3  •  amLODIPine (NORVASC) 10 MG tablet, Take 1 tablet by mouth Daily., Disp: 90 tablet, Rfl: 3  •  aspirin 81 MG tablet, Take 1 tablet by mouth Daily., Disp: 30 tablet, Rfl: 11  •  B Complex Vitamins (VITAMIN B COMPLEX PO), Take 1 capsule by mouth Daily., Disp: , Rfl:   •  baclofen (LIORESAL) 10 MG tablet, Take 10 mg by mouth Every Night., Disp: , Rfl:   •  buPROPion XL (WELLBUTRIN XL) 150 MG 24 hr tablet, TAKE ONE TABLET BY MOUTH EVERY DAY, Disp: 30 tablet, Rfl: 2  •  Calcium Carbonate (CALTRATE 600 PO), Take 1 tablet by mouth Daily., Disp: , Rfl:   •  carvedilol (COREG) 12.5 MG tablet, Take 1 tablet by mouth 2 (Two) Times a Day., Disp: 180 tablet, Rfl: 2  •  Cholecalciferol (VITAMIN D3) 125 MCG (5000 UT) capsule capsule, TAKE ONE CAPSULE BY MOUTH EVERY DAY, Disp: 100 capsule, Rfl: 3  •  ferrous gluconate 324 (37.5 Fe) MG tablet tablet, TAKE ONE TABLET DAILY WITH BREAKFAST, Disp: 90 tablet, Rfl: 3  •  hydroCHLOROthiazide (HYDRODIURIL) 25 MG tablet, TAKE ONE TABLET BY MOUTH EVERY DAY, Disp: 30 tablet, Rfl: 5  •  HYDROcodone-acetaminophen (NORCO) 7.5-325 MG per tablet, Take 1 tablet by mouth 2 (Two) Times a Day As Needed for Mild Pain (1-3) or Moderate Pain (4-6)., Disp: , Rfl:   •  hydrOXYzine (ATARAX) 25 MG tablet, TAKE ONE TABLET THREE TIMES DAILY AS  NEEDED FOR ITCHING, Disp: 30 tablet, Rfl: 1  •  levothyroxine (SYNTHROID, LEVOTHROID) 100 MCG tablet, TAKE ONE TABLET BY MOUTH EVERY DAY, Disp: 90 tablet, Rfl: 3  •  losartan (COZAAR) 100 MG tablet, TAKE ONE TABLET BY MOUTH EVERY DAY, Disp: 30 tablet, Rfl: 5  •  morphine (MS CONTIN) 30 MG 12 hr tablet, Take 30 mg by mouth 2 (Two) Times a Day., Disp: , Rfl:   •  Multiple Vitamins-Minerals (CENTRUM SILVER PO), Take 1 tablet by mouth Daily., Disp: , Rfl:   •  nicotine (NICODERM CQ) 14 MG/24HR patch, Place 1 patch on the skin as directed by provider Daily., Disp: 21 patch, Rfl: 2  •  omeprazole (priLOSEC) 20 MG capsule, Take 1 capsule by mouth Daily., Disp: 30 capsule, Rfl: 2  •  pantoprazole (PROTONIX) 40 MG EC tablet, TAKE ONE TABLET BY MOUTH EVERY DAY, Disp: 30 tablet, Rfl: 11  •  sulfamethoxazole-trimethoprim (Bactrim DS) 800-160 MG per tablet, Take 1 tablet by mouth 2 (Two) Times a Day., Disp: 14 tablet, Rfl: 0  •  sulfamethoxazole-trimethoprim (Bactrim DS) 800-160 MG per tablet, Take 1 tablet by mouth 2 (Two) Times a Day., Disp: 14 tablet, Rfl: 0  •  vitamin C (ASCORBIC ACID) 500 MG tablet, Take 500 mg by mouth Daily., Disp: , Rfl:   •  VITAMIN E PO, Take 1 tablet by mouth Daily., Disp: , Rfl:     The following portions of the patient's history were reviewed and updated as appropriate: allergies, current medications, past family history, past medical history, past social history, past surgical history and problem list.    Review of Systems   Constitutional: Negative.    Respiratory: Negative.    Cardiovascular: Negative.    Gastrointestinal: Positive for abdominal pain.   Musculoskeletal: Positive for arthralgias.   Skin: Negative.    Neurological: Negative.    Psychiatric/Behavioral: Negative.        Objective   Physical Exam   Constitutional: She is oriented to person, place, and time. She appears well-nourished.   Neck: Neck supple.   Cardiovascular: Normal rate, regular rhythm and normal heart sounds.    Pulmonary/Chest: Effort normal and breath sounds normal.   Abdominal: Bowel sounds are normal.   Musculoskeletal: She exhibits tenderness (costal pelvic tender).   Neurological: She is alert and oriented to person, place, and time.   Skin: Skin is warm.   Psychiatric: She has a normal mood and affect.       All tests have been reviewed.    Assessment/Plan   Diagnoses and all orders for this visit:    Gastroesophageal reflux disease without esophagitis continue medication history of esophageal dilation patient states occasional swallow problems.  Continue PPI for now.    Recent abdominal pain resolved.  After PPI    Anemia, unspecified type encourage patient to do stool for blood test  -     CBC & Differential  -     Basic Metabolic Panel  -     Ferritin  -     Folate  -     Haptoglobin  -     Iron Profile  -     Lactate Dehydrogenase  -     Reticulocytes  -     Sedimentation Rate  -     Vitamin B12    Costal margin pain encourage patient to stand up more rather than sitting or the time  Follow-up in 1 month

## 2020-06-24 ENCOUNTER — CLINICAL SUPPORT (OUTPATIENT)
Dept: INTERNAL MEDICINE | Facility: CLINIC | Age: 85
End: 2020-06-24

## 2020-06-24 DIAGNOSIS — D64.9 ANEMIA, UNSPECIFIED TYPE: Primary | ICD-10-CM

## 2020-06-24 DIAGNOSIS — K92.1 HEMATOCHEZIA: ICD-10-CM

## 2020-06-25 ENCOUNTER — OFFICE VISIT (OUTPATIENT)
Dept: GASTROENTEROLOGY | Facility: CLINIC | Age: 85
End: 2020-06-25

## 2020-06-25 VITALS
TEMPERATURE: 98.4 F | WEIGHT: 129 LBS | HEART RATE: 60 BPM | DIASTOLIC BLOOD PRESSURE: 71 MMHG | SYSTOLIC BLOOD PRESSURE: 114 MMHG | HEIGHT: 65 IN | BODY MASS INDEX: 21.49 KG/M2

## 2020-06-25 DIAGNOSIS — R10.814 LEFT LOWER QUADRANT ABDOMINAL TENDERNESS WITHOUT REBOUND TENDERNESS: Primary | ICD-10-CM

## 2020-06-25 DIAGNOSIS — D64.9 ANEMIA, UNSPECIFIED TYPE: Primary | ICD-10-CM

## 2020-06-25 LAB — HEMOCCULT STL QL IA: POSITIVE

## 2020-06-25 PROCEDURE — 82274 ASSAY TEST FOR BLOOD FECAL: CPT | Performed by: INTERNAL MEDICINE

## 2020-06-25 PROCEDURE — 99214 OFFICE O/P EST MOD 30 MIN: CPT | Performed by: INTERNAL MEDICINE

## 2020-06-25 RX ORDER — METRONIDAZOLE 250 MG/1
TABLET ORAL
Qty: 28 TABLET | Refills: 0 | Status: SHIPPED | OUTPATIENT
Start: 2020-06-25 | End: 2020-07-17

## 2020-06-25 RX ORDER — LEVOFLOXACIN 500 MG/1
500 TABLET, FILM COATED ORAL DAILY
Qty: 7 TABLET | Refills: 0 | Status: SHIPPED | OUTPATIENT
Start: 2020-06-25 | End: 2020-08-05

## 2020-06-25 NOTE — PROGRESS NOTES
No chief complaint on file.    History of Present Illness     Review of Systems   Constitutional: Negative for appetite change, chills, fatigue, fever and unexpected weight change.   HENT: Negative for mouth sores, nosebleeds and trouble swallowing.    Eyes: Negative for discharge and redness.   Respiratory: Negative for apnea, cough and shortness of breath.    Cardiovascular: Negative for chest pain, palpitations and leg swelling.   Gastrointestinal: Positive for abdominal pain, constipation and nausea. Negative for abdominal distention, anal bleeding, blood in stool, diarrhea and vomiting.   Endocrine: Negative for cold intolerance, heat intolerance and polydipsia.   Genitourinary: Negative for dysuria, hematuria and urgency.   Musculoskeletal: Negative for arthralgias, joint swelling and myalgias.   Skin: Negative for rash.   Allergic/Immunologic: Negative for food allergies and immunocompromised state.   Neurological: Negative for dizziness, seizures, syncope and headaches.   Hematological: Negative for adenopathy. Does not bruise/bleed easily.   Psychiatric/Behavioral: Negative for dysphoric mood. The patient is not nervous/anxious and is not hyperactive.      Patient Active Problem List   Diagnosis   • Anxiety   • Anemia   • Chronic coronary artery disease   • Chronic obstructive pulmonary disease (CMS/HCC)   • Diverticulum of esophagus   • Fatigue   • Gastroesophageal reflux disease   • Hyperlipidemia   • Hypertension   • Multiple pulmonary nodules   • Peripheral arterial occlusive disease (CMS/HCC)   • Plantar fasciitis   • Spinal stenosis   • Temporary cerebral vascular dysfunction   • Uterine leiomyoma   • Vitamin D deficiency   • Cramp in lower leg   • Memory loss   • Hx of CABG   • Senile osteoporosis   • Costal margin pain     Past Medical History:   Diagnosis Date   • Abdominal pain     states hx of   • Abnormal electrocardiogram    • Abnormal urinalysis    • Acute UTI    • Anemia    • Anomaly cardiac      REPORTS WILL SHOW ON LEFT VENTRICLE IN A 12 LEAD EKG AS BEING A MI BUT REPORTS SHE HAS NEVER HAD THIS    • Anxiety    • Aphagia     HX OF THIS, REPORTS WAS CLEARED OF A CVA BUT WAS TOLD WAS RELATED TO FATIGUE   • Arthritis    • Asthma    • Atrial fibrillation (CMS/HCC)    • Backache    • Bursitis of right shoulder    • CAD (coronary artery disease)    • Cataract, bilateral     s/p removal   • COPD (chronic obstructive pulmonary disease) (CMS/HCC)    • Depression    • Diverticulitis    • Dysphagia     REPORTS RESOLVED AFTER DILITATION   • Fracture     left wrist, 2 fingers on right hand, toes on right foot, right arm   • Full dentures    • Gastric ulcer    • GERD (gastroesophageal reflux disease)    • Gout    • Heart murmur    • High cholesterol    • History of nuclear stress test 2011?   • HTN (hypertension)    • Hypertriglyceridemia    • Hypothyroidism    • Multiple lung nodules    • Osteoarthritis    • Osteoporosis    • Peripheral neuropathy    • Personal history of tobacco use    • Plantar fasciitis     left   • Pneumonia    • Sinus problem    • Sinusitis    • Tear of meniscus of knee     right   • TIA (transient ischemic attack)     denies 5/22/19.  states had aphasia in E[place] but was ruled out.   • Uterine leiomyoma    • Viral gastroenteritis    • Vitamin D deficiency    • Wears glasses    • Weight loss, non-intentional     80 lb in 5 years     Past Surgical History:   Procedure Laterality Date   • CARDIAC CATHETERIZATION  2007?    no stents   • CARDIAC SURGERY     • CATARACT EXTRACTION Bilateral 2009   • CATARACT EXTRACTION, BILATERAL     • CHOLECYSTECTOMY  2002   • COLONOSCOPY     • CORONARY ARTERY BYPASS GRAFT  1998    2 vessels   • ENDOSCOPY     • ENDOSCOPY N/A 5/3/2017    Procedure: ESOPHAGOGASTRODUODENOSCOPY with biopsies;  Surgeon: Yared Castellanos MD;  Location: [facility] ENDOSCOPY;  Service:    • ENDOSCOPY N/A 5/23/2019    Procedure: ESOPHAGOGASTRODUODENOSCOPY WITH BIOPSY AND BALLOON DILITATION;   Surgeon: Yared Castellanos MD;  Location: [facility] ENDOSCOPY;  Service: Gastroenterology   • MOUTH SURGERY      full extraction   • TOTAL HIP ARTHROPLASTY Left 2010    PREETI Stanley MD     Family History   Problem Relation Age of Onset   • Cancer Mother         uterus   • Cancer Sister         colon; kidney   • Hypertension Sister    • Stroke Sister    • Cancer Brother         prostate   • Arthritis Other    • Cancer Other    • Kidney disease Other    • Stroke Other    • Hypertension Other    • Gout Other    • Colon cancer Sister    • Cancer Sister         kidney   • Cirrhosis Neg Hx    • Liver disease Neg Hx    • Liver cancer Neg Hx    • Stomach cancer Neg Hx      Social History     Tobacco Use   • Smoking status: Former Smoker     Packs/day: 0.00     Years: 65.00     Pack years: 0.00     Types: Cigarettes     Last attempt to quit: 10/1/2018     Years since quittin.7   • Smokeless tobacco: Never Used   Substance Use Topics   • Alcohol use: Yes     Comment: Seldom       Current Outpatient Medications:   •  albuterol sulfate HFA (PROAIR HFA) 108 (90 Base) MCG/ACT inhaler, Inhale 2 puffs Every 4 (Four) Hours As Needed for Wheezing or Shortness of Air., Disp: 54 g, Rfl: 3  •  amLODIPine (NORVASC) 10 MG tablet, Take 1 tablet by mouth Daily., Disp: 90 tablet, Rfl: 3  •  aspirin 81 MG tablet, Take 1 tablet by mouth Daily., Disp: 30 tablet, Rfl: 11  •  baclofen (LIORESAL) 10 MG tablet, Take 10 mg by mouth Every Night., Disp: , Rfl:   •  buPROPion XL (WELLBUTRIN XL) 150 MG 24 hr tablet, TAKE ONE TABLET BY MOUTH EVERY DAY, Disp: 30 tablet, Rfl: 2  •  carvedilol (COREG) 12.5 MG tablet, Take 1 tablet by mouth 2 (Two) Times a Day., Disp: 180 tablet, Rfl: 2  •  Cholecalciferol (VITAMIN D3) 125 MCG (5000 UT) capsule capsule, TAKE ONE CAPSULE BY MOUTH EVERY DAY, Disp: 100 capsule, Rfl: 3  •  HYDROcodone-acetaminophen (NORCO) 7.5-325 MG per tablet, Take 1 tablet by mouth 2 (Two) Times a Day As Needed for Mild Pain (1-3) or  "Moderate Pain (4-6)., Disp: , Rfl:   •  hydrOXYzine (ATARAX) 25 MG tablet, TAKE ONE TABLET THREE TIMES DAILY AS NEEDED FOR ITCHING, Disp: 30 tablet, Rfl: 1  •  levothyroxine (SYNTHROID, LEVOTHROID) 100 MCG tablet, TAKE ONE TABLET BY MOUTH EVERY DAY, Disp: 90 tablet, Rfl: 3  •  losartan (COZAAR) 100 MG tablet, TAKE ONE TABLET BY MOUTH EVERY DAY, Disp: 30 tablet, Rfl: 5  •  morphine (MS CONTIN) 30 MG 12 hr tablet, Take 30 mg by mouth 2 (Two) Times a Day., Disp: , Rfl:   •  pantoprazole (PROTONIX) 40 MG EC tablet, TAKE ONE TABLET BY MOUTH EVERY DAY, Disp: 30 tablet, Rfl: 11  •  vitamin C (ASCORBIC ACID) 500 MG tablet, Take 500 mg by mouth Daily., Disp: , Rfl:   •  levoFLOXacin (Levaquin) 500 MG tablet, Take 1 tablet by mouth Daily., Disp: 7 tablet, Rfl: 0  •  metroNIDAZOLE (Flagyl) 250 MG tablet, Take 1 tablet by mouth four times a day x 7 days, Disp: 28 tablet, Rfl: 0    Allergies   Allergen Reactions   • Ciprofloxacin GI Intolerance     Patient states she is not allergic.   • Hazelnut Nausea Only   • Keflex [Cephalexin] Nausea Only   • Niacin Rash     \"it makes my skin burn\"   • Oxycodone Dizziness     \"it makes me feel dopey and out of my head\"  NOTE: does tolerate hydrocodone and morphine   • Statins Myalgia       Blood pressure 114/71, pulse 60, temperature 98.4 °F (36.9 °C), height 165.1 cm (65\"), weight 58.5 kg (129 lb), not currently breastfeeding.    Physical Exam   Constitutional: She is oriented to person, place, and time. She appears well-developed and well-nourished. No distress.   HENT:   Head: Normocephalic and atraumatic.   Right Ear: Hearing and external ear normal.   Left Ear: Hearing and external ear normal.   Nose: Nose normal.   Mouth/Throat: Oropharynx is clear and moist and mucous membranes are normal. Mucous membranes are not pale, not dry and not cyanotic. No oral lesions. No oropharyngeal exudate.   Eyes: Conjunctivae and EOM are normal. Right eye exhibits no discharge. Left eye exhibits no " discharge. No scleral icterus.   Neck: Trachea normal. Neck supple. No JVD present. No edema present. No thyroid mass and no thyromegaly present.   Cardiovascular: Normal rate, regular rhythm, S2 normal and normal heart sounds. Exam reveals no gallop, no S3 and no friction rub.   No murmur heard.  Pulmonary/Chest: Effort normal and breath sounds normal. No respiratory distress. She has no wheezes. She has no rales. She exhibits no tenderness.   Abdominal: Soft. Normal appearance and bowel sounds are normal. She exhibits no distension, no ascites and no mass. There is no splenomegaly or hepatomegaly. There is no tenderness. There is no rigidity, no rebound and no guarding. No hernia.   Musculoskeletal: She exhibits no tenderness or deformity.     Vascular Status -  Her right foot exhibits no edema. Her left foot exhibits no edema.  Lymphadenopathy:     She has no cervical adenopathy.        Left: No supraclavicular adenopathy present.   Neurological: She is alert and oriented to person, place, and time. She has normal strength. No cranial nerve deficit or sensory deficit. She exhibits normal muscle tone. Coordination normal.   Skin: No rash noted. She is not diaphoretic. No cyanosis. No pallor. Nails show no clubbing.   Psychiatric: She has a normal mood and affect. Her behavior is normal. Judgment and thought content normal.   Nursing note and vitals reviewed.  Stigmata of chronic liver disease:  None.  Asterixis:  None.    Assessment:      ICD-10-CM ICD-9-CM   1. Left lower quadrant abdominal tenderness without rebound tenderness R10.814 789.64         Discussion:  1.     Plan/  Patient Instructions   1. Low fiber diet (avoid fruits, vegetables, cereals, fiber supplements, nuts and seeds) for 5 days thereafter low-fat high-fiber diet.  2. Levaquin (levofloxacin). Take 1 tablet by mouth once a day for 7 days. Side effects were discussed.  3. Flagyl (metronidazole) tablets 250 mg. Take 1 tablet one by mouth 4 times a  "day for 7 days.  Side effects were discussed.  4. Avoid laxatives, enemas for next 5 days.  However, for constipation the patient may use \"stool softeners\" 1-2 per day.  5. May take probiotics such as Align.  Take one orally daily while taking antibiotics and 1-2 weeks thereafter.  6. Protonix (Pantoprazole) tablet 40 mg tablet. Take 1 tablet orally in the morning half an hour before eating every day.  7. The patient has been advised to call back in 1 week regarding progress.  Otherwise follow-up in 1 to 2 weeks.  8.  The patient has been advised to hold calcium, multivitamins, vitamin B supplements and iron supplements while taking antibiotics.  Once the course of antibiotics is finished the patient may resume calcium and multivitamins as well as vitamin B.  However she may continue to hold iron supplements for now.         Yared Castellanos MD  "

## 2020-06-25 NOTE — PATIENT INSTRUCTIONS
"1. Low fiber diet (avoid fruits, vegetables, cereals, fiber supplements, nuts and seeds) for 5 days thereafter low-fat high-fiber diet.  2. Levaquin (levofloxacin). Take 1 tablet by mouth once a day for 7 days. Side effects were discussed.  3. Flagyl (metronidazole) tablets 250 mg. Take 1 tablet one by mouth 4 times a day for 7 days.  Side effects were discussed.  4. Avoid laxatives, enemas for next 5 days.  However, for constipation the patient may use \"stool softeners\" 1-2 per day.  5. May take probiotics such as Align.  Take one orally daily while taking antibiotics and 1-2 weeks thereafter.  6. Protonix (Pantoprazole) tablet 40 mg tablet. Take 1 tablet orally in the morning half an hour before eating every day.  7. The patient has been advised to call back in 1 week regarding progress.  Otherwise follow-up in 1 to 2 weeks.  8.  The patient has been advised to hold calcium, multivitamins, vitamin B supplements and iron supplements while taking antibiotics.  Once the course of antibiotics is finished the patient may resume calcium and multivitamins as well as vitamin B.  However she may continue to hold iron supplements for now.    "

## 2020-06-30 ENCOUNTER — TELEPHONE (OUTPATIENT)
Dept: GASTROENTEROLOGY | Facility: CLINIC | Age: 85
End: 2020-06-30

## 2020-06-30 DIAGNOSIS — R11.2 NAUSEA AND VOMITING, INTRACTABILITY OF VOMITING NOT SPECIFIED, UNSPECIFIED VOMITING TYPE: Primary | ICD-10-CM

## 2020-06-30 RX ORDER — ONDANSETRON 4 MG/1
4 TABLET, FILM COATED ORAL EVERY 8 HOURS PRN
Qty: 30 TABLET | Refills: 0 | Status: SHIPPED | OUTPATIENT
Start: 2020-06-30 | End: 2020-07-17

## 2020-06-30 RX ORDER — ALUMINUM ZIRCONIUM OCTACHLOROHYDREX GLY 16 G/100G
1 GEL TOPICAL DAILY
Qty: 7 CAPSULE | Refills: 0 | COMMUNITY
Start: 2020-06-30 | End: 2020-12-01

## 2020-06-30 NOTE — TELEPHONE ENCOUNTER
Patient called today, stating the antibiotics are making her sick.  She is on Day 4 of 7 with the antibiotics.  Discussed with patient the low fiber foods until Thursday, Spoke with Juan Gallardo sent.  Called patient back.  Explained to patient that she might want to try taking the zofran a bit before trying to take the antibiotics, she should eat with the antibiotics and push fluids, as she has had some vomiting.  She will call if there are further concerns, FUP appt on 07/08/20.

## 2020-07-02 ENCOUNTER — TELEPHONE (OUTPATIENT)
Dept: GASTROENTEROLOGY | Facility: CLINIC | Age: 85
End: 2020-07-02

## 2020-07-02 NOTE — TELEPHONE ENCOUNTER
Patient called.  She did not need anything, states her nausea is better, Zofran has helped.  She will liberalize fiber in her diet. She will FUP on 07/08/20 as planned.

## 2020-07-08 RX ORDER — LEVOTHYROXINE SODIUM 0.1 MG/1
TABLET ORAL
Qty: 90 TABLET | Refills: 0 | Status: SHIPPED | OUTPATIENT
Start: 2020-07-08 | End: 2020-08-17

## 2020-07-15 ENCOUNTER — OFFICE VISIT (OUTPATIENT)
Dept: ORTHOPEDIC SURGERY | Facility: CLINIC | Age: 85
End: 2020-07-15

## 2020-07-15 VITALS — HEIGHT: 65 IN | WEIGHT: 130.6 LBS | RESPIRATION RATE: 18 BRPM | BODY MASS INDEX: 21.76 KG/M2

## 2020-07-15 DIAGNOSIS — M16.11 PRIMARY OSTEOARTHRITIS OF RIGHT HIP: ICD-10-CM

## 2020-07-15 DIAGNOSIS — Z96.642 HISTORY OF TOTAL HIP ARTHROPLASTY, LEFT: ICD-10-CM

## 2020-07-15 DIAGNOSIS — M25.561 ARTHRALGIA OF RIGHT KNEE: ICD-10-CM

## 2020-07-15 DIAGNOSIS — M51.36 LUMBAR DEGENERATIVE DISC DISEASE: ICD-10-CM

## 2020-07-15 DIAGNOSIS — M17.11 PRIMARY OSTEOARTHRITIS OF RIGHT KNEE: ICD-10-CM

## 2020-07-15 DIAGNOSIS — M25.551 ARTHRALGIA OF RIGHT HIP: ICD-10-CM

## 2020-07-15 DIAGNOSIS — M75.41 SHOULDER IMPINGEMENT SYNDROME, RIGHT: Primary | ICD-10-CM

## 2020-07-15 PROCEDURE — 99213 OFFICE O/P EST LOW 20 MIN: CPT | Performed by: PHYSICIAN ASSISTANT

## 2020-07-15 PROCEDURE — 20610 DRAIN/INJ JOINT/BURSA W/O US: CPT | Performed by: PHYSICIAN ASSISTANT

## 2020-07-15 RX ORDER — LIDOCAINE HYDROCHLORIDE 10 MG/ML
2 INJECTION, SOLUTION INFILTRATION; PERINEURAL
Status: COMPLETED | OUTPATIENT
Start: 2020-07-15 | End: 2020-07-15

## 2020-07-15 RX ORDER — METHYLPREDNISOLONE ACETATE 40 MG/ML
40 INJECTION, SUSPENSION INTRA-ARTICULAR; INTRALESIONAL; INTRAMUSCULAR; SOFT TISSUE
Status: COMPLETED | OUTPATIENT
Start: 2020-07-15 | End: 2020-07-15

## 2020-07-15 RX ADMIN — METHYLPREDNISOLONE ACETATE 40 MG: 40 INJECTION, SUSPENSION INTRA-ARTICULAR; INTRALESIONAL; INTRAMUSCULAR; SOFT TISSUE at 11:17

## 2020-07-15 RX ADMIN — LIDOCAINE HYDROCHLORIDE 2 ML: 10 INJECTION, SOLUTION INFILTRATION; PERINEURAL at 11:17

## 2020-07-15 NOTE — PROGRESS NOTES
Subjective   Patient ID: Mi Tejeda is a 84 y.o. right hand dominant female  Pain of the Right Knee (Pt here for right knee pain, worse since last visit. ) and Pain of the Right Shoulder (Patient her for right shoulder pain past several weeks, no known injury. )         History of Present Illness  Patient presents with complaints of right shoulder and right knee arthralgias.  She states the right knee has given her trouble for several years however, her right shoulder pain began several weeks ago.  There has been no injury or trauma to either joint.  She does take hydrocodone and morphine for arthritis pain.                                                   Past Medical History:   Diagnosis Date   • Abdominal pain     states hx of   • Abnormal electrocardiogram    • Abnormal urinalysis    • Acute UTI    • Anemia    • Anomaly cardiac     REPORTS WILL SHOW ON LEFT VENTRICLE IN A 12 LEAD EKG AS BEING A MI BUT REPORTS SHE HAS NEVER HAD THIS    • Anxiety    • Aphagia     HX OF THIS, REPORTS WAS CLEARED OF A CVA BUT WAS TOLD WAS RELATED TO FATIGUE   • Arthritis    • Asthma    • Atrial fibrillation (CMS/HCC)    • Backache    • Bursitis of right shoulder    • CAD (coronary artery disease)    • Cataract, bilateral     s/p removal   • COPD (chronic obstructive pulmonary disease) (CMS/HCC)    • Depression    • Diverticulitis    • Dysphagia     REPORTS RESOLVED AFTER DILITATION   • Fracture     left wrist, 2 fingers on right hand, toes on right foot, right arm   • Full dentures    • Gastric ulcer    • GERD (gastroesophageal reflux disease)    • Gout    • Heart murmur    • High cholesterol    • History of nuclear stress test 2011?   • HTN (hypertension)    • Hypertriglyceridemia    • Hypothyroidism    • Multiple lung nodules    • Osteoarthritis    • Osteoporosis    • Peripheral neuropathy    • Personal history of tobacco use    • Plantar fasciitis     left   • Pneumonia    • Sinus problem    • Sinusitis    • Tear of  meniscus of knee     right   • TIA (transient ischemic attack)     denies 5/22/19.  states had aphasia in E.R., but was ruled out.   • Uterine leiomyoma    • Viral gastroenteritis    • Vitamin D deficiency    • Wears glasses    • Weight loss, non-intentional     80 lb in 5 years        Past Surgical History:   Procedure Laterality Date   • CARDIAC CATHETERIZATION  2007?    no stents   • CARDIAC SURGERY     • CATARACT EXTRACTION Bilateral 2009   • CATARACT EXTRACTION, BILATERAL     • CHOLECYSTECTOMY  2002   • COLONOSCOPY     • CORONARY ARTERY BYPASS GRAFT  1998    2 vessels   • ENDOSCOPY     • ENDOSCOPY N/A 5/3/2017    Procedure: ESOPHAGOGASTRODUODENOSCOPY with biopsies;  Surgeon: Yared Castellanos MD;  Location: [facility] ENDOSCOPY;  Service:    • ENDOSCOPY N/A 5/23/2019    Procedure: ESOPHAGOGASTRODUODENOSCOPY WITH BIOPSY AND BALLOON DILITATION;  Surgeon: Yared Castellanos MD;  Location: [facility] ENDOSCOPY;  Service: Gastroenterology   • MOUTH SURGERY      full extraction   • TOTAL HIP ARTHROPLASTY Left 02/02/2010    PREETI Stanley MD       Family History   Problem Relation Age of Onset   • Cancer Mother         uterus   • Cancer Sister         colon; kidney   • Hypertension Sister    • Stroke Sister    • Cancer Brother         prostate   • Arthritis Other    • Cancer Other    • Kidney disease Other    • Stroke Other    • Hypertension Other    • Gout Other    • Colon cancer Sister    • Cancer Sister         kidney   • Cirrhosis Neg Hx    • Liver disease Neg Hx    • Liver cancer Neg Hx    • Stomach cancer Neg Hx        Social History     Socioeconomic History   • Marital status:      Spouse name: Not on file   • Number of children: Not on file   • Years of education: Not on file   • Highest education level: Not on file   Occupational History   • Occupation: retired   Tobacco Use   • Smoking status: Former Smoker     Packs/day: 0.00     Years: 65.00     Pack years: 0.00     Types: Cigarettes     Last attempt to quit:  10/1/2018     Years since quittin.8   • Smokeless tobacco: Never Used   Substance and Sexual Activity   • Alcohol use: Yes     Comment: Seldom   • Drug use: No   • Sexual activity: Defer   Social History Narrative    Right hand dominant         Current Outpatient Medications:   •  albuterol sulfate HFA (PROAIR HFA) 108 (90 Base) MCG/ACT inhaler, Inhale 2 puffs Every 4 (Four) Hours As Needed for Wheezing or Shortness of Air., Disp: 54 g, Rfl: 3  •  amLODIPine (NORVASC) 10 MG tablet, Take 1 tablet by mouth Daily., Disp: 90 tablet, Rfl: 3  •  aspirin 81 MG tablet, Take 1 tablet by mouth Daily., Disp: 30 tablet, Rfl: 11  •  baclofen (LIORESAL) 10 MG tablet, Take 10 mg by mouth Every Night., Disp: , Rfl:   •  buPROPion XL (WELLBUTRIN XL) 150 MG 24 hr tablet, TAKE ONE TABLET BY MOUTH EVERY DAY, Disp: 30 tablet, Rfl: 2  •  carvedilol (COREG) 12.5 MG tablet, Take 1 tablet by mouth 2 (Two) Times a Day., Disp: 180 tablet, Rfl: 2  •  Cholecalciferol (VITAMIN D3) 125 MCG (5000 UT) capsule capsule, TAKE ONE CAPSULE BY MOUTH EVERY DAY, Disp: 100 capsule, Rfl: 3  •  HYDROcodone-acetaminophen (NORCO) 7.5-325 MG per tablet, Take 1 tablet by mouth 2 (Two) Times a Day As Needed for Mild Pain (1-3) or Moderate Pain (4-6)., Disp: , Rfl:   •  hydrOXYzine (ATARAX) 25 MG tablet, TAKE ONE TABLET THREE TIMES DAILY AS NEEDED FOR ITCHING, Disp: 30 tablet, Rfl: 1  •  levoFLOXacin (Levaquin) 500 MG tablet, Take 1 tablet by mouth Daily., Disp: 7 tablet, Rfl: 0  •  levothyroxine (SYNTHROID, LEVOTHROID) 100 MCG tablet, TAKE ONE TABLET BY MOUTH EVERY DAY, Disp: 90 tablet, Rfl: 0  •  losartan (COZAAR) 100 MG tablet, TAKE ONE TABLET BY MOUTH EVERY DAY, Disp: 30 tablet, Rfl: 5  •  morphine (MS CONTIN) 30 MG 12 hr tablet, Take 30 mg by mouth 2 (Two) Times a Day., Disp: , Rfl:   •  pantoprazole (PROTONIX) 40 MG EC tablet, TAKE ONE TABLET BY MOUTH EVERY DAY, Disp: 30 tablet, Rfl: 11  •  Probiotic Product (ALIGN) capsule, Take 1 capsule by mouth  "Daily., Disp: 7 capsule, Rfl: 0  •  vitamin C (ASCORBIC ACID) 500 MG tablet, Take 500 mg by mouth Daily., Disp: , Rfl:     Allergies   Allergen Reactions   • Ciprofloxacin GI Intolerance     Patient states she is not allergic.   • Hazelnut Nausea Only   • Keflex [Cephalexin] Nausea Only   • Niacin Rash     \"it makes my skin burn\"   • Oxycodone Dizziness     \"it makes me feel dopey and out of my head\"  NOTE: does tolerate hydrocodone and morphine   • Statins Myalgia       Review of Systems   Constitutional: Negative for fever.   HENT: Negative for dental problem and voice change.    Eyes: Negative for visual disturbance.   Respiratory: Negative for shortness of breath.    Cardiovascular: Negative for chest pain.   Gastrointestinal: Negative for abdominal pain.        Recently finished 2 antibiotics per Dr. Castellanos for stomach issues   Genitourinary: Negative for dysuria.   Musculoskeletal: Positive for arthralgias and gait problem. Negative for joint swelling.   Skin: Negative for rash.   Neurological: Negative for speech difficulty.   Hematological: Does not bruise/bleed easily.   Psychiatric/Behavioral: Negative for confusion.        I have reviewed the medical and surgical history, family history, social history, medications, and/or allergies, and the review of systems of this report.    Objective   Resp 18   Ht 165.1 cm (65\")   Wt 59.2 kg (130 lb 9.6 oz)   LMP  (LMP Unknown)   BMI 21.73 kg/m²    Physical Exam   Constitutional: She is oriented to person, place, and time. She appears well-developed and well-nourished.   Pulmonary/Chest: Effort normal. No respiratory distress.   Musculoskeletal:        Right shoulder: She exhibits decreased range of motion, tenderness, crepitus and pain. She exhibits no deformity.        Right knee: She exhibits no swelling, no effusion, no ecchymosis and no erythema. Tenderness found. Medial joint line and lateral joint line tenderness noted.   Neurological: She is alert and " oriented to person, place, and time.   Psychiatric: She has a normal mood and affect.   Nursing note and vitals reviewed.    Right Knee Exam     Range of Motion   Extension: 5   Flexion: 100     Tests   Ashanti:  Medial - negative Lateral - negative  Varus: negative Valgus: negative  Lachman:  Anterior - negative        Other   Erythema: absent  Effusion: no effusion present      Right Shoulder Exam     Tenderness   The patient is experiencing tenderness in the acromioclavicular joint and acromion.    Muscle Strength   Supraspinatus: 4/5   Subscapularis: 4/5   Biceps: 4/5     Tests   Cross arm: positive  Impingement: positive  Drop arm: negative    Other   Erythema: absent           Extremity DVT signs are negative on physical exam with negative Teresa sign, no calf pain, no palpable cords and no skin tone change   Neurologic Exam     Mental Status   Oriented to person, place, and time.            Assessment/Plan   Independent Review of Radiographic Studies:    X-ray of the right shoulder 3 views performed in the office reveal no acute fracture or dislocation.  There is AC joint arthritic changes.  X-ray of the right knee reveals medial joint space narrowing with chondrocalcinosis present and medial joint space narrowing    Large Joint Arthrocentesis: R knee  Date/Time: 7/15/2020 11:17 AM  Consent given by: patient  Site marked: site marked  Timeout: Immediately prior to procedure a time out was called to verify the correct patient, procedure, equipment, support staff and site/side marked as required   Supporting Documentation  Indications: pain   Procedure Details  Location: knee - R knee  Preparation: Patient was prepped and draped in the usual sterile fashion  Needle size: 22 G  Approach: anterolateral  Medications administered: 2 mL lidocaine 1 %; 40 mg methylPREDNISolone acetate 40 MG/ML  Patient tolerance: patient tolerated the procedure well with no immediate complications    Large Joint Arthrocentesis: R  subacromial bursa  Date/Time: 7/15/2020 11:17 AM  Consent given by: patient  Site marked: site marked  Timeout: Immediately prior to procedure a time out was called to verify the correct patient, procedure, equipment, support staff and site/side marked as required   Supporting Documentation  Indications: pain   Procedure Details  Location: shoulder - R subacromial bursa  Preparation: Patient was prepped and draped in the usual sterile fashion  Needle size: 22 G  Approach: posterior  Medications administered: 2 mL lidocaine 1 %; 40 mg methylPREDNISolone acetate 40 MG/ML  Patient tolerance: patient tolerated the procedure well with no immediate complications             Mi was seen today for pain and pain.    Diagnoses and all orders for this visit:    Shoulder impingement syndrome, right  -     Cancel: XR Shoulder 2+ View Right  -     Miscellaneous DME  -     Wheelchair  -     Large Joint Arthrocentesis: R subacromial bursa  -     XR Knee 1 or 2 View Right  -     lidocaine (XYLOCAINE) 1 % injection 2 mL  -     methylPREDNISolone acetate (DEPO-medrol) injection 40 mg    Arthralgia of right knee  -     Cancel: XR Knee 1 or 2 View Right  -     Miscellaneous DME  -     Wheelchair  -     XR Shoulder 2+ View Right    Arthralgia of right hip  -     Miscellaneous DME  -     Wheelchair    Primary osteoarthritis of right knee  -     Miscellaneous DME  -     Wheelchair  -     Large Joint Arthrocentesis  -     lidocaine (XYLOCAINE) 1 % injection 2 mL  -     methylPREDNISolone acetate (DEPO-medrol) injection 40 mg    Primary osteoarthritis of right hip  -     Miscellaneous DME  -     Wheelchair    Lumbar degenerative disc disease  -     Miscellaneous DME  -     Wheelchair    History of total hip arthroplasty, left  -     Miscellaneous DME  -     Wheelchair       Orthopedic activities reviewed and patient expressed appreciation  Discussion of orthopedic goals  Risk, benefits, and merits of treatment alternatives reviewed with  the patient and questions answered  Call or notify for any adverse effect from injection therapy    Recommendations/Plan:   Exercise, medications, injections, other patient advice, and return appointment as noted.  Patient is encouraged to call or return for any issues or concerns.    Patient agreeable to call or return sooner for any concerns.               EMR Dragon-transcription disclaimer:  This encounter note is an electronic transcription of spoken language to printed text.  Electronic transcription of spoken language may permit erroneous or at times nonsensical words or phrases to be inadvertently transcribed.  Although I have reviewed the note for such errors, some may still exist

## 2020-07-17 ENCOUNTER — OFFICE VISIT (OUTPATIENT)
Dept: INTERNAL MEDICINE | Facility: CLINIC | Age: 85
End: 2020-07-17

## 2020-07-17 VITALS
BODY MASS INDEX: 21.51 KG/M2 | HEART RATE: 70 BPM | SYSTOLIC BLOOD PRESSURE: 130 MMHG | OXYGEN SATURATION: 97 % | WEIGHT: 129.12 LBS | TEMPERATURE: 97.8 F | HEIGHT: 65 IN | DIASTOLIC BLOOD PRESSURE: 66 MMHG | RESPIRATION RATE: 16 BRPM

## 2020-07-17 DIAGNOSIS — K21.9 GASTROESOPHAGEAL REFLUX DISEASE WITHOUT ESOPHAGITIS: ICD-10-CM

## 2020-07-17 DIAGNOSIS — D64.9 ANEMIA, UNSPECIFIED TYPE: ICD-10-CM

## 2020-07-17 DIAGNOSIS — R91.8 MULTIPLE PULMONARY NODULES: ICD-10-CM

## 2020-07-17 DIAGNOSIS — F41.9 ANXIETY: ICD-10-CM

## 2020-07-17 DIAGNOSIS — J43.8 OTHER EMPHYSEMA (HCC): ICD-10-CM

## 2020-07-17 DIAGNOSIS — E78.5 HYPERLIPIDEMIA, UNSPECIFIED HYPERLIPIDEMIA TYPE: ICD-10-CM

## 2020-07-17 DIAGNOSIS — E55.9 VITAMIN D DEFICIENCY: ICD-10-CM

## 2020-07-17 DIAGNOSIS — I25.10 CHRONIC CORONARY ARTERY DISEASE: Primary | ICD-10-CM

## 2020-07-17 DIAGNOSIS — I10 ESSENTIAL HYPERTENSION: ICD-10-CM

## 2020-07-17 DIAGNOSIS — M81.0 SENILE OSTEOPOROSIS: ICD-10-CM

## 2020-07-17 DIAGNOSIS — R07.81 COSTAL MARGIN PAIN: ICD-10-CM

## 2020-07-17 PROCEDURE — 99214 OFFICE O/P EST MOD 30 MIN: CPT | Performed by: INTERNAL MEDICINE

## 2020-07-17 NOTE — PROGRESS NOTES
Subjective   Mi Tejeda is a 84 y.o. female.     Chief Complaint   Patient presents with   • Anemia     1 mo f/u   • Hypertension       History of Present Illness   Patient here for follow-up.  UTI resolved after treatment.  Abdominal pain improved on antibiotics by gastroenterologist diagnosed diverticulitis.  Anemia patient does have positive blood in stool patient yet to do blood tests for anemia work-up.  Left costal margin pain still comes and goes even though better.  Hyperlipidemia stable on medication hypertension stable on medication emphysema stable on medication and the lung nodule needs to be repeated in next year.  Anxiety patient self discontinued Wellbutrin.    Current Outpatient Medications:   •  albuterol sulfate HFA (PROAIR HFA) 108 (90 Base) MCG/ACT inhaler, Inhale 2 puffs Every 4 (Four) Hours As Needed for Wheezing or Shortness of Air., Disp: 54 g, Rfl: 3  •  amLODIPine (NORVASC) 10 MG tablet, Take 1 tablet by mouth Daily., Disp: 90 tablet, Rfl: 3  •  aspirin 81 MG tablet, Take 1 tablet by mouth Daily., Disp: 30 tablet, Rfl: 11  •  baclofen (LIORESAL) 10 MG tablet, Take 10 mg by mouth Every Night., Disp: , Rfl:   •  buPROPion XL (WELLBUTRIN XL) 150 MG 24 hr tablet, TAKE ONE TABLET BY MOUTH EVERY DAY, Disp: 30 tablet, Rfl: 2  •  carvedilol (COREG) 12.5 MG tablet, Take 1 tablet by mouth 2 (Two) Times a Day., Disp: 180 tablet, Rfl: 2  •  Cholecalciferol (VITAMIN D3) 125 MCG (5000 UT) capsule capsule, TAKE ONE CAPSULE BY MOUTH EVERY DAY, Disp: 100 capsule, Rfl: 3  •  HYDROcodone-acetaminophen (NORCO) 7.5-325 MG per tablet, Take 1 tablet by mouth 2 (Two) Times a Day As Needed for Mild Pain (1-3) or Moderate Pain (4-6)., Disp: , Rfl:   •  hydrOXYzine (ATARAX) 25 MG tablet, TAKE ONE TABLET THREE TIMES DAILY AS NEEDED FOR ITCHING, Disp: 30 tablet, Rfl: 1  •  levoFLOXacin (Levaquin) 500 MG tablet, Take 1 tablet by mouth Daily., Disp: 7 tablet, Rfl: 0  •  levothyroxine (SYNTHROID, LEVOTHROID) 100  MCG tablet, TAKE ONE TABLET BY MOUTH EVERY DAY, Disp: 90 tablet, Rfl: 0  •  losartan (COZAAR) 100 MG tablet, TAKE ONE TABLET BY MOUTH EVERY DAY, Disp: 30 tablet, Rfl: 5  •  morphine (MS CONTIN) 30 MG 12 hr tablet, Take 30 mg by mouth 2 (Two) Times a Day., Disp: , Rfl:   •  pantoprazole (PROTONIX) 40 MG EC tablet, TAKE ONE TABLET BY MOUTH EVERY DAY, Disp: 30 tablet, Rfl: 11  •  Probiotic Product (ALIGN) capsule, Take 1 capsule by mouth Daily., Disp: 7 capsule, Rfl: 0  •  vitamin C (ASCORBIC ACID) 500 MG tablet, Take 500 mg by mouth Daily., Disp: , Rfl:     The following portions of the patient's history were reviewed and updated as appropriate: allergies, current medications, past family history, past medical history, past social history, past surgical history and problem list.    Review of Systems   Constitutional: Negative.    Respiratory: Negative.    Cardiovascular: Negative.    Gastrointestinal: Negative.    Musculoskeletal: Negative.    Skin: Negative.    Neurological: Negative.    Psychiatric/Behavioral: Negative.        Objective   Physical Exam   Constitutional: She is oriented to person, place, and time. She appears well-developed and well-nourished.   Neck: Neck supple.   Cardiovascular: Normal rate, regular rhythm and normal heart sounds.   Pulmonary/Chest: Effort normal and breath sounds normal.   Abdominal: Bowel sounds are normal.   Neurological: She is alert and oriented to person, place, and time.   Skin: Skin is warm.   Psychiatric: She has a normal mood and affect.       All tests have been reviewed.    Assessment/Plan   There are no diagnoses linked to this encounter.          Gastroesophageal reflux disease without esophagitis continue medication history of esophageal dilation patient states occasional swallow problems.  Continue PPI for now.    ?diverticulitis on meds from GI     Anemia, stool positive for blood , ?relate to divertic, seeing GI   -     CBC & Differential  -     Basic Metabolic  Panel  -     Ferritin  -     Folate  -     Haptoglobin  -     Iron Profile  -     Lactate Dehydrogenase  -     Reticulocytes  -     Sedimentation Rate  -     Vitamin B12   UTI s/p tx doing well  Costal margin pain encourage patient to stand up more rather than sitting or the time, off and on improved    Other hyperlipidemia continue medication     Essential hypertension continue blood pressure medicine     Other emphysema (CMS/HCC) continue medicine     Multiple pulmonary nodules repeat 2/2021     Senile osteoporosis continue shots with calcium.     Anxiety stable now resume wellbutirn      1 mo after labs               Below is to historical records for reference only:  Right thigh numb and sharp pain patient is seeing orthopedist  CAD CABG history no cp EKG old MI , refused stress test and EKG. Unable to take a cholesterol medicine patient is allergic to it.refuse PCK9 even ins covers. plavix, resume  early satirety resolved.  seen GI, s/p EGD5/2017: 0.75 cm clean base gastric ulcer at the body of the stomach. Risks of bleeding less than 5%.Non-bleeding duodenal bulb vascular ectasia.Erythematous gastritis.Small sliding hiatal hernia less than 3 cm.Nonobstructive Schatzki's-type ring, following with Dr. William, cont protonix, to have endoscopy on 5/23/19   pulmonary CT scan showed a small nodules, repeat stable, no need of repeat  CT scan does show 4.3 cm uterine fibroid s/p ultrasound utero mass, seen by gyn,  stable, no symptoms, rec watch per gyn by patient  TIA history of possible TIA left eye cholesterol deposits by ophthalmologist. carotid duplex 50%, CTA neck artery done 2017, 10 and 40 % otoniel,   CT head showed microvasc changes   Claudication possible PAD history of stents left leg artery. ultrasound again stenosis   Tobacco, QUIT 10/2018 on nicotine patch  muscle cramp -- improved continue flexeril  plantar faciatis banks's neuroma, -- continue to followup podiatrist   LBP arthritis -- follow up with pain  clinic and on Pain medicine, on MS  Fe def anemia normal after fe supplement obtain colon report from dr leung done 4/2013,, SBFT normal,-- continue iron supplements.  colonoscopy 4/9/2013.colon polyp(tubular adenoma), AVM, and divertic -- follow dr rice , declines further colonoscopy  vit D low--continue supplement.   hyperglycemia -- good diet  depression/anxiety continue medications improved on neurontin, xanax d/c'd by pain doctor, remeron no help, xanax d/c'd by pain clinic,-- continue hydroxyzine wellbutrin  HTN -- continue medicine stable  hypothyroidism -- continue medication   LBP spinal stenosis. -- continue medicine from pain clinic on narcotics  copd severe restrictive disease on PFT, unable to use spiriva, -- continue symbicort as needed, better since stopped smoking  refuse prevnar 13, refuse zostavax and shingrix, refuses flushot, pneumovax done  Muscle cramp in toes follow up with podiatrist. Baclofen no help, stretch exercise  Hip pain -- follow ortho--leg edema compression hose  Mild tremor watch for now  Memory problem, reviewed  CT head -- doing ok  Osteopenia on dexa vit d and calcium continue repeat

## 2020-08-05 ENCOUNTER — OFFICE VISIT (OUTPATIENT)
Dept: ORTHOPEDIC SURGERY | Facility: CLINIC | Age: 85
End: 2020-08-05

## 2020-08-05 VITALS — BODY MASS INDEX: 21.49 KG/M2 | RESPIRATION RATE: 18 BRPM | WEIGHT: 129 LBS | HEIGHT: 65 IN

## 2020-08-05 DIAGNOSIS — M25.561 ARTHRALGIA OF RIGHT KNEE: ICD-10-CM

## 2020-08-05 DIAGNOSIS — M70.61 GREATER TROCHANTERIC BURSITIS OF BOTH HIPS: ICD-10-CM

## 2020-08-05 DIAGNOSIS — M25.511 ARTHRALGIA OF RIGHT SHOULDER REGION: Primary | ICD-10-CM

## 2020-08-05 DIAGNOSIS — M25.552 PAIN OF BOTH HIP JOINTS: ICD-10-CM

## 2020-08-05 DIAGNOSIS — M70.62 GREATER TROCHANTERIC BURSITIS OF BOTH HIPS: ICD-10-CM

## 2020-08-05 DIAGNOSIS — M25.551 PAIN OF BOTH HIP JOINTS: ICD-10-CM

## 2020-08-05 PROCEDURE — 20610 DRAIN/INJ JOINT/BURSA W/O US: CPT | Performed by: PHYSICIAN ASSISTANT

## 2020-08-05 RX ORDER — LIDOCAINE HYDROCHLORIDE 10 MG/ML
2 INJECTION, SOLUTION INFILTRATION; PERINEURAL
Status: COMPLETED | OUTPATIENT
Start: 2020-08-05 | End: 2020-08-05

## 2020-08-05 RX ORDER — METHYLPREDNISOLONE ACETATE 40 MG/ML
40 INJECTION, SUSPENSION INTRA-ARTICULAR; INTRALESIONAL; INTRAMUSCULAR; SOFT TISSUE
Status: COMPLETED | OUTPATIENT
Start: 2020-08-05 | End: 2020-08-05

## 2020-08-05 RX ADMIN — LIDOCAINE HYDROCHLORIDE 2 ML: 10 INJECTION, SOLUTION INFILTRATION; PERINEURAL at 13:33

## 2020-08-05 RX ADMIN — METHYLPREDNISOLONE ACETATE 40 MG: 40 INJECTION, SUSPENSION INTRA-ARTICULAR; INTRALESIONAL; INTRAMUSCULAR; SOFT TISSUE at 13:33

## 2020-08-05 NOTE — PROGRESS NOTES
Subjective   Patient ID: Mi Tejeda is a 84 y.o. right hand dominant female  Pain of the Right Hip; Pain of the Left Hip (Requests inj today for both hip for bursitis); Pain of the Right Shoulder (Patient states shoulder is hurting, inj on 7/15/20 did not help to alleviate symptoms. /); and Pain of the Right Knee (Patient states knee is hurting, inj on 7/15/20 did not help to alleviate symptoms. )         History of Present Illness  Patient presents with complaints of bilateral hip pain to the lateral aspect.  There is been no injury or trauma.  She states in the past she received bursa injections which helped.  She also reports the right shoulder cortisone injection did not help with her pain control.  Patient does take hydrocodone and morphine for chronic arthritis pain                                                 Past Medical History:   Diagnosis Date   • Abdominal pain     states hx of   • Abnormal electrocardiogram    • Abnormal urinalysis    • Acute UTI    • Anemia    • Anomaly cardiac     REPORTS WILL SHOW ON LEFT VENTRICLE IN A 12 LEAD EKG AS BEING A MI BUT REPORTS SHE HAS NEVER HAD THIS    • Anxiety    • Aphagia     HX OF THIS, REPORTS WAS CLEARED OF A CVA BUT WAS TOLD WAS RELATED TO FATIGUE   • Arthritis    • Asthma    • Atrial fibrillation (CMS/HCC)    • Backache    • Bursitis of right shoulder    • CAD (coronary artery disease)    • Cataract, bilateral     s/p removal   • COPD (chronic obstructive pulmonary disease) (CMS/HCC)    • Depression    • Diverticulitis    • Dysphagia     REPORTS RESOLVED AFTER DILITATION   • Fracture     left wrist, 2 fingers on right hand, toes on right foot, right arm   • Full dentures    • Gastric ulcer    • GERD (gastroesophageal reflux disease)    • Gout    • Heart murmur    • High cholesterol    • History of nuclear stress test 2011?   • HTN (hypertension)    • Hypertriglyceridemia    • Hypothyroidism    • Multiple lung nodules    • Osteoarthritis    •  Osteoporosis    • Peripheral neuropathy    • Personal history of tobacco use    • Plantar fasciitis     left   • Pneumonia    • Sinus problem    • Sinusitis    • Tear of meniscus of knee     right   • TIA (transient ischemic attack)     denies 5/22/19.  states had aphasia in [facility]R[facility] but was ruled out.   • Uterine leiomyoma    • Viral gastroenteritis    • Vitamin D deficiency    • Wears glasses    • Weight loss, non-intentional     80 lb in 5 years        Past Surgical History:   Procedure Laterality Date   • CARDIAC CATHETERIZATION  2007?    no stents   • CARDIAC SURGERY     • CATARACT EXTRACTION Bilateral 2009   • CATARACT EXTRACTION, BILATERAL     • CHOLECYSTECTOMY  2002   • COLONOSCOPY     • CORONARY ARTERY BYPASS GRAFT  1998    2 vessels   • ENDOSCOPY     • ENDOSCOPY N/A 5/3/2017    Procedure: ESOPHAGOGASTRODUODENOSCOPY with biopsies;  Surgeon: Yared Castellanos MD;  Location: [facility] ENDOSCOPY;  Service:    • ENDOSCOPY N/A 5/23/2019    Procedure: ESOPHAGOGASTRODUODENOSCOPY WITH BIOPSY AND BALLOON DILITATION;  Surgeon: Yared Castellanos MD;  Location: [facility] ENDOSCOPY;  Service: Gastroenterology   • MOUTH SURGERY      full extraction   • TOTAL HIP ARTHROPLASTY Left 02/02/2010    PREETI Stanley MD       Family History   Problem Relation Age of Onset   • Cancer Mother         uterus   • Cancer Sister         colon; kidney   • Hypertension Sister    • Stroke Sister    • Cancer Brother         prostate   • Arthritis Other    • Cancer Other    • Kidney disease Other    • Stroke Other    • Hypertension Other    • Gout Other    • Colon cancer Sister    • Cancer Sister         kidney   • Cirrhosis Neg Hx    • Liver disease Neg Hx    • Liver cancer Neg Hx    • Stomach cancer Neg Hx        Social History     Socioeconomic History   • Marital status:      Spouse name: Not on file   • Number of children: Not on file   • Years of education: Not on file   • Highest education level: Not on file   Occupational History   •  Occupation: retired   Tobacco Use   • Smoking status: Former Smoker     Packs/day: 0.00     Years: 65.00     Pack years: 0.00     Types: Cigarettes     Last attempt to quit: 10/1/2018     Years since quittin.8   • Smokeless tobacco: Never Used   Substance and Sexual Activity   • Alcohol use: Yes     Comment: Seldom   • Drug use: No   • Sexual activity: Defer   Social History Narrative    Right hand dominant         Current Outpatient Medications:   •  albuterol sulfate HFA (PROAIR HFA) 108 (90 Base) MCG/ACT inhaler, Inhale 2 puffs Every 4 (Four) Hours As Needed for Wheezing or Shortness of Air., Disp: 54 g, Rfl: 3  •  amLODIPine (NORVASC) 10 MG tablet, Take 1 tablet by mouth Daily., Disp: 90 tablet, Rfl: 3  •  aspirin 81 MG tablet, Take 1 tablet by mouth Daily., Disp: 30 tablet, Rfl: 11  •  baclofen (LIORESAL) 10 MG tablet, Take 10 mg by mouth Every Night., Disp: , Rfl:   •  buPROPion XL (WELLBUTRIN XL) 150 MG 24 hr tablet, TAKE ONE TABLET BY MOUTH EVERY DAY, Disp: 30 tablet, Rfl: 2  •  carvedilol (COREG) 12.5 MG tablet, Take 1 tablet by mouth 2 (Two) Times a Day., Disp: 180 tablet, Rfl: 2  •  Cholecalciferol (VITAMIN D3) 125 MCG (5000 UT) capsule capsule, TAKE ONE CAPSULE BY MOUTH EVERY DAY, Disp: 100 capsule, Rfl: 3  •  HYDROcodone-acetaminophen (NORCO) 7.5-325 MG per tablet, Take 1 tablet by mouth 2 (Two) Times a Day As Needed for Mild Pain (1-3) or Moderate Pain (4-6)., Disp: , Rfl:   •  hydrOXYzine (ATARAX) 25 MG tablet, TAKE ONE TABLET THREE TIMES DAILY AS NEEDED FOR ITCHING, Disp: 30 tablet, Rfl: 1  •  levothyroxine (SYNTHROID, LEVOTHROID) 100 MCG tablet, TAKE ONE TABLET BY MOUTH EVERY DAY, Disp: 90 tablet, Rfl: 0  •  losartan (COZAAR) 100 MG tablet, TAKE ONE TABLET BY MOUTH EVERY DAY, Disp: 30 tablet, Rfl: 5  •  morphine (MS CONTIN) 30 MG 12 hr tablet, Take 30 mg by mouth 2 (Two) Times a Day., Disp: , Rfl:   •  pantoprazole (PROTONIX) 40 MG EC tablet, TAKE ONE TABLET BY MOUTH EVERY DAY, Disp: 30 tablet,  "Rfl: 11  •  Probiotic Product (ALIGN) capsule, Take 1 capsule by mouth Daily., Disp: 7 capsule, Rfl: 0  •  vitamin C (ASCORBIC ACID) 500 MG tablet, Take 500 mg by mouth Daily., Disp: , Rfl:     Allergies   Allergen Reactions   • Ciprofloxacin GI Intolerance     Patient states she is not allergic.   • Hazelnut Nausea Only   • Keflex [Cephalexin] Nausea Only   • Niacin Rash     \"it makes my skin burn\"   • Oxycodone Dizziness     \"it makes me feel dopey and out of my head\"  NOTE: does tolerate hydrocodone and morphine   • Statins Myalgia       Review of Systems   Constitutional: Negative for fever.   HENT: Negative for dental problem and voice change.    Eyes: Negative for visual disturbance.   Respiratory: Negative for shortness of breath.    Cardiovascular: Negative for chest pain.   Gastrointestinal: Negative for abdominal pain.   Genitourinary: Negative for dysuria.   Musculoskeletal: Positive for arthralgias, gait problem ( uses rollator) and joint swelling.   Skin: Negative for rash.   Neurological: Negative for speech difficulty.   Hematological: Does not bruise/bleed easily.   Psychiatric/Behavioral: Negative for confusion.       I have reviewed the medical and surgical history, family history, social history, medications, and/or allergies, and the review of systems of this report.    Objective   Resp 18   Ht 165.1 cm (65\")   Wt 58.5 kg (129 lb)   LMP  (LMP Unknown)   BMI 21.47 kg/m²    Physical Exam   Constitutional: She is oriented to person, place, and time. She appears well-developed and well-nourished.   Pulmonary/Chest: Effort normal.   Musculoskeletal:        Right hip: She exhibits tenderness. She exhibits normal range of motion and no deformity.        Left hip: She exhibits tenderness. She exhibits normal range of motion and normal strength.   Neurological: She is alert and oriented to person, place, and time.   Psychiatric: She has a normal mood and affect.   Nursing note and vitals " reviewed.    Right Hip Exam     Tenderness   The patient is experiencing tenderness in the greater trochanter.    Tests   Akash: positive    Other   Erythema: absent  Sensation: normal      Left Hip Exam     Tenderness   The patient is experiencing tenderness in the greater trochanter.    Tests   Akash: positive    Other   Erythema: absent  Sensation: normal              Neurologic Exam     Mental Status   Oriented to person, place, and time.              Assessment/Plan   Independent Review of Radiographic Studies:    No new imaging done today.      Large Joint Arthrocentesis: R greater trochanteric bursa  Date/Time: 8/5/2020 1:33 PM  Consent given by: patient  Site marked: site marked  Timeout: Immediately prior to procedure a time out was called to verify the correct patient, procedure, equipment, support staff and site/side marked as required   Supporting Documentation  Indications: pain   Procedure Details  Location: hip - R greater trochanteric bursa  Preparation: Patient was prepped and draped in the usual sterile fashion  Needle size: 22 G  Approach: lateral  Medications administered: 2 mL lidocaine 1 %; 40 mg methylPREDNISolone acetate 40 MG/ML  Patient tolerance: patient tolerated the procedure well with no immediate complications    Large Joint Arthrocentesis: L greater trochanteric bursa  Date/Time: 8/5/2020 1:33 PM  Consent given by: patient  Site marked: site marked  Timeout: Immediately prior to procedure a time out was called to verify the correct patient, procedure, equipment, support staff and site/side marked as required   Supporting Documentation  Indications: pain   Procedure Details  Location: hip - L greater trochanteric bursa  Preparation: Patient was prepped and draped in the usual sterile fashion  Needle size: 22 G  Approach: lateral  Medications administered: 2 mL lidocaine 1 %; 40 mg methylPREDNISolone acetate 40 MG/ML  Patient tolerance: patient tolerated the procedure well with no  immediate complications             Mi was seen today for pain, pain, pain and pain.    Diagnoses and all orders for this visit:    Arthralgia of right shoulder region    Arthralgia of right knee    Pain of both hip joints  -     Large Joint Arthrocentesis: R greater trochanteric bursa  -     Large Joint Arthrocentesis: L greater trochanteric bursa    Greater trochanteric bursitis of both hips       Orthopedic activities reviewed and patient expressed appreciation  Discussion of orthopedic goals  Risk, benefits, and merits of treatment alternatives reviewed with the patient and questions answered  Call or notify for any adverse effect from injection therapy    Recommendations/Plan:  Exercise, medications, injections, other patient advice, and return appointment as noted.  Patient is encouraged to call or return for any issues or concerns.    I offered the patient a referral to Dr. Nick to see if he could help with her pain as she is not an ideal surgical candidate for shoulder replacement.  Patient politely declined stating she sees a pain management physician and may talk to the pain management physician about different options for her shoulder pain  Patient agreeable to call or return sooner for any concerns.             EMR Dragon-transcription disclaimer:  This encounter note is an electronic transcription of spoken language to printed text.  Electronic transcription of spoken language may permit erroneous or at times nonsensical words or phrases to be inadvertently transcribed.  Although I have reviewed the note for such errors, some may still exist

## 2020-08-06 ENCOUNTER — OFFICE VISIT (OUTPATIENT)
Dept: CARDIOLOGY | Facility: CLINIC | Age: 85
End: 2020-08-06

## 2020-08-06 VITALS
HEIGHT: 65 IN | SYSTOLIC BLOOD PRESSURE: 92 MMHG | DIASTOLIC BLOOD PRESSURE: 60 MMHG | OXYGEN SATURATION: 97 % | HEART RATE: 60 BPM | WEIGHT: 130 LBS | BODY MASS INDEX: 21.66 KG/M2

## 2020-08-06 DIAGNOSIS — E78.5 HYPERLIPIDEMIA, UNSPECIFIED HYPERLIPIDEMIA TYPE: ICD-10-CM

## 2020-08-06 DIAGNOSIS — I25.10 CHRONIC CORONARY ARTERY DISEASE: ICD-10-CM

## 2020-08-06 DIAGNOSIS — I77.9 PERIPHERAL ARTERIAL OCCLUSIVE DISEASE (HCC): ICD-10-CM

## 2020-08-06 DIAGNOSIS — I10 ESSENTIAL HYPERTENSION: ICD-10-CM

## 2020-08-06 DIAGNOSIS — I35.1 AORTIC VALVE INSUFFICIENCY, ETIOLOGY OF CARDIAC VALVE DISEASE UNSPECIFIED: Primary | ICD-10-CM

## 2020-08-06 PROCEDURE — 99213 OFFICE O/P EST LOW 20 MIN: CPT | Performed by: INTERNAL MEDICINE

## 2020-08-06 NOTE — PROGRESS NOTES
"             [facility] Cardiology Office Follow Up Note    Mi Tejeda  [id number]  2020    Primary Care Provider: Adiel Awad MD    Chief Complaint: Regular follow-up    History of Present Illness:   Mrs. Mi Tejeda is a 84 y.o. female being seen by Cardiology for regular follow-up.  The patient has a past medical history significant for dyslipidemia, hypertension, GERD, hypothyroidism, and COPD with a history of prior tobacco use.  Her cardiac history includes coronary artery disease, with a prior history of two-vessel coronary artery bypass grafting in .  She also has a history of mild to moderate aortic insufficiency.   She presents today for her regular follow-up appointment.  Since her last follow-up, the patient reports she has been under more stress and anxiety due to trying to sell her house and moved in with her daughter in [facility].  She notes her Xanax was discontinued, and she has experienced occasional panic attacks.  With her panic attacks, she was experiencing chest pressure\" which typically resolves when her anxiety level decreases.  She denies exertional chest pain or chest discomfort on a daily basis.  No significant exertional dyspnea.  No orthopnea, PND, or lower extremity swelling.  No palpitations.  No presyncopal or syncopal events.  No other specific complaints at this time.      Past Cardiac Testin. Last Coronary Angio: Remote  2. Prior Stress Testing: Remote  3. Last Echo:               1.  2015                          1.  The patient had normal chamber dimensions with borderline left atrial enlargement with mild left ventricular enlargement with normal global LV systolic function with paradoxical septal motion with estimated ejection fraction of 60%.                            2. The patient had PAP of 24 mmHg. Right atrial pressure was estimated around 10 mmHg.  RV dimensions and RV function normal. "                            3.  The patient had mild-to-moderate aortic, trivial-to-mild mitral and trivial tricuspid insufficiency.                             4.  Trivial pericardial effusion was suggested.                2.  12/27/2019                           1.  Normal left ventricular systolic function, LVEF 66%                          2.  Borderline left atrial dilation                          3.  Mild calcification of the aortic valve, without significant stenosis.  Mild to moderate aortic regurgitation.                          4.  RVSP 31 mmHg  4. Prior Holter Monitor: None    Review of Systems:   Review of Systems   Constitutional: Negative for activity change, appetite change, chills, diaphoresis, fatigue, fever, unexpected weight gain and unexpected weight loss.   Eyes: Negative for blurred vision and double vision.   Respiratory: Positive for chest tightness. Negative for cough, shortness of breath and wheezing.    Cardiovascular: Negative for chest pain, palpitations and leg swelling.   Gastrointestinal: Negative for abdominal pain, anal bleeding, blood in stool and GERD.   Endocrine: Negative for cold intolerance and heat intolerance.   Genitourinary: Negative for hematuria.   Neurological: Negative for dizziness, syncope, weakness and light-headedness.   Hematological: Does not bruise/bleed easily.   Psychiatric/Behavioral: Positive for stress. Negative for depressed mood. The patient is nervous/anxious.        I have reviewed and/or updated the patient's past medical, past surgical, family, social history, problem list and allergies as appropriate.     Medications:     Current Outpatient Medications:   •  albuterol sulfate HFA (PROAIR HFA) 108 (90 Base) MCG/ACT inhaler, Inhale 2 puffs Every 4 (Four) Hours As Needed for Wheezing or Shortness of Air., Disp: 54 g, Rfl: 3  •  amLODIPine (NORVASC) 10 MG tablet, Take 1 tablet by mouth Daily., Disp: 90 tablet, Rfl: 3  •  aspirin 81 MG tablet, Take 1  "tablet by mouth Daily., Disp: 30 tablet, Rfl: 11  •  baclofen (LIORESAL) 10 MG tablet, Take 10 mg by mouth Every Night., Disp: , Rfl:   •  buPROPion XL (WELLBUTRIN XL) 150 MG 24 hr tablet, TAKE ONE TABLET BY MOUTH EVERY DAY, Disp: 30 tablet, Rfl: 2  •  carvedilol (COREG) 12.5 MG tablet, Take 1 tablet by mouth 2 (Two) Times a Day., Disp: 180 tablet, Rfl: 2  •  Cholecalciferol (VITAMIN D3) 125 MCG (5000 UT) capsule capsule, TAKE ONE CAPSULE BY MOUTH EVERY DAY, Disp: 100 capsule, Rfl: 3  •  HYDROcodone-acetaminophen (NORCO) 7.5-325 MG per tablet, Take 1 tablet by mouth 2 (Two) Times a Day As Needed for Mild Pain (1-3) or Moderate Pain (4-6)., Disp: , Rfl:   •  hydrOXYzine (ATARAX) 25 MG tablet, TAKE ONE TABLET THREE TIMES DAILY AS NEEDED FOR ITCHING, Disp: 30 tablet, Rfl: 1  •  levothyroxine (SYNTHROID, LEVOTHROID) 100 MCG tablet, TAKE ONE TABLET BY MOUTH EVERY DAY, Disp: 90 tablet, Rfl: 0  •  losartan (COZAAR) 100 MG tablet, TAKE ONE TABLET BY MOUTH EVERY DAY, Disp: 30 tablet, Rfl: 5  •  morphine (MS CONTIN) 30 MG 12 hr tablet, Take 30 mg by mouth 2 (Two) Times a Day., Disp: , Rfl:   •  pantoprazole (PROTONIX) 40 MG EC tablet, TAKE ONE TABLET BY MOUTH EVERY DAY, Disp: 30 tablet, Rfl: 11  •  Probiotic Product (ALIGN) capsule, Take 1 capsule by mouth Daily., Disp: 7 capsule, Rfl: 0  •  vitamin C (ASCORBIC ACID) 500 MG tablet, Take 500 mg by mouth Daily., Disp: , Rfl:     Physical Exam:  Vital Signs:   Vitals:    08/06/20 1314   BP: 92/60   BP Location: Right arm   Patient Position: Sitting   Cuff Size: Adult   Pulse: 60   SpO2: 97%   Weight: 59 kg (130 lb)   Height: 165.1 cm (65\")       Physical Exam   Constitutional: She is oriented to person, place, and time. She appears well-developed and well-nourished. No distress.   HENT:   Head: Normocephalic and atraumatic.   Moist Mucous Membranes.    Eyes: Pupils are equal, round, and reactive to light. EOM are normal. No scleral icterus.   Neck: No tracheal deviation present. "   Cardiovascular: Normal rate, regular rhythm, normal heart sounds and intact distal pulses. Exam reveals no gallop and no friction rub.   No murmur heard.  Normal JVD.   Pulmonary/Chest: Effort normal and breath sounds normal. No stridor. No respiratory distress. She has no wheezes. She has no rales. She exhibits no tenderness.   Abdominal: Soft. Bowel sounds are normal. She exhibits no distension. There is no tenderness. There is no rebound and no guarding.   Musculoskeletal: Normal range of motion. She exhibits no edema.   Lymphadenopathy:     She has no cervical adenopathy.   Neurological: She is alert and oriented to person, place, and time.   Skin: Skin is warm and dry. She is not diaphoretic. No erythema.   Varicose veins in bilateral lower extremities   Psychiatric: She has a normal mood and affect. Her behavior is normal.       Results Review:   I reviewed the patient's new clinical results.        Assessment / Plan:     1.  Coronary artery disease  --Known history of coronary artery disease, status post two-vessel CABG in 1998  --Intermittent episodes of chest discomfort appear to be related to anxiety with panic attacks  --No exertional chest pain or anginal symptoms  --Continue daily aspirin  --Not on statin due to history of intolerance secondary to myalgias  --Follow-up in 6 months, or sooner if required     2.  Essential hypertension  --Well-controlled today  --No symptoms of orthostasis  --Continue current antihypertensives     3.  Aortic regurgitation  --Last echocardiogram in 2019 with mild to moderate AI, normal LVEF  --Repeat echocardiogram upon follow-up in 6 months to reevaluate AI     4.  Peripheral arterial occlusive disease  --Bilateral SFA disease as well as occlusion of bilateral posterior tibial arteries on prior noninvasive evaluation  --Remains without claudication      Preventative Cardiology:   Tobacco Cessation: Prior cessation  Obstructive Sleep Apnea Screening: N/A  AAA Screening:  N/A  Peripheral Arterial Disease Screening: Completed    Follow Up:   Return in about 6 months (around 2/6/2021).      Thank you for allowing me to participate in the care of your patient. Please to not hesitate to contact me with additional questions or concerns.     LATRICE Wylie MD  Interventional Cardiology   08/06/2020  1:26 PM

## 2020-08-12 ENCOUNTER — APPOINTMENT (OUTPATIENT)
Dept: INFUSION THERAPY | Facility: HOSPITAL | Age: 85
End: 2020-08-12

## 2020-08-17 RX ORDER — LEVOTHYROXINE SODIUM 0.1 MG/1
TABLET ORAL
Qty: 90 TABLET | Refills: 0 | Status: SHIPPED | OUTPATIENT
Start: 2020-08-17 | End: 2021-02-01 | Stop reason: SDUPTHER

## 2020-08-18 ENCOUNTER — HOSPITAL ENCOUNTER (OUTPATIENT)
Dept: INFUSION THERAPY | Facility: HOSPITAL | Age: 85
Discharge: HOME | End: 2020-08-18
Admitting: PHYSICIAN ASSISTANT

## 2020-08-18 VITALS
DIASTOLIC BLOOD PRESSURE: 55 MMHG | RESPIRATION RATE: 18 BRPM | OXYGEN SATURATION: 98 % | SYSTOLIC BLOOD PRESSURE: 141 MMHG | HEART RATE: 57 BPM | TEMPERATURE: 98 F | HEIGHT: 64 IN | BODY MASS INDEX: 22.2 KG/M2 | WEIGHT: 130 LBS

## 2020-08-18 DIAGNOSIS — M81.0 SENILE OSTEOPOROSIS: Primary | ICD-10-CM

## 2020-08-18 PROCEDURE — 25010000002 DENOSUMAB 60 MG/ML SOLUTION PREFILLED SYRINGE: Performed by: PHYSICIAN ASSISTANT

## 2020-08-18 PROCEDURE — 96372 THER/PROPH/DIAG INJ SC/IM: CPT

## 2020-08-18 RX ADMIN — DENOSUMAB 60 MG: 60 INJECTION SUBCUTANEOUS at 13:43

## 2020-08-20 ENCOUNTER — OFFICE VISIT (OUTPATIENT)
Dept: INTERNAL MEDICINE | Facility: CLINIC | Age: 85
End: 2020-08-20

## 2020-08-20 VITALS
BODY MASS INDEX: 22.36 KG/M2 | DIASTOLIC BLOOD PRESSURE: 74 MMHG | WEIGHT: 131 LBS | HEART RATE: 60 BPM | HEIGHT: 64 IN | OXYGEN SATURATION: 97 % | TEMPERATURE: 97.8 F | RESPIRATION RATE: 16 BRPM | SYSTOLIC BLOOD PRESSURE: 130 MMHG

## 2020-08-20 DIAGNOSIS — I10 ESSENTIAL HYPERTENSION: ICD-10-CM

## 2020-08-20 DIAGNOSIS — M81.0 SENILE OSTEOPOROSIS: ICD-10-CM

## 2020-08-20 DIAGNOSIS — K21.9 GASTROESOPHAGEAL REFLUX DISEASE WITHOUT ESOPHAGITIS: Primary | ICD-10-CM

## 2020-08-20 DIAGNOSIS — F41.9 ANXIETY: ICD-10-CM

## 2020-08-20 DIAGNOSIS — D64.9 ANEMIA, UNSPECIFIED TYPE: Primary | ICD-10-CM

## 2020-08-20 DIAGNOSIS — Z78.9 DECREASED ACTIVITIES OF DAILY LIVING (ADL): ICD-10-CM

## 2020-08-20 DIAGNOSIS — R91.8 MULTIPLE PULMONARY NODULES: ICD-10-CM

## 2020-08-20 DIAGNOSIS — D64.9 ANEMIA, UNSPECIFIED TYPE: ICD-10-CM

## 2020-08-20 LAB
BASOPHILS # BLD AUTO: 0.04 10*3/MM3 (ref 0–0.2)
BASOPHILS NFR BLD AUTO: 0.5 % (ref 0–1.5)
BUN SERPL-MCNC: 39 MG/DL (ref 8–23)
BUN/CREAT SERPL: 39.4 (ref 7–25)
CALCIUM SERPL-MCNC: 9.2 MG/DL (ref 8.6–10.5)
CHLORIDE SERPL-SCNC: 105 MMOL/L (ref 98–107)
CO2 SERPL-SCNC: 27.7 MMOL/L (ref 22–29)
CREAT SERPL-MCNC: 0.99 MG/DL (ref 0.57–1)
EOSINOPHIL # BLD AUTO: 0.13 10*3/MM3 (ref 0–0.4)
EOSINOPHIL NFR BLD AUTO: 1.5 % (ref 0.3–6.2)
ERYTHROCYTE [DISTWIDTH] IN BLOOD BY AUTOMATED COUNT: 13.2 % (ref 12.3–15.4)
ERYTHROCYTE [SEDIMENTATION RATE] IN BLOOD BY WESTERGREN METHOD: 12 MM/HR (ref 0–30)
FERRITIN SERPL-MCNC: 126 NG/ML (ref 13–150)
FOLATE SERPL-MCNC: >20 NG/ML (ref 4.78–24.2)
GLUCOSE SERPL-MCNC: 90 MG/DL (ref 65–99)
HAPTOGLOB SERPL-MCNC: 162 MG/DL (ref 41–333)
HCT VFR BLD AUTO: 34.9 % (ref 34–46.6)
HGB BLD-MCNC: 11.4 G/DL (ref 12–15.9)
IMM GRANULOCYTES # BLD AUTO: 0.03 10*3/MM3 (ref 0–0.05)
IMM GRANULOCYTES NFR BLD AUTO: 0.4 % (ref 0–0.5)
IRON SATN MFR SERPL: 16 % (ref 20–50)
IRON SERPL-MCNC: 61 MCG/DL (ref 37–145)
LDH SERPL-CCNC: 138 U/L (ref 135–214)
LYMPHOCYTES # BLD AUTO: 2.44 10*3/MM3 (ref 0.7–3.1)
LYMPHOCYTES NFR BLD AUTO: 28.8 % (ref 19.6–45.3)
MCH RBC QN AUTO: 30.5 PG (ref 26.6–33)
MCHC RBC AUTO-ENTMCNC: 32.7 G/DL (ref 31.5–35.7)
MCV RBC AUTO: 93.3 FL (ref 79–97)
MONOCYTES # BLD AUTO: 0.63 10*3/MM3 (ref 0.1–0.9)
MONOCYTES NFR BLD AUTO: 7.4 % (ref 5–12)
NEUTROPHILS # BLD AUTO: 5.2 10*3/MM3 (ref 1.7–7)
NEUTROPHILS NFR BLD AUTO: 61.4 % (ref 42.7–76)
NRBC BLD AUTO-RTO: 0 /100 WBC (ref 0–0.2)
PLATELET # BLD AUTO: 189 10*3/MM3 (ref 140–450)
POTASSIUM SERPL-SCNC: 4.6 MMOL/L (ref 3.5–5.2)
RBC # BLD AUTO: 3.74 10*6/MM3 (ref 3.77–5.28)
RETICS/RBC NFR AUTO: 1.22 % (ref 0.7–1.9)
SODIUM SERPL-SCNC: 143 MMOL/L (ref 136–145)
TIBC SERPL-MCNC: 373 MCG/DL
UIBC SERPL-MCNC: 312 MCG/DL (ref 112–346)
VIT B12 SERPL-MCNC: 1218 PG/ML (ref 211–946)
WBC # BLD AUTO: 8.47 10*3/MM3 (ref 3.4–10.8)

## 2020-08-20 PROCEDURE — 99214 OFFICE O/P EST MOD 30 MIN: CPT | Performed by: INTERNAL MEDICINE

## 2020-08-20 RX ORDER — ESCITALOPRAM OXALATE 10 MG/1
10 TABLET ORAL DAILY
Qty: 30 TABLET | Refills: 1 | Status: SHIPPED | OUTPATIENT
Start: 2020-08-20 | End: 2020-12-10

## 2020-08-20 NOTE — PROGRESS NOTES
Subjective   Mi Tejeda is a 84 y.o. female.     Chief Complaint   Patient presents with   • Labs Only       History of Present Illness   Patient here for follow-up of.  The GERD stable now. abd pain resolved after abx thought to be Diverticulitis resolved.  Anemia hemoglobin decreased work-up by lab unremarkable.  Patient was seen by gastroenterologist.  Rib cage pain occasional.  Hyperlipidemia stable on medication hypertension stable on medication.  Emphysema stable on medication.  Lung nodule stable.  Anxiety still after Wellbutrin.    Current Outpatient Medications:   •  albuterol sulfate HFA (PROAIR HFA) 108 (90 Base) MCG/ACT inhaler, Inhale 2 puffs Every 4 (Four) Hours As Needed for Wheezing or Shortness of Air., Disp: 54 g, Rfl: 3  •  amLODIPine (NORVASC) 10 MG tablet, Take 1 tablet by mouth Daily., Disp: 90 tablet, Rfl: 3  •  aspirin 81 MG tablet, Take 1 tablet by mouth Daily., Disp: 30 tablet, Rfl: 11  •  baclofen (LIORESAL) 10 MG tablet, Take 10 mg by mouth Every Night., Disp: , Rfl:   •  carvedilol (COREG) 12.5 MG tablet, Take 1 tablet by mouth 2 (Two) Times a Day., Disp: 180 tablet, Rfl: 2  •  Cholecalciferol (VITAMIN D3) 125 MCG (5000 UT) capsule capsule, TAKE ONE CAPSULE BY MOUTH EVERY DAY, Disp: 100 capsule, Rfl: 3  •  HYDROcodone-acetaminophen (NORCO) 7.5-325 MG per tablet, Take 1 tablet by mouth 2 (Two) Times a Day As Needed for Mild Pain (1-3) or Moderate Pain (4-6)., Disp: , Rfl:   •  hydrOXYzine (ATARAX) 25 MG tablet, TAKE ONE TABLET THREE TIMES DAILY AS NEEDED FOR ITCHING, Disp: 30 tablet, Rfl: 1  •  levothyroxine (SYNTHROID, LEVOTHROID) 100 MCG tablet, TAKE ONE TABLET BY MOUTH EVERY DAY, Disp: 90 tablet, Rfl: 0  •  losartan (COZAAR) 100 MG tablet, TAKE ONE TABLET BY MOUTH EVERY DAY, Disp: 30 tablet, Rfl: 5  •  morphine (MS CONTIN) 30 MG 12 hr tablet, Take 30 mg by mouth 2 (Two) Times a Day., Disp: , Rfl:   •  pantoprazole (PROTONIX) 40 MG EC tablet, TAKE ONE TABLET BY MOUTH EVERY DAY,  Disp: 30 tablet, Rfl: 11  •  Probiotic Product (ALIGN) capsule, Take 1 capsule by mouth Daily., Disp: 7 capsule, Rfl: 0  •  vitamin C (ASCORBIC ACID) 500 MG tablet, Take 500 mg by mouth Daily., Disp: , Rfl:   •  buPROPion XL (WELLBUTRIN XL) 150 MG 24 hr tablet, TAKE ONE TABLET BY MOUTH EVERY DAY, Disp: 30 tablet, Rfl: 2  •  escitalopram (Lexapro) 10 MG tablet, Take 1 tablet by mouth Daily., Disp: 30 tablet, Rfl: 1    The following portions of the patient's history were reviewed and updated as appropriate: allergies, current medications, past family history, past medical history, past social history, past surgical history and problem list.    Review of Systems   Constitutional: Negative.    Respiratory: Negative.    Cardiovascular: Negative.    Gastrointestinal: Negative.    Musculoskeletal: Negative.    Skin: Negative.    Neurological: Negative.    Psychiatric/Behavioral: Negative.        Objective   Physical Exam   Constitutional: She is oriented to person, place, and time. She appears well-nourished.   Neck: Neck supple.   Cardiovascular: Normal rate, regular rhythm and normal heart sounds.   Pulmonary/Chest: Effort normal and breath sounds normal.   Abdominal: Bowel sounds are normal.   Neurological: She is alert and oriented to person, place, and time.   Skin: Skin is warm.   Psychiatric: She has a normal mood and affect.       All tests have been reviewed.    Assessment/Plan   Diagnoses and all orders for this visit:    Gastroesophageal reflux disease without esophagitis continue medication    Essential hypertension continue medication    Anemia, unspecified type follow-up with her gastroneurologist, if Hb normal then no need to scopes, patient has emphysema , may have SE with anesthesia    Anxiety add Lexapro continue Wellbutrin    Senile osteoporosis continue medications    Multiple pulmonary nodules repeat in 6 months.    Other orders  -     escitalopram (Lexapro) 10 MG tablet; Take 1 tablet by mouth  Daily.    Follow-up in 1 month again           Below is to historical records for reference only:  Right thigh numb and sharp pain patient is seeing orthopedist  CAD CABG history no cp EKG old MI , refused stress test and EKG. Unable to take a cholesterol medicine patient is allergic to it.refuse PCK9 even ins covers. plavix, resume  early satirety resolved.  seen GI, s/p EGD5/2017: 0.75 cm clean base gastric ulcer at the body of the stomach. Risks of bleeding less than 5%.Non-bleeding duodenal bulb vascular ectasia.Erythematous gastritis.Small sliding hiatal hernia less than 3 cm.Nonobstructive Schatzki's-type ring, following with Dr. William, cont protonix, to have endoscopy on 5/23/19   pulmonary CT scan showed a small nodules, repeat stable, no need of repeat  CT scan does show 4.3 cm uterine fibroid s/p ultrasound utero mass, seen by gyn,  stable, no symptoms, rec watch per gyn by patient  TIA history of possible TIA left eye cholesterol deposits by ophthalmologist. carotid duplex 50%, CTA neck artery done 2017, 10 and 40 % otoniel,   CT head showed microvasc changes   Claudication possible PAD history of stents left leg artery. ultrasound again stenosis   Tobacco, QUIT 10/2018 on nicotine patch  muscle cramp -- improved continue flexeril  plantar faciatis banks's neuroma, -- continue to followup podiatrist   LBP arthritis -- follow up with pain clinic and on Pain medicine, on MS  Fe def anemia normal after fe supplement obtain colon report from dr leung done 4/2013,, SBFT normal,-- continue iron supplements.  colonoscopy 4/9/2013.colon polyp(tubular adenoma), AVM, and divertic -- follow dr william , declines further colonoscopy  vit D low--continue supplement.   hyperglycemia -- good diet  depression/anxiety continue medications improved on neurontin, xanax d/c'd by pain doctor, remeron no help, xanax d/c'd by pain clinic,-- continue hydroxyzine wellbutrin  HTN -- continue medicine stable  hypothyroidism -- continue  medication   LBP spinal stenosis. -- continue medicine from pain clinic on narcotics  copd severe restrictive disease on PFT, unable to use spiriva, -- continue symbicort as needed, better since stopped smoking  refuse prevnar 13, refuse zostavax and shingrix, refuses flushot, pneumovax done  Muscle cramp in toes follow up with podiatrist. Baclofen no help, stretch exercise  Hip pain -- follow ortho--leg edema compression hose  Mild tremor watch for now  Memory problem, reviewed  CT head -- doing ok  Osteopenia on dexa vit d and calcium continue repeat

## 2020-08-21 ENCOUNTER — TELEPHONE (OUTPATIENT)
Dept: INTERNAL MEDICINE | Facility: CLINIC | Age: 85
End: 2020-08-21

## 2020-08-21 NOTE — TELEPHONE ENCOUNTER
ALLIE DAMONRN WITH [facility] CALLED TO STATE THAT THEY ARE NOT ABLE TO ACCEPT THE REFERRAL BECAUSE SERVICES ARE NOT OFFERED AT THIS TIME.

## 2020-08-25 ENCOUNTER — RESULTS ENCOUNTER (OUTPATIENT)
Dept: INTERNAL MEDICINE | Facility: CLINIC | Age: 85
End: 2020-08-25

## 2020-08-25 DIAGNOSIS — D64.9 ANEMIA, UNSPECIFIED TYPE: ICD-10-CM

## 2020-09-11 ENCOUNTER — OUTSIDE FACILITY SERVICE (OUTPATIENT)
Dept: INTERNAL MEDICINE | Facility: CLINIC | Age: 85
End: 2020-09-11

## 2020-09-11 PROCEDURE — G0180 MD CERTIFICATION HHA PATIENT: HCPCS | Performed by: INTERNAL MEDICINE

## 2020-09-21 ENCOUNTER — OFFICE VISIT (OUTPATIENT)
Dept: GASTROENTEROLOGY | Facility: CLINIC | Age: 85
End: 2020-09-21

## 2020-09-21 VITALS
TEMPERATURE: 98 F | DIASTOLIC BLOOD PRESSURE: 51 MMHG | WEIGHT: 137 LBS | BODY MASS INDEX: 23.39 KG/M2 | HEIGHT: 64 IN | RESPIRATION RATE: 12 BRPM | HEART RATE: 60 BPM | SYSTOLIC BLOOD PRESSURE: 147 MMHG

## 2020-09-21 DIAGNOSIS — R19.5 OCCULT BLOOD IN STOOLS: Primary | ICD-10-CM

## 2020-09-21 DIAGNOSIS — R13.19 ESOPHAGEAL DYSPHAGIA: ICD-10-CM

## 2020-09-21 DIAGNOSIS — K59.03 DRUG-INDUCED CONSTIPATION: ICD-10-CM

## 2020-09-21 DIAGNOSIS — K21.9 GASTROESOPHAGEAL REFLUX DISEASE WITHOUT ESOPHAGITIS: ICD-10-CM

## 2020-09-21 PROCEDURE — 99214 OFFICE O/P EST MOD 30 MIN: CPT | Performed by: INTERNAL MEDICINE

## 2020-09-21 RX ORDER — POLYETHYLENE GLYCOL 3350 17 G/17G
17 POWDER, FOR SOLUTION ORAL DAILY
Qty: 510 G | Refills: 2 | Status: SHIPPED | OUTPATIENT
Start: 2020-09-21

## 2020-09-21 RX ORDER — DOCUSATE SODIUM 100 MG/1
100 CAPSULE, LIQUID FILLED ORAL 2 TIMES DAILY
Qty: 60 CAPSULE | Refills: 2 | Status: SHIPPED | OUTPATIENT
Start: 2020-09-21 | End: 2021-02-10

## 2020-09-21 NOTE — PROGRESS NOTES
Follow Up Note     Date: 2020   Patient Name: Mi Tejeda  MRN: [id number]  : 1935     Referring Physician: Adiel Awad MD    Chief Complaint:    Chief Complaint   Patient presents with   • Follow-up   • Abdominal Pain   • Anemia       Interval History:   2020  Mi Tejeda is a 84 y.o. female who is here today for follow up for constipation abdominal pain.  She states that since she was given an antibiotic last time her pain gradually improved however she still has a significant constipation not taking any laxatives.  Deny any nausea vomiting.  She had a stool test done which revealed acute blood in 2020 however patient denies any blood in the stool black stool or melena    2020  The patient has a history of reflux off and on for the last several years.  The reflux is moderately severe.  Symptoms are described as retrosternal burning sensation, and indigestion.  There is history of occasional regurgitative symptoms.  Frequency being several times per week.  The symptoms are worse at night.  The patient takes acid suppressive therapy with reasonable control of his symptoms.  The patient had associated dysphagia.  This significantly improved.  The patient denies further abdominal pain.  There is no nausea or vomiting.  There is history of constipation.  The patient has gained 3 pounds since May 2019.  There is no history of liver or pancreatic disease.  She had undergone a colonoscopy in  by surgical service-Dr. Thompson.  The patient was found to have polyps-tubular adenoma.    Subjective      Past Medical History:   Past Medical History:   Diagnosis Date   • Abdominal pain     states hx of   • Abnormal electrocardiogram    • Abnormal urinalysis    • Acute UTI    • Anemia    • Anomaly cardiac     REPORTS WILL SHOW ON LEFT VENTRICLE IN A 12 LEAD EKG AS BEING A MI BUT REPORTS SHE HAS NEVER HAD THIS    • Anxiety    • Aphagia     HX OF THIS, REPORTS WAS CLEARED OF A CVA  BUT WAS TOLD WAS RELATED TO FATIGUE   • Arthritis    • Asthma    • Atrial fibrillation (CMS/HCC)    • Backache    • Bursitis of right shoulder    • CAD (coronary artery disease)    • Cataract, bilateral     s/p removal   • COPD (chronic obstructive pulmonary disease) (CMS/HCC)    • Depression    • Diverticulitis    • Dysphagia     REPORTS RESOLVED AFTER DILITATION   • Fracture     left wrist, 2 fingers on right hand, toes on right foot, right arm   • Full dentures    • Gastric ulcer    • GERD (gastroesophageal reflux disease)    • Gout    • Heart murmur    • High cholesterol    • History of nuclear stress test 2011?   • HTN (hypertension)    • Hypertriglyceridemia    • Hypothyroidism    • Multiple lung nodules    • Osteoarthritis    • Osteoporosis    • Peripheral neuropathy    • Personal history of tobacco use    • Plantar fasciitis     left   • Pneumonia    • Sinus problem    • Sinusitis    • Tear of meniscus of knee     right   • TIA (transient ischemic attack)     denies 5/22/19.  states had aphasia in E.R., but was ruled out.   • Uterine leiomyoma    • Viral gastroenteritis    • Vitamin D deficiency    • Wears glasses    • Weight loss, non-intentional     80 lb in 5 years     Past Surgical History:   Past Surgical History:   Procedure Laterality Date   • CARDIAC CATHETERIZATION  2007?    no stents   • CARDIAC SURGERY     • CATARACT EXTRACTION Bilateral 2009   • CATARACT EXTRACTION, BILATERAL     • CHOLECYSTECTOMY  2002   • COLONOSCOPY     • CORONARY ARTERY BYPASS GRAFT  1998    2 vessels   • ENDOSCOPY     • ENDOSCOPY N/A 5/3/2017    Procedure: ESOPHAGOGASTRODUODENOSCOPY with biopsies;  Surgeon: Yared Castellanos MD;  [facility];  Service:    • ENDOSCOPY N/A 5/23/2019    Procedure: ESOPHAGOGASTRODUODENOSCOPY WITH BIOPSY AND BALLOON DILITATION;  Surgeon: Yared Castellanos MD;  Location[facility];  Service: Gastroenterology   • MOUTH SURGERY      full extraction   • TOTAL HIP ARTHROPLASTY Left  2010    PREETI Stanley MD       Family History:   Family History   Problem Relation Age of Onset   • Cancer Mother         uterus   • Cancer Sister         colon; kidney   • Hypertension Sister    • Stroke Sister    • Cancer Brother         prostate   • Arthritis Other    • Cancer Other    • Kidney disease Other    • Stroke Other    • Hypertension Other    • Gout Other    • Colon cancer Sister    • Cancer Sister         kidney   • Cirrhosis Neg Hx    • Liver disease Neg Hx    • Liver cancer Neg Hx    • Stomach cancer Neg Hx        Social History:   Social History     Socioeconomic History   • Marital status:      Spouse name: Not on file   • Number of children: Not on file   • Years of education: Not on file   • Highest education level: Not on file   Occupational History   • Occupation: retired   Tobacco Use   • Smoking status: Former Smoker     Packs/day: 0.00     Years: 65.00     Pack years: 0.00     Types: Cigarettes     Quit date: 10/1/2018     Years since quittin.9   • Smokeless tobacco: Never Used   Substance and Sexual Activity   • Alcohol use: Yes     Comment: Seldom   • Drug use: No   • Sexual activity: Defer   Social History Narrative    Right hand dominant       Medications:     Current Outpatient Medications:   •  albuterol sulfate HFA (PROAIR HFA) 108 (90 Base) MCG/ACT inhaler, Inhale 2 puffs Every 4 (Four) Hours As Needed for Wheezing or Shortness of Air., Disp: 54 g, Rfl: 3  •  amLODIPine (NORVASC) 10 MG tablet, Take 1 tablet by mouth Daily., Disp: 90 tablet, Rfl: 3  •  aspirin 81 MG tablet, Take 1 tablet by mouth Daily., Disp: 30 tablet, Rfl: 11  •  baclofen (LIORESAL) 10 MG tablet, Take 10 mg by mouth Every Night., Disp: , Rfl:   •  buPROPion XL (WELLBUTRIN XL) 150 MG 24 hr tablet, TAKE ONE TABLET BY MOUTH EVERY DAY, Disp: 30 tablet, Rfl: 2  •  carvedilol (COREG) 12.5 MG tablet, Take 1 tablet by mouth 2 (Two) Times a Day., Disp: 180 tablet, Rfl: 2  •  Cholecalciferol (VITAMIN D3) 125  "MCG (5000 UT) capsule capsule, TAKE ONE CAPSULE BY MOUTH EVERY DAY, Disp: 100 capsule, Rfl: 3  •  escitalopram (Lexapro) 10 MG tablet, Take 1 tablet by mouth Daily., Disp: 30 tablet, Rfl: 1  •  HYDROcodone-acetaminophen (NORCO) 7.5-325 MG per tablet, Take 1 tablet by mouth 2 (Two) Times a Day As Needed for Mild Pain (1-3) or Moderate Pain (4-6)., Disp: , Rfl:   •  hydrOXYzine (ATARAX) 25 MG tablet, TAKE ONE TABLET THREE TIMES DAILY AS NEEDED FOR ITCHING, Disp: 30 tablet, Rfl: 1  •  levothyroxine (SYNTHROID, LEVOTHROID) 100 MCG tablet, TAKE ONE TABLET BY MOUTH EVERY DAY, Disp: 90 tablet, Rfl: 0  •  losartan (COZAAR) 100 MG tablet, TAKE ONE TABLET BY MOUTH EVERY DAY, Disp: 30 tablet, Rfl: 5  •  morphine (MS CONTIN) 30 MG 12 hr tablet, Take 30 mg by mouth 2 (Two) Times a Day., Disp: , Rfl:   •  pantoprazole (PROTONIX) 40 MG EC tablet, TAKE ONE TABLET BY MOUTH EVERY DAY, Disp: 30 tablet, Rfl: 11  •  Probiotic Product (ALIGN) capsule, Take 1 capsule by mouth Daily., Disp: 7 capsule, Rfl: 0  •  vitamin C (ASCORBIC ACID) 500 MG tablet, Take 500 mg by mouth Daily., Disp: , Rfl:     Allergies:   Allergies   Allergen Reactions   • Ciprofloxacin GI Intolerance     Patient states she is not allergic.   • Hazelnut Nausea Only   • Keflex [Cephalexin] Nausea Only   • Niacin Rash     \"it makes my skin burn\"   • Oxycodone Dizziness     \"it makes me feel dopey and out of my head\"  NOTE: does tolerate hydrocodone and morphine   • Statins Myalgia       Review of Systems:   Review of Systems   Constitutional: Positive for unexpected weight loss. Negative for appetite change and fatigue.   HENT: Positive for trouble swallowing.    Gastrointestinal: Positive for constipation and nausea. Negative for abdominal distention, abdominal pain, anal bleeding, blood in stool, diarrhea, rectal pain, vomiting, GERD and indigestion.       The following portions of the patient's history were reviewed and updated as appropriate: allergies, current " "medications, past family history, past medical history, past social history, past surgical history and problem list.    Objective     Physical Exam:  Vital Signs:   Vitals:    09/21/20 1605   BP: 147/51   Pulse: 60   Resp: 12   Temp: 98 °F (36.7 °C)   Weight: 62.1 kg (137 lb)   Height: 162.6 cm (64\")       Physical Exam  Vitals signs and nursing note reviewed.   Constitutional:       Appearance: Normal appearance. She is well-developed.   HENT:      Head: Normocephalic and atraumatic.      Mouth/Throat:      Mouth: Mucous membranes are moist.      Pharynx: Oropharynx is clear.   Eyes:      Conjunctiva/sclera: Conjunctivae normal.   Neck:      Musculoskeletal: Neck supple.   Cardiovascular:      Rate and Rhythm: Normal rate and regular rhythm.      Heart sounds: No murmur.   Pulmonary:      Effort: Pulmonary effort is normal. No respiratory distress.      Breath sounds: Normal breath sounds.   Abdominal:      General: Bowel sounds are normal. There is no distension.      Palpations: Abdomen is soft. There is no mass.      Tenderness: There is no abdominal tenderness.      Hernia: No hernia is present.   Lymphadenopathy:      Cervical: No cervical adenopathy.   Skin:     General: Skin is warm and dry.   Neurological:      Mental Status: She is alert and oriented to person, place, and time.      Sensory: No sensory deficit.         Results Review:   I reviewed the patient's new clinical results.    Orders Only on 06/25/2020   Component Date Value Ref Range Status   • POC OCCULT BLOOD BY IMMUNOASSAY 06/25/2020 Positive* Negative Final      No radiology results for the last 90 days.    Assessment / Plan      1.  Normocytic anemia  2.  FOBT positive stool  She has a normocytic anemia with a hemoglobin around 10-11 g/dL.  She denies any blood in stool or melena.   She has a CKD.  This is most likely anemia of chronic disease with a borderline iron deficiency.   She had colonoscopy in 2013 by Dr. hodgson, polyps removed and " angiectasis reported.  She had an EGD done 2019 revealed a scant skis ring which was dilated.   At this time patient does not want to undergo any endoscopic procedure.  She is aware that low-grade colonic lesions and pathology could be missed without the colonoscopy.  we will consider colonoscopy if there is any overt bleeding    3.  Drug induced constipation  She is on a long-term opioids  She is constipated for many years and not taking any laxatives. Bowel movement once in 2-3 days with a hard stool  High-fiber diet discussed  We will start on a stool softener and also MiraLAX 17 g p.o. daily    4.  GERD  Reflux  under control with PPI Protonix 40 mg p.o. day    5.  Dysphagia  Her prior EGD as per report showed scattered skis ring.  Her dysphagia improved after dilatation in 2019 but now it is back to what it was before. Minimal intermittent difficulty with swallowing for solids.   Does not want to undergo any endoscopic procedure for now  We will monitor      Prior work-up  Laboratory Testing:  Upon review of medical records:     Dated February 15, 2017 sodium 143 potassium 3.8 chloride 106 CO2 30 BUN 28 serum creatinine 0.80 glucose 92. Calcium 9.9. Albumin 4.00. T bili 0.3 AST 23 ALT 18 alkaline phosphatase 91. C-reactive protein 1.10. Serum iron 50. TIBC 339. Iron saturation 15%. Ferritin 62.00. WBC 7.89 hemoglobin 13.5 hematocrit 41.6 MCV 96.3 platelet count 238.     Dated December 8, 2017 serum iron 43.  TIBC 339.  UIBC 296.  Iron saturation 13%.     Dated August 23, 2018 sodium 143 potassium 4.2 chloride 107 CO2 29 BUN 32 serum creatinine 0.90 glucose 111.  Calcium 9.7.  Total protein 6.1.  Albumin 3.40.  T bili 0.4 AST 32 ALT 23 alkaline phosphatase 88.  TSH 2.150.  Sed rate 6.  WBC 8.30 hemoglobin 13.3 hematocrit 42.0 MCV 97.4 and platelet count 206.     Dated March 29, 2019 sodium 141 potassium 3.9 chloride 105 CO2 28 BUN 44 serum creatinine 1.00 glucose 92.  Calcium 9.9.     Abdominal Imaging:  Upon  Review of medical records:     The patient underwent a CT of the abdomen and pelvis without contrast dated July 24, 2015 which revealed: Abdomen: Clear lung bases. Heart size is normal. The limited noncontrast images of the liver are normal. Patient is status post cholecystectomy. The spleen is normal. No adrenal masses are seen. The aorta is normal in caliber. There is no significant free fluid or adenopathy. There is no nephrolithiasis. There is no hydronephrosis. Extensive vascular calcifications are present within the renal arteries. Pelvis: Appendix is normal. There are colonic diverticula present without evidence of diverticulitis. The urinary bladder is unremarkable. There is a 4.3 cm mass adjacent to the uterus which is 6 suspected to reflect a fibroid, however this should be followed up with pelvic ultrasound. There is no free fluid, free air or adenopathy. Bone windows reveal left hip arthroplasty. There are no acute osseous abnormalities.     Dated February 27, 2017 the patient underwent a CT of the abdomen and pelvis without contrast which revealed: No renal or ureteral stone or hydronephrosis. Bladder is normal in contour. Liver, spleen, pancreas and adrenal glands appear normal. The bowel gas pattern is nonobstructive. No focal inflammatory changes are present. The appendix is not visualized but there are no secondary signs to suggest appendicitis. There is scattered diverticulosis without diverticulitis. There is diffuse vascular calcifications.     Procedures:  Upon review of medical records:     Dated April 10, 2013 the patient underwent a colonoscopy by Dr. Thompson from surgical service which revealed: Colon polyp. Arteriovenous malformations, cecum. Diverticulosis coli. Colon, polyp at 10 cm, biopsy revealed tubular adenoma.     Dated May 23, 2019 the patient underwent an upper endoscopy which revealed: Esophageal rings.  Schatzki's ring.  Status post serial dilation to 18 mm.  Small sliding hiatal  hernia.  Gastric diverticulum at the fundus.  Small gastric polyps.  Erythematous gastritis.  Small submucosal nodule in the second portion of duodenum.  Tortuosity and tertiary contractions of esophageal body were noted.  Distal esophagus was noted to be spastic and resistant to insufflation.  However a persistent insufflation this opens.  Duodenum, submucosal nodule, biopsy revealed duodenal type mucosa with no significant histopathologic abnormalities.  Negative for specific microorganisms.  Negative for dysplasia or malignancy.  The endoscopic impression of a submucosal nodule is noted.  The specimen shows duodenal type mucosa with no significant submucosa present for evaluation.  Duodenum, biopsy revealed duodenal type mucosa with no significant histopathologic abnormalities.  Antrum, body and fundus, biopsies revealed gastric mucosa with reactive (chemical) gastropathy.  Negative for intestinal metaplasia or dysplasia.  No Helicobacter pylori-like organisms seen.  Stomach, body, polyps, biopsy revealed fundic gland polyps.  Negative for intestinal metaplasia or dysplasia.  No Helicobacter pylori-like organisms seen.           Follow Up:   No follow-ups on file.    Agusto Garza MD  [facility]  9/21/2020  16:07 EDT     Please note that portions of this note may have been completed with a voice recognition program.

## 2020-09-22 ENCOUNTER — TELEPHONE (OUTPATIENT)
Dept: CARDIOLOGY | Facility: CLINIC | Age: 85
End: 2020-09-22

## 2020-09-22 ENCOUNTER — OFFICE VISIT (OUTPATIENT)
Dept: INTERNAL MEDICINE | Facility: CLINIC | Age: 85
End: 2020-09-22

## 2020-09-22 VITALS
SYSTOLIC BLOOD PRESSURE: 110 MMHG | DIASTOLIC BLOOD PRESSURE: 66 MMHG | HEART RATE: 57 BPM | HEIGHT: 64 IN | OXYGEN SATURATION: 98 % | TEMPERATURE: 97.1 F | BODY MASS INDEX: 23.39 KG/M2 | WEIGHT: 137 LBS

## 2020-09-22 DIAGNOSIS — F41.9 ANXIETY: ICD-10-CM

## 2020-09-22 DIAGNOSIS — R91.8 MULTIPLE PULMONARY NODULES: ICD-10-CM

## 2020-09-22 DIAGNOSIS — D64.9 ANEMIA, UNSPECIFIED TYPE: ICD-10-CM

## 2020-09-22 DIAGNOSIS — E55.9 VITAMIN D DEFICIENCY: Primary | ICD-10-CM

## 2020-09-22 DIAGNOSIS — I77.9 PERIPHERAL ARTERIAL OCCLUSIVE DISEASE (HCC): ICD-10-CM

## 2020-09-22 PROCEDURE — 99214 OFFICE O/P EST MOD 30 MIN: CPT | Performed by: INTERNAL MEDICINE

## 2020-09-22 NOTE — PROGRESS NOTES
Subjective   Mi Tejeda is a 84 y.o. female.     Chief Complaint   Patient presents with   • Follow-up     f/u on anxiety med lexapro, pt states she is still waking up throughout the night.       History of Present Illness   Patient here for follow-up.  GERD stable.  Hypertension stable on medication.  Anemia stable now.  Anxiety improved on medication.  Patient still has some sleeping problem.  Lung nodule needs to be followed up next year.    Current Outpatient Medications:   •  albuterol sulfate HFA (PROAIR HFA) 108 (90 Base) MCG/ACT inhaler, Inhale 2 puffs Every 4 (Four) Hours As Needed for Wheezing or Shortness of Air., Disp: 54 g, Rfl: 3  •  amLODIPine (NORVASC) 10 MG tablet, Take 1 tablet by mouth Daily., Disp: 90 tablet, Rfl: 3  •  aspirin 81 MG tablet, Take 1 tablet by mouth Daily., Disp: 30 tablet, Rfl: 11  •  baclofen (LIORESAL) 10 MG tablet, Take 10 mg by mouth Every Night., Disp: , Rfl:   •  buPROPion XL (WELLBUTRIN XL) 150 MG 24 hr tablet, TAKE ONE TABLET BY MOUTH EVERY DAY, Disp: 30 tablet, Rfl: 2  •  carvedilol (COREG) 12.5 MG tablet, Take 1 tablet by mouth 2 (Two) Times a Day., Disp: 180 tablet, Rfl: 2  •  Cholecalciferol (VITAMIN D3) 125 MCG (5000 UT) capsule capsule, TAKE ONE CAPSULE BY MOUTH EVERY DAY, Disp: 100 capsule, Rfl: 3  •  docusate sodium (COLACE) 100 MG capsule, Take 1 capsule by mouth 2 (Two) Times a Day., Disp: 60 capsule, Rfl: 2  •  escitalopram (Lexapro) 10 MG tablet, Take 1 tablet by mouth Daily., Disp: 30 tablet, Rfl: 1  •  HYDROcodone-acetaminophen (NORCO) 7.5-325 MG per tablet, Take 1 tablet by mouth 2 (Two) Times a Day As Needed for Mild Pain (1-3) or Moderate Pain (4-6)., Disp: , Rfl:   •  hydrOXYzine (ATARAX) 25 MG tablet, TAKE ONE TABLET THREE TIMES DAILY AS NEEDED FOR ITCHING, Disp: 30 tablet, Rfl: 1  •  levothyroxine (SYNTHROID, LEVOTHROID) 100 MCG tablet, TAKE ONE TABLET BY MOUTH EVERY DAY, Disp: 90 tablet, Rfl: 0  •  losartan (COZAAR) 100 MG tablet, TAKE ONE  TABLET BY MOUTH EVERY DAY, Disp: 30 tablet, Rfl: 5  •  morphine (MS CONTIN) 30 MG 12 hr tablet, Take 30 mg by mouth 2 (Two) Times a Day., Disp: , Rfl:   •  pantoprazole (PROTONIX) 40 MG EC tablet, TAKE ONE TABLET BY MOUTH EVERY DAY, Disp: 30 tablet, Rfl: 11  •  polyethylene glycol (MiraLax) 17 GM/SCOOP powder, Take 17 g by mouth Daily., Disp: 510 g, Rfl: 2  •  Probiotic Product (ALIGN) capsule, Take 1 capsule by mouth Daily., Disp: 7 capsule, Rfl: 0  •  vitamin C (ASCORBIC ACID) 500 MG tablet, Take 500 mg by mouth Daily., Disp: , Rfl:     The following portions of the patient's history were reviewed and updated as appropriate: allergies, current medications, past family history, past medical history, past social history, past surgical history and problem list.    Review of Systems   Constitutional: Negative.    Respiratory: Negative.    Cardiovascular: Negative.    Gastrointestinal: Negative.    Musculoskeletal: Negative.    Skin: Negative.    Neurological: Negative.    Psychiatric/Behavioral: Negative.        Objective   Physical Exam  Constitutional:       Appearance: She is normal weight.   HENT:      Head: Normocephalic and atraumatic.      Nose: Nose normal.      Mouth/Throat:      Mouth: Mucous membranes are moist.      Pharynx: Oropharynx is clear.   Eyes:      Extraocular Movements: Extraocular movements intact.      Conjunctiva/sclera: Conjunctivae normal.      Pupils: Pupils are equal, round, and reactive to light.   Neck:      Musculoskeletal: Normal range of motion and neck supple.   Cardiovascular:      Rate and Rhythm: Normal rate and regular rhythm.      Pulses: Normal pulses.      Heart sounds: Normal heart sounds.   Pulmonary:      Effort: Pulmonary effort is normal.      Breath sounds: Normal breath sounds.   Abdominal:      General: Bowel sounds are normal.   Skin:     General: Skin is warm.   Neurological:      General: No focal deficit present.      Mental Status: She is alert and oriented to  person, place, and time.   Psychiatric:         Mood and Affect: Mood normal.         Behavior: Behavior normal.         All tests have been reviewed.    Assessment/Plan   Diagnoses and all orders for this visit:                Gastroesophageal reflux disease without esophagitis continue medication     Essential hypertension continue medication     Anemia,seen by gastroneurologist, Hb stable patient declines scopes for now     Anxiety  continue Wellbutrin lexapro       Multiple pulmonary nodules repeat in 2/2021     Follow-up in 1 month              Below is to historical records for reference only:  Right thigh numb and sharp pain patient is seeing orthopedist  CAD CABG history no cp EKG old MI , refused stress test and EKG. Unable to take a cholesterol medicine patient is allergic to it.refuse PCK9 even ins covers. plavix, resume  early satirety resolved.  seen GI, s/p EGD5/2017: 0.75 cm clean base gastric ulcer at the body of the stomach. Risks of bleeding less than 5%.Non-bleeding duodenal bulb vascular ectasia.Erythematous gastritis.Small sliding hiatal hernia less than 3 cm.Nonobstructive Schatzki's-type ring, following with Dr. William, cont protonix, to have endoscopy on 5/23/19   pulmonary CT scan showed a small nodules, repeat stable, no need of repeat  CT scan does show 4.3 cm uterine fibroid s/p ultrasound utero mass, seen by gyn,  stable, no symptoms, rec watch per gyn by patient  TIA history of possible TIA left eye cholesterol deposits by ophthalmologist. carotid duplex 50%, CTA neck artery done 2017, 10 and 40 % otoniel,   CT head showed microvasc changes   Claudication possible PAD history of stents left leg artery. ultrasound again stenosis   Tobacco, QUIT 10/2018 on nicotine patch  muscle cramp -- improved continue flexeril  plantar faciatis banks's neuroma, -- continue to followup podiatrist   LBP arthritis -- follow up with pain clinic and on Pain medicine, on MS  Fe def anemia normal after fe  supplement obtain colon report from dr leung done 4/2013,, SBFT normal,-- continue iron supplements.  colonoscopy 4/9/2013.colon polyp(tubular adenoma), AVM, and divertic -- follow dr rice , declines further colonoscopy  vit D low--continue supplement.   hyperglycemia -- good diet  depression/anxiety continue medications improved on neurontin, xanax d/c'd by pain doctor, remeron no help, xanax d/c'd by pain clinic,-- continue hydroxyzine wellbutrin  HTN -- continue medicine stable  hypothyroidism -- continue medication   LBP spinal stenosis. -- continue medicine from pain clinic on narcotics, difficult to walk now, on wheelchair, PT no help. Follow pain clinic  copd severe restrictive disease on PFT, unable to use spiriva, -- continue symbicort as needed, better since stopped smoking  refuse prevnar 13, refuse zostavax and shingrix, refuses flushot, pneumovax done  Muscle cramp in toes follow up with podiatrist. Baclofen no help, stretch exercise  Hip pain -- follow ortho--leg edema compression hose  Mild tremor watch for now  Memory problem, reviewed  CT head -- doing ok  Osteopenia on dexa vit d and calcium continue repeat

## 2020-09-22 NOTE — TELEPHONE ENCOUNTER
[facility] insurance sent letter to notify us that the patient last had his Rx for Carvedilol last filled 5-18-20 for 90 day supply. I have called pt to check if she had been in the hospital or [facility] [place] facility or switched to mail order pharmacy. VM full, lvm for daughter and msg w/son to have pt call. He was not aware of any change.

## 2020-12-01 ENCOUNTER — OFFICE VISIT (OUTPATIENT)
Dept: INTERNAL MEDICINE | Facility: CLINIC | Age: 85
End: 2020-12-01

## 2020-12-01 VITALS
BODY MASS INDEX: 22.84 KG/M2 | HEIGHT: 64 IN | HEART RATE: 56 BPM | TEMPERATURE: 96.9 F | RESPIRATION RATE: 16 BRPM | DIASTOLIC BLOOD PRESSURE: 84 MMHG | SYSTOLIC BLOOD PRESSURE: 122 MMHG | WEIGHT: 133.8 LBS | OXYGEN SATURATION: 100 %

## 2020-12-01 DIAGNOSIS — J43.8 OTHER EMPHYSEMA (HCC): ICD-10-CM

## 2020-12-01 DIAGNOSIS — R41.3 MEMORY LOSS: ICD-10-CM

## 2020-12-01 DIAGNOSIS — E03.8 OTHER SPECIFIED HYPOTHYROIDISM: ICD-10-CM

## 2020-12-01 DIAGNOSIS — I77.9 PERIPHERAL ARTERIAL OCCLUSIVE DISEASE (HCC): ICD-10-CM

## 2020-12-01 DIAGNOSIS — E55.9 VITAMIN D DEFICIENCY: ICD-10-CM

## 2020-12-01 DIAGNOSIS — M48.00 SPINAL STENOSIS, UNSPECIFIED SPINAL REGION: ICD-10-CM

## 2020-12-01 DIAGNOSIS — R73.03 PREDIABETES: ICD-10-CM

## 2020-12-01 DIAGNOSIS — M81.0 SENILE OSTEOPOROSIS: ICD-10-CM

## 2020-12-01 DIAGNOSIS — D64.9 ANEMIA, UNSPECIFIED TYPE: ICD-10-CM

## 2020-12-01 DIAGNOSIS — F41.9 ANXIETY: ICD-10-CM

## 2020-12-01 DIAGNOSIS — E78.5 HYPERLIPIDEMIA, UNSPECIFIED HYPERLIPIDEMIA TYPE: ICD-10-CM

## 2020-12-01 DIAGNOSIS — Z95.1 HX OF CABG: ICD-10-CM

## 2020-12-01 DIAGNOSIS — I10 ESSENTIAL HYPERTENSION: ICD-10-CM

## 2020-12-01 DIAGNOSIS — I48.91 ATRIAL FIBRILLATION, UNSPECIFIED TYPE (HCC): ICD-10-CM

## 2020-12-01 DIAGNOSIS — I67.82 TEMPORARY CEREBRAL VASCULAR DYSFUNCTION: ICD-10-CM

## 2020-12-01 DIAGNOSIS — K21.9 GASTROESOPHAGEAL REFLUX DISEASE WITHOUT ESOPHAGITIS: Primary | ICD-10-CM

## 2020-12-01 DIAGNOSIS — I25.10 CHRONIC CORONARY ARTERY DISEASE: ICD-10-CM

## 2020-12-01 DIAGNOSIS — Q39.6 DIVERTICULUM OF ESOPHAGUS: ICD-10-CM

## 2020-12-01 DIAGNOSIS — K59.00 CONSTIPATION, UNSPECIFIED CONSTIPATION TYPE: ICD-10-CM

## 2020-12-01 DIAGNOSIS — R91.8 MULTIPLE PULMONARY NODULES: ICD-10-CM

## 2020-12-01 DIAGNOSIS — D25.9 UTERINE LEIOMYOMA, UNSPECIFIED LOCATION: ICD-10-CM

## 2020-12-01 PROBLEM — R07.81 COSTAL MARGIN PAIN: Status: RESOLVED | Noted: 2020-06-17 | Resolved: 2020-12-01

## 2020-12-01 PROCEDURE — 99213 OFFICE O/P EST LOW 20 MIN: CPT | Performed by: INTERNAL MEDICINE

## 2020-12-01 PROCEDURE — G0439 PPPS, SUBSEQ VISIT: HCPCS | Performed by: INTERNAL MEDICINE

## 2020-12-01 RX ORDER — APIXABAN 2.5 MG/1
TABLET, FILM COATED ORAL
COMMUNITY
Start: 2020-10-20 | End: 2020-12-10 | Stop reason: ALTCHOICE

## 2020-12-01 RX ORDER — METOPROLOL TARTRATE 50 MG/1
TABLET, FILM COATED ORAL
COMMUNITY
Start: 2020-11-24 | End: 2020-12-21 | Stop reason: SDUPTHER

## 2020-12-01 NOTE — PROGRESS NOTES
Subjective   Mi Tejeda is a 85 y.o. female.     No chief complaint on file.      History of Present Illness       Current Outpatient Medications:   •  albuterol sulfate HFA (PROAIR HFA) 108 (90 Base) MCG/ACT inhaler, Inhale 2 puffs Every 4 (Four) Hours As Needed for Wheezing or Shortness of Air., Disp: 54 g, Rfl: 3  •  amLODIPine (NORVASC) 10 MG tablet, Take 1 tablet by mouth Daily., Disp: 90 tablet, Rfl: 3  •  aspirin 81 MG tablet, Take 1 tablet by mouth Daily., Disp: 30 tablet, Rfl: 11  •  baclofen (LIORESAL) 10 MG tablet, Take 10 mg by mouth Every Night., Disp: , Rfl:   •  buPROPion XL (WELLBUTRIN XL) 150 MG 24 hr tablet, TAKE ONE TABLET BY MOUTH EVERY DAY, Disp: 30 tablet, Rfl: 2  •  carvedilol (COREG) 12.5 MG tablet, Take 1 tablet by mouth 2 (Two) Times a Day., Disp: 180 tablet, Rfl: 2  •  Cholecalciferol (VITAMIN D3) 125 MCG (5000 UT) capsule capsule, TAKE ONE CAPSULE BY MOUTH EVERY DAY, Disp: 100 capsule, Rfl: 3  •  docusate sodium (COLACE) 100 MG capsule, Take 1 capsule by mouth 2 (Two) Times a Day., Disp: 60 capsule, Rfl: 2  •  escitalopram (Lexapro) 10 MG tablet, Take 1 tablet by mouth Daily., Disp: 30 tablet, Rfl: 1  •  HYDROcodone-acetaminophen (NORCO) 7.5-325 MG per tablet, Take 1 tablet by mouth 2 (Two) Times a Day As Needed for Mild Pain (1-3) or Moderate Pain (4-6)., Disp: , Rfl:   •  hydrOXYzine (ATARAX) 25 MG tablet, TAKE ONE TABLET THREE TIMES DAILY AS NEEDED FOR ITCHING, Disp: 30 tablet, Rfl: 1  •  levothyroxine (SYNTHROID, LEVOTHROID) 100 MCG tablet, TAKE ONE TABLET BY MOUTH EVERY DAY, Disp: 90 tablet, Rfl: 0  •  losartan (COZAAR) 100 MG tablet, TAKE ONE TABLET BY MOUTH EVERY DAY, Disp: 30 tablet, Rfl: 5  •  morphine (MS CONTIN) 30 MG 12 hr tablet, Take 30 mg by mouth 2 (Two) Times a Day., Disp: , Rfl:   •  pantoprazole (PROTONIX) 40 MG EC tablet, TAKE ONE TABLET BY MOUTH EVERY DAY, Disp: 30 tablet, Rfl: 11  •  polyethylene glycol (MiraLax) 17 GM/SCOOP powder, Take 17 g by mouth Daily.,  Disp: 510 g, Rfl: 2  •  Probiotic Product (ALIGN) capsule, Take 1 capsule by mouth Daily., Disp: 7 capsule, Rfl: 0  •  vitamin C (ASCORBIC ACID) 500 MG tablet, Take 500 mg by mouth Daily., Disp: , Rfl:     {Common H&P Review Areas:29319}    Review of Systems    Objective   Physical Exam    All tests have been reviewed.    Assessment/Plan   Diagnoses and all orders for this visit:    Gastroesophageal reflux disease without esophagitis    Essential hypertension    Anemia, unspecified type    Multiple pulmonary nodules    Anxiety    Spinal stenosis, unspecified spinal region    Other specified hypothyroidism                   Below is to historical records for reference only:  Right thigh numb and sharp pain patient is seeing orthopedist  CAD CABG history no cp EKG old MI , refused stress test and EKG. Unable to take a cholesterol medicine patient is allergic to it.refuse PCK9 even ins covers. plavix, resume  early satirety resolved.  seen GI, s/p EGD5/2017: 0.75 cm clean base gastric ulcer at the body of the stomach. Risks of bleeding less than 5%.Non-bleeding duodenal bulb vascular ectasia.Erythematous gastritis.Small sliding hiatal hernia less than 3 cm.Nonobstructive Schatzki's-type ring, following with Dr. William, cont protonix, to have endoscopy on 5/23/19   pulmonary CT scan showed a small nodules, repeat stable, no need of repeat  CT scan does show 4.3 cm uterine fibroid s/p ultrasound utero mass, seen by gyn,  stable, no symptoms, rec watch per gyn by patient  TIA history of possible TIA left eye cholesterol deposits by ophthalmologist. carotid duplex 50%, CTA neck artery done 2017, 10 and 40 % otoniel,   CT head showed microvasc changes   Claudication possible PAD history of stents left leg artery. ultrasound again stenosis   Tobacco, QUIT 10/2018 on nicotine patch  muscle cramp -- improved continue flexeril  plantar faciatis banks's neuroma, -- continue to followup podiatrist   LBP arthritis -- follow up with  pain clinic and on Pain medicine, on MS  Fe def anemia normal after fe supplement obtain colon report from dr leung done 4/2013,, SBFT normal,-- continue iron supplements.  colonoscopy 4/9/2013.colon polyp(tubular adenoma), AVM, and divertic -- follow dr rice , declines further colonoscopy  vit D low--continue supplement.   hyperglycemia -- good diet  depression/anxiety continue medications improved on neurontin, xanax d/c'd by pain doctor, remeron no help, xanax d/c'd by pain clinic,-- continue hydroxyzine wellbutrin  HTN -- continue medicine stable  hypothyroidism -- continue medication   LBP spinal stenosis. -- continue medicine from pain clinic on narcotics, difficult to walk now, on wheelchair, PT no help. Follow pain clinic  copd severe restrictive disease on PFT, unable to use spiriva, -- continue symbicort as needed, better since stopped smoking  refuse prevnar 13, refuse zostavax and shingrix, refuses flushot, pneumovax done  Muscle cramp in toes follow up with podiatrist. Baclofen no help, stretch exercise  Hip pain -- follow ortho--leg edema compression hose  Mild tremor watch for now  Memory problem, reviewed  CT head -- doing ok  Osteopenia on dexa vit d and calcium continue repeat

## 2020-12-01 NOTE — PROGRESS NOTES
The ABCs of the Annual Wellness Visit  Subsequent Medicare Wellness Visit    Chief Complaint   Patient presents with   • Medicare Wellness-subsequent       Subjective   History of Present Illness:  Mi Tejeda is a 85 y.o. female who presents for a Subsequent Medicare Wellness Visit.  Patient here for annual wellness check also has medical problems to be followed up.  GERD stable on medication.  Hyperlipidemia stable on diet hypertension stable on medication.  Depression anxiety stable on medication.  Constipation stable on medication.  Recently developed atrial fibrillation on blood thinner stable now.  Low back pain stable on medication.  Hypothyroidism stable on medication.  Patient is taking narcotic pain medicine.  The GERD stable on medication.  HEALTH RISK ASSESSMENT    Recent Hospitalizations:  No hospitalization(s) within the last year.    Current Medical Providers:  Patient Care Team:  Adiel Awad MD as PCP - General  Adiel Awad MD as PCP - Family Medicine    Smoking Status:  Social History     Tobacco Use   Smoking Status Former Smoker   • Packs/day: 0.00   • Years: 65.00   • Pack years: 0.00   • Types: Cigarettes   • Quit date: 10/1/2018   • Years since quittin.1   Smokeless Tobacco Never Used       Alcohol Consumption:  Social History     Substance and Sexual Activity   Alcohol Use Yes    Comment: Seldom       Depression Screen:   PHQ-2/PHQ-9 Depression Screening 2020   Little interest or pleasure in doing things 0   Feeling down, depressed, or hopeless 0   Trouble falling or staying asleep, or sleeping too much -   Feeling tired or having little energy -   Poor appetite or overeating -   Feeling bad about yourself - or that you are a failure or have let yourself or your family down -   Trouble concentrating on things, such as reading the newspaper or watching television -   Moving or speaking so slowly that other people could have noticed. Or the opposite - being so fidgety or  restless that you have been moving around a lot more than usual -   Thoughts that you would be better off dead, or of hurting yourself in some way -   Total Score 0   If you checked off any problems, how difficult have these problems made it for you to do your work, take care of things at home, or get along with other people? -       Fall Risk Screen:  JAY Fall Risk Assessment has not been completed.    Health Habits and Functional and Cognitive Screening:  Functional & Cognitive Status 12/1/2020   Do you have difficulty preparing food and eating? No   Do you have difficulty bathing yourself, getting dressed or grooming yourself? No   Do you have difficulty using the toilet? No   Do you have difficulty moving around from place to place? No   Do you have trouble with steps or getting out of a bed or a chair? Yes   Current Diet Well Balanced Diet   Dental Exam Up to date        Dental Exam Comment -   Eye Exam Up to date        Eye Exam Comment -   Exercise (times per week) 0 times per week   Current Exercise Activities Include None   Do you need help using the phone?  No   Are you deaf or do you have serious difficulty hearing?  No   Do you need help with transportation? No   Do you need help shopping? Yes   Do you need help preparing meals?  Yes   Do you need help with housework?  Yes   Do you need help with laundry? Yes   Do you need help taking your medications? No   Do you need help managing money? No   Do you ever drive or ride in a car without wearing a seat belt? No   Have you felt unusual stress, anger or loneliness in the last month? Yes   Who do you live with? Child   If you need help, do you have trouble finding someone available to you? No   Have you been bothered in the last four weeks by sexual problems? No   Do you have difficulty concentrating, remembering or making decisions? No         Does the patient have evidence of cognitive impairment? No    Asprin use counseling:Start ASA 81 mg daily      Age-appropriate Screening Schedule:  Refer to the list below for future screening recommendations based on patient's age, sex and/or medical conditions. Orders for these recommended tests are listed in the plan section. The patient has been provided with a written plan.    Health Maintenance   Topic Date Due   • ZOSTER VACCINE (2 of 2) 12/20/2016   • LIPID PANEL  11/19/2020   • INFLUENZA VACCINE  09/22/2021 (Originally 8/1/2020)   • DXA SCAN  11/26/2021   • TDAP/TD VACCINES (2 - Td) 10/25/2026   • MAMMOGRAM  Discontinued          The following portions of the patient's history were reviewed and updated as appropriate: allergies, current medications, past family history, past medical history, past social history, past surgical history and problem list.    Outpatient Medications Prior to Visit   Medication Sig Dispense Refill   • albuterol sulfate HFA (PROAIR HFA) 108 (90 Base) MCG/ACT inhaler Inhale 2 puffs Every 4 (Four) Hours As Needed for Wheezing or Shortness of Air. 54 g 3   • amLODIPine (NORVASC) 10 MG tablet Take 1 tablet by mouth Daily. 90 tablet 3   • aspirin 81 MG tablet Take 1 tablet by mouth Daily. 30 tablet 11   • baclofen (LIORESAL) 10 MG tablet Take 10 mg by mouth Every Night.     • buPROPion XL (WELLBUTRIN XL) 150 MG 24 hr tablet TAKE ONE TABLET BY MOUTH EVERY DAY 30 tablet 2   • carvedilol (COREG) 12.5 MG tablet Take 1 tablet by mouth 2 (Two) Times a Day. 180 tablet 2   • Cholecalciferol (VITAMIN D3) 125 MCG (5000 UT) capsule capsule TAKE ONE CAPSULE BY MOUTH EVERY  capsule 3   • docusate sodium (COLACE) 100 MG capsule Take 1 capsule by mouth 2 (Two) Times a Day. 60 capsule 2   • Eliquis 2.5 MG tablet tablet      • escitalopram (Lexapro) 10 MG tablet Take 1 tablet by mouth Daily. 30 tablet 1   • hydrOXYzine (ATARAX) 25 MG tablet TAKE ONE TABLET THREE TIMES DAILY AS NEEDED FOR ITCHING 30 tablet 1   • levothyroxine (SYNTHROID, LEVOTHROID) 100 MCG tablet TAKE ONE TABLET BY MOUTH EVERY DAY 90  tablet 0   • losartan (COZAAR) 100 MG tablet TAKE ONE TABLET BY MOUTH EVERY DAY 30 tablet 5   • metoprolol tartrate (LOPRESSOR) 50 MG tablet      • morphine (MS CONTIN) 30 MG 12 hr tablet Take 30 mg by mouth 2 (Two) Times a Day.     • pantoprazole (PROTONIX) 40 MG EC tablet TAKE ONE TABLET BY MOUTH EVERY DAY 30 tablet 11   • polyethylene glycol (MiraLax) 17 GM/SCOOP powder Take 17 g by mouth Daily. 510 g 2   • vitamin C (ASCORBIC ACID) 500 MG tablet Take 500 mg by mouth Daily.     • HYDROcodone-acetaminophen (NORCO) 7.5-325 MG per tablet Take 1 tablet by mouth 2 (Two) Times a Day As Needed for Mild Pain (1-3) or Moderate Pain (4-6).     • Probiotic Product (ALIGN) capsule Take 1 capsule by mouth Daily. 7 capsule 0     No facility-administered medications prior to visit.        Patient Active Problem List   Diagnosis   • Anxiety   • Anemia   • Chronic coronary artery disease   • Chronic obstructive pulmonary disease (CMS/HCC)   • Diverticulum of esophagus   • Gastroesophageal reflux disease   • Hyperlipidemia   • Hypertension   • Other specified hypothyroidism   • Multiple pulmonary nodules   • Peripheral arterial occlusive disease (CMS/HCC)   • Spinal stenosis   • Temporary cerebral vascular dysfunction   • Uterine leiomyoma   • Vitamin D deficiency   • Memory loss   • Hx of CABG   • Senile osteoporosis   • Unspecified atrial fibrillation (CMS/HCC)       Advanced Care Planning:  ACP discussion was held with the patient during this visit. Patient does not have an advance directive, declines further assistance.    Review of Systems   HENT: Negative.    Eyes: Negative.    Respiratory: Negative.    Cardiovascular: Negative.    Gastrointestinal: Negative.    Endocrine: Negative.    Genitourinary: Negative.    Musculoskeletal: Positive for back pain.   Skin: Negative.    Allergic/Immunologic: Negative.    Neurological: Positive for weakness.   Hematological: Negative.    Psychiatric/Behavioral: Negative.        Compared  "to one year ago, the patient feels her physical health is the same.  Compared to one year ago, the patient feels her mental health is the same.    Reviewed chart for potential of high risk medication in the elderly: yes  Reviewed chart for potential of harmful drug interactions in the elderly:no    Objective         Vitals:    12/01/20 1408   BP: 122/84   Pulse: 56   Resp: 16   Temp: 96.9 °F (36.1 °C)   TempSrc: Infrared   SpO2: 100%   Weight: 60.7 kg (133 lb 12.8 oz)   Height: 162.6 cm (64.02\")   PainSc: 0-No pain       Body mass index is 22.96 kg/m².  Discussed the patient's BMI with her. The BMI is in the acceptable range.    Physical Exam  Constitutional:       Appearance: She is well-developed.   HENT:      Head: Normocephalic and atraumatic.      Right Ear: External ear normal.      Left Ear: External ear normal.      Nose: Nose normal.   Eyes:      Conjunctiva/sclera: Conjunctivae normal.      Pupils: Pupils are equal, round, and reactive to light.   Neck:      Musculoskeletal: Normal range of motion and neck supple.   Cardiovascular:      Rate and Rhythm: Normal rate and regular rhythm.      Heart sounds: Normal heart sounds.   Pulmonary:      Effort: Pulmonary effort is normal.      Breath sounds: Normal breath sounds.   Abdominal:      General: Bowel sounds are normal.      Palpations: Abdomen is soft.   Genitourinary:     Vagina: Normal.   Musculoskeletal: Normal range of motion.         General: Tenderness present.   Skin:     General: Skin is warm and dry.   Neurological:      Mental Status: She is alert and oriented to person, place, and time.      Deep Tendon Reflexes: Reflexes are normal and symmetric.   Psychiatric:         Mood and Affect: Mood normal.         Behavior: Behavior normal.         Thought Content: Thought content normal.         Judgment: Judgment normal.               Assessment/Plan   Medicare Risks and Personalized Health Plan  CMS Preventative Services Quick Reference  Advance " Directive Discussion    The above risks/problems have been discussed with the patient.  Pertinent information has been shared with the patient in the After Visit Summary.  Follow up plans and orders are seen below in the Assessment/Plan Section.    Diagnoses and all orders for this visit:    1. Gastroesophageal reflux disease without esophagitis (Primary) continue medication    2. Essential hypertension continue medication    3. Anemia, unspecified type  -     CBC & Differential    4. Multiple pulmonary nodules repeat in February 2021    5. Anxiety continue medication    6. Spinal stenosis, unspecified spinal region continue narcotic pain medicine    7. Other specified hypothyroidism continue medication  -     TSH    8. Chronic coronary artery disease continue medication    9. Hyperlipidemia, unspecified hyperlipidemia type continue current diet  -     Comprehensive Metabolic Panel  -     Lipid Panel    10. Peripheral arterial occlusive disease (CMS/HCC)    11. Temporary cerebral vascular dysfunction    12. Atrial fibrillation, unspecified type (CMS/HCC) continue medication    13. 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 Vitamin D deficiency  -     Vitamin D 25 Hydroxy    16. Constipation, unspecified constipation type continue medication    17. Senile osteoporosis continue Prolia follow-up per rheumatology    18. Memory loss stable    19. Hx of CABG continue medication    20. Uterine leiomyoma, unspecified location    21. Prediabetes  -     Hemoglobin A1c      Follow Up:  Return in about 2 months (around 2/1/2021) for Recheck.     An After Visit Summary and PPPS were given to the patient.          Below is to historical records for reference only:  Right thigh numb and sharp pain patient is seeing orthopedist  CAD CABG history no cp EKG old MI , refused stress test and EKG. Unable to take a cholesterol medicine patient is allergic to it.refuse PCK9 even ins  covers. plavix, resume  early satirety resolved.  seen GI, s/p EGD5/2017: 0.75 cm clean base gastric ulcer at the body of the stomach. Risks of bleeding less than 5%.Non-bleeding duodenal bulb vascular ectasia.Erythematous gastritis.Small sliding hiatal hernia less than 3 cm.Nonobstructive Schatzki's-type ring, following with Dr. William, cont protonix, to have endoscopy on 5/23/19   pulmonary CT scan showed a small nodules, repeat stable, no need of repeat  CT scan does show 4.3 cm uterine fibroid s/p ultrasound utero mass, seen by gyn,  stable, no symptoms, rec watch per gyn by patient  TIA history of possible TIA left eye cholesterol deposits by ophthalmologist. carotid duplex 50%, CTA neck artery done 2017, 10 and 40 % otoniel,   CT head showed microvasc changes   Claudication possible PAD history of stents left leg artery. ultrasound again stenosis   Tobacco, QUIT 10/2018 on nicotine patch  muscle cramp -- improved continue flexeril  plantar faciatis banks's neuroma, -- continue to followup podiatrist   LBP arthritis -- follow up with pain clinic and on Pain medicine, on MS  Fe def anemia normal after fe supplement obtain colon report from dr leung done 4/2013,, SBFT normal,-- continue iron supplements.  colonoscopy 4/9/2013.colon polyp(tubular adenoma), AVM, and divertic -- follow dr william , declines further colonoscopy  vit D low--continue supplement.   hyperglycemia -- good diet  depression/anxiety continue medications improved on neurontin, xanax d/c'd by pain doctor, remeron no help, xanax d/c'd by pain clinic,-- continue hydroxyzine wellbutrin  HTN -- continue medicine stable  hypothyroidism -- continue medication   LBP spinal stenosis. -- continue medicine from pain clinic on narcotics, difficult to walk now, on wheelchair, PT no help. Follow pain clinic  copd severe restrictive disease on PFT, unable to use spiriva, -- continue symbicort as needed, better since stopped smoking  refuse prevnar 13, refuse  zostavax and shingrix, refuses flushot, pneumovax done  Muscle cramp in toes follow up with podiatrist. Baclofen no help, stretch exercise  Hip pain -- follow ortho--leg edema compression hose  Mild tremor watch for now  Memory problem, reviewed  CT head -- doing ok  Osteopenia on dexa vit d and calcium continue repeat

## 2020-12-01 NOTE — PROGRESS NOTES
The ABCs of the Annual Wellness Visit  Subsequent Medicare Wellness Visit    Chief Complaint   Patient presents with   • Medicare Wellness-subsequent       Subjective   History of Present Illness:  Mi Tejeda is a 85 y.o. female who presents for a Subsequent Medicare Wellness Visit.  Patient here for follow-up and Medicare wellness.  The recent atrial fibrillation went to hospital.  Atrial fibrillation controlled now.  Hemoglobin decreased.  GERD stable on medication.  Hypertension stable on medication.  Pulmonary nodule stable.  Anxiety stable on medication.  Low back pain medication stable.  Hypothyroidism stable on medication.  HEALTH RISK ASSESSMENT    Recent Hospitalizations:  No hospitalization(s) within the last year.    Current Medical Providers:  Patient Care Team:  Adiel Awad MD as PCP - General  Adiel Awad MD as PCP - Family Medicine    Smoking Status:  Social History     Tobacco Use   Smoking Status Former Smoker   • Packs/day: 0.00   • Years: 65.00   • Pack years: 0.00   • Types: Cigarettes   • Quit date: 10/1/2018   • Years since quittin.1   Smokeless Tobacco Never Used       Alcohol Consumption:  Social History     Substance and Sexual Activity   Alcohol Use Yes    Comment: Seldom       Depression Screen:   PHQ-2/PHQ-9 Depression Screening 2020   Little interest or pleasure in doing things 0   Feeling down, depressed, or hopeless 0   Trouble falling or staying asleep, or sleeping too much -   Feeling tired or having little energy -   Poor appetite or overeating -   Feeling bad about yourself - or that you are a failure or have let yourself or your family down -   Trouble concentrating on things, such as reading the newspaper or watching television -   Moving or speaking so slowly that other people could have noticed. Or the opposite - being so fidgety or restless that you have been moving around a lot more than usual -   Thoughts that you would be better off dead, or of hurting  yourself in some way -   Total Score 0   If you checked off any problems, how difficult have these problems made it for you to do your work, take care of things at home, or get along with other people? -       Fall Risk Screen:  JAY Fall Risk Assessment has not been completed.    Health Habits and Functional and Cognitive Screening:  Functional & Cognitive Status 12/1/2020   Do you have difficulty preparing food and eating? No   Do you have difficulty bathing yourself, getting dressed or grooming yourself? No   Do you have difficulty using the toilet? No   Do you have difficulty moving around from place to place? No   Do you have trouble with steps or getting out of a bed or a chair? Yes   Current Diet Well Balanced Diet   Dental Exam Up to date        Dental Exam Comment -   Eye Exam Up to date        Eye Exam Comment -   Exercise (times per week) 0 times per week   Current Exercise Activities Include None   Do you need help using the phone?  No   Are you deaf or do you have serious difficulty hearing?  No   Do you need help with transportation? No   Do you need help shopping? Yes   Do you need help preparing meals?  Yes   Do you need help with housework?  Yes   Do you need help with laundry? Yes   Do you need help taking your medications? No   Do you need help managing money? No   Do you ever drive or ride in a car without wearing a seat belt? No   Have you felt unusual stress, anger or loneliness in the last month? Yes   Who do you live with? Child   If you need help, do you have trouble finding someone available to you? No   Have you been bothered in the last four weeks by sexual problems? No   Do you have difficulty concentrating, remembering or making decisions? No         Does the patient have evidence of cognitive impairment? No    Asprin use counseling:Taking ASA appropriately as indicated    Age-appropriate Screening Schedule:  Refer to the list below for future screening recommendations based on  patient's age, sex and/or medical conditions. Orders for these recommended tests are listed in the plan section. The patient has been provided with a written plan.    Health Maintenance   Topic Date Due   • ZOSTER VACCINE (2 of 2) 12/20/2016   • LIPID PANEL  11/19/2020   • INFLUENZA VACCINE  09/22/2021 (Originally 8/1/2020)   • DXA SCAN  11/26/2021   • TDAP/TD VACCINES (2 - Td) 10/25/2026   • MAMMOGRAM  Discontinued          The following portions of the patient's history were reviewed and updated as appropriate: allergies, current medications, past family history, past medical history, past social history, past surgical history and problem list.    Outpatient Medications Prior to Visit   Medication Sig Dispense Refill   • albuterol sulfate HFA (PROAIR HFA) 108 (90 Base) MCG/ACT inhaler Inhale 2 puffs Every 4 (Four) Hours As Needed for Wheezing or Shortness of Air. 54 g 3   • amLODIPine (NORVASC) 10 MG tablet Take 1 tablet by mouth Daily. 90 tablet 3   • aspirin 81 MG tablet Take 1 tablet by mouth Daily. 30 tablet 11   • baclofen (LIORESAL) 10 MG tablet Take 10 mg by mouth Every Night.     • buPROPion XL (WELLBUTRIN XL) 150 MG 24 hr tablet TAKE ONE TABLET BY MOUTH EVERY DAY 30 tablet 2   • carvedilol (COREG) 12.5 MG tablet Take 1 tablet by mouth 2 (Two) Times a Day. 180 tablet 2   • Cholecalciferol (VITAMIN D3) 125 MCG (5000 UT) capsule capsule TAKE ONE CAPSULE BY MOUTH EVERY  capsule 3   • docusate sodium (COLACE) 100 MG capsule Take 1 capsule by mouth 2 (Two) Times a Day. 60 capsule 2   • Eliquis 2.5 MG tablet tablet      • escitalopram (Lexapro) 10 MG tablet Take 1 tablet by mouth Daily. 30 tablet 1   • hydrOXYzine (ATARAX) 25 MG tablet TAKE ONE TABLET THREE TIMES DAILY AS NEEDED FOR ITCHING 30 tablet 1   • levothyroxine (SYNTHROID, LEVOTHROID) 100 MCG tablet TAKE ONE TABLET BY MOUTH EVERY DAY 90 tablet 0   • losartan (COZAAR) 100 MG tablet TAKE ONE TABLET BY MOUTH EVERY DAY 30 tablet 5   • metoprolol  tartrate (LOPRESSOR) 50 MG tablet      • morphine (MS CONTIN) 30 MG 12 hr tablet Take 30 mg by mouth 2 (Two) Times a Day.     • pantoprazole (PROTONIX) 40 MG EC tablet TAKE ONE TABLET BY MOUTH EVERY DAY 30 tablet 11   • polyethylene glycol (MiraLax) 17 GM/SCOOP powder Take 17 g by mouth Daily. 510 g 2   • vitamin C (ASCORBIC ACID) 500 MG tablet Take 500 mg by mouth Daily.     • HYDROcodone-acetaminophen (NORCO) 7.5-325 MG per tablet Take 1 tablet by mouth 2 (Two) Times a Day As Needed for Mild Pain (1-3) or Moderate Pain (4-6).     • Probiotic Product (ALIGN) capsule Take 1 capsule by mouth Daily. 7 capsule 0     No facility-administered medications prior to visit.        Patient Active Problem List   Diagnosis   • Anxiety   • Anemia   • Chronic coronary artery disease   • Chronic obstructive pulmonary disease (CMS/HCC)   • Diverticulum of esophagus   • Gastroesophageal reflux disease   • Hyperlipidemia   • Hypertension   • Other specified hypothyroidism   • Multiple pulmonary nodules   • Peripheral arterial occlusive disease (CMS/HCC)   • Spinal stenosis   • Temporary cerebral vascular dysfunction   • Uterine leiomyoma   • Vitamin D deficiency   • Memory loss   • Hx of CABG   • Senile osteoporosis   • Unspecified atrial fibrillation (CMS/HCC)       Advanced Care Planning:  ACP discussion was held with the patient during this visit. Patient has an advance directive (not in EMR), copy requested.    Review of Systems   Constitutional: Negative.    Respiratory: Negative.    Cardiovascular: Negative.    Gastrointestinal: Negative.    Musculoskeletal: Negative.    Skin: Negative.    Neurological: Negative.    Psychiatric/Behavioral: Negative.        Compared to one year ago, the patient feels her physical health is the same.  Compared to one year ago, the patient feels her mental health is the same.    Reviewed chart for potential of high risk medication in the elderly: yes  Reviewed chart for potential of harmful drug  "interactions in the elderly:no    Objective         Vitals:    12/01/20 1408   BP: 122/84   Pulse: 56   Resp: 16   Temp: 96.9 °F (36.1 °C)   TempSrc: Infrared   SpO2: 100%   Weight: 60.7 kg (133 lb 12.8 oz)   Height: 162.6 cm (64.02\")   PainSc: 0-No pain       Body mass index is 22.96 kg/m².  Discussed the patient's BMI with her. The BMI is in the acceptable range.    Physical Exam  Neck:      Musculoskeletal: Neck supple.   Cardiovascular:      Rate and Rhythm: Normal rate and regular rhythm.      Heart sounds: Normal heart sounds.   Pulmonary:      Effort: Pulmonary effort is normal.      Breath sounds: Normal breath sounds.   Abdominal:      General: Bowel sounds are normal.   Skin:     General: Skin is warm.   Neurological:      Mental Status: She is alert and oriented to person, place, and time.               Assessment/Plan   Medicare Risks and Personalized Health Plan  CMS Preventative Services Quick Reference  Advance Directive Discussion    The above risks/problems have been discussed with the patient.  Pertinent information has been shared with the patient in the After Visit Summary.  Follow up plans and orders are seen below in the Assessment/Plan Section.    Diagnoses and all orders for this visit:    1. Gastroesophageal reflux disease without esophagitis (Primary)    2. Essential hypertension    3. Anemia, unspecified type    4. Multiple pulmonary nodules    5. Anxiety    6. Spinal stenosis, unspecified spinal region    7. Other specified hypothyroidism    8. Chronic coronary artery disease    9. Hyperlipidemia, unspecified hyperlipidemia type    10. Peripheral arterial occlusive disease (CMS/HCC)    11. Temporary cerebral vascular dysfunction    12. Atrial fibrillation, unspecified type (CMS/HCC)    13. 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 Vitamin D deficiency    16. Constipation, unspecified constipation type    17. Senile osteoporosis    18. Memory loss    19. Hx of " CABG    20. Uterine leiomyoma, unspecified location      Follow Up:  No follow-ups on file.     An After Visit Summary and PPPS were given to the patient.            Below is to historical records for reference only:  Right thigh numb and sharp pain patient is seeing orthopedist  CAD CABG history no cp EKG old MI , refused stress test and EKG. Unable to take a cholesterol medicine patient is allergic to it.refuse PCK9 even ins covers. plavix, resume  early satirety resolved.  seen GI, s/p EGD5/2017: 0.75 cm clean base gastric ulcer at the body of the stomach. Risks of bleeding less than 5%.Non-bleeding duodenal bulb vascular ectasia.Erythematous gastritis.Small sliding hiatal hernia less than 3 cm.Nonobstructive Schatzki's-type ring, following with Dr. William, cont protonix, to have endoscopy on 5/23/19   pulmonary CT scan showed a small nodules, repeat stable, no need of repeat  CT scan does show 4.3 cm uterine fibroid s/p ultrasound utero mass, seen by gyn,  stable, no symptoms, rec watch per gyn by patient  TIA history of possible TIA left eye cholesterol deposits by ophthalmologist. carotid duplex 50%, CTA neck artery done 2017, 10 and 40 % otoniel,   CT head showed microvasc changes   Claudication possible PAD history of stents left leg artery. ultrasound again stenosis   Tobacco, QUIT 10/2018 on nicotine patch  muscle cramp -- improved continue flexeril  plantar faciatis banks's neuroma, -- continue to followup podiatrist   LBP arthritis -- follow up with pain clinic and on Pain medicine, on MS  Fe def anemia normal after fe supplement obtain colon report from dr leung done 4/2013,, SBFT normal,-- continue iron supplements.  colonoscopy 4/9/2013.colon polyp(tubular adenoma), AVM, and divertic -- follow dr william , declines further colonoscopy  vit D low--continue supplement.   hyperglycemia -- good diet  depression/anxiety continue medications improved on neurontin, xanax d/c'd by pain doctor, remeron no help, xanax  d/c'd by pain clinic,-- continue hydroxyzine wellbutrin  HTN -- continue medicine stable  hypothyroidism -- continue medication   LBP spinal stenosis. -- continue medicine from pain clinic on narcotics, difficult to walk now, on wheelchair, PT no help. Follow pain clinic  copd severe restrictive disease on PFT, unable to use spiriva, -- continue symbicort as needed, better since stopped smoking  refuse prevnar 13, refuse zostavax and shingrix, refuses flushot, pneumovax done  Muscle cramp in toes follow up with podiatrist. Baclofen no help, stretch exercise  Hip pain -- follow ortho--leg edema compression hose  Mild tremor watch for now  Memory problem, reviewed  CT head -- doing ok  Osteopenia on dexa vit d and calcium continue repeat

## 2020-12-08 RX ORDER — LOSARTAN POTASSIUM 100 MG/1
TABLET ORAL
Qty: 90 TABLET | Refills: 3 | Status: SHIPPED | OUTPATIENT
Start: 2020-12-08 | End: 2020-12-10 | Stop reason: ALTCHOICE

## 2020-12-10 ENCOUNTER — OFFICE VISIT (OUTPATIENT)
Dept: CARDIOLOGY | Facility: CLINIC | Age: 85
End: 2020-12-10

## 2020-12-10 VITALS
OXYGEN SATURATION: 96 % | WEIGHT: 133 LBS | HEART RATE: 64 BPM | BODY MASS INDEX: 22.71 KG/M2 | HEIGHT: 64 IN | SYSTOLIC BLOOD PRESSURE: 160 MMHG | DIASTOLIC BLOOD PRESSURE: 62 MMHG

## 2020-12-10 DIAGNOSIS — I48.0 AF (PAROXYSMAL ATRIAL FIBRILLATION) (HCC): Primary | ICD-10-CM

## 2020-12-10 DIAGNOSIS — E78.5 HYPERLIPIDEMIA, UNSPECIFIED HYPERLIPIDEMIA TYPE: ICD-10-CM

## 2020-12-10 DIAGNOSIS — I10 ESSENTIAL HYPERTENSION: ICD-10-CM

## 2020-12-10 DIAGNOSIS — I25.10 CHRONIC CORONARY ARTERY DISEASE: ICD-10-CM

## 2020-12-10 PROCEDURE — 99213 OFFICE O/P EST LOW 20 MIN: CPT | Performed by: INTERNAL MEDICINE

## 2020-12-10 RX ORDER — FERROUS SULFATE TAB EC 324 MG (65 MG FE EQUIVALENT) 324 (65 FE) MG
324 TABLET DELAYED RESPONSE ORAL
COMMUNITY
End: 2022-07-25 | Stop reason: SDUPTHER

## 2020-12-10 NOTE — PROGRESS NOTES
"             [facility] Cardiology Office Follow Up Note    Mi Tejeda  [id number]  12/10/2020    Primary Care Provider: Adiel Awad MD    Chief Complaint: Hospital follow-up    History of Present Illness:   Mrs. Mi Tejeda is a 85 y.o. female being seen by Cardiology for  hospital follow-up.  The patient has a past medical history significant for dyslipidemia, hypertension, GERD, hypothyroidism, and COPD with a history of prior tobacco use.  Her cardiac history includes coronary artery disease, with a prior history of two-vessel coronary artery bypass grafting in .  She also has a history of mild to moderate aortic insufficiency.  Since her last appointment, the patient reports being evaluated at the [facility] for palpitations.  At that time, an ECG revealed atrial fibrillation with a ventricular rate of 116 bpm.  She was admitted and placed on a diltiazem drip for rate control.  Discharge, her carvedilol was changed to metoprolol for additional rate control.  She was also prescribed Eliquis for CVA prophylaxis.  The patient reports that she has done well with the metoprolol without current episodes of palpitations.  She reports that she has not, however, started Eliquis.  She reports a recent fall, where she did hit her head on a piece of furniture.  She denies any palpitations associated with the episode.  No syncope.  The patient reports she \"lost her balance\" and has not had any subsequent falls.  She denies chest pain or chest discomfort.  No other specific complaints at this time.    Past Cardiac Testin. Last Coronary Angio: Remote  2. Prior Stress Testing: Remote  3. Last Echo:               1.  2015                          1.  The patient had normal chamber dimensions with borderline left atrial enlargement with mild left ventricular enlargement with normal global LV systolic function with paradoxical septal motion with estimated ejection " fraction of 60%.                            2. The patient had PAP of 24 mmHg. Right atrial pressure was estimated around 10 mmHg.  RV dimensions and RV function normal.                            3.  The patient had mild-to-moderate aortic, trivial-to-mild mitral and trivial tricuspid insufficiency.                             4.  Trivial pericardial effusion was suggested.                2.  10/19/2020                          1.  Normal left ventricular systolic function, LVEF 60-65%                          2.  Moderate aortic regurgitation    3.  Mild MR and TR    4.  Mild right ventricular dilation with normal RV systolic function  4. Prior Holter Monitor: None    Review of Systems:   Review of Systems   Constitutional: Negative for activity change, appetite change, chills, diaphoresis, fatigue, fever, unexpected weight gain and unexpected weight loss.   Eyes: Negative for blurred vision and double vision.   Respiratory: Negative for cough, chest tightness, shortness of breath and wheezing.    Cardiovascular: Negative for chest pain, palpitations and leg swelling.   Gastrointestinal: Negative for abdominal pain, anal bleeding, blood in stool and GERD.   Endocrine: Negative for cold intolerance and heat intolerance.   Genitourinary: Negative for hematuria.   Neurological: Negative for dizziness, syncope, weakness and light-headedness.   Hematological: Does not bruise/bleed easily.   Psychiatric/Behavioral: Negative for depressed mood and stress. The patient is not nervous/anxious.        I have reviewed and/or updated the patient's past medical, past surgical, family, social history, problem list and allergies as appropriate.     Medications:     Current Outpatient Medications:   •  albuterol sulfate HFA (PROAIR HFA) 108 (90 Base) MCG/ACT inhaler, Inhale 2 puffs Every 4 (Four) Hours As Needed for Wheezing or Shortness of Air., Disp: 54 g, Rfl: 3  •  amLODIPine (NORVASC) 10 MG tablet, Take 1 tablet by mouth  "Daily., Disp: 90 tablet, Rfl: 3  •  aspirin 81 MG tablet, Take 1 tablet by mouth Daily., Disp: 30 tablet, Rfl: 11  •  Cholecalciferol (VITAMIN D3) 125 MCG (5000 UT) capsule capsule, TAKE ONE CAPSULE BY MOUTH EVERY DAY, Disp: 100 capsule, Rfl: 3  •  docusate sodium (COLACE) 100 MG capsule, Take 1 capsule by mouth 2 (Two) Times a Day., Disp: 60 capsule, Rfl: 2  •  ferrous sulfate 324 (65 Fe) MG tablet delayed-release EC tablet, Take 324 mg by mouth Daily With Breakfast., Disp: , Rfl:   •  HYDROcodone-acetaminophen (NORCO) 7.5-325 MG per tablet, Take 1 tablet by mouth 2 (Two) Times a Day As Needed for Mild Pain (1-3) or Moderate Pain (4-6)., Disp: , Rfl:   •  levothyroxine (SYNTHROID, LEVOTHROID) 100 MCG tablet, TAKE ONE TABLET BY MOUTH EVERY DAY (Patient taking differently: 88 mcg.), Disp: 90 tablet, Rfl: 0  •  metoprolol tartrate (LOPRESSOR) 50 MG tablet, , Disp: , Rfl:   •  morphine (MS CONTIN) 30 MG 12 hr tablet, Take 30 mg by mouth 2 (Two) Times a Day., Disp: , Rfl:   •  pantoprazole (PROTONIX) 40 MG EC tablet, TAKE ONE TABLET BY MOUTH EVERY DAY, Disp: 30 tablet, Rfl: 11  •  vitamin C (ASCORBIC ACID) 500 MG tablet, Take 500 mg by mouth Daily., Disp: , Rfl:   •  buPROPion XL (WELLBUTRIN XL) 150 MG 24 hr tablet, TAKE ONE TABLET BY MOUTH EVERY DAY, Disp: 30 tablet, Rfl: 2  •  polyethylene glycol (MiraLax) 17 GM/SCOOP powder, Take 17 g by mouth Daily., Disp: 510 g, Rfl: 2    Physical Exam:  Vital Signs:   Vitals:    12/10/20 1027   BP: 160/62   BP Location: Left arm   Patient Position: Sitting   Cuff Size: Adult   Pulse: 64   SpO2: 96%   Weight: 60.3 kg (133 lb)   Height: 162.6 cm (64\")       Physical Exam  Constitutional:       General: She is not in acute distress.     Appearance: Normal appearance. She is well-developed. She is not diaphoretic.   HENT:      Head: Normocephalic and atraumatic.   Eyes:      General: No scleral icterus.     Pupils: Pupils are equal, round, and reactive to light.   Neck:      " Musculoskeletal: Neck supple. No muscular tenderness.      Trachea: No tracheal deviation.   Cardiovascular:      Rate and Rhythm: Normal rate and regular rhythm.      Heart sounds: Normal heart sounds. No murmur. No friction rub. No gallop.       Comments: Normal JVD.  Pulmonary:      Effort: Pulmonary effort is normal. No respiratory distress.      Breath sounds: Normal breath sounds. No stridor. No wheezing or rales.   Chest:      Chest wall: No tenderness.   Abdominal:      General: Bowel sounds are normal. There is no distension.      Palpations: Abdomen is soft.      Tenderness: There is no abdominal tenderness. There is no guarding or rebound.   Musculoskeletal: Normal range of motion.         General: No swelling.   Lymphadenopathy:      Cervical: No cervical adenopathy.   Skin:     General: Skin is warm and dry.      Findings: No erythema.   Neurological:      General: No focal deficit present.      Mental Status: She is alert and oriented to person, place, and time.   Psychiatric:         Mood and Affect: Mood normal.         Behavior: Behavior normal.         Results Review:   I reviewed the patient's new clinical results.        Assessment / Plan:     1.  Paroxysmal atrial fibrillation  --Recent hospitalization for atrial fibrillation with rapid ventricular rates  --No recurrent symptoms since changing carvedilol to metoprolol for rate control  --While [id number] score is elevated, the patient has had a recent fall and declines therapeutic anticoagulation at this time is aware of the risk of CVA  --Continue metoprolol for rate control  --Continue aspirin  --Will obtain outpatient cardiac monitor to evaluate rate control and AF burden    2.  Coronary artery disease  --Known history of coronary artery disease, status post two-vessel CABG in 1998  --No exertional chest pain or anginal symptoms  --Continue daily aspirin  --Not on statin due to history of intolerance secondary to myalgias     3.  Essential  hypertension  --Hypertensive today, however normotensive on recent BP checks  --Continue current antihypertensives     3.  Aortic regurgitation  --Remains moderate on echocardiogram in 10/2020  --Repeat echocardiogram in 1 year to reassess     4.  Peripheral arterial occlusive disease  --Bilateral SFA disease as well as occlusion of bilateral posterior tibial arteries on prior noninvasive evaluation  --No current symptoms of claudication        Preventative Cardiology:   Tobacco Cessation: Prior cessation  Obstructive Sleep Apnea Screening: N/A  AAA Screening: N/A  Peripheral Arterial Disease Screening: Completed    Follow Up:   Return in about 6 months (around 6/10/2021).      Thank you for allowing me to participate in the care of your patient. Please to not hesitate to contact me with additional questions or concerns.     LATRICE Wylie MD  Interventional Cardiology   12/10/2020  10:29 EST

## 2020-12-21 RX ORDER — METOPROLOL TARTRATE 50 MG/1
50 TABLET, FILM COATED ORAL 2 TIMES DAILY
Qty: 90 TABLET | Refills: 3 | Status: SHIPPED | OUTPATIENT
Start: 2020-12-21 | End: 2021-12-10

## 2021-01-14 LAB
25(OH)D3+25(OH)D2 SERPL-MCNC: 82.1 NG/ML (ref 30–100)
ALBUMIN SERPL-MCNC: 3.5 G/DL (ref 3.5–5.2)
ALBUMIN/GLOB SERPL: 1.2 G/DL
ALP SERPL-CCNC: 146 U/L (ref 39–117)
ALT SERPL-CCNC: 16 U/L (ref 1–33)
AST SERPL-CCNC: 32 U/L (ref 1–32)
BASOPHILS # BLD AUTO: 0.06 10*3/MM3 (ref 0–0.2)
BASOPHILS NFR BLD AUTO: 0.8 % (ref 0–1.5)
BILIRUB SERPL-MCNC: 0.3 MG/DL (ref 0–1.2)
BUN SERPL-MCNC: 37 MG/DL (ref 8–23)
BUN/CREAT SERPL: 31.9 (ref 7–25)
CALCIUM SERPL-MCNC: 11.4 MG/DL (ref 8.6–10.5)
CHLORIDE SERPL-SCNC: 101 MMOL/L (ref 98–107)
CHOLEST SERPL-MCNC: 168 MG/DL (ref 0–200)
CO2 SERPL-SCNC: 30.1 MMOL/L (ref 22–29)
CREAT SERPL-MCNC: 1.16 MG/DL (ref 0.57–1)
EOSINOPHIL # BLD AUTO: 0.62 10*3/MM3 (ref 0–0.4)
EOSINOPHIL NFR BLD AUTO: 7.8 % (ref 0.3–6.2)
ERYTHROCYTE [DISTWIDTH] IN BLOOD BY AUTOMATED COUNT: 14.5 % (ref 12.3–15.4)
GLOBULIN SER CALC-MCNC: 2.9 GM/DL
GLUCOSE SERPL-MCNC: 107 MG/DL (ref 65–99)
HBA1C MFR BLD: 5 % (ref 4.8–5.6)
HCT VFR BLD AUTO: 34.2 % (ref 34–46.6)
HDLC SERPL-MCNC: 29 MG/DL (ref 40–60)
HGB BLD-MCNC: 11 G/DL (ref 12–15.9)
IMM GRANULOCYTES # BLD AUTO: 0.04 10*3/MM3 (ref 0–0.05)
IMM GRANULOCYTES NFR BLD AUTO: 0.5 % (ref 0–0.5)
LDLC SERPL CALC-MCNC: 111 MG/DL (ref 0–100)
LYMPHOCYTES # BLD AUTO: 1.72 10*3/MM3 (ref 0.7–3.1)
LYMPHOCYTES NFR BLD AUTO: 21.6 % (ref 19.6–45.3)
MCH RBC QN AUTO: 28.8 PG (ref 26.6–33)
MCHC RBC AUTO-ENTMCNC: 32.2 G/DL (ref 31.5–35.7)
MCV RBC AUTO: 89.5 FL (ref 79–97)
MONOCYTES # BLD AUTO: 0.87 10*3/MM3 (ref 0.1–0.9)
MONOCYTES NFR BLD AUTO: 10.9 % (ref 5–12)
NEUTROPHILS # BLD AUTO: 4.65 10*3/MM3 (ref 1.7–7)
NEUTROPHILS NFR BLD AUTO: 58.4 % (ref 42.7–76)
NRBC BLD AUTO-RTO: 0 /100 WBC (ref 0–0.2)
PLATELET # BLD AUTO: 235 10*3/MM3 (ref 140–450)
POTASSIUM SERPL-SCNC: 4.2 MMOL/L (ref 3.5–5.2)
PROT SERPL-MCNC: 6.4 G/DL (ref 6–8.5)
RBC # BLD AUTO: 3.82 10*6/MM3 (ref 3.77–5.28)
SODIUM SERPL-SCNC: 140 MMOL/L (ref 136–145)
TRIGL SERPL-MCNC: 154 MG/DL (ref 0–150)
TSH SERPL DL<=0.005 MIU/L-ACNC: 2.41 UIU/ML (ref 0.27–4.2)
VLDLC SERPL CALC-MCNC: 28 MG/DL (ref 5–40)
WBC # BLD AUTO: 7.96 10*3/MM3 (ref 3.4–10.8)

## 2021-02-01 RX ORDER — LEVOTHYROXINE SODIUM 0.1 MG/1
100 TABLET ORAL DAILY
Qty: 90 TABLET | Refills: 3 | Status: SHIPPED | OUTPATIENT
Start: 2021-02-01 | End: 2022-02-28

## 2021-02-01 NOTE — TELEPHONE ENCOUNTER
[facility] [place] [place] [facility] [address] [phone]  - [phone] FX    Relationship: Pharmacy    Best call back number: MAGGIE    Medication needed:   Requested Prescriptions     Pending Prescriptions Disp Refills   • levothyroxine (SYNTHROID, LEVOTHROID) 100 MCG tablet 90 tablet 0     Sig: Take 1 tablet by mouth Daily.       When do you need the refill by: 02/01/2021    What details did the patient provide when requesting the medication: PATIENT HAS LESS THAN A THREE DAY SUPPLY    Does the patient have less than a 3 day supply:  [x] Yes  [] No    What [facility] the patient[facility] [place] [place] - [address] - [phone] [facility] [phone] FX<br>[facility] - [place], [place] - [address] AT [address] TO [facility] - [phone] [facility] [phone] FX             
Patient last seen on 12/1/2020  
None

## 2021-02-02 ENCOUNTER — OFFICE VISIT (OUTPATIENT)
Dept: INTERNAL MEDICINE | Facility: CLINIC | Age: 86
End: 2021-02-02

## 2021-02-02 ENCOUNTER — TELEPHONE (OUTPATIENT)
Dept: INTERNAL MEDICINE | Facility: CLINIC | Age: 86
End: 2021-02-02

## 2021-02-02 VITALS
HEART RATE: 63 BPM | DIASTOLIC BLOOD PRESSURE: 64 MMHG | HEIGHT: 64 IN | WEIGHT: 130 LBS | OXYGEN SATURATION: 95 % | SYSTOLIC BLOOD PRESSURE: 114 MMHG | BODY MASS INDEX: 22.2 KG/M2 | TEMPERATURE: 97.1 F

## 2021-02-02 DIAGNOSIS — J43.8 OTHER EMPHYSEMA (HCC): Primary | ICD-10-CM

## 2021-02-02 DIAGNOSIS — R11.0 NAUSEA: ICD-10-CM

## 2021-02-02 DIAGNOSIS — D64.9 ANEMIA, UNSPECIFIED TYPE: ICD-10-CM

## 2021-02-02 DIAGNOSIS — R91.8 LUNG NODULES: ICD-10-CM

## 2021-02-02 DIAGNOSIS — F32.A DEPRESSION, UNSPECIFIED DEPRESSION TYPE: ICD-10-CM

## 2021-02-02 DIAGNOSIS — R25.1 TREMOR: ICD-10-CM

## 2021-02-02 DIAGNOSIS — F41.9 ANXIETY: ICD-10-CM

## 2021-02-02 DIAGNOSIS — R13.10 DYSPHAGIA, UNSPECIFIED TYPE: ICD-10-CM

## 2021-02-02 DIAGNOSIS — R25.2 LEG CRAMP: ICD-10-CM

## 2021-02-02 DIAGNOSIS — R53.83 OTHER FATIGUE: ICD-10-CM

## 2021-02-02 PROCEDURE — 99214 OFFICE O/P EST MOD 30 MIN: CPT | Performed by: INTERNAL MEDICINE

## 2021-02-02 RX ORDER — ALBUTEROL SULFATE 90 UG/1
2 AEROSOL, METERED RESPIRATORY (INHALATION) EVERY 4 HOURS PRN
Qty: 54 G | Refills: 3 | Status: SHIPPED | OUTPATIENT
Start: 2021-02-02 | End: 2022-03-10 | Stop reason: SDUPTHER

## 2021-02-02 RX ORDER — BACLOFEN 10 MG/1
10 TABLET ORAL NIGHTLY PRN
COMMUNITY
End: 2021-02-02 | Stop reason: SDUPTHER

## 2021-02-02 RX ORDER — FERROUS GLUCONATE 324(37.5)
TABLET ORAL
COMMUNITY
Start: 2020-11-05 | End: 2021-02-02

## 2021-02-02 RX ORDER — BACLOFEN 10 MG/1
10 TABLET ORAL NIGHTLY PRN
Qty: 90 TABLET | Refills: 3 | Status: CANCELLED | OUTPATIENT
Start: 2021-02-02

## 2021-02-02 RX ORDER — BACLOFEN 10 MG/1
10 TABLET ORAL NIGHTLY PRN
Qty: 30 TABLET | Refills: 1 | Status: SHIPPED | OUTPATIENT
Start: 2021-02-02 | End: 2021-04-27

## 2021-02-02 RX ORDER — LOSARTAN POTASSIUM 100 MG/1
100 TABLET ORAL DAILY
COMMUNITY
Start: 2021-01-04 | End: 2021-12-21

## 2021-02-02 NOTE — PROGRESS NOTES
Subjective   Mi Tejeda is a 85 y.o. female.     Chief Complaint   Patient presents with   • Fatigue     very weak, using her inhaler a lot. onset 3 months   • Nausea     c/o acid reflex, onset for about 1 mo, pt states her esophagus needs stretched due to food getting hung    • Tremors     pt c/o hand tremors off and on but is gradually getting worse    • Depression     pt c/o 3 mo feeling very depressed and hopeless, pt states she recently moved to new house       History of Present Illness   Patient here for follow-up.  Patient complains anxious depression no suicidal thoughts.  Patient recently moved to \[facility]\"".  Patient self discontinued Wellbutrin stating it is no help.  Patient complains of feeling tired and no energy.  Also has some mild tremor eating make it worse.  Patient also complains sickly stomach reflux heartburn for about 1 month food stuck in the throat.  Protonix no help.  Leg cramp still.  History of a lung nodule needs to be followed up.  COPD needs medicine refilled.    Current Outpatient Medications:   •  amLODIPine (NORVASC) 10 MG tablet, Take 1 tablet by mouth Daily., Disp: 90 tablet, Rfl: 3  •  aspirin 81 MG tablet, Take 1 tablet by mouth Daily., Disp: 30 tablet, Rfl: 11  •  baclofen (LIORESAL) 10 MG tablet, Take 1 tablet by mouth At Night As Needed for Muscle Spasms., Disp: 30 tablet, Rfl: 1  •  Cholecalciferol (VITAMIN D3) 125 MCG (5000 UT) capsule capsule, TAKE ONE CAPSULE BY MOUTH EVERY DAY, Disp: 100 capsule, Rfl: 3  •  docusate sodium (COLACE) 100 MG capsule, Take 1 capsule by mouth 2 (Two) Times a Day., Disp: 60 capsule, Rfl: 2  •  ferrous sulfate 324 (65 Fe) MG tablet delayed-release EC tablet, Take 324 mg by mouth Daily With Breakfast., Disp: , Rfl:   •  HYDROcodone-acetaminophen (NORCO) 7.5-325 MG per tablet, Take 1 tablet by mouth 2 (Two) Times a Day As Needed for Mild Pain (1-3) or Moderate Pain (4-6)., Disp: , Rfl:   •  levothyroxine (SYNTHROID, LEVOTHROID) 100  MCG tablet, Take 1 tablet by mouth Daily., Disp: 90 tablet, Rfl: 3  •  losartan (COZAAR) 100 MG tablet, , Disp: , Rfl:   •  metoprolol tartrate (LOPRESSOR) 50 MG tablet, Take 1 tablet by mouth 2 (Two) Times a Day., Disp: 90 tablet, Rfl: 3  •  morphine (MS CONTIN) 30 MG 12 hr tablet, Take 30 mg by mouth 2 (Two) Times a Day., Disp: , Rfl:   •  mupirocin (BACTROBAN) 2 % ointment, , Disp: , Rfl:   •  pantoprazole (PROTONIX) 40 MG EC tablet, TAKE ONE TABLET BY MOUTH EVERY DAY, Disp: 30 tablet, Rfl: 11  •  polyethylene glycol (MiraLax) 17 GM/SCOOP powder, Take 17 g by mouth Daily., Disp: 510 g, Rfl: 2  •  vitamin C (ASCORBIC ACID) 500 MG tablet, Take 500 mg by mouth Daily., Disp: , Rfl:   •  albuterol sulfate HFA (ProAir HFA) 108 (90 Base) MCG/ACT inhaler, Inhale 2 puffs Every 4 (Four) Hours As Needed for Wheezing or Shortness of Air., Disp: 54 g, Rfl: 3  •  sertraline (Zoloft) 50 MG tablet, Take 1 tablet by mouth Daily., Disp: 30 tablet, Rfl: 1    The following portions of the patient's history were reviewed and updated as appropriate: allergies, current medications, past family history, past medical history, past social history, past surgical history and problem list.    Review of Systems   Constitutional: Positive for fatigue.   Respiratory: Negative.    Cardiovascular: Negative.    Gastrointestinal: Negative.    Musculoskeletal: Negative.    Skin: Negative.    Neurological: Negative.    Psychiatric/Behavioral: The patient is nervous/anxious.        Objective   Physical Exam  Neck:      Musculoskeletal: Neck supple.   Cardiovascular:      Rate and Rhythm: Normal rate and regular rhythm.      Heart sounds: Normal heart sounds.   Pulmonary:      Effort: Pulmonary effort is normal.      Breath sounds: Normal breath sounds.   Abdominal:      General: Bowel sounds are normal.   Skin:     General: Skin is warm.   Neurological:      General: No focal deficit present.      Mental Status: She is alert and oriented to person,  place, and time.         All tests have been reviewed.    Assessment/Plan   Diagnoses and all orders for this visit:    Other emphysema (CMS/HCC) refill medication  -     albuterol sulfate HFA (ProAir HFA) 108 (90 Base) MCG/ACT inhaler; Inhale 2 puffs Every 4 (Four) Hours As Needed for Wheezing or Shortness of Air.    Other fatigue possible related to mood disorder versus anemia    Mild anemia continue to watch patient is on a iron supplement    Depression, unspecified depression type discontinue Wellbutrin for no use self discontinued a month ago.  Initiate Zoloft  -     sertraline (Zoloft) 50 MG tablet; Take 1 tablet by mouth Daily.    Tremor mild may be related to anxiety continue to watch    Nausea possible related to swallow problem refer to GI    Lung nodules follow-up with lung nodule  -     CT CHEST WITHOUT CONTRAST DIAGNOSTIC    Leg cramp initiate baclofen  -     baclofen (LIORESAL) 10 MG tablet; Take 1 tablet by mouth At Night As Needed for Muscle Spasms.    Dysphagia, unspecified type  -     Ambulatory Referral to Gastroenterology    Anxiety initiate medication  -     sertraline (Zoloft) 50 MG tablet; Take 1 tablet by mouth Daily.    Other orders  -     Cancel: baclofen (LIORESAL) 10 MG tablet; Take 1 tablet by mouth At Night As Needed for Muscle Spasms.  -     Discontinue: baclofen (LIORESAL) 10 MG tablet; Take 10 mg by mouth At Night As Needed for Muscle Spasms.  -     losartan (COZAAR) 100 MG tablet  -     Discontinue: ferrous gluconate 324 (37.5 Fe) MG tablet tablet  -     mupirocin (BACTROBAN) 2 % ointment    with others in 3 week

## 2021-02-02 NOTE — TELEPHONE ENCOUNTER
[facility] [place] [place] [facility] [address] - [phone]  - [phone]    Relationship: Pharmacy    Best call back number: [phone]      Medication needed: BACLOFEN      When do you need the refill by:  02/02/21    What details did the patient provide when requesting the medication: PHARMACY STATES THAT THE PATIENT USUALLY GETS THIS FROM ANOTHER PHARMACY. THE PATIENT CONTACTED THEM TO GET A REFILL. THEY WERE UNAWARE OF THE DOSAGE OR FREQUENCY. PLEASE ADVISE.     Does the patient have less than a 3 day supply:  [x] Yes  [] No    What is the patient's preferred [facility] [place] [place] - [address] - [phone]  - [phone]  [phone]

## 2021-02-03 RX ORDER — DOXYCYCLINE HYCLATE 50 MG/1
CAPSULE, GELATIN COATED ORAL
Qty: 90 TABLET | Refills: 3 | Status: SHIPPED | OUTPATIENT
Start: 2021-02-03 | End: 2021-02-10 | Stop reason: SDUPTHER

## 2021-02-16 ENCOUNTER — HOSPITAL ENCOUNTER (OUTPATIENT)
Dept: CT IMAGING | Facility: HOSPITAL | Age: 86
End: 2021-02-16

## 2021-02-19 ENCOUNTER — APPOINTMENT (OUTPATIENT)
Dept: INFUSION THERAPY | Facility: HOSPITAL | Age: 86
End: 2021-02-19

## 2021-02-25 ENCOUNTER — APPOINTMENT (OUTPATIENT)
Dept: INFUSION THERAPY | Facility: HOSPITAL | Age: 86
End: 2021-02-25

## 2021-03-04 ENCOUNTER — HOSPITAL ENCOUNTER (OUTPATIENT)
Dept: CT IMAGING | Facility: HOSPITAL | Age: 86
Discharge: HOME | End: 2021-03-04
Admitting: INTERNAL MEDICINE

## 2021-03-04 PROCEDURE — 71250 CT THORAX DX C-: CPT

## 2021-03-15 ENCOUNTER — OFFICE VISIT (OUTPATIENT)
Dept: GASTROENTEROLOGY | Facility: CLINIC | Age: 86
End: 2021-03-15

## 2021-03-15 VITALS
SYSTOLIC BLOOD PRESSURE: 130 MMHG | HEART RATE: 52 BPM | RESPIRATION RATE: 12 BRPM | TEMPERATURE: 97.3 F | HEIGHT: 64 IN | DIASTOLIC BLOOD PRESSURE: 42 MMHG | BODY MASS INDEX: 22.31 KG/M2

## 2021-03-15 DIAGNOSIS — K21.9 GASTROESOPHAGEAL REFLUX DISEASE, UNSPECIFIED WHETHER ESOPHAGITIS PRESENT: Primary | ICD-10-CM

## 2021-03-15 DIAGNOSIS — K59.03 THERAPEUTIC OPIOID INDUCED CONSTIPATION: ICD-10-CM

## 2021-03-15 DIAGNOSIS — R13.19 ESOPHAGEAL DYSPHAGIA: ICD-10-CM

## 2021-03-15 DIAGNOSIS — T40.2X5A THERAPEUTIC OPIOID INDUCED CONSTIPATION: ICD-10-CM

## 2021-03-15 DIAGNOSIS — D64.9 NORMOCYTIC ANEMIA: ICD-10-CM

## 2021-03-15 PROCEDURE — 99214 OFFICE O/P EST MOD 30 MIN: CPT | Performed by: PHYSICIAN ASSISTANT

## 2021-03-15 RX ORDER — OMEPRAZOLE 40 MG/1
40 CAPSULE, DELAYED RELEASE ORAL
Qty: 30 CAPSULE | Refills: 0 | Status: SHIPPED | OUTPATIENT
Start: 2021-03-15 | End: 2021-03-16 | Stop reason: SDUPTHER

## 2021-03-15 RX ORDER — OMEPRAZOLE 20 MG/1
20 CAPSULE, DELAYED RELEASE ORAL DAILY
COMMUNITY
End: 2021-03-15 | Stop reason: SDUPTHER

## 2021-03-15 NOTE — PROGRESS NOTES
Follow Up Note     Date: 03/15/2021   Patient Name: Mi Tejeda  MRN: [id number]  : 1935     Primary Care Provider: Adiel Awad MD     Chief Complaint:    Chief Complaint   Patient presents with   • Follow-up   • Difficulty Swallowing   • Constipation   • Anemia     History of present illness:   3/15/2021  Mi Tejeda is a 85 y.o. female who is here today for follow up regarding dysphagia, constipation and anemia.     Patient reports that dysphagia is persistent and happening with every meal.  She feels as if food is stuck in her chest and will go to the sink to try to regurgitate while eating.  She has had this problem for several years now.  She has had previous EGDs with dilatations and they have not helped for very long, only a couple of months each time.  She does have history of hiatal hernia and Schatzki's ring.  Last dilatation of the esophagus was completed by Dr. Castellanos 2019.  She also recalls a history of having gastric ulcer.  She has been taking omeprazole 20 mg once daily for a few months now.  She was previously taking Protonix without relief and her PCP changed it to omeprazole.  Denies any known history of chronic kidney disease.  She did have recent labs with her PCP and hemoglobin has been stable.    Regarding constipation, she is having bowel movements every other day and denies any straining.  She started taking MiraLAX but only takes it as needed.  Does have some intermittent bright red rectal bleeding but this is only small amount when it occurs, on the tissue.    Interval History:  2020  Mi Tejeda is a 84 y.o. female who is here today for follow up for constipation abdominal pain.  She states that since she was given an antibiotic last time her pain gradually improved however she still has a significant constipation not taking any laxatives.  Deny any nausea vomiting.  She had a stool test done which revealed acute blood in 2020 however patient  denies any blood in the stool black stool or melena     6/27/2020  The patient has a history of reflux off and on for the last several years.  The reflux is moderately severe.  Symptoms are described as retrosternal burning sensation, and indigestion.  There is history of occasional regurgitative symptoms.  Frequency being several times per week.  The symptoms are worse at night.  The patient takes acid suppressive therapy with reasonable control of his symptoms.  The patient had associated dysphagia.  This significantly improved.  The patient denies further abdominal pain.  There is no nausea or vomiting.  There is history of constipation.  The patient has gained 3 pounds since May 2019.  There is no history of liver or pancreatic disease.  She had undergone a colonoscopy in 2013 by surgical service-Dr. Thompson.  The patient was found to have polyps-tubular adenoma.    Subjective     Past Medical History:   Diagnosis Date   • Abdominal pain     states hx of   • Abnormal electrocardiogram    • Abnormal urinalysis    • Acute UTI    • Anemia    • Anomaly cardiac     REPORTS WILL SHOW ON LEFT VENTRICLE IN A 12 LEAD EKG AS BEING A MI BUT REPORTS SHE HAS NEVER HAD THIS    • Anxiety    • Aphagia     HX OF THIS, REPORTS WAS CLEARED OF A CVA BUT WAS TOLD WAS RELATED TO FATIGUE   • Arthritis    • Asthma    • Atrial fibrillation (CMS/HCC)    • Backache    • Bursitis of right shoulder    • CAD (coronary artery disease)    • Cataract, bilateral     s/p removal   • COPD (chronic obstructive pulmonary disease) (CMS/HCC)    • Depression    • Diverticulitis    • Dysphagia     REPORTS RESOLVED AFTER DILITATION   • Fracture     left wrist, 2 fingers on right hand, toes on right foot, right arm   • Full dentures    • Gastric ulcer    • GERD (gastroesophageal reflux disease)    • Gout    • Heart murmur    • High cholesterol    • History of nuclear stress test 2011?   • HTN (hypertension)    • Hypertriglyceridemia    • Hypothyroidism    •  "Multiple lung nodules    • Osteoarthritis    • Osteoporosis    • Peripheral neuropathy    • Personal history of tobacco use    • Plantar fasciitis     left   • Pneumonia    • Sinus problem    • Sinusitis    • Tear of meniscus of knee     right   • TIA (transient ischemic attack)     denies 5/22/19.  states had aphasia in E.R., but was ruled out.   • Toe pain     \"Calluses were filed out, but is painful\"   • Uterine leiomyoma    • Viral gastroenteritis    • Vitamin D deficiency    • Wears glasses    • Weight loss, non-intentional     80 lb in 5 years     Past Surgical History:   Procedure Laterality Date   • CARDIAC CATHETERIZATION  2007?    no stents   • CARDIAC SURGERY     • CATARACT EXTRACTION Bilateral 2009   • CATARACT EXTRACTION, BILATERAL     • CHOLECYSTECTOMY  2002   • COLONOSCOPY     • CORONARY ARTERY BYPASS GRAFT  1998    2 vessels   • ENDOSCOPY     • ENDOSCOPY N/A 5/3/2017    Procedure: ESOPHAGOGASTRODUODENOSCOPY with biopsies;  Surgeon: Yared Castellanos MD;  Location: [facility] ENDOSCOPY;  Service:    • ENDOSCOPY N/A 5/23/2019    Procedure: ESOPHAGOGASTRODUODENOSCOPY WITH BIOPSY AND BALLOON DILITATION;  Surgeon: Yared Castellanos MD;  Location: [facility] ENDOSCOPY;  Service: Gastroenterology   • MOUTH SURGERY      full extraction   • TOTAL HIP ARTHROPLASTY Left 02/02/2010    PREETI Stanley MD     Family History   Problem Relation Age of Onset   • Cancer Mother         uterus   • Cancer Sister         colon; kidney   • Hypertension Sister    • Stroke Sister    • Cancer Brother         prostate   • Arthritis Other    • Cancer Other    • Kidney disease Other    • Stroke Other    • Hypertension Other    • Gout Other    • Colon cancer Sister    • Cancer Sister         kidney   • Cirrhosis Neg Hx    • Liver disease Neg Hx    • Liver cancer Neg Hx    • Stomach cancer Neg Hx      Social History     Socioeconomic History   • Marital status:      Spouse name: Not on file   • Number of children: Not on file   • Years of " education: Not on file   • Highest education level: Not on file   Tobacco Use   • Smoking status: Former Smoker     Packs/day: 0.00     Years: 65.00     Pack years: 0.00     Types: Cigarettes     Quit date: 10/1/2018     Years since quittin.4   • Smokeless tobacco: Never Used   Vaping Use   • Vaping Use: Never used   Substance and Sexual Activity   • Alcohol use: Yes     Comment: Seldom   • Drug use: No   • Sexual activity: Defer       Current Outpatient Medications:   •  albuterol sulfate HFA (ProAir HFA) 108 (90 Base) MCG/ACT inhaler, Inhale 2 puffs Every 4 (Four) Hours As Needed for Wheezing or Shortness of Air., Disp: 54 g, Rfl: 3  •  aspirin 81 MG tablet, Take 1 tablet by mouth Daily., Disp: 30 tablet, Rfl: 11  •  baclofen (LIORESAL) 10 MG tablet, Take 1 tablet by mouth At Night As Needed for Muscle Spasms., Disp: 30 tablet, Rfl: 1  •  CALCIUM PO, Take  by mouth Daily., Disp: , Rfl:   •  carvedilol (COREG) 12.5 MG tablet, Take 12.5 mg by mouth 2 (Two) Times a Day With Meals., Disp: , Rfl:   •  Cholecalciferol (VITAMIN D3) 125 MCG (5000 UT) capsule capsule, TAKE ONE CAPSULE BY MOUTH EVERY DAY, Disp: 100 capsule, Rfl: 3  •  ferrous sulfate 324 (65 Fe) MG tablet delayed-release EC tablet, Take 324 mg by mouth Daily With Breakfast., Disp: , Rfl:   •  HYDROcodone-acetaminophen (NORCO) 7.5-325 MG per tablet, Take 1 tablet by mouth 2 (Two) Times a Day As Needed for Mild Pain (1-3) or Moderate Pain (4-6)., Disp: , Rfl:   •  levothyroxine (SYNTHROID, LEVOTHROID) 100 MCG tablet, Take 1 tablet by mouth Daily., Disp: 90 tablet, Rfl: 3  •  losartan (COZAAR) 100 MG tablet, Take 100 mg by mouth Daily., Disp: , Rfl:   •  metoprolol tartrate (LOPRESSOR) 50 MG tablet, Take 1 tablet by mouth 2 (Two) Times a Day., Disp: 90 tablet, Rfl: 3  •  morphine (MS CONTIN) 30 MG 12 hr tablet, Take 30 mg by mouth 2 (Two) Times a Day., Disp: , Rfl:   •  mupirocin (BACTROBAN) 2 % ointment, As Needed., Disp: , Rfl:   •  omeprazole (priLOSEC)  "20 MG capsule, Take 20 mg by mouth Daily., Disp: , Rfl:   •  polyethylene glycol (MiraLax) 17 GM/SCOOP powder, Take 17 g by mouth Daily., Disp: 510 g, Rfl: 2  •  vitamin C (ASCORBIC ACID) 500 MG tablet, Take 500 mg by mouth Daily., Disp: , Rfl:   •  sertraline (Zoloft) 50 MG tablet, Take 1 tablet by mouth Daily., Disp: 30 tablet, Rfl: 1    Allergies   Allergen Reactions   • Vicodin [Hydrocodone-Acetaminophen] Hives     Can take hydrocodone, cannot tolerate VICODIN   • Ciprofloxacin GI Intolerance     Patient states she is not allergic.   • Hazelnut Nausea Only   • Keflex [Cephalexin] Nausea Only   • Niacin Rash     \"it makes my skin burn\"   • Oxycodone Dizziness     \"it makes me feel dopey and out of my head\"  NOTE: does tolerate hydrocodone and morphine   • Statins Myalgia       The following portions of the patient's history were reviewed and updated as appropriate: allergies, current medications, past family history, past medical history, past social history, past surgical history and problem list.  Objective     Physical Exam  Vitals reviewed.   Constitutional:       General: She is not in acute distress.     Appearance: Normal appearance. She is well-developed. She is not ill-appearing or diaphoretic.   HENT:      Head: Normocephalic and atraumatic.      Right Ear: External ear normal.      Left Ear: External ear normal.      Nose: Nose normal.      Mouth/Throat:      Comments: Wearing a mask  Eyes:      General: No scleral icterus.        Right eye: No discharge.         Left eye: No discharge.      Conjunctiva/sclera: Conjunctivae normal.   Neck:      Vascular: No JVD.   Cardiovascular:      Rate and Rhythm: Normal rate and regular rhythm.      Heart sounds: Normal heart sounds. No murmur. No friction rub. No gallop.    Pulmonary:      Effort: Pulmonary effort is normal. No respiratory distress.      Breath sounds: Normal breath sounds. No wheezing or rales.   Chest:      Chest wall: No tenderness. " "  Abdominal:      General: Bowel sounds are normal. There is no distension.      Palpations: Abdomen is soft. There is no mass.      Tenderness: There is no abdominal tenderness. There is no guarding.   Musculoskeletal:         General: No deformity.      Cervical back: Normal range of motion.   Skin:     General: Skin is warm and dry.      Findings: Bruising present. No erythema or rash.      Comments: Scattered bruising on b/l LEs   Neurological:      Mental Status: She is alert and oriented to person, place, and time.      Coordination: Coordination normal.   Psychiatric:         Mood and Affect: Mood normal.         Behavior: Behavior normal.         Thought Content: Thought content normal.         Judgment: Judgment normal.       Vitals:    03/15/21 1330   BP: 130/42   Pulse: 52   Resp: 12   Temp: 97.3 °F (36.3 °C)   Weight: Comment: in w/c   Height: 162.6 cm (64\")       Results Review:   I reviewed the patient's new clinical results.    No visits with results within 90 Day(s) from this visit.   Latest known visit with results is:   Office Visit on 12/01/2020   Component Date Value Ref Range Status   • WBC 01/14/2021 7.96  3.40 - 10.80 10*3/mm3 Final   • RBC 01/14/2021 3.82  3.77 - 5.28 10*6/mm3 Final   • Hemoglobin 01/14/2021 11.0* 12.0 - 15.9 g/dL Final   • Hematocrit 01/14/2021 34.2  34.0 - 46.6 % Final   • MCV 01/14/2021 89.5  79.0 - 97.0 fL Final   • MCH 01/14/2021 28.8  26.6 - 33.0 pg Final   • MCHC 01/14/2021 32.2  31.5 - 35.7 g/dL Final   • RDW 01/14/2021 14.5  12.3 - 15.4 % Final   • Platelets 01/14/2021 235  140 - 450 10*3/mm3 Final   • Neutrophil Rel % 01/14/2021 58.4  42.7 - 76.0 % Final   • Lymphocyte Rel % 01/14/2021 21.6  19.6 - 45.3 % Final   • Monocyte Rel % 01/14/2021 10.9  5.0 - 12.0 % Final   • Eosinophil Rel % 01/14/2021 7.8* 0.3 - 6.2 % Final   • Basophil Rel % 01/14/2021 0.8  0.0 - 1.5 % Final   • Neutrophils Absolute 01/14/2021 4.65  1.70 - 7.00 10*3/mm3 Final   • Lymphocytes Absolute " 01/14/2021 1.72  0.70 - 3.10 10*3/mm3 Final   • Monocytes Absolute 01/14/2021 0.87  0.10 - 0.90 10*3/mm3 Final   • Eosinophils Absolute 01/14/2021 0.62* 0.00 - 0.40 10*3/mm3 Final   • Basophils Absolute 01/14/2021 0.06  0.00 - 0.20 10*3/mm3 Final   • Immature Granulocyte Rel % 01/14/2021 0.5  0.0 - 0.5 % Final   • Immature Grans Absolute 01/14/2021 0.04  0.00 - 0.05 10*3/mm3 Final   • nRBC 01/14/2021 0.0  0.0 - 0.2 /100 WBC Final   • Glucose 01/14/2021 107* 65 - 99 mg/dL Final   • BUN 01/14/2021 37* 8 - 23 mg/dL Final   • Creatinine 01/14/2021 1.16* 0.57 - 1.00 mg/dL Final   • eGFR Non  Am 01/14/2021 44* >60 mL/min/1.73 Final    Comment: The MDRD GFR formula is only valid for adults with stable  renal function between ages 18 and 70.     • eGFR  Am 01/14/2021 54* >60 mL/min/1.73 Final   • BUN/Creatinine Ratio 01/14/2021 31.9* 7.0 - 25.0 Final   • Sodium 01/14/2021 140  136 - 145 mmol/L Final   • Potassium 01/14/2021 4.2  3.5 - 5.2 mmol/L Final   • Chloride 01/14/2021 101  98 - 107 mmol/L Final   • Total CO2 01/14/2021 30.1* 22.0 - 29.0 mmol/L Final   • Calcium 01/14/2021 11.4* 8.6 - 10.5 mg/dL Final   • Total Protein 01/14/2021 6.4  6.0 - 8.5 g/dL Final   • Albumin 01/14/2021 3.50  3.50 - 5.20 g/dL Final   • Globulin 01/14/2021 2.9  gm/dL Final   • A/G Ratio 01/14/2021 1.2  g/dL Final   • Total Bilirubin 01/14/2021 0.3  0.0 - 1.2 mg/dL Final   • Alkaline Phosphatase 01/14/2021 146* 39 - 117 U/L Final   • AST (SGOT) 01/14/2021 32  1 - 32 U/L Final   • ALT (SGPT) 01/14/2021 16  1 - 33 U/L Final   • Total Cholesterol 01/14/2021 168  0 - 200 mg/dL Final    Comment: Cholesterol Reference Ranges  (U.S. Department of Health and Human Services ATP III  Classifications)  Desirable          <200 mg/dL  Borderline High    200-239 mg/dL  High Risk          >240 mg/dL  Triglyceride Reference Ranges  (U.S. Department of Health and Human Services ATP III  Classifications)  Normal           <150 mg/dL  Borderline High   150-199 mg/dL  High             200-499 mg/dL  Very High        >500 mg/dL  HDL Reference Ranges  (U.S. Department of Health and Human Services ATP III  Classifcations)  Low     <40 mg/dl (major risk factor for CHD)  High    >60 mg/dl ('negative' risk factor for CHD)  LDL Reference Ranges  (U.S. Department of Health and Human Services ATP III  Classifcations)  Optimal          <100 mg/dL  Near Optimal     100-129 mg/dL  Borderline High  130-159 mg/dL  High             160-189 mg/dL  Very High        >189 mg/dL     • Triglycerides 01/14/2021 154* 0 - 150 mg/dL Final   • HDL Cholesterol 01/14/2021 29* 40 - 60 mg/dL Final   • VLDL Cholesterol Jhony 01/14/2021 28  5 - 40 mg/dL Final   • LDL Chol Calc ([facility]) 01/14/2021 111* 0 - 100 mg/dL Final   • TSH 01/14/2021 2.410  0.270 - 4.200 uIU/mL Final   • 25 Hydroxy, Vitamin D 01/14/2021 82.1  30.0 - 100.0 ng/ml Final    Comment: Results may be falsely increased if patient taking Biotin.  Reference Range for Total Vitamin D 25(OH)  Deficiency <20.0 ng/mL  Insufficiency 21-29 ng/mL  Sufficiency  ng/mL  Toxicity >100 ng/ml     • Hemoglobin A1C 01/14/2021 5.00  4.80 - 5.60 % Final    Comment: Hemoglobin A1C Ranges:  Increased Risk for Diabetes  5.7% to 6.4%  Diabetes                     >= 6.5%  Diabetic Goal                < 7.0%        CT CHEST WITHOUT CONTRAST DIAGNOSTIC    Result Date: 3/4/2021  Stable subcentimeter right upper lobe nodules highly likely to be benign nodules such as granulomas. Nodules have been stable for greater than 2 years.   This study was performed with techniques to keep radiation doses as low as reasonably achievable (ALARA). Individualized dose reduction techniques using automated exposure control or adjustment of vA and/or kV according to the patient size were employed.  This report was finalized on 3/4/2021 3:39 PM by Alfie Choi MD.     Procedures:  Upon review of medical records:     Dated April 10, 2013 the patient underwent a colonoscopy  by Dr. Thompson from surgical service which revealed: Colon polyp. Arteriovenous malformations, cecum. Diverticulosis coli. Colon, polyp at 10 cm, biopsy revealed tubular adenoma.     Dated May 23, 2019 the patient underwent an upper endoscopy which revealed: Esophageal rings.  Schatzki's ring.  Status post serial dilation to 18 mm.  Small sliding hiatal hernia.  Gastric diverticulum at the fundus.  Small gastric polyps.  Erythematous gastritis.  Small submucosal nodule in the second portion of duodenum.  Tortuosity and tertiary contractions of esophageal body were noted.  Distal esophagus was noted to be spastic and resistant to insufflation.  However a persistent insufflation this opens.  Duodenum, submucosal nodule, biopsy revealed duodenal type mucosa with no significant histopathologic abnormalities.  Negative for specific microorganisms.  Negative for dysplasia or malignancy.  The endoscopic impression of a submucosal nodule is noted.  The specimen shows duodenal type mucosa with no significant submucosa present for evaluation.  Duodenum, biopsy revealed duodenal type mucosa with no significant histopathologic abnormalities.  Antrum, body and fundus, biopsies revealed gastric mucosa with reactive (chemical) gastropathy.  Negative for intestinal metaplasia or dysplasia.  No Helicobacter pylori-like organisms seen.  Stomach, body, polyps, biopsy revealed fundic gland polyps.  Negative for intestinal metaplasia or dysplasia.  No Helicobacter pylori-like organisms seen.     Assessment / Plan      1. Gastroesophageal reflux disease, unspecified whether esophagitis present  2. Esophageal dysphagia  Patient has had GERD for several years.  Currently, she is taking omeprazole 20 mg once daily without complete relief.  She still has acid reflux which is severe when she lies down at night.  I will increase omeprazole to 40 mg once daily, plan to take this increased dose for 4 weeks.  Reviewed recent labs and GFR decreased  but she has an advanced age, no history of CKD.  She considered having EGD with possible dilatation but will proceed with esophagram for further evaluation of reported persistent dysphagia which was only temporarily relieved with dilatation of the esophagus. Does have history of tortuous esophagus on previous EGD, likely contributing to her continued dysphagia.  She was instructed not to lie down immediately after eating (wait at least 3 hours after meals), elevate the head of the bed at night, avoid spicy foods, avoid mints, avoid caffeine, avoid nicotine and maintain a healthy weight.     - omeprazole (priLOSEC) 40 MG capsule; Take 1 capsule by mouth Every Morning Before Breakfast.  Dispense: 30 capsule; Refill: 0  - [place] Esophagram Complete; Future    9/21/2020  Reflux  under control with PPI Protonix 40 mg p.o. day  Her prior EGD as per report showed scattered skis ring.  Her dysphagia improved after dilatation in 2019 but now it is back to what it was before. Minimal intermittent difficulty with swallowing for solids.   Does not want to undergo any endoscopic procedure for now  We will monitor    3. Normocytic anemia  Hemoglobin has been stable over the past several months at 11.  She has not seen any obvious rectal bleeding or melena.    9/21/2020  She has a normocytic anemia with a hemoglobin around 10-11 g/dL.  She denies any blood in stool or melena.   She has a CKD.  This is most likely anemia of chronic disease with a borderline iron deficiency.   She had colonoscopy in 2013 by Dr. hodgson, polyps removed and angiectasis reported.  She had an EGD done 2019 revealed a scant skis ring which was dilated.   At this time patient does not want to undergo any endoscopic procedure.  She is aware that low-grade colonic lesions and pathology could be missed without the colonoscopy.  we will consider colonoscopy if there is any overt bleeding    4. Therapeutic opioid induced constipation  She reports that constipation  is currently controlled to her satisfaction.  She has bowel movements every other day and only takes MiraLAX as needed.    9/21/2020  She is on a long-term opioids  She is constipated for many years and not taking any laxatives. Bowel movement once in 2-3 days with a hard stool  High-fiber diet discussed  We will start on a stool softener and also MiraLAX 17 g p.o. daily          Follow Up:   Return in about 6 weeks (around 4/26/2021) for recheck dysphagia.      Shawnee Morse PA-C  [facility]  3/15/2021  16:40 EDT    Please note that portions of this note may have been completed with a voice recognition program. Efforts were made to edit the dictations, but occasionally words are mistranscribed.

## 2021-03-16 ENCOUNTER — TELEPHONE (OUTPATIENT)
Dept: GASTROENTEROLOGY | Facility: CLINIC | Age: 86
End: 2021-03-16

## 2021-03-16 ENCOUNTER — TELEPHONE (OUTPATIENT)
Dept: ORTHOPEDIC SURGERY | Facility: CLINIC | Age: 86
End: 2021-03-16

## 2021-03-16 DIAGNOSIS — M25.551 ARTHRALGIA OF RIGHT HIP: Primary | ICD-10-CM

## 2021-03-16 DIAGNOSIS — K21.9 GASTROESOPHAGEAL REFLUX DISEASE, UNSPECIFIED WHETHER ESOPHAGITIS PRESENT: ICD-10-CM

## 2021-03-16 RX ORDER — OMEPRAZOLE 40 MG/1
40 CAPSULE, DELAYED RELEASE ORAL
Qty: 30 CAPSULE | Refills: 0 | Status: SHIPPED | OUTPATIENT
Start: 2021-03-16 | End: 2021-04-15 | Stop reason: SDUPTHER

## 2021-03-16 NOTE — TELEPHONE ENCOUNTER
Caller:     Relationship to patient:     Best call back number: [phone]    Chief complaint: PATIENT REQUESTING TO SCHEDULE FOLLOW UP GROIN INJECTION     Type of visit:FOLLOW UP     Requested date: ASAP      Additional notes:PATIENT STATES HER LAST GROIN INJECTION WAS GIVEN BY DR MORROW

## 2021-03-16 NOTE — TELEPHONE ENCOUNTER
----- Message from Marissa Bedoya MA sent at 3/16/2021  9:33 AM EDT -----  Can you resend patient's script to [facility] in [place], then I can call [facility] if needed to cancel the one sent there.    Thanks  Marissa  ----- Message -----  From: Fausto Maldonado MA  Sent: 3/16/2021   8:25 AM EDT  To: Marissa Bedoya MA    Her script for Omeprazole was sent to [facility] in [place], she wants it sent to [facility] in [place] and it will be delivered to her house today

## 2021-03-22 ENCOUNTER — OFFICE VISIT (OUTPATIENT)
Dept: INTERNAL MEDICINE | Facility: CLINIC | Age: 86
End: 2021-03-22

## 2021-03-22 VITALS
SYSTOLIC BLOOD PRESSURE: 120 MMHG | TEMPERATURE: 97.5 F | HEART RATE: 56 BPM | DIASTOLIC BLOOD PRESSURE: 62 MMHG | OXYGEN SATURATION: 96 % | BODY MASS INDEX: 22.2 KG/M2 | WEIGHT: 130 LBS | HEIGHT: 64 IN

## 2021-03-22 DIAGNOSIS — K21.9 GASTROESOPHAGEAL REFLUX DISEASE WITHOUT ESOPHAGITIS: ICD-10-CM

## 2021-03-22 DIAGNOSIS — J43.8 OTHER EMPHYSEMA (HCC): Primary | ICD-10-CM

## 2021-03-22 DIAGNOSIS — I10 ESSENTIAL HYPERTENSION: ICD-10-CM

## 2021-03-22 DIAGNOSIS — R25.1 TREMOR: ICD-10-CM

## 2021-03-22 DIAGNOSIS — R91.8 MULTIPLE PULMONARY NODULES: ICD-10-CM

## 2021-03-22 DIAGNOSIS — R13.10 DYSPHAGIA, UNSPECIFIED TYPE: ICD-10-CM

## 2021-03-22 DIAGNOSIS — F41.9 ANXIETY: ICD-10-CM

## 2021-03-22 DIAGNOSIS — D64.9 ANEMIA, UNSPECIFIED TYPE: ICD-10-CM

## 2021-03-22 DIAGNOSIS — F32.A DEPRESSION, UNSPECIFIED DEPRESSION TYPE: ICD-10-CM

## 2021-03-22 DIAGNOSIS — R25.2 LEG CRAMP: ICD-10-CM

## 2021-03-22 PROBLEM — R11.0 NAUSEA: Status: RESOLVED | Noted: 2019-05-21 | Resolved: 2021-03-22

## 2021-03-22 PROCEDURE — 99214 OFFICE O/P EST MOD 30 MIN: CPT | Performed by: INTERNAL MEDICINE

## 2021-03-22 NOTE — PROGRESS NOTES
Subjective   Mi Tejeda is a 85 y.o. female.     Chief Complaint   Patient presents with   • Follow-up     CT of chest       History of Present Illness   Patient here for follow-up of.  CT scan of the chest that showed the nodule is stable for last 2 years.  Fatigue occasional.  Anxiety depression patient yet to start medication.  Tremor stable.  Blood pressure stable weight is stable.  Leg cramp baclofen helps.  Swallow problem patient is seeing GI doctor.    Current Outpatient Medications:   •  albuterol sulfate HFA (ProAir HFA) 108 (90 Base) MCG/ACT inhaler, Inhale 2 puffs Every 4 (Four) Hours As Needed for Wheezing or Shortness of Air., Disp: 54 g, Rfl: 3  •  aspirin 81 MG tablet, Take 1 tablet by mouth Daily., Disp: 30 tablet, Rfl: 11  •  baclofen (LIORESAL) 10 MG tablet, Take 1 tablet by mouth At Night As Needed for Muscle Spasms., Disp: 30 tablet, Rfl: 1  •  CALCIUM PO, Take  by mouth Daily., Disp: , Rfl:   •  carvedilol (COREG) 12.5 MG tablet, Take 12.5 mg by mouth 2 (Two) Times a Day With Meals., Disp: , Rfl:   •  Cholecalciferol (VITAMIN D3) 125 MCG (5000 UT) capsule capsule, TAKE ONE CAPSULE BY MOUTH EVERY DAY, Disp: 100 capsule, Rfl: 3  •  ferrous sulfate 324 (65 Fe) MG tablet delayed-release EC tablet, Take 324 mg by mouth Daily With Breakfast., Disp: , Rfl:   •  HYDROcodone-acetaminophen (NORCO) 7.5-325 MG per tablet, Take 1 tablet by mouth 2 (Two) Times a Day As Needed for Mild Pain (1-3) or Moderate Pain (4-6)., Disp: , Rfl:   •  levothyroxine (SYNTHROID, LEVOTHROID) 100 MCG tablet, Take 1 tablet by mouth Daily., Disp: 90 tablet, Rfl: 3  •  losartan (COZAAR) 100 MG tablet, Take 100 mg by mouth Daily., Disp: , Rfl:   •  metoprolol tartrate (LOPRESSOR) 50 MG tablet, Take 1 tablet by mouth 2 (Two) Times a Day., Disp: 90 tablet, Rfl: 3  •  morphine (MS CONTIN) 30 MG 12 hr tablet, Take 30 mg by mouth 2 (Two) Times a Day., Disp: , Rfl:   •  omeprazole (priLOSEC) 40 MG capsule, Take 1 capsule by  mouth Every Morning Before Breakfast., Disp: 30 capsule, Rfl: 0  •  polyethylene glycol (MiraLax) 17 GM/SCOOP powder, Take 17 g by mouth Daily., Disp: 510 g, Rfl: 2  •  vitamin C (ASCORBIC ACID) 500 MG tablet, Take 500 mg by mouth Daily., Disp: , Rfl:   •  mupirocin (BACTROBAN) 2 % ointment, As Needed., Disp: , Rfl:   •  sertraline (Zoloft) 50 MG tablet, Take 1 tablet by mouth Daily., Disp: 30 tablet, Rfl: 1    The following portions of the patient's history were reviewed and updated as appropriate: allergies, current medications, past family history, past medical history, past social history, past surgical history and problem list.    Review of Systems   Respiratory: Negative.    Cardiovascular: Negative.    Gastrointestinal: Negative.    Musculoskeletal: Negative.    Skin: Negative.    Neurological: Positive for weakness.   Psychiatric/Behavioral: Negative.        Objective   Physical Exam  Cardiovascular:      Rate and Rhythm: Normal rate and regular rhythm.      Heart sounds: Normal heart sounds.   Pulmonary:      Effort: Pulmonary effort is normal.      Breath sounds: Normal breath sounds.   Abdominal:      General: Bowel sounds are normal.   Musculoskeletal:      Cervical back: Neck supple.      Comments: Weak legs   Skin:     General: Skin is warm.   Neurological:      Mental Status: She is alert and oriented to person, place, and time.         All tests have been reviewed.    Assessment/Plan   Diagnoses and all orders for this visit:    Other emphysema (CMS/HCC) cont med     Anemia, unspecified type watch    Depression, unspecified depression type start med    Anxiety cont med    Tremor mild and watch    Essential hypertension cont med    Multiple pulmonary nodules stable for 2 years    Leg cramp cont baclofen    Gastroesophageal reflux disease without esophagitis follow GI    Dysphagia, unspecified type follow GI pending swallow test    4 mo

## 2021-03-26 ENCOUNTER — APPOINTMENT (OUTPATIENT)
Dept: GENERAL RADIOLOGY | Facility: HOSPITAL | Age: 86
End: 2021-03-26

## 2021-03-30 ENCOUNTER — OFFICE VISIT (OUTPATIENT)
Dept: CARDIOLOGY | Facility: CLINIC | Age: 86
End: 2021-03-30

## 2021-03-30 VITALS
RESPIRATION RATE: 18 BRPM | DIASTOLIC BLOOD PRESSURE: 62 MMHG | OXYGEN SATURATION: 99 % | SYSTOLIC BLOOD PRESSURE: 100 MMHG | BODY MASS INDEX: 22.2 KG/M2 | HEIGHT: 64 IN | WEIGHT: 130 LBS | HEART RATE: 59 BPM

## 2021-03-30 DIAGNOSIS — I25.10 CHRONIC CORONARY ARTERY DISEASE: ICD-10-CM

## 2021-03-30 DIAGNOSIS — I48.0 PAROXYSMAL ATRIAL FIBRILLATION (HCC): ICD-10-CM

## 2021-03-30 DIAGNOSIS — E78.5 HYPERLIPIDEMIA, UNSPECIFIED HYPERLIPIDEMIA TYPE: ICD-10-CM

## 2021-03-30 DIAGNOSIS — I77.9 PERIPHERAL ARTERIAL OCCLUSIVE DISEASE (HCC): ICD-10-CM

## 2021-03-30 DIAGNOSIS — I73.9 PAD (PERIPHERAL ARTERY DISEASE) (HCC): Primary | ICD-10-CM

## 2021-03-30 DIAGNOSIS — I10 ESSENTIAL HYPERTENSION: ICD-10-CM

## 2021-03-30 PROCEDURE — 99214 OFFICE O/P EST MOD 30 MIN: CPT | Performed by: INTERNAL MEDICINE

## 2021-03-30 PROCEDURE — 93000 ELECTROCARDIOGRAM COMPLETE: CPT | Performed by: INTERNAL MEDICINE

## 2021-03-30 NOTE — PROGRESS NOTES
[facility] Cardiology Office Follow Up Note    Mi Tejeda  [id number]  2021    Primary Care Provider: Adiel Awad MD    Chief Complaint: Regular follow-up    History of Present Illness:   Mrs. Mi Tejeda is a 85 y.o. female being seen by Cardiology for   regular follow-up.  The patient has a past medical history significant for dyslipidemia, hypertension, GERD, hypothyroidism, and COPD with a history of prior tobacco use.  Her cardiac history includes coronary artery disease, with a prior history of two-vessel coronary artery bypass grafting in .  She also has a history of mild to moderate aortic insufficiency.  She also has a history significant for peripheral arterial disease reportedly with prior intervention.  She presents the cardiology clinic today for regular follow-up.  Since her last appointment, the patient reports she has been well without any significant changes in her health.  She denies any significant episodes of palpitations.  No symptoms to suggest recurrent PAF with RVR.  She currently denies chest pain or chest discomfort.  No significant exertional dyspnea.  Her primary complaint today chronic pain in her left second toe secondary to a callus.  She follows with regularly with podiatry for management.  Currently, after removal of the callus she reports adequate healing of the wound.  She denies any current lower extremity ulcerations.  With ambulation, she denies any significant lower extremity pain or discomfort.  No other specific complaints at this time.     Past Cardiac Testin. Last Coronary Angio: Remote  2. Prior Stress Testing: Remote  3. Last Echo:               1.  2015                          1.  The patient had normal chamber dimensions with borderline left atrial enlargement with mild left ventricular enlargement with normal global LV systolic function with paradoxical septal motion with estimated ejection fraction of  60%.                            2. The patient had PAP of 24 mmHg. Right atrial pressure was estimated around 10 mmHg.  RV dimensions and RV function normal.                            3.  The patient had mild-to-moderate aortic, trivial-to-mild mitral and trivial tricuspid insufficiency.                             4.  Trivial pericardial effusion was suggested.                2.  10/19/2020                          1.  Normal left ventricular systolic function, LVEF 60-65%                          2.  Moderate aortic regurgitation                          3.  Mild MR and TR                          4.  Mild right ventricular dilation with normal RV systolic function  4. Prior Holter Monitor: None    Review of Systems:   Review of Systems   Constitutional: Negative for activity change, appetite change, chills, diaphoresis, fatigue, fever, unexpected weight gain and unexpected weight loss.   Eyes: Negative for blurred vision and double vision.   Respiratory: Negative for cough, chest tightness, shortness of breath and wheezing.    Cardiovascular: Negative for chest pain, palpitations and leg swelling.   Gastrointestinal: Negative for abdominal pain, anal bleeding, blood in stool and GERD.   Endocrine: Negative for cold intolerance and heat intolerance.   Genitourinary: Negative for hematuria.   Musculoskeletal:        Left toe pain   Neurological: Negative for dizziness, syncope, weakness and light-headedness.   Hematological: Does not bruise/bleed easily.   Psychiatric/Behavioral: Negative for depressed mood and stress. The patient is not nervous/anxious.        I have reviewed and/or updated the patient's past medical, past surgical, family, social history, problem list and allergies as appropriate.     Medications:     Current Outpatient Medications:   •  albuterol sulfate HFA (ProAir HFA) 108 (90 Base) MCG/ACT inhaler, Inhale 2 puffs Every 4 (Four) Hours As Needed for Wheezing or Shortness of Air., Disp: 54 g,  "Rfl: 3  •  aspirin 81 MG tablet, Take 1 tablet by mouth Daily., Disp: 30 tablet, Rfl: 11  •  baclofen (LIORESAL) 10 MG tablet, Take 1 tablet by mouth At Night As Needed for Muscle Spasms., Disp: 30 tablet, Rfl: 1  •  CALCIUM PO, Take  by mouth Daily., Disp: , Rfl:   •  carvedilol (COREG) 12.5 MG tablet, Take 12.5 mg by mouth 2 (Two) Times a Day With Meals., Disp: , Rfl:   •  Cholecalciferol (VITAMIN D3) 125 MCG (5000 UT) capsule capsule, TAKE ONE CAPSULE BY MOUTH EVERY DAY, Disp: 100 capsule, Rfl: 3  •  ferrous sulfate 324 (65 Fe) MG tablet delayed-release EC tablet, Take 324 mg by mouth Daily With Breakfast., Disp: , Rfl:   •  HYDROcodone-acetaminophen (NORCO) 7.5-325 MG per tablet, Take 1 tablet by mouth 2 (Two) Times a Day As Needed for Mild Pain (1-3) or Moderate Pain (4-6)., Disp: , Rfl:   •  levothyroxine (SYNTHROID, LEVOTHROID) 100 MCG tablet, Take 1 tablet by mouth Daily., Disp: 90 tablet, Rfl: 3  •  losartan (COZAAR) 100 MG tablet, Take 100 mg by mouth Daily., Disp: , Rfl:   •  metoprolol tartrate (LOPRESSOR) 50 MG tablet, Take 1 tablet by mouth 2 (Two) Times a Day., Disp: 90 tablet, Rfl: 3  •  morphine (MS CONTIN) 30 MG 12 hr tablet, Take 30 mg by mouth 2 (Two) Times a Day., Disp: , Rfl:   •  omeprazole (priLOSEC) 40 MG capsule, Take 1 capsule by mouth Every Morning Before Breakfast., Disp: 30 capsule, Rfl: 0  •  polyethylene glycol (MiraLax) 17 GM/SCOOP powder, Take 17 g by mouth Daily., Disp: 510 g, Rfl: 2  •  sertraline (Zoloft) 50 MG tablet, Take 1 tablet by mouth Daily., Disp: 30 tablet, Rfl: 1  •  vitamin C (ASCORBIC ACID) 500 MG tablet, Take 500 mg by mouth Daily., Disp: , Rfl:   •  mupirocin (BACTROBAN) 2 % ointment, As Needed., Disp: , Rfl:     Physical Exam:  Vital Signs:   Vitals:    03/30/21 1158   BP: 100/62   Pulse: 59   Resp: 18   SpO2: 99%   Weight: 59 kg (130 lb)   Height: 162.6 cm (64\")       Physical Exam  Constitutional:       Appearance: She is well-developed. She is not diaphoretic.   "      Comments: Elderly female in no acute distress.  Sitting in a wheelchair.   HENT:      Head: Normocephalic and atraumatic.   Eyes:      General: No scleral icterus.     Pupils: Pupils are equal, round, and reactive to light.   Neck:      Trachea: No tracheal deviation.   Cardiovascular:      Rate and Rhythm: Normal rate and regular rhythm.      Heart sounds: Normal heart sounds. No murmur heard.   No friction rub. No gallop.       Comments: Normal JVD.  Diminished bilateral lower extremity AT and DP pulses.  Adequate Doppler pulses on assessment of the left AT and DP.  Pulmonary:      Effort: Pulmonary effort is normal. No respiratory distress.      Breath sounds: Normal breath sounds. No stridor. No wheezing or rales.   Chest:      Chest wall: No tenderness.   Abdominal:      General: Bowel sounds are normal. There is no distension.      Palpations: Abdomen is soft.      Tenderness: There is no abdominal tenderness. There is no guarding or rebound.   Musculoskeletal:         General: No swelling. Normal range of motion.      Cervical back: Neck supple. No tenderness.   Lymphadenopathy:      Cervical: No cervical adenopathy.   Skin:     General: Skin is warm and dry.      Findings: No erythema.      Comments: Callus located on the tip of the second left toe   Neurological:      General: No focal deficit present.      Mental Status: She is alert and oriented to person, place, and time.   Psychiatric:         Mood and Affect: Mood normal.         Behavior: Behavior normal.         Results Review:   I reviewed the patient's new clinical results.      ECG 12 Lead    Date/Time: 3/30/2021 12:34 PM  Performed by: Khoi Wylie MD  Authorized by: Khoi Wylie MD   Comparison: not compared with previous ECG   Rhythm: sinus bradycardia  Rate: bradycardic  QRS axis: normal  Other findings: non-specific ST-T wave changes  Other findings comments: Cannot rule out prior anteroseptal MI    Clinical impression:  abnormal EKG            Assessment / Plan:     1.  Paroxysmal atrial fibrillation  --Known history of paroxysmal atrial fibrillation with rapid ventricular rates  --No recent symptoms suggest recurrent PAF  --Outpatient cardiac monitoring did not show any recurrent PAF, occasional supraventricular and ventricular ectopy  --[id number] score is elevated, however the patient has previously and continues to decline therapeutic anticoagulation given history of falls and risk of anticoagulation likely outweighing benefit  --Continue metoprolol for rate control  --Continue aspirin  --Follow-up in 6 months, or sooner if required     2.  Coronary artery disease  --Known history of coronary artery disease, status post two-vessel CABG in 1998  --No exertional chest pain or anginal symptoms  --Continue daily aspirin  --Not on statin due to history of intolerance secondary to myalgias     3.   Peripheral arterial disease  --Known history of PAD, the patient does report undergoing intervention for PAD remotely  --Diminished pulses in bilateral lower extremities to palpation, however adequate Doppler pulses of the left AT and DP  --History of a left second toe callus, however no nonhealing wounds or significant ulcerations  --Currently denies claudication  --Will obtain bilateral tibial duplex to reassess degree of PAD    4.  Essential hypertension  --Well-controlled with current antihypertensives     5.  Aortic regurgitation  --Remains moderate on echocardiogram in 10/2020  --Plan for repeat echocardiogram at next follow-up to reassess       Follow Up:   Return in about 6 months (around 9/30/2021).      Thank you for allowing me to participate in the care of your patient. Please to not hesitate to contact me with additional questions or concerns.     LATRICE Wylie MD  Interventional Cardiology   03/30/2021  12:12 EDT

## 2021-04-04 ENCOUNTER — APPOINTMENT (OUTPATIENT)
Dept: PREADMISSION TESTING | Facility: HOSPITAL | Age: 86
End: 2021-04-04

## 2021-04-04 PROCEDURE — U0005 INFEC AGEN DETEC AMPLI PROBE: HCPCS

## 2021-04-04 PROCEDURE — U0004 COV-19 TEST NON-CDC HGH THRU: HCPCS

## 2021-04-04 PROCEDURE — C9803 HOPD COVID-19 SPEC COLLECT: HCPCS

## 2021-04-05 LAB — SARS-COV-2 RNA NOSE QL NAA+PROBE: NOT DETECTED

## 2021-04-06 ENCOUNTER — HOSPITAL ENCOUNTER (OUTPATIENT)
Dept: GENERAL RADIOLOGY | Facility: HOSPITAL | Age: 86
Discharge: HOME | End: 2021-04-06
Admitting: PHYSICIAN ASSISTANT

## 2021-04-06 DIAGNOSIS — R13.19 ESOPHAGEAL DYSPHAGIA: ICD-10-CM

## 2021-04-06 DIAGNOSIS — K21.9 GASTROESOPHAGEAL REFLUX DISEASE, UNSPECIFIED WHETHER ESOPHAGITIS PRESENT: ICD-10-CM

## 2021-04-06 PROCEDURE — 74220 X-RAY XM ESOPHAGUS 1CNTRST: CPT

## 2021-04-08 ENCOUNTER — APPOINTMENT (OUTPATIENT)
Dept: GENERAL RADIOLOGY | Facility: HOSPITAL | Age: 86
End: 2021-04-08

## 2021-04-09 ENCOUNTER — APPOINTMENT (OUTPATIENT)
Dept: ULTRASOUND IMAGING | Facility: HOSPITAL | Age: 86
End: 2021-04-09

## 2021-04-15 DIAGNOSIS — K21.9 GASTROESOPHAGEAL REFLUX DISEASE, UNSPECIFIED WHETHER ESOPHAGITIS PRESENT: ICD-10-CM

## 2021-04-15 RX ORDER — OMEPRAZOLE 40 MG/1
40 CAPSULE, DELAYED RELEASE ORAL
Qty: 30 CAPSULE | Refills: 0 | Status: SHIPPED | OUTPATIENT
Start: 2021-04-15 | End: 2021-05-07 | Stop reason: SDUPTHER

## 2021-04-16 ENCOUNTER — HOSPITAL ENCOUNTER (OUTPATIENT)
Dept: GENERAL RADIOLOGY | Facility: HOSPITAL | Age: 86
Discharge: HOME | End: 2021-04-16
Admitting: ORTHOPAEDIC SURGERY

## 2021-04-16 PROCEDURE — 25010000002 METHYLPREDNISOLONE PER 80 MG: Performed by: ORTHOPAEDIC SURGERY

## 2021-04-16 PROCEDURE — 25010000003 LIDOCAINE 1 % SOLUTION: Performed by: ORTHOPAEDIC SURGERY

## 2021-04-16 PROCEDURE — 77002 NEEDLE LOCALIZATION BY XRAY: CPT

## 2021-04-16 PROCEDURE — 77002 NEEDLE LOCALIZATION BY XRAY: CPT | Performed by: ORTHOPAEDIC SURGERY

## 2021-04-16 PROCEDURE — 20610 DRAIN/INJ JOINT/BURSA W/O US: CPT | Performed by: ORTHOPAEDIC SURGERY

## 2021-04-16 RX ORDER — METHYLPREDNISOLONE ACETATE 80 MG/ML
80 INJECTION, SUSPENSION INTRA-ARTICULAR; INTRALESIONAL; INTRAMUSCULAR; SOFT TISSUE ONCE
Status: COMPLETED | OUTPATIENT
Start: 2021-04-16 | End: 2021-04-16

## 2021-04-16 RX ORDER — LIDOCAINE HYDROCHLORIDE 10 MG/ML
5 INJECTION, SOLUTION INFILTRATION; PERINEURAL ONCE
Status: COMPLETED | OUTPATIENT
Start: 2021-04-16 | End: 2021-04-16

## 2021-04-16 RX ADMIN — METHYLPREDNISOLONE ACETATE 80 MG: 80 INJECTION, SUSPENSION INTRA-ARTICULAR; INTRALESIONAL; INTRAMUSCULAR; SOFT TISSUE at 14:14

## 2021-04-16 RX ADMIN — LIDOCAINE HYDROCHLORIDE 5 ML: 10 INJECTION, SOLUTION INFILTRATION; PERINEURAL at 14:13

## 2021-04-16 NOTE — POST-PROCEDURE NOTE
[facility]  [address], [address]  [place] [address]  [phone]        PROCEDURE REPORT        DIAGNOSIS:  Right hip osteoarthritis, symptomatic    PROCEDURE: Right  hip injection under flouroscopy      Mi Tejeda with date of birth 1935 presents to [facility] Radiology Department today for injection therapy.        Patient presents to [facility] Radiology Department Flouroscopy Suite on 4/16/2021 for planned elective right hip injection under flouroscopy for symptomatic osteoarthritis.    Procedure:     After consent was obtained, and using ethyl chloride topical local anesthetic, the right hip was then prepped and draped with sterile technique. With an anterior hip approach, flouroscopy guidance, and care to stay lateral of the femoral artery, the hip joint was entered via a 20 gauge spinal needle.  A mixture of 80 mg methylprednisolone in one ml plus 5 ml of 1% plain Lidocaine was injected and the needle withdrawn. The procedure was well tolerated and without complication. The patient noted relief of focal hip joint pain.  The patient did remain stable and with baseline ambulation. The patient is asked to rest the joint for a few more days before resuming full regular activities. It may be painful for the first few days. Watch for fever, skin issues, increased swelling or persistent pain in the joint. Call or return to clinic if such symptoms occur, other concerns or if there is lack of improvement as anticipated.    Impression: Symptomatic right hip osteoarthritis.      Recommendations/Plan:      Treatment and patient advice as noted here and in office visit report.  Orthopedic activities reviewed and patient expressed appreciation.  Discussion of orthopedic goals.   Risk, benefits, and merits of treatment options reviewed and questions answered.  Call or notify for any adverse effect from injection therapy.    Exercise: As tolerated.  No strenuous  activity for a few days as appropriate.  Brace:  No brace was given at today's visit  Referral: No referrals made at today's visit  Studies: No additional studies ordered.  Surgery: No surgery proposed at this visit.  Activity:  May perform usual activities as tolerated.      Patient will return to our clinic at scheduled appointment.  Patient agreeable to call or return sooner for any concerns.

## 2021-04-27 DIAGNOSIS — R25.2 LEG CRAMP: ICD-10-CM

## 2021-04-27 RX ORDER — BACLOFEN 10 MG/1
TABLET ORAL
Qty: 30 TABLET | Refills: 0 | Status: SHIPPED | OUTPATIENT
Start: 2021-04-27

## 2021-05-07 ENCOUNTER — OFFICE VISIT (OUTPATIENT)
Dept: GASTROENTEROLOGY | Facility: CLINIC | Age: 86
End: 2021-05-07

## 2021-05-07 DIAGNOSIS — Q39.6 ESOPHAGEAL DIVERTICULUM: ICD-10-CM

## 2021-05-07 DIAGNOSIS — K21.9 GASTROESOPHAGEAL REFLUX DISEASE, UNSPECIFIED WHETHER ESOPHAGITIS PRESENT: ICD-10-CM

## 2021-05-07 DIAGNOSIS — R13.19 ESOPHAGEAL DYSPHAGIA: Primary | ICD-10-CM

## 2021-05-07 DIAGNOSIS — T40.2X5A THERAPEUTIC OPIOID-INDUCED CONSTIPATION (OIC): ICD-10-CM

## 2021-05-07 DIAGNOSIS — K22.4 ESOPHAGEAL DYSMOTILITY: ICD-10-CM

## 2021-05-07 DIAGNOSIS — K59.03 THERAPEUTIC OPIOID-INDUCED CONSTIPATION (OIC): ICD-10-CM

## 2021-05-07 PROCEDURE — 99442 PR PHYS/QHP TELEPHONE EVALUATION 11-20 MIN: CPT | Performed by: PHYSICIAN ASSISTANT

## 2021-05-07 RX ORDER — FAMOTIDINE 40 MG/1
40 TABLET, FILM COATED ORAL NIGHTLY PRN
Qty: 30 TABLET | Refills: 2 | Status: SHIPPED | OUTPATIENT
Start: 2021-05-07 | End: 2021-07-12 | Stop reason: SDUPTHER

## 2021-05-07 RX ORDER — OMEPRAZOLE 40 MG/1
40 CAPSULE, DELAYED RELEASE ORAL
Qty: 30 CAPSULE | Refills: 2 | Status: SHIPPED | OUTPATIENT
Start: 2021-05-07 | End: 2021-07-12 | Stop reason: SDUPTHER

## 2021-05-07 NOTE — PROGRESS NOTES
Follow Up Note     Date: 2021   Patient Name: Mi Tejeda  MRN: [id number]  : 1935     Primary Care Provider: Adiel Awad MD     You have chosen to receive care through a telephone visit. Do you consent to use a telephone visit for your medical care today? Yes    Chief Complaint:    Chief Complaint   Patient presents with   • Difficulty Swallowing     History of present illness:   2021  Mi Tejeda is a 85 y.o. female who is here today for follow up regarding dysphagia.    Dysphagia is the same as previous.  She completed esophagram as directed and would like to discuss those results.  She is taking omeprazole 40 mg once daily and still having severe heartburn, this is worse during the evening before bed.  She previously was taking omeprazole 20 mg without relief.  She also took Protonix without relief before that.  She is ill with upper respiratory symptoms at this time and was unable to come to the office today.    Interval History:  3/15/2021  Patient reports that dysphagia is persistent and happening with every meal.  She feels as if food is stuck in her chest and will go to the sink to try to regurgitate while eating.  She has had this problem for several years now.  She has had previous EGDs with dilatations and they have not helped for very long, only a couple of months each time.  She does have history of hiatal hernia and Schatzki's ring.  Last dilatation of the esophagus was completed by Dr. Castellanos 2019.  She also recalls a history of having gastric ulcer.  She has been taking omeprazole 20 mg once daily for a few months now.  She was previously taking Protonix without relief and her PCP changed it to omeprazole.  Denies any known history of chronic kidney disease.  She did have recent labs with her PCP and hemoglobin has been stable.     Regarding constipation, she is having bowel movements every other day and denies any straining.  She started taking MiraLAX but  only takes it as needed.  Does have some intermittent bright red rectal bleeding but this is only small amount when it occurs, on the tissue.     9/21/2020  Mi Tejeda is a 84 y.o. female who is here today for follow up for constipation abdominal pain.  She states that since she was given an antibiotic last time her pain gradually improved however she still has a significant constipation not taking any laxatives.  Deny any nausea vomiting.  She had a stool test done which revealed acute blood in June 2020 however patient denies any blood in the stool black stool or melena     6/27/2020  The patient has a history of reflux off and on for the last several years.  The reflux is moderately severe.  Symptoms are described as retrosternal burning sensation, and indigestion.  There is history of occasional regurgitative symptoms.  Frequency being several times per week.  The symptoms are worse at night.  The patient takes acid suppressive therapy with reasonable control of his symptoms.  The patient had associated dysphagia.  This significantly improved.  The patient denies further abdominal pain.  There is no nausea or vomiting.  There is history of constipation.  The patient has gained 3 pounds since May 2019.  There is no history of liver or pancreatic disease.  She had undergone a colonoscopy in 2013 by surgical service-Dr. Thompson.  The patient was found to have polyps-tubular adenoma.    Subjective     Past Medical History:   Diagnosis Date   • Abdominal pain     states hx of   • Abnormal electrocardiogram    • Abnormal urinalysis    • Acute UTI    • Anemia    • Anomaly cardiac     REPORTS WILL SHOW ON LEFT VENTRICLE IN A 12 LEAD EKG AS BEING A MI BUT REPORTS SHE HAS NEVER HAD THIS    • Anxiety    • Aphagia     HX OF THIS, REPORTS WAS CLEARED OF A CVA BUT WAS TOLD WAS RELATED TO FATIGUE   • Arthritis    • Asthma    • Atrial fibrillation (CMS/HCC)    • Backache    • Bursitis of right shoulder    • CAD (coronary  "artery disease)    • Cataract, bilateral     s/p removal   • COPD (chronic obstructive pulmonary disease) (CMS/HCC)    • Depression    • Diverticulitis    • Dysphagia     REPORTS RESOLVED AFTER DILITATION   • Fracture     left wrist, 2 fingers on right hand, toes on right foot, right arm   • Full dentures    • Gastric ulcer    • GERD (gastroesophageal reflux disease)    • Gout    • Heart murmur    • High cholesterol    • History of nuclear stress test 2011?   • HTN (hypertension)    • Hypertriglyceridemia    • Hypothyroidism    • Multiple lung nodules    • Osteoarthritis    • Osteoporosis    • Peripheral neuropathy    • Personal history of tobacco use    • Plantar fasciitis     left   • Pneumonia    • Sinus problem    • Sinusitis    • Tear of meniscus of knee     right   • TIA (transient ischemic attack)     denies 5/22/19.  states had aphasia in E.R., but was ruled out.   • Toe pain     \"Calluses were filed out, but is painful\"   • Uterine leiomyoma    • Viral gastroenteritis    • Vitamin D deficiency    • Wears glasses    • Weight loss, non-intentional     80 lb in 5 years     Past Surgical History:   Procedure Laterality Date   • CARDIAC CATHETERIZATION  2007?    no stents   • CARDIAC SURGERY     • CATARACT EXTRACTION Bilateral 2009   • CATARACT EXTRACTION, BILATERAL     • CHOLECYSTECTOMY  2002   • COLONOSCOPY     • CORONARY ARTERY BYPASS GRAFT  1998    2 vessels   • ENDOSCOPY     • ENDOSCOPY N/A 5/3/2017    Procedure: ESOPHAGOGASTRODUODENOSCOPY with biopsies;  Surgeon: Yared Castellanos MD;  Location: [facility];  Service:    • ENDOSCOPY N/A 5/23/2019    Procedure: ESOPHAGOGASTRODUODENOSCOPY WITH BIOPSY AND BALLOON DILITATION;  Surgeon: Yared Castellanos MD;  Location: [facility];  Service: Gastroenterology   • MOUTH SURGERY      full extraction   • TOTAL HIP ARTHROPLASTY Left 02/02/2010    PREETI Stanley MD     Family History   Problem Relation Age of Onset   • Cancer Mother         uterus   • Cancer Sister "         colon; kidney   • Hypertension Sister    • Stroke Sister    • Cancer Brother         prostate   • Arthritis Other    • Cancer Other    • Kidney disease Other    • Stroke Other    • Hypertension Other    • Gout Other    • Colon cancer Sister    • Cancer Sister         kidney   • Cirrhosis Neg Hx    • Liver disease Neg Hx    • Liver cancer Neg Hx    • Stomach cancer Neg Hx      Social History     Socioeconomic History   • Marital status:      Spouse name: Not on file   • Number of children: Not on file   • Years of education: Not on file   • Highest education level: Not on file   Tobacco Use   • Smoking status: Former Smoker     Packs/day: 0.00     Years: 65.00     Pack years: 0.00     Types: Cigarettes     Quit date: 10/1/2018     Years since quittin.6   • Smokeless tobacco: Never Used   Vaping Use   • Vaping Use: Never used   Substance and Sexual Activity   • Alcohol use: Yes     Comment: Seldom   • Drug use: No   • Sexual activity: Defer       Current Outpatient Medications:   •  albuterol sulfate HFA (ProAir HFA) 108 (90 Base) MCG/ACT inhaler, Inhale 2 puffs Every 4 (Four) Hours As Needed for Wheezing or Shortness of Air., Disp: 54 g, Rfl: 3  •  aspirin 81 MG tablet, Take 1 tablet by mouth Daily., Disp: 30 tablet, Rfl: 11  •  baclofen (LIORESAL) 10 MG tablet, TAKE ONE TABLET BY MOUTH AT NIGHT AS NEEDED FOR MUSCLE SPAMS, Disp: 30 tablet, Rfl: 0  •  CALCIUM PO, Take  by mouth Daily., Disp: , Rfl:   •  carvedilol (COREG) 12.5 MG tablet, Take 12.5 mg by mouth 2 (Two) Times a Day With Meals., Disp: , Rfl:   •  Cholecalciferol (VITAMIN D3) 125 MCG (5000 UT) capsule capsule, TAKE ONE CAPSULE BY MOUTH EVERY DAY, Disp: 100 capsule, Rfl: 3  •  ferrous sulfate 324 (65 Fe) MG tablet delayed-release EC tablet, Take 324 mg by mouth Daily With Breakfast., Disp: , Rfl:   •  HYDROcodone-acetaminophen (NORCO) 7.5-325 MG per tablet, Take 1 tablet by mouth 2 (Two) Times a Day As Needed for Mild Pain (1-3) or  "Moderate Pain (4-6)., Disp: , Rfl:   •  levothyroxine (SYNTHROID, LEVOTHROID) 100 MCG tablet, Take 1 tablet by mouth Daily., Disp: 90 tablet, Rfl: 3  •  losartan (COZAAR) 100 MG tablet, Take 100 mg by mouth Daily., Disp: , Rfl:   •  metoprolol tartrate (LOPRESSOR) 50 MG tablet, Take 1 tablet by mouth 2 (Two) Times a Day., Disp: 90 tablet, Rfl: 3  •  morphine (MS CONTIN) 30 MG 12 hr tablet, Take 30 mg by mouth 2 (Two) Times a Day., Disp: , Rfl:   •  mupirocin (BACTROBAN) 2 % ointment, As Needed., Disp: , Rfl:   •  omeprazole (priLOSEC) 40 MG capsule, Take 1 capsule by mouth Every Morning Before Breakfast., Disp: 30 capsule, Rfl: 2  •  polyethylene glycol (MiraLax) 17 GM/SCOOP powder, Take 17 g by mouth Daily., Disp: 510 g, Rfl: 2  •  sertraline (Zoloft) 50 MG tablet, Take 1 tablet by mouth Daily., Disp: 30 tablet, Rfl: 1  •  vitamin C (ASCORBIC ACID) 500 MG tablet, Take 500 mg by mouth Daily., Disp: , Rfl:   •  famotidine (PEPCID) 40 MG tablet, Take 1 tablet by mouth At Night As Needed for Heartburn., Disp: 30 tablet, Rfl: 2    Allergies   Allergen Reactions   • Ciprofloxacin GI Intolerance     Patient states she is not allergic.   • Hazelnut Nausea Only   • Keflex [Cephalexin] Nausea Only   • Niacin Rash     \"it makes my skin burn\"   • Oxycodone Dizziness     \"it makes me feel dopey and out of my head\"  NOTE: does tolerate hydrocodone and morphine   • Statins Myalgia   • Vicodin [Hydrocodone-Acetaminophen] Hives     Can take hydrocodone, cannot tolerate VICODIN       The following portions of the patient's history were reviewed and updated as appropriate: allergies, current medications, past family history, past medical history, past social history, past surgical history and problem list.    Objective     Physical Exam  HENT:      Head:      Comments: Voice normal  Pulmonary:      Effort: No respiratory distress.   Neurological:      Mental Status: She is alert and oriented to person, place, and time.   Psychiatric: "         Thought Content: Thought content normal.         Judgment: Judgment normal.       There were no vitals filed for this visit.- telephone visit    Results Review:   I reviewed the patient's new clinical results.    Appointment on 04/04/2021   Component Date Value Ref Range Status   • SARS-CoV-2 BRYNN 04/04/2021 Not Detected  Not Detected Final      FL Esophagram Complete Single Contrast  Result Date: 4/6/2021  1. Severe dysmotility. 2. Diverticuli as described in the midthoracic esophagus and in the proximal duodenum. 3. Smooth narrowing of the distal esophagus, but 13 mm tablet passes through this region. 4. Moderate gastroesophageal reflux to thoracic inlet.      Images were reviewed, interpreted, and dictated by Dr. Hannah Silverio M.D. Transcribed by uLlu Huff PA-C.  This report was finalized on 4/6/2021 12:11 PM by Hannah Silverio M.D..     Assessment / Plan      1. Esophageal dysphagia  2. Esophageal dysmotility  3. Esophageal diverticulum  5/7/2021  Dysphagia has been a longstanding complaint.  Her last EGD dilatation was completed in 2019.  Esophagram recently completed shows severe dysmotility, esophageal diverticulum and smooth narrowing of the distal esophagus.  All of these are contributing to her dysphagia complaint.  She should consider having repeat EGD with dilatation but it may not be helpful due to the finding of dysmotility and diverticulum even though there is a narrowing noted.  She understands this.  I would like to treat her acid reflux more aggressively which may help with dysphagia.  For now she still wants to defer EGD.    3/15/2021  Patient has had GERD for several years.  Currently, she is taking omeprazole 20 mg once daily without complete relief.  She still has acid reflux which is severe when she lies down at night.  I will increase omeprazole to 40 mg once daily, plan to take this increased dose for 4 weeks.  Reviewed recent labs and GFR decreased but she has an  advanced age, no history of CKD.  She considered having EGD with possible dilatation but will proceed with esophagram for further evaluation of reported persistent dysphagia which was only temporarily relieved with dilatation of the esophagus. Does have history of tortuous esophagus on previous EGD, likely contributing to her continued dysphagia.  She was instructed not to lie down immediately after eating (wait at least 3 hours after meals), elevate the head of the bed at night, avoid spicy foods, avoid mints, avoid caffeine, avoid nicotine and maintain a healthy weight.      9/21/2020  Reflux  under control with PPI Protonix 40 mg p.o. day  Her prior EGD as per report showed scattered skis ring.  Her dysphagia improved after dilatation in 2019 but now it is back to what it was before. Minimal intermittent difficulty with swallowing for solids.   Does not want to undergo any endoscopic procedure for now  We will monitor    4. Gastroesophageal reflux disease, unspecified whether esophagitis present  5/7/2021  GERD is severe even while taking omeprazole 40 mg once daily her symptoms are worse in the evening.  I have recommended that she start taking famotidine 40 mg at night before bed in addition to the omeprazole 40 mg once daily.  She will call the office with any concerns.  - famotidine (PEPCID) 40 MG tablet; Take 1 tablet by mouth At Night As Needed for Heartburn.  Dispense: 30 tablet; Refill: 2  - omeprazole (priLOSEC) 40 MG capsule; Take 1 capsule by mouth Every Morning Before Breakfast.  Dispense: 30 capsule; Refill: 2    5. Therapeutic opioid-induced constipation (OIC)  5/7/2021  Constipation is treated currently with MiraLAX as needed with good control.  Continue for now.    3/15/2021  She reports that constipation is currently controlled to her satisfaction.  She has bowel movements every other day and only takes MiraLAX as needed.     9/21/2020  She is on a long-term opioids  She is constipated for many  years and not taking any laxatives. Bowel movement once in 2-3 days with a hard stool  High-fiber diet discussed  We will start on a stool softener and also MiraLAX 17 g p.o. daily           Prior history:  Normocytic anemia  3/15/2021  Hemoglobin has been stable over the past several months at 11.  She has not seen any obvious rectal bleeding or melena.     9/21/2020  She has a normocytic anemia with a hemoglobin around 10-11 g/dL.  She denies any blood in stool or melena.   She has a CKD.  This is most likely anemia of chronic disease with a borderline iron deficiency.   She had colonoscopy in 2013 by Dr. hodgson, polyps removed and angiectasis reported.  She had an EGD done 2019 revealed a scant skis ring which was dilated.   At this time patient does not want to undergo any endoscopic procedure.  She is aware that low-grade colonic lesions and pathology could be missed without the colonoscopy.  we will consider colonoscopy if there is any overt bleeding          Follow Up:   Return in about 2 months (around 7/7/2021) for recheck dysphagia.    This visit has been rescheduled as a phone visit to comply with patient safety concerns in accordance with CDC recommendations. Total time of discussion was 14 minutes.      Shawnee Morse PA-C  [facility]  5/7/2021  15:54 EDT    Please note that portions of this note may have been completed with a voice recognition program. Efforts were made to edit the dictations, but occasionally words are mistranscribed.

## 2021-07-12 ENCOUNTER — OFFICE VISIT (OUTPATIENT)
Dept: GASTROENTEROLOGY | Facility: CLINIC | Age: 86
End: 2021-07-12

## 2021-07-12 VITALS
SYSTOLIC BLOOD PRESSURE: 184 MMHG | TEMPERATURE: 97.3 F | HEART RATE: 55 BPM | BODY MASS INDEX: 22.31 KG/M2 | HEIGHT: 64 IN | DIASTOLIC BLOOD PRESSURE: 62 MMHG

## 2021-07-12 DIAGNOSIS — K21.9 GASTROESOPHAGEAL REFLUX DISEASE, UNSPECIFIED WHETHER ESOPHAGITIS PRESENT: ICD-10-CM

## 2021-07-12 DIAGNOSIS — K22.4 ESOPHAGEAL DYSMOTILITY: ICD-10-CM

## 2021-07-12 DIAGNOSIS — R13.19 ESOPHAGEAL DYSPHAGIA: Primary | ICD-10-CM

## 2021-07-12 DIAGNOSIS — Q39.6 ESOPHAGEAL DIVERTICULUM: ICD-10-CM

## 2021-07-12 PROCEDURE — 99213 OFFICE O/P EST LOW 20 MIN: CPT | Performed by: PHYSICIAN ASSISTANT

## 2021-07-12 RX ORDER — OMEPRAZOLE 40 MG/1
40 CAPSULE, DELAYED RELEASE ORAL
Qty: 30 CAPSULE | Refills: 5 | Status: SHIPPED | OUTPATIENT
Start: 2021-07-12 | End: 2021-09-20

## 2021-07-12 RX ORDER — FAMOTIDINE 40 MG/1
40 TABLET, FILM COATED ORAL NIGHTLY
Qty: 30 TABLET | Refills: 5 | Status: SHIPPED | OUTPATIENT
Start: 2021-07-12 | End: 2021-07-22

## 2021-07-12 NOTE — PROGRESS NOTES
Follow Up Note     Date: 2021   Patient Name: Mi Tejeda  MRN: [id number]  : 1935     Primary Care Provider: Adiel Awad MD     Chief Complaint:    Chief Complaint   Patient presents with   • Follow-up   • Difficulty Swallowing   • Heartburn   • Constipation     History of present illness:   2021  Mi Tejeda is a 85 y.o. female who is here today for follow up regarding dysphagia and heartburn.    Patient reports that dysphagia is still present and manageable. She has regurgitation of foods when severe dysphagia present, this is only occurring about one time every couple of weeks. She is still able to eat regular meals. She has an Ensure nutritional shake for breakfast, a small lunch with a good sized dinner daily. She still has a relatively good appetite per her report. She does not want to have EGD at this time. She is still taking omeprazole 40 mg once daily in the morning and famotidine 40 mg at night. She feels that she has had a dramatic improvement in her acid reflux complaints, especially at night, after the addition of famotidine. She has upcoming appointment with her PCP soon and will have labs at that time. She denies any current abdominal pain, nausea, vomiting, change in her bowel habits. She states that constipation is well controlled currently and only takes MiraLAX as needed. She has maintained her weight at 130 lbs over the past 1 year.    Interval History:  2021  Dysphagia is the same as previous.  She completed esophagram as directed and would like to discuss those results.  She is taking omeprazole 40 mg once daily and still having severe heartburn, this is worse during the evening before bed.  She previously was taking omeprazole 20 mg without relief.  She also took Protonix without relief before that.  She is ill with upper respiratory symptoms at this time and was unable to come to the office today.     3/15/2021  Patient reports that dysphagia is  persistent and happening with every meal.  She feels as if food is stuck in her chest and will go to the sink to try to regurgitate while eating.  She has had this problem for several years now.  She has had previous EGDs with dilatations and they have not helped for very long, only a couple of months each time.  She does have history of hiatal hernia and Schatzki's ring.  Last dilatation of the esophagus was completed by Dr. Castellanos 7/2019.  She also recalls a history of having gastric ulcer.  She has been taking omeprazole 20 mg once daily for a few months now.  She was previously taking Protonix without relief and her PCP changed it to omeprazole.  Denies any known history of chronic kidney disease.  She did have recent labs with her PCP and hemoglobin has been stable.     Regarding constipation, she is having bowel movements every other day and denies any straining.  She started taking MiraLAX but only takes it as needed.  Does have some intermittent bright red rectal bleeding but this is only small amount when it occurs, on the tissue.     9/21/2020  Mi Tejeda is a 84 y.o. female who is here today for follow up for constipation abdominal pain.  She states that since she was given an antibiotic last time her pain gradually improved however she still has a significant constipation not taking any laxatives.  Deny any nausea vomiting.  She had a stool test done which revealed acute blood in June 2020 however patient denies any blood in the stool black stool or melena     6/27/2020  The patient has a history of reflux off and on for the last several years.  The reflux is moderately severe.  Symptoms are described as retrosternal burning sensation, and indigestion.  There is history of occasional regurgitative symptoms.  Frequency being several times per week.  The symptoms are worse at night.  The patient takes acid suppressive therapy with reasonable control of his symptoms.  The patient had associated  "dysphagia.  This significantly improved.  The patient denies further abdominal pain.  There is no nausea or vomiting.  There is history of constipation.  The patient has gained 3 pounds since May 2019.  There is no history of liver or pancreatic disease.  She had undergone a colonoscopy in 2013 by surgical service-Dr. Thompson.  The patient was found to have polyps-tubular adenoma.    Subjective     Past Medical History:   Diagnosis Date   • Abdominal pain     states hx of   • Abnormal electrocardiogram    • Abnormal urinalysis    • Acute UTI    • Anemia    • Anomaly cardiac     REPORTS WILL SHOW ON LEFT VENTRICLE IN A 12 LEAD EKG AS BEING A MI BUT REPORTS SHE HAS NEVER HAD THIS    • Anxiety    • Aphagia     HX OF THIS, REPORTS WAS CLEARED OF A CVA BUT WAS TOLD WAS RELATED TO FATIGUE   • Arthritis    • Asthma    • Atrial fibrillation (CMS/HCC)    • Backache    • Bursitis of right shoulder    • CAD (coronary artery disease)    • Cataract, bilateral     s/p removal   • COPD (chronic obstructive pulmonary disease) (CMS/HCC)    • Depression    • Diverticulitis    • Dysphagia     REPORTS RESOLVED AFTER DILITATION   • Fracture     left wrist, 2 fingers on right hand, toes on right foot, right arm   • Full dentures    • Gastric ulcer    • GERD (gastroesophageal reflux disease)    • Gout    • Heart murmur    • High cholesterol    • History of nuclear stress test 2011?   • HTN (hypertension)    • Hypertriglyceridemia    • Hypothyroidism    • Multiple lung nodules    • Osteoarthritis    • Osteoporosis    • Peripheral neuropathy    • Personal history of tobacco use    • Plantar fasciitis     left   • Pneumonia    • Sinus problem    • Sinusitis    • Tear of meniscus of knee     right   • TIA (transient ischemic attack)     denies 5/22/19.  states had aphasia in E.R., but was ruled out.   • Toe pain     \"Calluses were filed out, but is painful\"   • Uterine leiomyoma    • Viral gastroenteritis    • Vitamin D deficiency    • Wears " glasses    • Weight loss, non-intentional     80 lb in 5 years     Past Surgical History:   Procedure Laterality Date   • CARDIAC CATHETERIZATION  ?    no stents   • CARDIAC SURGERY     • CATARACT EXTRACTION Bilateral    • CATARACT EXTRACTION, BILATERAL     • CHOLECYSTECTOMY     • COLONOSCOPY     • CORONARY ARTERY BYPASS GRAFT      2 vessels   • ENDOSCOPY     • ENDOSCOPY N/A 5/3/2017    Procedure: ESOPHAGOGASTRODUODENOSCOPY with biopsies;  Surgeon: Yared Castellanos MD;  Location: [facility];  Service:    • ENDOSCOPY N/A 2019    Procedure: ESOPHAGOGASTRODUODENOSCOPY WITH BIOPSY AND BALLOON DILITATION;  Surgeon: Yared Castellanos MD;  Location: [facility];  Service: Gastroenterology   • MOUTH SURGERY      full extraction   • TOTAL HIP ARTHROPLASTY Left 2010    PREETI Stanley MD     Family History   Problem Relation Age of Onset   • Cancer Mother         uterus   • Cancer Sister         colon; kidney   • Hypertension Sister    • Stroke Sister    • Cancer Brother         prostate   • Arthritis Other    • Cancer Other    • Kidney disease Other    • Stroke Other    • Hypertension Other    • Gout Other    • Colon cancer Sister    • Cancer Sister         kidney   • Cirrhosis Neg Hx    • Liver disease Neg Hx    • Liver cancer Neg Hx    • Stomach cancer Neg Hx      Social History     Socioeconomic History   • Marital status:      Spouse name: Not on file   • Number of children: Not on file   • Years of education: Not on file   • Highest education level: Not on file   Tobacco Use   • Smoking status: Former Smoker     Packs/day: 0.00     Years: 65.00     Pack years: 0.00     Types: Cigarettes     Quit date: 10/1/2018     Years since quittin.7   • Smokeless tobacco: Never Used   Vaping Use   • Vaping Use: Never used   Substance and Sexual Activity   • Alcohol use: Yes     Comment: Seldom   • Drug use: No   • Sexual activity: Defer       Current Outpatient Medications:   •  albuterol  sulfate HFA (ProAir HFA) 108 (90 Base) MCG/ACT inhaler, Inhale 2 puffs Every 4 (Four) Hours As Needed for Wheezing or Shortness of Air., Disp: 54 g, Rfl: 3  •  aspirin 81 MG tablet, Take 1 tablet by mouth Daily., Disp: 30 tablet, Rfl: 11  •  baclofen (LIORESAL) 10 MG tablet, TAKE ONE TABLET BY MOUTH AT NIGHT AS NEEDED FOR MUSCLE SPAMS, Disp: 30 tablet, Rfl: 0  •  CALCIUM PO, Take  by mouth Daily., Disp: , Rfl:   •  carvedilol (COREG) 12.5 MG tablet, Take 12.5 mg by mouth 2 (Two) Times a Day With Meals., Disp: , Rfl:   •  Cholecalciferol (VITAMIN D3) 125 MCG (5000 UT) capsule capsule, TAKE ONE CAPSULE BY MOUTH EVERY DAY, Disp: 100 capsule, Rfl: 3  •  famotidine (PEPCID) 40 MG tablet, Take 1 tablet by mouth At Night As Needed for Heartburn., Disp: 30 tablet, Rfl: 2  •  ferrous sulfate 324 (65 Fe) MG tablet delayed-release EC tablet, Take 324 mg by mouth Daily With Breakfast., Disp: , Rfl:   •  HYDROcodone-acetaminophen (NORCO) 7.5-325 MG per tablet, Take 1 tablet by mouth 2 (Two) Times a Day As Needed for Mild Pain (1-3) or Moderate Pain (4-6)., Disp: , Rfl:   •  levothyroxine (SYNTHROID, LEVOTHROID) 100 MCG tablet, Take 1 tablet by mouth Daily., Disp: 90 tablet, Rfl: 3  •  losartan (COZAAR) 100 MG tablet, Take 100 mg by mouth Daily., Disp: , Rfl:   •  metoprolol tartrate (LOPRESSOR) 50 MG tablet, Take 1 tablet by mouth 2 (Two) Times a Day., Disp: 90 tablet, Rfl: 3  •  morphine (MS CONTIN) 30 MG 12 hr tablet, Take 30 mg by mouth 2 (Two) Times a Day., Disp: , Rfl:   •  mupirocin (BACTROBAN) 2 % ointment, As Needed., Disp: , Rfl:   •  omeprazole (priLOSEC) 40 MG capsule, Take 1 capsule by mouth Every Morning Before Breakfast., Disp: 30 capsule, Rfl: 2  •  polyethylene glycol (MiraLax) 17 GM/SCOOP powder, Take 17 g by mouth Daily., Disp: 510 g, Rfl: 2  •  sertraline (Zoloft) 50 MG tablet, Take 1 tablet by mouth Daily., Disp: 30 tablet, Rfl: 1  •  vitamin C (ASCORBIC ACID) 500 MG tablet, Take 500 mg by mouth Daily., Disp:  ", Rfl:     Allergies   Allergen Reactions   • Other Nausea Only     Balsalmic Vinegar     • Ciprofloxacin GI Intolerance     Patient states she is not allergic.   • Hazelnut Nausea Only   • Keflex [Cephalexin] Nausea Only   • Niacin Rash     \"it makes my skin burn\"   • Oxycodone Dizziness     \"it makes me feel dopey and out of my head\"  NOTE: does tolerate hydrocodone and morphine   • Statins Myalgia   • Vicodin [Hydrocodone-Acetaminophen] Hives     Can take hydrocodone, cannot tolerate VICODIN       The following portions of the patient's history were reviewed and updated as appropriate: allergies, current medications, past family history, past medical history, past social history, past surgical history and problem list.    Objective     Physical Exam  Vitals reviewed.   Constitutional:       General: She is not in acute distress.     Appearance: Normal appearance. She is well-developed. She is not ill-appearing or diaphoretic.   HENT:      Head: Normocephalic and atraumatic.      Right Ear: External ear normal.      Left Ear: External ear normal.      Nose: Nose normal.      Mouth/Throat:      Comments: Wearing a mask  Eyes:      General: No scleral icterus.        Right eye: No discharge.         Left eye: No discharge.      Conjunctiva/sclera: Conjunctivae normal.   Neck:      Vascular: No JVD.   Cardiovascular:      Rate and Rhythm: Normal rate and regular rhythm.      Heart sounds: Normal heart sounds. No murmur heard.   No friction rub. No gallop.    Pulmonary:      Effort: Pulmonary effort is normal. No respiratory distress.      Breath sounds: Normal breath sounds. No wheezing or rales.   Chest:      Chest wall: No tenderness.   Abdominal:      General: Bowel sounds are normal. There is no distension.      Palpations: Abdomen is soft. There is no mass.      Tenderness: There is no abdominal tenderness. There is no guarding.   Musculoskeletal:         General: No deformity.      Cervical back: Normal range " "of motion.      Right lower leg: No edema.      Left lower leg: No edema.      Comments: Using a wheelchair for mobility today   Skin:     General: Skin is warm and dry.      Findings: Bruising present. No erythema or rash.      Comments: Scattered bruising on b/l LEs   Neurological:      Mental Status: She is alert and oriented to person, place, and time.      Coordination: Coordination normal.   Psychiatric:         Mood and Affect: Mood normal.         Behavior: Behavior normal.         Thought Content: Thought content normal.         Judgment: Judgment normal.       Vitals:    07/12/21 1331   BP: (!) 184/62   Pulse: 55   Temp: 97.3 °F (36.3 °C)   TempSrc: Infrared   Weight: Comment: W/C   Height: 162.6 cm (64\")   note- she understands that her BP is elevated today, will re-check when she returns home today    Results Review:   I reviewed the patient's new clinical results.      FL Esophagram Complete Single Contrast  Result Date: 4/6/2021  1. Severe dysmotility. 2. Diverticuli as described in the midthoracic esophagus and in the proximal duodenum. 3. Smooth narrowing of the distal esophagus, but 13 mm tablet passes through this region. 4. Moderate gastroesophageal reflux to thoracic inlet.       Assessment / Plan      1. Esophageal dysphagia  2. Esophageal dysmotility  3. Esophageal diverticulum  7/12/2021  Dysphagia has been present for years and she continues to have this problem. She only has regurgitation of food about once every 2 weeks. She is able to eat regular meals. Her weight has remained stable. She would like to defer EGD at this time. I have asked her to call with any concerns or worsening of her dysphagia complaint.    5/7/2021  Dysphagia has been a longstanding complaint.  Her last EGD dilatation was completed in 2019.  Esophagram recently completed shows severe dysmotility, esophageal diverticulum and smooth narrowing of the distal esophagus.  All of these are contributing to her dysphagia " complaint.  She should consider having repeat EGD with dilatation but it may not be helpful due to the finding of dysmotility and diverticulum even though there is a narrowing noted.  She understands this.  I would like to treat her acid reflux more aggressively which may help with dysphagia.  For now she still wants to defer EGD.     3/15/2021  Patient has had GERD for several years.  Currently, she is taking omeprazole 20 mg once daily without complete relief.  She still has acid reflux which is severe when she lies down at night.  I will increase omeprazole to 40 mg once daily, plan to take this increased dose for 4 weeks.  Reviewed recent labs and GFR decreased but she has an advanced age, no history of CKD.  She considered having EGD with possible dilatation but will proceed with esophagram for further evaluation of reported persistent dysphagia which was only temporarily relieved with dilatation of the esophagus. Does have history of tortuous esophagus on previous EGD, likely contributing to her continued dysphagia.  She was instructed not to lie down immediately after eating (wait at least 3 hours after meals), elevate the head of the bed at night, avoid spicy foods, avoid mints, avoid caffeine, avoid nicotine and maintain a healthy weight.      9/21/2020  Reflux  under control with PPI Protonix 40 mg p.o. day  Her prior EGD as per report showed scattered skis ring.  Her dysphagia improved after dilatation in 2019 but now it is back to what it was before. Minimal intermittent difficulty with swallowing for solids.   Does not want to undergo any endoscopic procedure for now  We will monitor     4. Gastroesophageal reflux disease, unspecified whether esophagitis present  7/12/2021  She feels that her acid reflux has dramatically improved since starting famotidine 40 mg once daily at night. She takes this every night because of the drastic improvement of her acid reflux symptoms and is able to sleep without  waking up with severe acid reflux. She will continue taking omeprazole 40 mg once daily for now. It would be preferred that she decrease to 20 mg once daily and eventually take this medication only as needed due to long-term risk of PPIs. This was discussed with her today. She would like to continue the omeprazole 40 mg once daily for now. She does not want to pursue any EGD at this time.  - famotidine (PEPCID) 40 MG tablet; Take 1 tablet by mouth Every Night.  Dispense: 30 tablet; Refill: 5  - omeprazole (priLOSEC) 40 MG capsule; Take 1 capsule by mouth Every Morning Before Breakfast.  Dispense: 30 capsule; Refill: 5    5/7/2021  GERD is severe even while taking omeprazole 40 mg once daily her symptoms are worse in the evening.  I have recommended that she start taking famotidine 40 mg at night before bed in addition to the omeprazole 40 mg once daily.  She will call the office with any concerns.  - famotidine (PEPCID) 40 MG tablet; Take 1 tablet by mouth At Night As Needed for Heartburn.  Dispense: 30 tablet; Refill: 2  - omeprazole (priLOSEC) 40 MG capsule; Take 1 capsule by mouth Every Morning Before Breakfast.  Dispense: 30 capsule; Refill: 2        Prior history:   Therapeutic opioid-induced constipation (OIC)  5/7/2021  Constipation is treated currently with MiraLAX as needed with good control.  Continue for now.  3/15/2021  She reports that constipation is currently controlled to her satisfaction.  She has bowel movements every other day and only takes MiraLAX as needed.  9/21/2020  She is on a long-term opioids  She is constipated for many years and not taking any laxatives. Bowel movement once in 2-3 days with a hard stool  High-fiber diet discussed  We will start on a stool softener and also MiraLAX 17 g p.o. daily      Follow Up:   Return if symptoms worsen or fail to improve.      Shawnee Morse PA-C  Gastroenterology Ed  7/12/2021  16:24 EDT    Please note that portions of this note may have been  completed with a voice recognition program. Efforts were made to edit the dictations, but occasionally words are mistranscribed.

## 2021-07-22 ENCOUNTER — OFFICE VISIT (OUTPATIENT)
Dept: INTERNAL MEDICINE | Facility: CLINIC | Age: 86
End: 2021-07-22

## 2021-07-22 VITALS
DIASTOLIC BLOOD PRESSURE: 62 MMHG | BODY MASS INDEX: 22.02 KG/M2 | WEIGHT: 129 LBS | HEART RATE: 52 BPM | HEIGHT: 64 IN | SYSTOLIC BLOOD PRESSURE: 104 MMHG | OXYGEN SATURATION: 96 % | TEMPERATURE: 96 F

## 2021-07-22 DIAGNOSIS — I10 ESSENTIAL HYPERTENSION: Primary | ICD-10-CM

## 2021-07-22 DIAGNOSIS — E78.5 HYPERLIPIDEMIA, UNSPECIFIED HYPERLIPIDEMIA TYPE: ICD-10-CM

## 2021-07-22 DIAGNOSIS — F41.9 ANXIETY: ICD-10-CM

## 2021-07-22 DIAGNOSIS — E55.9 VITAMIN D DEFICIENCY: ICD-10-CM

## 2021-07-22 DIAGNOSIS — E03.8 OTHER SPECIFIED HYPOTHYROIDISM: ICD-10-CM

## 2021-07-22 DIAGNOSIS — D64.9 ANEMIA, UNSPECIFIED TYPE: ICD-10-CM

## 2021-07-22 DIAGNOSIS — J43.8 OTHER EMPHYSEMA (HCC): ICD-10-CM

## 2021-07-22 DIAGNOSIS — R13.19 OTHER DYSPHAGIA: ICD-10-CM

## 2021-07-22 DIAGNOSIS — F32.A DEPRESSION, UNSPECIFIED DEPRESSION TYPE: ICD-10-CM

## 2021-07-22 PROCEDURE — 99214 OFFICE O/P EST MOD 30 MIN: CPT | Performed by: INTERNAL MEDICINE

## 2021-07-22 RX ORDER — DULOXETIN HYDROCHLORIDE 30 MG/1
30 CAPSULE, DELAYED RELEASE ORAL DAILY
Qty: 30 CAPSULE | Refills: 1 | Status: SHIPPED | OUTPATIENT
Start: 2021-07-22 | End: 2021-10-06 | Stop reason: SDDI

## 2021-07-22 NOTE — PROGRESS NOTES
Subjective   Mi Tejeda is a 85 y.o. female.     Chief Complaint   Patient presents with   • Follow-up   • Hypertension   • Hypothyroidism   • Emphysema       History of Present Illness   Patient here for follow-up.  Dysphagia status post a work-up showed a dysmotility in the Schatzki's ring status post a dilation.  Dysphagia comes and goes.  Hypertension stable on medication.  Hyperlipidemia stable on medication.  Vitamin D high normal.  Hypothyroidism stable on medication.  Anemia stable now.  Patient complains depressed mood.  Also mild anxiousness.  Emphysema stable.  Denies any short of breath chest pain.  No orthopnea    Current Outpatient Medications:   •  albuterol sulfate HFA (ProAir HFA) 108 (90 Base) MCG/ACT inhaler, Inhale 2 puffs Every 4 (Four) Hours As Needed for Wheezing or Shortness of Air., Disp: 54 g, Rfl: 3  •  aspirin 81 MG tablet, Take 1 tablet by mouth Daily., Disp: 30 tablet, Rfl: 11  •  baclofen (LIORESAL) 10 MG tablet, TAKE ONE TABLET BY MOUTH AT NIGHT AS NEEDED FOR MUSCLE SPAMS, Disp: 30 tablet, Rfl: 0  •  CALCIUM PO, Take  by mouth Daily., Disp: , Rfl:   •  carvedilol (COREG) 12.5 MG tablet, Take 12.5 mg by mouth 2 (Two) Times a Day With Meals., Disp: , Rfl:   •  Cholecalciferol (VITAMIN D3) 125 MCG (5000 UT) capsule capsule, TAKE ONE CAPSULE BY MOUTH EVERY DAY, Disp: 100 capsule, Rfl: 3  •  ferrous sulfate 324 (65 Fe) MG tablet delayed-release EC tablet, Take 324 mg by mouth Daily With Breakfast., Disp: , Rfl:   •  HYDROcodone-acetaminophen (NORCO) 7.5-325 MG per tablet, Take 1 tablet by mouth 2 (Two) Times a Day As Needed for Mild Pain (1-3) or Moderate Pain (4-6)., Disp: , Rfl:   •  levothyroxine (SYNTHROID, LEVOTHROID) 100 MCG tablet, Take 1 tablet by mouth Daily., Disp: 90 tablet, Rfl: 3  •  losartan (COZAAR) 100 MG tablet, Take 100 mg by mouth Daily., Disp: , Rfl:   •  metoprolol tartrate (LOPRESSOR) 50 MG tablet, Take 1 tablet by mouth 2 (Two) Times a Day., Disp: 90 tablet,  Rfl: 3  •  morphine (MS CONTIN) 30 MG 12 hr tablet, Take 30 mg by mouth 2 (Two) Times a Day., Disp: , Rfl:   •  mupirocin (BACTROBAN) 2 % ointment, As Needed., Disp: , Rfl:   •  omeprazole (priLOSEC) 40 MG capsule, Take 1 capsule by mouth Every Morning Before Breakfast., Disp: 30 capsule, Rfl: 5  •  polyethylene glycol (MiraLax) 17 GM/SCOOP powder, Take 17 g by mouth Daily., Disp: 510 g, Rfl: 2  •  vitamin C (ASCORBIC ACID) 500 MG tablet, Take 500 mg by mouth Daily., Disp: , Rfl:   •  DULoxetine (Cymbalta) 30 MG capsule, Take 1 capsule by mouth Daily., Disp: 30 capsule, Rfl: 1    The following portions of the patient's history were reviewed and updated as appropriate: allergies, current medications, past family history, past medical history, past social history, past surgical history and problem list.    Review of Systems   Constitutional: Negative.    Respiratory: Negative.    Cardiovascular: Negative.  Negative for leg swelling.   Gastrointestinal: Negative.         Occasional swallow problem   Musculoskeletal: Negative.    Skin: Negative.    Neurological: Negative.    Psychiatric/Behavioral: The patient is nervous/anxious.         Depressed mood       Objective   Physical Exam  Cardiovascular:      Rate and Rhythm: Normal rate and regular rhythm.      Heart sounds: Normal heart sounds.   Pulmonary:      Effort: Pulmonary effort is normal.      Breath sounds: Normal breath sounds.   Abdominal:      General: Bowel sounds are normal.   Musculoskeletal:      Cervical back: Neck supple.      Right lower leg: No edema.      Left lower leg: No edema.   Skin:     General: Skin is warm.   Neurological:      Mental Status: She is alert and oriented to person, place, and time.         All tests have been reviewed.    Assessment/Plan   Diagnoses and all orders for this visit:    Essential hypertension continue medication    Hyperlipidemia, continue good diet    Vitamin D deficiency hold the supplement for now    Other  specified hypothyroidism continue medication    Anemia, unspecified type stable    Depression, unspecified depression type initiate medication Zoloft that did not help initiate Cymbalta  -     DULoxetine (Cymbalta) 30 MG capsule; Take 1 capsule by mouth Daily.    Anxiety  -     DULoxetine (Cymbalta) 30 MG capsule; Take 1 capsule by mouth Daily.    Other emphysema (CMS/HCC) stable    Other dysphagia probably due to dysmotility      1 month follow-up with others

## 2021-08-23 NOTE — TELEPHONE ENCOUNTER
Last office visit  07/22/2021  No future appointment scheduled.      Please advise since you have not prescribed medication in past

## 2021-08-24 RX ORDER — CARVEDILOL 12.5 MG/1
TABLET ORAL
Qty: 180 TABLET | Refills: 0 | OUTPATIENT
Start: 2021-08-24

## 2021-08-25 ENCOUNTER — TELEPHONE (OUTPATIENT)
Dept: CARDIOLOGY | Facility: CLINIC | Age: 86
End: 2021-08-25

## 2021-08-25 DIAGNOSIS — I10 ESSENTIAL HYPERTENSION: Primary | ICD-10-CM

## 2021-08-25 RX ORDER — CARVEDILOL 12.5 MG/1
12.5 TABLET ORAL 2 TIMES DAILY WITH MEALS
Qty: 30 TABLET | Refills: 0 | Status: SHIPPED | OUTPATIENT
Start: 2021-08-25 | End: 2021-09-28 | Stop reason: ALTCHOICE

## 2021-08-26 NOTE — TELEPHONE ENCOUNTER
Contacted patient and advised her to follow up with cardiology regarding medication; patient states that she had already discussed with Dr. Randall and she did not need Dr. Awad to take over prescription.     Pharmacy must have sent to our office by mistake

## 2021-09-20 ENCOUNTER — OFFICE VISIT (OUTPATIENT)
Dept: ORTHOPEDIC SURGERY | Facility: CLINIC | Age: 86
End: 2021-09-20

## 2021-09-20 DIAGNOSIS — M17.11 PRIMARY OSTEOARTHRITIS OF RIGHT KNEE: Primary | ICD-10-CM

## 2021-09-20 PROCEDURE — 20610 DRAIN/INJ JOINT/BURSA W/O US: CPT | Performed by: PHYSICIAN ASSISTANT

## 2021-09-20 RX ORDER — TRIAMCINOLONE ACETONIDE 40 MG/ML
40 INJECTION, SUSPENSION INTRA-ARTICULAR; INTRAMUSCULAR
Status: COMPLETED | OUTPATIENT
Start: 2021-09-20 | End: 2021-09-20

## 2021-09-20 RX ORDER — FAMOTIDINE 40 MG/1
TABLET, FILM COATED ORAL
COMMUNITY
Start: 2021-08-04 | End: 2022-02-01

## 2021-09-20 RX ORDER — LIDOCAINE HYDROCHLORIDE 10 MG/ML
2 INJECTION, SOLUTION INFILTRATION; PERINEURAL
Status: COMPLETED | OUTPATIENT
Start: 2021-09-20 | End: 2021-09-20

## 2021-09-20 RX ADMIN — TRIAMCINOLONE ACETONIDE 40 MG: 40 INJECTION, SUSPENSION INTRA-ARTICULAR; INTRAMUSCULAR at 13:22

## 2021-09-20 RX ADMIN — LIDOCAINE HYDROCHLORIDE 2 ML: 10 INJECTION, SOLUTION INFILTRATION; PERINEURAL at 13:22

## 2021-09-20 NOTE — PROGRESS NOTES
"Subjective   Mi Tejeda is a 85 y.o. female here today for injection therapy.    Chief Complaint   Patient presents with   • Right Knee - Injections     Last injection 7/15/2020.     Patient presents for a repeat right knee cortisone injection.  Past Medical History:   Diagnosis Date   • Abdominal pain     states hx of   • Abnormal electrocardiogram    • Abnormal urinalysis    • Acute UTI    • Anemia    • Anomaly cardiac     REPORTS WILL SHOW ON LEFT VENTRICLE IN A 12 LEAD EKG AS BEING A MI BUT REPORTS SHE HAS NEVER HAD THIS    • Anxiety    • Aphagia     HX OF THIS, REPORTS WAS CLEARED OF A CVA BUT WAS TOLD WAS RELATED TO FATIGUE   • Arthritis    • Asthma    • Atrial fibrillation (CMS/HCC)    • Backache    • Bursitis of right shoulder    • CAD (coronary artery disease)    • Cataract, bilateral     s/p removal   • COPD (chronic obstructive pulmonary disease) (CMS/HCC)    • Depression    • Diverticulitis    • Dysphagia     REPORTS RESOLVED AFTER DILITATION   • Fracture     left wrist, 2 fingers on right hand, toes on right foot, right arm   • Full dentures    • Gastric ulcer    • GERD (gastroesophageal reflux disease)    • Gout    • Heart murmur    • High cholesterol    • History of nuclear stress test 2011?   • HTN (hypertension)    • Hypertriglyceridemia    • Hypothyroidism    • Multiple lung nodules    • Osteoarthritis    • Osteoporosis    • Peripheral neuropathy    • Personal history of tobacco use    • Plantar fasciitis     left   • Pneumonia    • Sinus problem    • Sinusitis    • Tear of meniscus of knee     right   • TIA (transient ischemic attack)     denies 5/22/19.  states had aphasia in E.R., but was ruled out.   • Toe pain     \"Calluses were filed out, but is painful\"   • Uterine leiomyoma    • Viral gastroenteritis    • Vitamin D deficiency    • Wears glasses    • Weight loss, non-intentional     80 lb in 5 years         Past Surgical History:   Procedure Laterality Date   • CARDIAC CATHETERIZATION " " 2007?    no stents   • CARDIAC SURGERY     • CATARACT EXTRACTION Bilateral 2009   • CATARACT EXTRACTION, BILATERAL     • CHOLECYSTECTOMY  2002   • COLONOSCOPY     • CORONARY ARTERY BYPASS GRAFT  1998    2 vessels   • ENDOSCOPY     • ENDOSCOPY N/A 5/3/2017    Procedure: ESOPHAGOGASTRODUODENOSCOPY with biopsies;  Surgeon: Yared Castellanos MD;  Location: [facility] ENDOSCOPY;  Service:    • ENDOSCOPY N/A 5/23/2019    Procedure: ESOPHAGOGASTRODUODENOSCOPY WITH BIOPSY AND BALLOON DILITATION;  Surgeon: Yared Castellanos MD;  Location: [facility] ENDOSCOPY;  Service: Gastroenterology   • MOUTH SURGERY      full extraction   • TOTAL HIP ARTHROPLASTY Left 02/02/2010    PREETI Stanley MD       Allergies   Allergen Reactions   • Ciprofloxacin GI Intolerance     Patient states she is not allergic.   • Hazelnut Nausea Only   • Keflex [Cephalexin] Nausea Only   • Niacin Rash     \"it makes my skin burn\"   • Other Nausea Only     Balsalmic Vinegar     • Oxycodone Dizziness     \"it makes me feel dopey and out of my head\"  NOTE: does tolerate hydrocodone and morphine   • Statins Myalgia   • Vicodin [Hydrocodone-Acetaminophen] Hives     Can take hydrocodone, cannot tolerate VICODIN       Objective   LMP  (LMP Unknown)    Physical Exam    Skin exam stable with no erythema, ecchymosis or rash.  No new swelling.  No motor or sensory changes.  Distal pulse intact.    Large Joint Arthrocentesis: R knee  Date/Time: 9/20/2021 1:22 PM  Consent given by: patient  Site marked: site marked  Timeout: Immediately prior to procedure a time out was called to verify the correct patient, procedure, equipment, support staff and site/side marked as required   Supporting Documentation  Indications: pain   Procedure Details  Location: knee - R knee  Preparation: Patient was prepped and draped in the usual sterile fashion  Needle size: 22 G  Approach: anterolateral  Medications administered: 2 mL lidocaine 1 %; 40 mg triamcinolone acetonide 40 MG/ML  Patient tolerance: " patient tolerated the procedure well with no immediate complications          Assessment/Plan      Diagnosis Plan   1. Primary osteoarthritis of right knee         Guided on proper techniques for mobility, strength, agility and/or conditioning exercises  Ice, heat, and/or modalities as beneficial  Watch for signs and symptoms of infection  Call or notify for any adverse effect from injection therapy    Recommendations:  Follow-up in 3 months or as needed  Patient agreeable to call or return sooner for any concerns.

## 2021-09-28 ENCOUNTER — OFFICE VISIT (OUTPATIENT)
Dept: CARDIOLOGY | Facility: CLINIC | Age: 86
End: 2021-09-28

## 2021-09-28 VITALS
OXYGEN SATURATION: 95 % | BODY MASS INDEX: 22.02 KG/M2 | DIASTOLIC BLOOD PRESSURE: 68 MMHG | HEIGHT: 64 IN | WEIGHT: 129 LBS | SYSTOLIC BLOOD PRESSURE: 114 MMHG | HEART RATE: 57 BPM

## 2021-09-28 DIAGNOSIS — I25.10 CHRONIC CORONARY ARTERY DISEASE: ICD-10-CM

## 2021-09-28 DIAGNOSIS — I48.0 PAROXYSMAL ATRIAL FIBRILLATION (HCC): Primary | ICD-10-CM

## 2021-09-28 DIAGNOSIS — I77.9 PERIPHERAL ARTERIAL OCCLUSIVE DISEASE (HCC): ICD-10-CM

## 2021-09-28 DIAGNOSIS — Z95.1 HX OF CABG: ICD-10-CM

## 2021-09-28 DIAGNOSIS — I10 ESSENTIAL HYPERTENSION: ICD-10-CM

## 2021-09-28 DIAGNOSIS — E78.5 HYPERLIPIDEMIA, UNSPECIFIED HYPERLIPIDEMIA TYPE: ICD-10-CM

## 2021-09-28 PROCEDURE — 99214 OFFICE O/P EST MOD 30 MIN: CPT | Performed by: INTERNAL MEDICINE

## 2021-09-28 PROCEDURE — 93000 ELECTROCARDIOGRAM COMPLETE: CPT | Performed by: INTERNAL MEDICINE

## 2021-09-28 NOTE — PROGRESS NOTES
[facility] Cardiology Office Follow Up Note    Mi Tejeda  [id number]  2021    Primary Care Provider: Adiel Awad MD    Chief Complaint: Routine follow-up    History of Present Illness:   Mrs. Mi Tejeda is a 85 y.o. female being seen by Cardiology for routine follow-up.  The patient has a past medical history significant for dyslipidemia, hypertension, GERD, hypothyroidism, and COPD with a history of prior tobacco use.  Her cardiac history includes coronary artery disease, with a prior history of two-vessel coronary artery bypass grafting in .  She also has a history of mild to moderate aortic insufficiency.  Her history includes paroxysmal atrial fibrillation as well as peripheral arterial disease reportedly with prior intervention.  She returns to cardiology clinic today for routine follow-up.  Since her last appointment, she denies any significant change in her health.  No chest pain or exertional anginal symptoms.  She reports her level of exertion is limited, however, by knee and hip pain.  She follows with orthopedic surgery, receiving regular injections.  No recent episodes of palpitations or symptoms to suggest PAF/RVR.  No significant lower extremity edema.  She denies any bilateral lower extremity pain or symptoms of claudication.  No history of nonhealing wounds or ulceration in her lower extremities.  No specific complaints today other than knee and hip pain.    Past Cardiac Testin. Last Coronary Angio: Remote  2. Prior Stress Testing: Remote  3. Last Echo:               1.  2015                          1.  The patient had normal chamber dimensions with borderline left atrial enlargement with mild left ventricular enlargement with normal global LV systolic function with paradoxical septal motion with estimated ejection fraction of 60%.                            2. The patient had PAP of 24 mmHg. Right atrial pressure was estimated around  10 mmHg.  RV dimensions and RV function normal.                            3.  The patient had mild-to-moderate aortic, trivial-to-mild mitral and trivial tricuspid insufficiency.                             4.  Trivial pericardial effusion was suggested.                2.  10/19/2020                          1.  Normal left ventricular systolic function, LVEF 60-65%                          2.  Moderate aortic regurgitation                          3.  Mild MR and TR                          4.  Mild right ventricular dilation with normal RV systolic function  4. Prior Holter Monitor: None    Review of Systems:   Review of Systems   Constitutional: Negative for activity change, appetite change, chills, diaphoresis, fatigue, fever, unexpected weight gain and unexpected weight loss.   Eyes: Negative for blurred vision and double vision.   Respiratory: Negative for cough, chest tightness, shortness of breath and wheezing.    Cardiovascular: Negative for chest pain, palpitations and leg swelling.   Gastrointestinal: Negative for abdominal pain, anal bleeding, blood in stool and GERD.   Endocrine: Negative for cold intolerance and heat intolerance.   Genitourinary: Negative for hematuria.   Musculoskeletal: Positive for arthralgias.   Neurological: Negative for dizziness, syncope, weakness and light-headedness.   Hematological: Does not bruise/bleed easily.   Psychiatric/Behavioral: Negative for depressed mood and stress. The patient is not nervous/anxious.        I have reviewed and/or updated the patient's past medical, past surgical, family, social history, problem list and allergies as appropriate.     Medications:     Current Outpatient Medications:   •  albuterol sulfate HFA (ProAir HFA) 108 (90 Base) MCG/ACT inhaler, Inhale 2 puffs Every 4 (Four) Hours As Needed for Wheezing or Shortness of Air., Disp: 54 g, Rfl: 3  •  aspirin 81 MG tablet, Take 1 tablet by mouth Daily., Disp: 30 tablet, Rfl: 11  •  baclofen  "(LIORESAL) 10 MG tablet, TAKE ONE TABLET BY MOUTH AT NIGHT AS NEEDED FOR MUSCLE SPAMS, Disp: 30 tablet, Rfl: 0  •  CALCIUM PO, Take  by mouth Daily., Disp: , Rfl:   •  Cholecalciferol (VITAMIN D3) 125 MCG (5000 UT) capsule capsule, TAKE ONE CAPSULE BY MOUTH EVERY DAY, Disp: 100 capsule, Rfl: 3  •  famotidine (PEPCID) 40 MG tablet, , Disp: , Rfl:   •  ferrous sulfate 324 (65 Fe) MG tablet delayed-release EC tablet, Take 324 mg by mouth Daily With Breakfast., Disp: , Rfl:   •  HYDROcodone-acetaminophen (NORCO) 7.5-325 MG per tablet, Take 1 tablet by mouth 2 (Two) Times a Day As Needed for Mild Pain (1-3) or Moderate Pain (4-6)., Disp: , Rfl:   •  levothyroxine (SYNTHROID, LEVOTHROID) 100 MCG tablet, Take 1 tablet by mouth Daily., Disp: 90 tablet, Rfl: 3  •  losartan (COZAAR) 100 MG tablet, Take 100 mg by mouth Daily., Disp: , Rfl:   •  metoprolol tartrate (LOPRESSOR) 50 MG tablet, Take 1 tablet by mouth 2 (Two) Times a Day., Disp: 90 tablet, Rfl: 3  •  morphine (MS CONTIN) 30 MG 12 hr tablet, Take 30 mg by mouth 2 (Two) Times a Day., Disp: , Rfl:   •  mupirocin (BACTROBAN) 2 % ointment, As Needed., Disp: , Rfl:   •  vitamin C (ASCORBIC ACID) 500 MG tablet, Take 500 mg by mouth Daily., Disp: , Rfl:   •  DULoxetine (Cymbalta) 30 MG capsule, Take 1 capsule by mouth Daily., Disp: 30 capsule, Rfl: 1  •  polyethylene glycol (MiraLax) 17 GM/SCOOP powder, Take 17 g by mouth Daily., Disp: 510 g, Rfl: 2    Physical Exam:  Vital Signs:   Vitals:    09/28/21 1126   BP: 114/68   BP Location: Left arm   Patient Position: Sitting   Cuff Size: Adult   Pulse: 57   SpO2: 95%   Weight: 58.5 kg (129 lb)   Height: 162.6 cm (64\")       Physical Exam  Constitutional:       Appearance: She is well-developed. She is not diaphoretic.      Comments: Elderly female in no acute distress   HENT:      Head: Normocephalic and atraumatic.   Eyes:      General: No scleral icterus.     Pupils: Pupils are equal, round, and reactive to light.   Neck:     "  Trachea: No tracheal deviation.   Cardiovascular:      Rate and Rhythm: Normal rate and regular rhythm.      Heart sounds: Normal heart sounds. No murmur heard.   No friction rub. No gallop.       Comments: Normal JVD.  Pulmonary:      Effort: Pulmonary effort is normal. No respiratory distress.      Breath sounds: Normal breath sounds. No stridor. No wheezing or rales.   Chest:      Chest wall: No tenderness.   Abdominal:      General: Bowel sounds are normal. There is no distension.      Palpations: Abdomen is soft.      Tenderness: There is no abdominal tenderness. There is no guarding or rebound.   Musculoskeletal:         General: Normal range of motion.      Cervical back: Neck supple. No tenderness.      Comments: Currently in a wheelchair   Lymphadenopathy:      Cervical: No cervical adenopathy.   Skin:     General: Skin is warm and dry.      Findings: No erythema.      Comments: Changes of chronic limb ischemia bilateral lower extremities.    Neurological:      General: No focal deficit present.      Mental Status: She is alert and oriented to person, place, and time.   Psychiatric:         Mood and Affect: Mood normal.         Behavior: Behavior normal.         Results Review:   I reviewed the patient's new clinical results.  I personally viewed and interpreted the patient's EKG/Telemetry data      ECG 12 Lead    Date/Time: 9/28/2021 12:49 PM  Performed by: Khoi Wylie MD  Authorized by: Khoi Wylie MD   Comparison: not compared with previous ECG   Rhythm: sinus bradycardia  Rate: bradycardic  QRS axis: normal  Other findings comments: Prior anterior MI.  Nonspecific ST-T wave changes.    Clinical impression: abnormal EKG            Assessment / Plan:     1.  Paroxysmal atrial fibrillation  --History of paroxysmal atrial fibrillation with rapid ventricular rates  --Asymptomatic  --[id number] score is elevated, however the patient continues to decline anticoagulation and understands the  risk of CVA in the setting of PAF  --Continue metoprolol for rate control  --Continue aspirin  --Follow-up in 6 months, or sooner if required     2.  Coronary artery disease  --Known history of coronary artery disease, status post two-vessel CABG in 1998  --Continues to deny chest pain or exertional anginal symptoms  --Continue daily aspirin  --Not on statin due to history of intolerance secondary to myalgias     3.   Peripheral arterial disease  --Known history of PAD, the patient does report undergoing intervention for PAD remotely  --Diminished bilateral pulses, however no current symptoms of claudication or nonhealing wounds/ulcerations  --Continue aspirin  --History of intolerance to statin, as above     4.  Essential hypertension  --Remains well controlled with current antihypertensives     5.  Aortic regurgitation  --Remains moderate on echocardiogram in 10/2020  --Asymptomatic  --Repeat echocardiogram at next follow-up to monitor    Follow Up:   Return in about 6 months (around 3/28/2022) for With Brooke.      Thank you for allowing me to participate in the care of your patient. Please to not hesitate to contact me with additional questions or concerns.     LATRICE Wylie MD  Interventional Cardiology   09/28/2021  11:57 EDT

## 2021-10-06 ENCOUNTER — OFFICE VISIT (OUTPATIENT)
Dept: INTERNAL MEDICINE | Facility: CLINIC | Age: 86
End: 2021-10-06

## 2021-10-06 VITALS
WEIGHT: 127 LBS | SYSTOLIC BLOOD PRESSURE: 122 MMHG | DIASTOLIC BLOOD PRESSURE: 80 MMHG | TEMPERATURE: 96.8 F | OXYGEN SATURATION: 93 % | HEIGHT: 64 IN | BODY MASS INDEX: 21.68 KG/M2 | HEART RATE: 64 BPM

## 2021-10-06 DIAGNOSIS — I10 PRIMARY HYPERTENSION: Primary | ICD-10-CM

## 2021-10-06 DIAGNOSIS — D64.9 ANEMIA, UNSPECIFIED TYPE: ICD-10-CM

## 2021-10-06 DIAGNOSIS — F41.9 ANXIETY: ICD-10-CM

## 2021-10-06 DIAGNOSIS — F32.A DEPRESSION, UNSPECIFIED DEPRESSION TYPE: ICD-10-CM

## 2021-10-06 DIAGNOSIS — E03.8 OTHER SPECIFIED HYPOTHYROIDISM: ICD-10-CM

## 2021-10-06 DIAGNOSIS — E78.5 HYPERLIPIDEMIA, UNSPECIFIED HYPERLIPIDEMIA TYPE: ICD-10-CM

## 2021-10-06 PROCEDURE — 99213 OFFICE O/P EST LOW 20 MIN: CPT | Performed by: INTERNAL MEDICINE

## 2021-10-06 RX ORDER — MIRTAZAPINE 15 MG/1
15 TABLET, FILM COATED ORAL NIGHTLY
Qty: 30 TABLET | Refills: 0 | Status: SHIPPED | OUTPATIENT
Start: 2021-10-06 | End: 2021-11-08

## 2021-10-06 NOTE — PROGRESS NOTES
Subjective   Mi Tejeda is a 85 y.o. female.     Chief Complaint   Patient presents with   • Follow-up   • Hypertension   • Depression     discuss medication; side effects with Cymbalta (insomina)       History of Present Illness   Patient here for follow-up.  Patient states Cymbalta caused insomnia.  And is still somewhat depressed no suicidal thoughts.  Hypertension stable on medication hyperlipidemia stable on medication hypothyroidism stable on medication anemia stable now.    Current Outpatient Medications:   •  albuterol sulfate HFA (ProAir HFA) 108 (90 Base) MCG/ACT inhaler, Inhale 2 puffs Every 4 (Four) Hours As Needed for Wheezing or Shortness of Air., Disp: 54 g, Rfl: 3  •  aspirin 81 MG tablet, Take 1 tablet by mouth Daily., Disp: 30 tablet, Rfl: 11  •  baclofen (LIORESAL) 10 MG tablet, TAKE ONE TABLET BY MOUTH AT NIGHT AS NEEDED FOR MUSCLE SPAMS, Disp: 30 tablet, Rfl: 0  •  CALCIUM PO, Take  by mouth Daily., Disp: , Rfl:   •  Cholecalciferol (VITAMIN D3) 125 MCG (5000 UT) capsule capsule, TAKE ONE CAPSULE BY MOUTH EVERY DAY, Disp: 100 capsule, Rfl: 3  •  famotidine (PEPCID) 40 MG tablet, , Disp: , Rfl:   •  ferrous sulfate 324 (65 Fe) MG tablet delayed-release EC tablet, Take 324 mg by mouth Daily With Breakfast., Disp: , Rfl:   •  HYDROcodone-acetaminophen (NORCO) 7.5-325 MG per tablet, Take 1 tablet by mouth 2 (Two) Times a Day As Needed for Mild Pain (1-3) or Moderate Pain (4-6)., Disp: , Rfl:   •  levothyroxine (SYNTHROID, LEVOTHROID) 100 MCG tablet, Take 1 tablet by mouth Daily., Disp: 90 tablet, Rfl: 3  •  losartan (COZAAR) 100 MG tablet, Take 100 mg by mouth Daily., Disp: , Rfl:   •  metoprolol tartrate (LOPRESSOR) 50 MG tablet, Take 1 tablet by mouth 2 (Two) Times a Day., Disp: 90 tablet, Rfl: 3  •  morphine (MS CONTIN) 30 MG 12 hr tablet, Take 30 mg by mouth 2 (Two) Times a Day., Disp: , Rfl:   •  mupirocin (BACTROBAN) 2 % ointment, As Needed., Disp: , Rfl:   •  polyethylene glycol  (MiraLax) 17 GM/SCOOP powder, Take 17 g by mouth Daily., Disp: 510 g, Rfl: 2  •  vitamin C (ASCORBIC ACID) 500 MG tablet, Take 500 mg by mouth Daily., Disp: , Rfl:   •  mirtazapine (Remeron) 15 MG tablet, Take 1 tablet by mouth Every Night., Disp: 30 tablet, Rfl: 0    The following portions of the patient's history were reviewed and updated as appropriate: allergies, current medications, past family history, past medical history, past social history, past surgical history and problem list.    Review of Systems   Constitutional: Negative.    Respiratory: Negative.    Cardiovascular: Negative.    Gastrointestinal: Negative.    Musculoskeletal: Negative.    Skin: Negative.    Neurological: Negative.    Psychiatric/Behavioral: Negative.        Objective   Physical Exam  Cardiovascular:      Rate and Rhythm: Normal rate and regular rhythm.      Heart sounds: Normal heart sounds.   Pulmonary:      Effort: Pulmonary effort is normal.      Breath sounds: Normal breath sounds.   Abdominal:      General: Bowel sounds are normal.   Musculoskeletal:      Cervical back: Neck supple.   Skin:     General: Skin is warm.   Neurological:      Mental Status: She is alert and oriented to person, place, and time.   Psychiatric:         Mood and Affect: Mood normal.         All tests have been reviewed.    Assessment/Plan   Diagnoses and all orders for this visit:    Primary hypertension continue medication    Hyperlipidemia, unspecified hyperlipidemia type continue medication    Other specified hypothyroidism continue medication    Depression, unspecified depression type discontinue Cymbalta due to insomnia initiate mirtazapine  -     mirtazapine (Remeron) 15 MG tablet; Take 1 tablet by mouth Every Night.    Anemia, unspecified type continue to watch    Anxiety initiate new medication  -     mirtazapine (Remeron) 15 MG tablet; Take 1 tablet by mouth Every Night.              Other dysphagia probably due to dysmotility        1 month  follow-up with others

## 2021-11-05 DIAGNOSIS — F41.9 ANXIETY: ICD-10-CM

## 2021-11-05 DIAGNOSIS — F32.A DEPRESSION, UNSPECIFIED DEPRESSION TYPE: ICD-10-CM

## 2021-11-08 RX ORDER — MIRTAZAPINE 15 MG/1
TABLET, FILM COATED ORAL
Qty: 90 TABLET | Refills: 0 | Status: SHIPPED | OUTPATIENT
Start: 2021-11-08 | End: 2021-12-10

## 2021-11-08 NOTE — TELEPHONE ENCOUNTER
Rx Refill Note  Requested Prescriptions     Pending Prescriptions Disp Refills   • mirtazapine (REMERON) 15 MG tablet [Pharmacy Med Name: MIRTAZAPINE 15 MG TABLET] 30 tablet 0     Sig: TAKE ONE TABLET BY MOUTH EVERY NIGHT      Last office visit with prescribing clinician: 10/6/2021      Next office visit with prescribing clinician: 12/14/2021            Mya Alonzo MA  11/08/21, 10:49 EST

## 2021-12-10 DIAGNOSIS — F41.9 ANXIETY: ICD-10-CM

## 2021-12-10 DIAGNOSIS — F32.A DEPRESSION, UNSPECIFIED DEPRESSION TYPE: ICD-10-CM

## 2021-12-10 RX ORDER — METOPROLOL TARTRATE 50 MG/1
TABLET, FILM COATED ORAL
Qty: 180 TABLET | Refills: 0 | Status: SHIPPED | OUTPATIENT
Start: 2021-12-10 | End: 2022-03-08

## 2021-12-10 RX ORDER — MIRTAZAPINE 15 MG/1
TABLET, FILM COATED ORAL
Qty: 30 TABLET | Refills: 0 | Status: SHIPPED | OUTPATIENT
Start: 2021-12-10 | End: 2021-12-14 | Stop reason: SDUPTHER

## 2021-12-14 ENCOUNTER — OFFICE VISIT (OUTPATIENT)
Dept: INTERNAL MEDICINE | Facility: CLINIC | Age: 86
End: 2021-12-14

## 2021-12-14 VITALS
SYSTOLIC BLOOD PRESSURE: 122 MMHG | WEIGHT: 136 LBS | OXYGEN SATURATION: 98 % | BODY MASS INDEX: 23.22 KG/M2 | HEART RATE: 53 BPM | HEIGHT: 64 IN | TEMPERATURE: 97.3 F | DIASTOLIC BLOOD PRESSURE: 78 MMHG

## 2021-12-14 DIAGNOSIS — I25.10 CHRONIC CORONARY ARTERY DISEASE: ICD-10-CM

## 2021-12-14 DIAGNOSIS — I48.0 PAROXYSMAL ATRIAL FIBRILLATION (HCC): Primary | ICD-10-CM

## 2021-12-14 DIAGNOSIS — F41.9 ANXIETY: ICD-10-CM

## 2021-12-14 DIAGNOSIS — R41.3 MEMORY LOSS: ICD-10-CM

## 2021-12-14 DIAGNOSIS — I10 PRIMARY HYPERTENSION: ICD-10-CM

## 2021-12-14 DIAGNOSIS — M48.00 SPINAL STENOSIS, UNSPECIFIED SPINAL REGION: ICD-10-CM

## 2021-12-14 DIAGNOSIS — E78.5 HYPERLIPIDEMIA, UNSPECIFIED HYPERLIPIDEMIA TYPE: ICD-10-CM

## 2021-12-14 DIAGNOSIS — R91.8 MULTIPLE PULMONARY NODULES: ICD-10-CM

## 2021-12-14 DIAGNOSIS — J43.8 OTHER EMPHYSEMA (HCC): ICD-10-CM

## 2021-12-14 DIAGNOSIS — I77.9 PERIPHERAL ARTERIAL OCCLUSIVE DISEASE (HCC): ICD-10-CM

## 2021-12-14 DIAGNOSIS — E03.8 OTHER SPECIFIED HYPOTHYROIDISM: ICD-10-CM

## 2021-12-14 DIAGNOSIS — K21.9 GASTROESOPHAGEAL REFLUX DISEASE WITHOUT ESOPHAGITIS: ICD-10-CM

## 2021-12-14 DIAGNOSIS — R13.19 OTHER DYSPHAGIA: ICD-10-CM

## 2021-12-14 DIAGNOSIS — I67.82 TEMPORARY CEREBRAL VASCULAR DYSFUNCTION: ICD-10-CM

## 2021-12-14 DIAGNOSIS — E55.9 VITAMIN D DEFICIENCY: ICD-10-CM

## 2021-12-14 DIAGNOSIS — Z00.00 WELLNESS EXAMINATION: ICD-10-CM

## 2021-12-14 DIAGNOSIS — M81.0 SENILE OSTEOPOROSIS: ICD-10-CM

## 2021-12-14 DIAGNOSIS — Z95.1 HX OF CABG: ICD-10-CM

## 2021-12-14 DIAGNOSIS — D64.9 ANEMIA, UNSPECIFIED TYPE: ICD-10-CM

## 2021-12-14 DIAGNOSIS — F32.A DEPRESSION, UNSPECIFIED DEPRESSION TYPE: ICD-10-CM

## 2021-12-14 PROBLEM — R25.1 TREMOR: Status: RESOLVED | Noted: 2021-02-02 | Resolved: 2021-12-14

## 2021-12-14 PROCEDURE — 99397 PER PM REEVAL EST PAT 65+ YR: CPT | Performed by: INTERNAL MEDICINE

## 2021-12-14 PROCEDURE — 1159F MED LIST DOCD IN RCRD: CPT | Performed by: INTERNAL MEDICINE

## 2021-12-14 PROCEDURE — G0439 PPPS, SUBSEQ VISIT: HCPCS | Performed by: INTERNAL MEDICINE

## 2021-12-14 PROCEDURE — 99213 OFFICE O/P EST LOW 20 MIN: CPT | Performed by: INTERNAL MEDICINE

## 2021-12-14 PROCEDURE — 1125F AMNT PAIN NOTED PAIN PRSNT: CPT | Performed by: INTERNAL MEDICINE

## 2021-12-14 PROCEDURE — 1170F FXNL STATUS ASSESSED: CPT | Performed by: INTERNAL MEDICINE

## 2021-12-14 RX ORDER — MIRTAZAPINE 7.5 MG/1
7.5 TABLET, FILM COATED ORAL NIGHTLY
Qty: 30 TABLET | Refills: 6 | Status: SHIPPED | OUTPATIENT
Start: 2021-12-14 | End: 2022-06-14

## 2021-12-14 NOTE — PROGRESS NOTES
The ABCs of the Annual Wellness Visit  Subsequent Medicare Wellness Visit    Chief Complaint   Patient presents with   • Medicare Wellness-subsequent      Subjective    History of Present Illness:  Mi Tejeda is a 86 y.o. female who presents for a Subsequent Medicare Wellness Visit.    Patient here for follow-up.  Osteoporosis patient is now using medication.  Swallow problem patient is on medication declined to have esophageal dilation.  Coronary artery disease is stable.  Anemia needs to be followed up.  Depression anxiety improved Remeron patient states dose is too high causing drowsiness in the morning.  Lung nodule benign memory loss stable    The following portions of the patient's history were reviewed and   updated as appropriate: allergies, current medications, past family history, past medical history, past social history, past surgical history and problem list.    Compared to one year ago, the patient feels her physical   health is the same.    Compared to one year ago, the patient feels her mental   health is the same.    Recent Hospitalizations:  She was not admitted to the hospital during the last year.       Current Medical Providers:  Patient Care Team:  Adiel Awad MD as PCP - General (Internal Medicine)    Outpatient Medications Prior to Visit   Medication Sig Dispense Refill   • albuterol sulfate HFA (ProAir HFA) 108 (90 Base) MCG/ACT inhaler Inhale 2 puffs Every 4 (Four) Hours As Needed for Wheezing or Shortness of Air. 54 g 3   • aspirin 81 MG tablet Take 1 tablet by mouth Daily. 30 tablet 11   • baclofen (LIORESAL) 10 MG tablet TAKE ONE TABLET BY MOUTH AT NIGHT AS NEEDED FOR MUSCLE SPAMS 30 tablet 0   • CALCIUM PO Take  by mouth Daily.     • Cholecalciferol (VITAMIN D3) 125 MCG (5000 UT) capsule capsule TAKE ONE CAPSULE BY MOUTH EVERY  capsule 3   • famotidine (PEPCID) 40 MG tablet      • ferrous sulfate 324 (65 Fe) MG tablet delayed-release EC tablet Take 324 mg by mouth Daily  With Breakfast.     • HYDROcodone-acetaminophen (NORCO) 7.5-325 MG per tablet Take 1 tablet by mouth 2 (Two) Times a Day As Needed for Mild Pain (1-3) or Moderate Pain (4-6).     • levothyroxine (SYNTHROID, LEVOTHROID) 100 MCG tablet Take 1 tablet by mouth Daily. 90 tablet 3   • losartan (COZAAR) 100 MG tablet Take 100 mg by mouth Daily.     • metoprolol tartrate (LOPRESSOR) 50 MG tablet TAKE 1 TABLET BY MOUTH 2 TIMES A  tablet 0   • morphine (MS CONTIN) 30 MG 12 hr tablet Take 30 mg by mouth 2 (Two) Times a Day.     • mupirocin (BACTROBAN) 2 % ointment As Needed.     • polyethylene glycol (MiraLax) 17 GM/SCOOP powder Take 17 g by mouth Daily. 510 g 2   • vitamin C (ASCORBIC ACID) 500 MG tablet Take 500 mg by mouth Daily.     • mirtazapine (REMERON) 15 MG tablet TAKE ONE TABLET BY MOUTH EVERY NIGHT 30 tablet 0     No facility-administered medications prior to visit.       Opioid medication/s are on active medication list.  and I have evaluated her active treatment plan and pain score trends (see table).  Vitals:    12/14/21 1514   PainSc:   7     I have reviewed the chart for potential of high risk medication and harmful drug interactions in the elderly.            Aspirin is on active medication list. Aspirin use is indicated based on review of current medical condition/s. Pros and cons of this therapy have been discussed today. Benefits of this medication outweigh potential harm.  Patient has been encouraged to continue taking this medication.  .      Patient Active Problem List   Diagnosis   • Anxiety   • Anemia   • Chronic coronary artery disease   • Chronic obstructive pulmonary disease (HCC)   • Dysphagia   • Diverticulum of esophagus   • Gastroesophageal reflux disease   • Hyperlipidemia   • Hypertension   • Other specified hypothyroidism   • Multiple pulmonary nodules   • Peripheral arterial occlusive disease (HCC)   • Spinal stenosis   • Temporary cerebral vascular dysfunction   • Uterine leiomyoma  "  • Vitamin D deficiency   • Memory loss   • Hx of CABG   • Senile osteoporosis   • Unspecified atrial fibrillation (HCC)   • Depression     Advance Care Planning  Advance Directive is on file.  ACP discussion was held with the patient during this visit. Patient has an advance directive in EMR which is still valid.     Review of Systems   Constitutional: Negative.    HENT: Negative.    Respiratory: Negative.    Cardiovascular: Negative.    Gastrointestinal: Negative.         Dysphagia   Endocrine: Negative.    Genitourinary: Negative.    Musculoskeletal: Positive for arthralgias and back pain.   Skin: Negative.    Allergic/Immunologic: Negative.    Neurological: Negative.    Hematological: Negative.    Psychiatric/Behavioral: Negative.         Objective    Vitals:    12/14/21 1514   BP: 122/78   Pulse: 53   Temp: 97.3 °F (36.3 °C)   SpO2: 98%   Weight: 61.7 kg (136 lb)   Height: 162.6 cm (64\")   PainSc:   7     BMI Readings from Last 1 Encounters:   12/14/21 23.34 kg/m²   BMI is within normal parameters. No follow-up required.    Does the patient have evidence of cognitive impairment? No    Physical Exam  Constitutional:       Appearance: She is well-developed.   HENT:      Head: Normocephalic and atraumatic.      Right Ear: External ear normal.      Left Ear: External ear normal.      Nose: Nose normal.   Eyes:      Conjunctiva/sclera: Conjunctivae normal.      Pupils: Pupils are equal, round, and reactive to light.   Cardiovascular:      Rate and Rhythm: Normal rate and regular rhythm.      Heart sounds: Normal heart sounds.   Pulmonary:      Effort: Pulmonary effort is normal.      Breath sounds: Normal breath sounds.   Abdominal:      General: Bowel sounds are normal.      Palpations: Abdomen is soft.   Genitourinary:     Vagina: Normal.   Musculoskeletal:         General: Tenderness present. Normal range of motion.      Cervical back: Normal range of motion and neck supple.   Skin:     General: Skin is warm and " dry.   Neurological:      Mental Status: She is alert and oriented to person, place, and time.      Deep Tendon Reflexes: Reflexes are normal and symmetric.   Psychiatric:         Behavior: Behavior normal.         Thought Content: Thought content normal.         Judgment: Judgment normal.                 HEALTH RISK ASSESSMENT    Smoking Status:  Social History     Tobacco Use   Smoking Status Former Smoker   • Packs/day: 0.00   • Years: 65.00   • Pack years: 0.00   • Types: Cigarettes   • Quit date: 10/1/2018   • Years since quitting: 3.2   Smokeless Tobacco Never Used     Alcohol Consumption:  Social History     Substance and Sexual Activity   Alcohol Use Yes    Comment: Seldom     Fall Risk Screen:    [facility] Fall Risk Assessment was completed, and patient is at HIGH risk for falls. Assessment completed on:3/22/2021    Depression Screening:  PHQ-2/PHQ-9 Depression Screening 12/14/2021   Little interest or pleasure in doing things 0   Feeling down, depressed, or hopeless 1   Trouble falling or staying asleep, or sleeping too much -   Feeling tired or having little energy -   Poor appetite or overeating -   Feeling bad about yourself - or that you are a failure or have let yourself or your family down -   Trouble concentrating on things, such as reading the newspaper or watching television -   Moving or speaking so slowly that other people could have noticed. Or the opposite - being so fidgety or restless that you have been moving around a lot more than usual -   Thoughts that you would be better off dead, or of hurting yourself in some way -   Total Score 1   If you checked off any problems, how difficult have these problems made it for you to do your work, take care of things at home, or get along with other people? -       Health Habits and Functional and Cognitive Screening:  Functional & Cognitive Status 12/14/2021   Do you have difficulty preparing food and eating? No   Do you have difficulty bathing  yourself, getting dressed or grooming yourself? No   Do you have difficulty using the toilet? No   Do you have difficulty moving around from place to place? Yes   Do you have trouble with steps or getting out of a bed or a chair? Yes   Current Diet Unhealthy Diet   Dental Exam Unknown        Dental Exam Comment wears dentures   Eye Exam Up to date        Eye Exam Comment 04/2021   Exercise (times per week) 0 times per week   Current Exercises Include No Regular Exercise   Current Exercise Activities Include -   Do you need help using the phone?  No   Are you deaf or do you have serious difficulty hearing?  Yes   Do you need help with transportation? No   Do you need help shopping? Yes   Do you need help preparing meals?  Yes   Do you need help with housework?  Yes   Do you need help with laundry? No   Do you need help taking your medications? No   Do you need help managing money? No   Do you ever drive or ride in a car without wearing a seat belt? No   Have you felt unusual stress, anger or loneliness in the last month? Yes   Who do you live with? Child   If you need help, do you have trouble finding someone available to you? No   Have you been bothered in the last four weeks by sexual problems? No   Do you have difficulty concentrating, remembering or making decisions? Yes       Age-appropriate Screening Schedule:  Refer to the list below for future screening recommendations based on patient's age, sex and/or medical conditions. Orders for these recommended tests are listed in the plan section. The patient has been provided with a written plan.    Health Maintenance   Topic Date Due   • ZOSTER VACCINE (2 of 2) 12/20/2016   • DXA SCAN  11/26/2021   • INFLUENZA VACCINE  12/14/2022 (Originally 8/1/2021)   • LIPID PANEL  01/14/2022   • TDAP/TD VACCINES (2 - Td or Tdap) 10/25/2026   • MAMMOGRAM  Discontinued              Assessment/Plan   CMS Preventative Services Quick Reference  Risk Factors Identified During  Encounter  Inactivity/Sedentary  The above risks/problems have been discussed with the patient.  Follow up actions/plans if indicated are seen below in the Assessment/Plan Section.  Pertinent information has been shared with the patient in the After Visit Summary.    Diagnoses and all orders for this visit:    1. Paroxysmal atrial fibrillation (HCC) (Primary) follow-up with cardiology    2. Peripheral arterial occlusive disease (HCC) stable    3. Primary hypertension continue medication    4. Hyperlipidemia, unspecified hyperlipidemia type continue medication    5. Hx of CABG    6. Chronic coronary artery disease    7. Vitamin D deficiency check lab  -     Vitamin D 25 Hydroxy    8. Other specified hypothyroidism check lab    9. Gastroesophageal reflux disease without esophagitis continue medication dysphagia patient declined dilation    10. Anemia, unspecified type check lab    11. Depression, unspecified depression type cut down medicine to half a dose due to drowsiness  -     mirtazapine (REMERON) 7.5 MG tablet; Take 1 tablet by mouth Every Night.  Dispense: 30 tablet; Refill: 6    12. Anxiety  -     mirtazapine (REMERON) 7.5 MG tablet; Take 1 tablet by mouth Every Night.  Dispense: 30 tablet; Refill: 6    13. Senile osteoporosis need to request referral to rheumatology  -     Ambulatory Referral to Rheumatology  -     DEXA Bone Density Axial    14. Multiple pulmonary nodules highly likely to be benign per radiology    15. Other emphysema (HCC) continue nighttime oxygen and inhaler    16. Spinal stenosis, unspecified spinal region continue medication    17. Memory loss stable    18. Temporary cerebral vascular dysfunction    19. Other dysphagia refused dilation    20. Wellness examination  -     CBC & Differential  -     Comprehensive Metabolic Panel  -     Lipid Panel  -     TSH        Follow Up:   Return in about 6 months (around 6/14/2022) for Recheck.     An After Visit Summary and PPPS were made available to  the patient.    {Optional Chart Navigation Links Wrapup  Review (Popup)  Advance Care Planning  Labs  CC  Problem List  Visit Diagnosis  Medications  Result Review  Imaging  Health Maintenance  Quality  BestPractice  SmartSets  SnapShot  Encounters  Notes  Media  Procedures :23}

## 2021-12-21 RX ORDER — LOSARTAN POTASSIUM 100 MG/1
TABLET ORAL
Qty: 90 TABLET | Refills: 0 | Status: SHIPPED | OUTPATIENT
Start: 2021-12-21 | End: 2022-03-21

## 2021-12-21 NOTE — TELEPHONE ENCOUNTER
Rx Refill Note  Requested Prescriptions     Pending Prescriptions Disp Refills   • losartan (COZAAR) 100 MG tablet [Pharmacy Med Name: LOSARTAN POTASSIUM 100 MG TAB] 90 tablet 0     Sig: TAKE 1 TABLET BY MOUTH EVERY DAY      Last office visit with prescribing clinician: 12/14/2021      Next office visit with prescribing clinician: 6/14/2022            Mya Alonzo MA  12/21/21, 13:45 EST     Please advise since you have not filled prescription in past

## 2021-12-22 ENCOUNTER — APPOINTMENT (OUTPATIENT)
Dept: BONE DENSITY | Facility: HOSPITAL | Age: 86
End: 2021-12-22

## 2021-12-29 ENCOUNTER — TELEPHONE (OUTPATIENT)
Dept: INTERNAL MEDICINE | Facility: CLINIC | Age: 86
End: 2021-12-29

## 2021-12-29 NOTE — TELEPHONE ENCOUNTER
Caller: Christiane Olivarez    Relationship: Emergency Contact    Best call back number: [phone]    What medication are you requesting: AT DOCTOR'S DISCRETION    What are your current symptoms: COUGHING    How long have you been experiencing symptoms: 1 WEEK    Have you had these symptoms before:    [x] Yes  [] No    Have you been treated for these symptoms before:   [x] Yes  [] No    If a prescription is needed, what is your preferred pharmacy and phone number:      [place] [address] [address] - [phone]  - [phone]         Additional notes:

## 2021-12-29 NOTE — TELEPHONE ENCOUNTER
Caller: Olivarez Christiane    Relationship: Emergency Contact    Best call back number: [phone] (H)    What is the best time to reach you: ANYTIME    Who are you requesting to speak with (clinical staff, provider,  specific staff member): DR. PEREZ OR NURSE    Do you know the name of the person who called:   CHRISTIANE RADHA, DAUGHTER OF PATIENT    What was the call regarding:   PATIENT DAUGHTER STATES THAT THE PATIENT IS FEELING BAD WITH THE FOLLOWING SYMPTOMS, COUGHING UP PHLEGM. HEADACHE, HOARSE,NIGHT SWEATS, RUNNING A LOW GRADE FEVER AND IS REQUESTING TO SPEAK WITH DR. PEREZ OR HIS NURSE. AN APPOINTMENT WAS OFFERED AND DECLINED AS CHRISTIANE STATES THAT THE PATIENT IS TOO WEAK TO MAKE IT OUT OF THE HOUSE TO AN APPOINTMENT.    Do you require a callback: YES, PLEASE    PATIENT HAS BEEN ADVISED THAT THIS REQUEST HAS BEEN MARKED AS A HIGH PRIORITY, PLEASE ALLOW 48 HOURS FOR OUR CLINICAL TEAM TO FOLLOW UP ON THIS REQUEST.

## 2021-12-30 NOTE — TELEPHONE ENCOUNTER
Called spoke to Mi, she has been taking mucinex, dayquil, nyquil, and she states that she is starting to feel a little better. Advised that if symptoms do not improve or worsen to go to the Urgent care, she is unable to do a video visit, she verbalized understanding.

## 2022-02-01 RX ORDER — FAMOTIDINE 40 MG/1
TABLET, FILM COATED ORAL
Qty: 30 TABLET | Refills: 3 | Status: SHIPPED | OUTPATIENT
Start: 2022-02-01 | End: 2022-06-06

## 2022-02-01 RX ORDER — DOXYCYCLINE HYCLATE 50 MG/1
CAPSULE, GELATIN COATED ORAL
Qty: 90 TABLET | Refills: 3 | Status: SHIPPED | OUTPATIENT
Start: 2022-02-01 | End: 2023-02-06

## 2022-02-01 NOTE — TELEPHONE ENCOUNTER
Rx Refill Note  Requested Prescriptions     Pending Prescriptions Disp Refills   • ferrous gluconate (FERGON) 324 MG tablet [Pharmacy Med Name: FERROUS GLUCONATE 324 MG TAB] 30 tablet 0     Sig: TAKE 1 TABLET BY MOUTH EVERY DAY WITH BREAKFAST      Last office visit with prescribing clinician: 12/14/2021      Next office visit with prescribing clinician: 6/14/2022            Adriana Bro LPN  02/01/22, 16:35 EST

## 2022-02-17 ENCOUNTER — TELEPHONE (OUTPATIENT)
Dept: INTERNAL MEDICINE | Facility: CLINIC | Age: 87
End: 2022-02-17

## 2022-02-17 LAB
25(OH)D3+25(OH)D2 SERPL-MCNC: 45 NG/ML (ref 30–100)
ALBUMIN SERPL-MCNC: 3.3 G/DL (ref 3.5–5.2)
ALBUMIN/GLOB SERPL: 1.1 G/DL
ALP SERPL-CCNC: 96 U/L (ref 39–117)
ALT SERPL-CCNC: 13 U/L (ref 1–33)
AST SERPL-CCNC: 22 U/L (ref 1–32)
BASOPHILS # BLD AUTO: 0.03 10*3/MM3 (ref 0–0.2)
BASOPHILS NFR BLD AUTO: 0.4 % (ref 0–1.5)
BILIRUB SERPL-MCNC: 0.3 MG/DL (ref 0–1.2)
BUN SERPL-MCNC: 27 MG/DL (ref 8–23)
BUN/CREAT SERPL: 23.5 (ref 7–25)
CALCIUM SERPL-MCNC: 9.3 MG/DL (ref 8.6–10.5)
CHLORIDE SERPL-SCNC: 108 MMOL/L (ref 98–107)
CHOLEST SERPL-MCNC: 174 MG/DL (ref 0–200)
CO2 SERPL-SCNC: 29.6 MMOL/L (ref 22–29)
CREAT SERPL-MCNC: 1.15 MG/DL (ref 0.57–1)
EOSINOPHIL # BLD AUTO: 0.26 10*3/MM3 (ref 0–0.4)
EOSINOPHIL NFR BLD AUTO: 3.5 % (ref 0.3–6.2)
ERYTHROCYTE [DISTWIDTH] IN BLOOD BY AUTOMATED COUNT: 13 % (ref 12.3–15.4)
GLOBULIN SER CALC-MCNC: 3.1 GM/DL
GLUCOSE SERPL-MCNC: 103 MG/DL (ref 65–99)
HCT VFR BLD AUTO: 37.4 % (ref 34–46.6)
HDLC SERPL-MCNC: 32 MG/DL (ref 40–60)
HGB BLD-MCNC: 12.3 G/DL (ref 12–15.9)
IMM GRANULOCYTES # BLD AUTO: 0.02 10*3/MM3 (ref 0–0.05)
IMM GRANULOCYTES NFR BLD AUTO: 0.3 % (ref 0–0.5)
LDLC SERPL CALC-MCNC: 111 MG/DL (ref 0–100)
LYMPHOCYTES # BLD AUTO: 2.33 10*3/MM3 (ref 0.7–3.1)
LYMPHOCYTES NFR BLD AUTO: 31.5 % (ref 19.6–45.3)
MCH RBC QN AUTO: 30.4 PG (ref 26.6–33)
MCHC RBC AUTO-ENTMCNC: 32.9 G/DL (ref 31.5–35.7)
MCV RBC AUTO: 92.3 FL (ref 79–97)
MONOCYTES # BLD AUTO: 0.64 10*3/MM3 (ref 0.1–0.9)
MONOCYTES NFR BLD AUTO: 8.7 % (ref 5–12)
NEUTROPHILS # BLD AUTO: 4.11 10*3/MM3 (ref 1.7–7)
NEUTROPHILS NFR BLD AUTO: 55.6 % (ref 42.7–76)
NRBC BLD AUTO-RTO: 0 /100 WBC (ref 0–0.2)
PLATELET # BLD AUTO: 185 10*3/MM3 (ref 140–450)
POTASSIUM SERPL-SCNC: 4.1 MMOL/L (ref 3.5–5.2)
PROT SERPL-MCNC: 6.4 G/DL (ref 6–8.5)
RBC # BLD AUTO: 4.05 10*6/MM3 (ref 3.77–5.28)
SODIUM SERPL-SCNC: 144 MMOL/L (ref 136–145)
TRIGL SERPL-MCNC: 172 MG/DL (ref 0–150)
TSH SERPL DL<=0.005 MIU/L-ACNC: 0.56 UIU/ML (ref 0.27–4.2)
VLDLC SERPL CALC-MCNC: 31 MG/DL (ref 5–40)
WBC # BLD AUTO: 7.39 10*3/MM3 (ref 3.4–10.8)

## 2022-02-21 ENCOUNTER — OFFICE VISIT (OUTPATIENT)
Dept: ORTHOPEDIC SURGERY | Facility: CLINIC | Age: 87
End: 2022-02-21

## 2022-02-21 VITALS — TEMPERATURE: 97.9 F | WEIGHT: 135.8 LBS | HEIGHT: 64 IN | BODY MASS INDEX: 23.18 KG/M2

## 2022-02-21 DIAGNOSIS — M17.11 PRIMARY OSTEOARTHRITIS OF RIGHT KNEE: ICD-10-CM

## 2022-02-21 DIAGNOSIS — M16.11 PRIMARY OSTEOARTHRITIS OF RIGHT HIP: ICD-10-CM

## 2022-02-21 DIAGNOSIS — M25.551 ARTHRALGIA OF RIGHT HIP: Primary | ICD-10-CM

## 2022-02-21 DIAGNOSIS — Z96.642 HISTORY OF TOTAL HIP REPLACEMENT, LEFT: ICD-10-CM

## 2022-02-21 PROCEDURE — 20610 DRAIN/INJ JOINT/BURSA W/O US: CPT | Performed by: ORTHOPAEDIC SURGERY

## 2022-02-21 PROCEDURE — 99213 OFFICE O/P EST LOW 20 MIN: CPT | Performed by: ORTHOPAEDIC SURGERY

## 2022-02-21 PROCEDURE — 73502 X-RAY EXAM HIP UNI 2-3 VIEWS: CPT | Performed by: ORTHOPAEDIC SURGERY

## 2022-02-21 RX ADMIN — LIDOCAINE HYDROCHLORIDE 2 ML: 10 INJECTION, SOLUTION INFILTRATION; PERINEURAL at 15:47

## 2022-02-21 RX ADMIN — METHYLPREDNISOLONE ACETATE 40 MG: 40 INJECTION, SUSPENSION INTRA-ARTICULAR; INTRALESIONAL; INTRAMUSCULAR; SOFT TISSUE at 15:47

## 2022-02-21 NOTE — PROGRESS NOTES
"Subjective   Mi Tejeda is a 86 y.o. female is here today for injection therapy.  Follow-up of the Right Hip (Here for increasing pain in hip. States she has\"aching\" pain in both thighs.) and Pain of the Right Knee      CHIEF COMPLAINT:    Here for repeat injection therapy    History of Present Illness     Since last injection, patient notes moderate relief of symptoms.  No adverse effects of prior injection.  Patient requests repeat right knee joint injection therapy.     She has occasional 'aching' pain in both thighs and with a history of chronic multi-level lumbar DDD/DJD with spinal stenosis.  She also has painful end-stage right hip osteoarthritis with recent increased pain.  We discussed options for treatment including surgical reconstruction.  However, with her advanced age and chronic medical conditions, she is not inclined at this time to have any elective total hip replacement.  She also states the right knee injection three months ago provided more pain relief than her prior right hip arthritis injection.  She also has a well functioning painless left total hip replacement done by myself back in 2010.  She reports no pain or any issues with her left hip.      Past Medical History:   Diagnosis Date   • Abdominal pain     states hx of   • Abnormal electrocardiogram    • Abnormal urinalysis    • Acute UTI    • Anemia    • Anomaly cardiac     REPORTS WILL SHOW ON LEFT VENTRICLE IN A 12 LEAD EKG AS BEING A MI BUT REPORTS SHE HAS NEVER HAD THIS    • Anxiety    • Aphagia     HX OF THIS, REPORTS WAS CLEARED OF A CVA BUT WAS TOLD WAS RELATED TO FATIGUE   • Arthritis    • Asthma    • Atrial fibrillation ([facility])    • Backache    • Bursitis of right shoulder    • CAD (coronary artery disease)    • Cataract, bilateral     s/p removal   • COPD (chronic obstructive pulmonary disease) ([facility])    • Depression    • Diverticulitis    • Dysphagia     REPORTS RESOLVED AFTER DILITATION   • Fracture     left wrist, 2 " "fingers on right hand, toes on right foot, right arm   • Full dentures    • Gastric ulcer    • GERD (gastroesophageal reflux disease)    • Gout    • Heart murmur    • High cholesterol    • History of nuclear stress test 2011?   • HTN (hypertension)    • Hypertriglyceridemia    • Hypothyroidism    • Multiple lung nodules    • Osteoarthritis    • Osteoporosis    • Peripheral neuropathy    • Personal history of tobacco use    • Plantar fasciitis     left   • Pneumonia    • Sinus problem    • Sinusitis    • Tear of meniscus of knee     right   • TIA (transient ischemic attack)     denies 5/22/19.  states had aphasia in [facility]R[facility] but was ruled out.   • Toe pain     \"Calluses were filed out, but is painful\"   • Uterine leiomyoma    • Viral gastroenteritis    • Vitamin D deficiency    • Wears glasses    • Weight loss, non-intentional     80 lb in 5 years        Past Surgical History:   Procedure Laterality Date   • CARDIAC CATHETERIZATION  2007?    no stents   • CARDIAC SURGERY     • CATARACT EXTRACTION Bilateral 2009   • CATARACT EXTRACTION, BILATERAL     • CHOLECYSTECTOMY  2002   • COLONOSCOPY     • CORONARY ARTERY BYPASS GRAFT  1998    2 vessels   • ENDOSCOPY     • ENDOSCOPY N/A 5/3/2017    Procedure: ESOPHAGOGASTRODUODENOSCOPY with biopsies;  Surgeon: Yared Castellanos MD;  Location: [facility] ENDOSCOPY;  Service:    • ENDOSCOPY N/A 5/23/2019    Procedure: ESOPHAGOGASTRODUODENOSCOPY WITH BIOPSY AND BALLOON DILITATION;  Surgeon: Yared Castellanos MD;  Location: [facility] ENDOSCOPY;  Service: Gastroenterology   • MOUTH SURGERY      full extraction   • TOTAL HIP ARTHROPLASTY Left 02/02/2010    PREETI Stanley MD       Allergies   Allergen Reactions   • Ciprofloxacin GI Intolerance     Patient states she is not allergic.   • Hazelnut Nausea Only   • Keflex [Cephalexin] Nausea Only   • Niacin Rash     \"it makes my skin burn\"   • Other Nausea Only     Balsalmic Vinegar     • Oxycodone Dizziness     \"it makes me feel dopey and out of my " "head\"  NOTE: does tolerate hydrocodone and morphine   • Statins Myalgia   • Vicodin [Hydrocodone-Acetaminophen] Hives     Can take hydrocodone, cannot tolerate VICODIN       Review of Systems   Constitutional: Negative for fever.   Musculoskeletal: Positive for arthralgias and gait problem (uses manual wheelchair due to right hip pain.  She and her daughter state she also can walk from room to room using a walker.). Negative for joint swelling.   Skin: Negative for color change and rash.        Chronic dystrophic darkening of lower leg skin.   Neurological: Positive for weakness (chronic).   Psychiatric/Behavioral: Negative for confusion.     I have reviewed the medical and surgical history, family history, social history, medications, and/or allergies, and the review of systems of this report.    Objective   Temp 97.9 °F (36.6 °C)   Ht 162.6 cm (64\")   Wt 61.6 kg (135 lb 12.8 oz)   BMI 23.31 kg/m²   Physical Exam  Vitals reviewed.   Constitutional:       General: She is not in acute distress.     Appearance: She is well-developed.   Skin:     General: Skin is warm and dry.      Findings: No erythema or rash.   Neurological:      Mental Status: She is alert.   Psychiatric:         Speech: Speech normal.       Skin exam stable with no erythema, ecchymosis or rash.  No new swelling.  No motor or sensory changes.  Distal pulse intact.  Ortho Exam  Neurologic Exam     Mental Status   Attention: normal.   Speech: speech is normal   Level of consciousness: alert  Knowledge: good.     Motor Exam   Overall muscle tone: normal      Large Joint Arthrocentesis: R knee  Date/Time: 2/21/2022 3:47 PM  Consent given by: patient  Site marked: site marked  Timeout: Immediately prior to procedure a time out was called to verify the correct patient, procedure, equipment, support staff and site/side marked as required   Supporting Documentation  Indications: pain   Procedure Details  Location: knee - R knee  Preparation: Patient was " prepped and draped in the usual sterile fashion  Needle size: 22 G  Approach: anteromedial  Medications administered: 40 mg methylPREDNISolone acetate 40 MG/ML; 2 mL lidocaine 1 %  Patient tolerance: patient tolerated the procedure well with no immediate complications        Assessment/Plan     Independent Review of Radiographic Studies:    AP pelvis and lateral of the right hip, indication to evaluate pain and with prior comparison views, shows no acute fracture or dislocation evident.  Also, indication to evaluate joint condition, with prior comparison views, shows evident end-stage right hip osteoarthritis with complete bone-on-bone wear, deformity of femur head and acetabulum and with superolateral erosion and migration of the hip joint.  At the left hip there is a well reduced, well positioned and aligned left total hip replacement prosthesis with no radiographic sign of any fracture or loosening.     Laboratory and Other Studies:  No new results reviewed today.     Medical Decision Making:    No complications of procedure and treatments.  Fair progress with residual symptoms.   Medical decision making noted in HPI of this report.  Proceeded with right knee joint injection therapy today.  Also, continue care plan as outlined in recommendations.    Diagnoses and all orders for this visit:    1. Arthralgia of right hip (Primary)  -     XR Hip With or Without Pelvis 2 - 3 View Right; Future  -     Large Joint Arthrocentesis: R knee    2. Primary osteoarthritis of right knee  -     Large Joint Arthrocentesis: R knee    3. Primary osteoarthritis of right hip    4. History of total hip replacement, left        Discussion of orthopaedic goals and activities and patient and daughter expressed appreciation.  Guided on proper techniques for mobility, strength, agility and/or conditioning exercises  Ice, heat, and/or modalities as beneficial  Watch for signs and symptoms of infection  Call or notify for any adverse effect  from injection therapy  Ambulation with walker support at all times and as tolerated.  Safety and fall precautions.    Recommendations/Plan:  Brace: No brace was given at today's visit.  Referral: No referrals made at today's visit.  Test/Studies: No additional studies ordered at this time.  Work/Activity Status: Usual activities, routine exercise as tolerated, no strenuous activity.    Return in about 3 months (around 5/21/2022) for Recheck.  Patient is encouraged and agreeable to call or return sooner for any issues or concerns.     Portions of this note have been scribed for Derick Stanley MD by Jasmin Wilkins CMA

## 2022-02-22 RX ORDER — LIDOCAINE HYDROCHLORIDE 10 MG/ML
2 INJECTION, SOLUTION INFILTRATION; PERINEURAL
Status: COMPLETED | OUTPATIENT
Start: 2022-02-21 | End: 2022-02-21

## 2022-02-22 RX ORDER — METHYLPREDNISOLONE ACETATE 40 MG/ML
40 INJECTION, SUSPENSION INTRA-ARTICULAR; INTRALESIONAL; INTRAMUSCULAR; SOFT TISSUE
Status: COMPLETED | OUTPATIENT
Start: 2022-02-21 | End: 2022-02-21

## 2022-02-28 RX ORDER — LEVOTHYROXINE SODIUM 0.1 MG/1
TABLET ORAL
Qty: 90 TABLET | Refills: 0 | Status: SHIPPED | OUTPATIENT
Start: 2022-02-28 | End: 2022-03-08 | Stop reason: SDUPTHER

## 2022-02-28 RX ORDER — LEVOTHYROXINE SODIUM 0.1 MG/1
TABLET ORAL
Qty: 90 TABLET | Refills: 0 | Status: SHIPPED | OUTPATIENT
Start: 2022-02-28 | End: 2022-02-28

## 2022-03-08 ENCOUNTER — OFFICE VISIT (OUTPATIENT)
Dept: INTERNAL MEDICINE | Facility: CLINIC | Age: 87
End: 2022-03-08

## 2022-03-08 VITALS
OXYGEN SATURATION: 98 % | DIASTOLIC BLOOD PRESSURE: 88 MMHG | WEIGHT: 134 LBS | SYSTOLIC BLOOD PRESSURE: 130 MMHG | HEIGHT: 64 IN | HEART RATE: 50 BPM | BODY MASS INDEX: 22.88 KG/M2 | TEMPERATURE: 97.1 F

## 2022-03-08 DIAGNOSIS — E03.8 OTHER SPECIFIED HYPOTHYROIDISM: ICD-10-CM

## 2022-03-08 DIAGNOSIS — J43.8 OTHER EMPHYSEMA: ICD-10-CM

## 2022-03-08 DIAGNOSIS — M25.551 RIGHT HIP PAIN: ICD-10-CM

## 2022-03-08 DIAGNOSIS — M81.0 SENILE OSTEOPOROSIS: ICD-10-CM

## 2022-03-08 DIAGNOSIS — F32.A DEPRESSION, UNSPECIFIED DEPRESSION TYPE: Primary | ICD-10-CM

## 2022-03-08 DIAGNOSIS — F41.9 ANXIETY: ICD-10-CM

## 2022-03-08 PROCEDURE — 99214 OFFICE O/P EST MOD 30 MIN: CPT | Performed by: INTERNAL MEDICINE

## 2022-03-08 RX ORDER — METOPROLOL TARTRATE 50 MG/1
TABLET, FILM COATED ORAL
Qty: 180 TABLET | Refills: 3 | Status: SHIPPED | OUTPATIENT
Start: 2022-03-08 | End: 2023-02-24

## 2022-03-08 RX ORDER — LEVOTHYROXINE SODIUM 88 UG/1
TABLET ORAL
Qty: 30 TABLET | Refills: 1 | Status: SHIPPED | OUTPATIENT
Start: 2022-03-08 | End: 2022-03-10 | Stop reason: SDUPTHER

## 2022-03-08 RX ORDER — BACLOFEN 5 MG/1
TABLET ORAL
COMMUNITY
Start: 2022-02-09 | End: 2022-10-25 | Stop reason: SDUPTHER

## 2022-03-08 NOTE — PROGRESS NOTES
Subjective   Mi Tejeda is a 86 y.o. female.     Chief Complaint   Patient presents with   • Follow-up     requesting a new referral for Prolia injection   • Night Sweats     Concerned it may be related to her thyroid medication.    • Hip Pain     Right; total replacement       History of Present Illness   Patient here for follow-up. Patient has osteoporosis in need of Prolia. Patient also complains not sweat recently getting worse denies any fever chills no weight loss no abdominal pain. TSH low normal. Patient also complains right hip joint gradually getting worse orthopedist recommended surgery if patient is lung condition is permissible for surgery. Anxiety depression improved after medication adjustment.    Current Outpatient Medications:   •  albuterol sulfate HFA (ProAir HFA) 108 (90 Base) MCG/ACT inhaler, Inhale 2 puffs Every 4 (Four) Hours As Needed for Wheezing or Shortness of Air., Disp: 54 g, Rfl: 3  •  aspirin 81 MG tablet, Take 1 tablet by mouth Daily., Disp: 30 tablet, Rfl: 11  •  baclofen (LIORESAL) 10 MG tablet, TAKE ONE TABLET BY MOUTH AT NIGHT AS NEEDED FOR MUSCLE SPAMS, Disp: 30 tablet, Rfl: 0  •  Baclofen 5 MG tablet, , Disp: , Rfl:   •  CALCIUM PO, Take  by mouth Daily., Disp: , Rfl:   •  famotidine (PEPCID) 40 MG tablet, TAKE ONE TABLET BY MOUTH EVERY NIGHT AT BEDTIME, Disp: 30 tablet, Rfl: 3  •  ferrous gluconate (FERGON) 324 MG tablet, TAKE 1 TABLET BY MOUTH EVERY DAY WITH BREAKFAST, Disp: 90 tablet, Rfl: 3  •  ferrous sulfate 324 (65 Fe) MG tablet delayed-release EC tablet, Take 324 mg by mouth Daily With Breakfast., Disp: , Rfl:   •  HYDROcodone-acetaminophen (NORCO) 7.5-325 MG per tablet, Take 1 tablet by mouth 2 (Two) Times a Day As Needed for Mild Pain (1-3) or Moderate Pain (4-6)., Disp: , Rfl:   •  levothyroxine (SYNTHROID, LEVOTHROID) 88 MCG tablet, 1 daily po, Disp: 30 tablet, Rfl: 1  •  losartan (COZAAR) 100 MG tablet, TAKE 1 TABLET BY MOUTH EVERY DAY, Disp: 90 tablet, Rfl:  0  •  metoprolol tartrate (LOPRESSOR) 50 MG tablet, TAKE 1 TABLET BY MOUTH 2 TIMES A DAY, Disp: 180 tablet, Rfl: 3  •  mirtazapine (REMERON) 7.5 MG tablet, Take 1 tablet by mouth Every Night., Disp: 30 tablet, Rfl: 6  •  morphine (MS CONTIN) 30 MG 12 hr tablet, Take 30 mg by mouth 2 (Two) Times a Day., Disp: , Rfl:   •  polyethylene glycol (MiraLax) 17 GM/SCOOP powder, Take 17 g by mouth Daily., Disp: 510 g, Rfl: 2  •  vitamin C (ASCORBIC ACID) 500 MG tablet, Take 500 mg by mouth Daily., Disp: , Rfl:   •  Cholecalciferol (VITAMIN D3) 125 MCG (5000 UT) capsule capsule, TAKE ONE CAPSULE BY MOUTH EVERY DAY, Disp: 100 capsule, Rfl: 3  •  mupirocin (BACTROBAN) 2 % ointment, As Needed., Disp: , Rfl:     The following portions of the patient's history were reviewed and updated as appropriate: allergies, current medications, past family history, past medical history, past social history, past surgical history and problem list.    Review of Systems   Constitutional: Negative.    Respiratory: Negative.    Cardiovascular: Negative.    Gastrointestinal: Negative.    Musculoskeletal: Positive for arthralgias.   Skin: Negative.    Neurological: Negative.    Psychiatric/Behavioral: The patient is nervous/anxious.        Objective   Physical Exam  Cardiovascular:      Rate and Rhythm: Normal rate and regular rhythm.      Heart sounds: Normal heart sounds.   Pulmonary:      Effort: Pulmonary effort is normal.      Breath sounds: Normal breath sounds.   Abdominal:      General: Bowel sounds are normal.   Musculoskeletal:         General: Tenderness present.      Cervical back: Neck supple.   Skin:     General: Skin is warm.   Neurological:      Mental Status: She is alert and oriented to person, place, and time.         All tests have been reviewed.    Assessment/Plan   Diagnoses and all orders for this visit:    Depression, continue medication    Anxiety continue medication    Other emphysema (HCC) refer to pulmonology for  possibility of surgery  -     Ambulatory Referral to Pulmonology    Senile osteoporosis refer to rheumatology for Prolia  -     Ambulatory Referral to Rheumatology    Other specified hypothyroidism patient has nine sweat and TSH low normal we will try to lower the dose to see if it helps  -     levothyroxine (SYNTHROID, LEVOTHROID) 88 MCG tablet; 1 daily po  -     TSH    Right hip pain possible surgery barring lung condition stable  -     Cancel: Ambulatory Referral to Pulmonology    Other orders  -     Baclofen 5 MG tablet      As scheduled , do lab in 6 weeks

## 2022-03-08 NOTE — TELEPHONE ENCOUNTER
Rx Refill Note  Requested Prescriptions     Pending Prescriptions Disp Refills   • metoprolol tartrate (LOPRESSOR) 50 MG tablet [Pharmacy Med Name: METOPROLOL TARTRATE 50 MG TAB] 180 tablet 0     Sig: TAKE 1 TABLET BY MOUTH 2 TIMES A DAY      Last office visit with prescribing clinician: 12/14/2021      Next office visit with prescribing clinician: 3/8/2022            RAVINDRA FORD MA  03/08/22, 09:31 EST

## 2022-03-10 DIAGNOSIS — J43.8 OTHER EMPHYSEMA: ICD-10-CM

## 2022-03-10 DIAGNOSIS — E03.8 OTHER SPECIFIED HYPOTHYROIDISM: ICD-10-CM

## 2022-03-10 RX ORDER — LEVOTHYROXINE SODIUM 88 UG/1
TABLET ORAL
Qty: 30 TABLET | Refills: 1 | Status: SHIPPED | OUTPATIENT
Start: 2022-03-10 | End: 2022-06-06

## 2022-03-10 RX ORDER — ALBUTEROL SULFATE 90 UG/1
2 AEROSOL, METERED RESPIRATORY (INHALATION) EVERY 4 HOURS PRN
Qty: 54 G | Refills: 3 | Status: SHIPPED | OUTPATIENT
Start: 2022-03-10 | End: 2023-03-31

## 2022-03-10 NOTE — TELEPHONE ENCOUNTER
Rx Refill Note  Requested Prescriptions     Pending Prescriptions Disp Refills   • albuterol sulfate HFA (ProAir HFA) 108 (90 Base) MCG/ACT inhaler 54 g 3     Sig: Inhale 2 puffs Every 4 (Four) Hours As Needed for Wheezing or Shortness of Air.   • levothyroxine (SYNTHROID, LEVOTHROID) 88 MCG tablet 30 tablet 1     Si daily po      Last office visit with prescribing clinician: 3/8/2022      Next office visit with prescribing clinician: 2022            Niki Bo LPN  03/10/22, 15:05 EST

## 2022-03-10 NOTE — TELEPHONE ENCOUNTER
Caller: Mi Tejeda    Relationship: Self    Best call back number:   [phone]     Requested Prescriptions:   Requested Prescriptions     Pending Prescriptions Disp Refills   • albuterol sulfate HFA (ProAir HFA) 108 (90 Base) MCG/ACT inhaler 54 g 3     Sig: Inhale 2 puffs Every 4 (Four) Hours As Needed for Wheezing or Shortness of Air.      ALSO NEEDS LEVOTHYROXINE  88 MCG    Pharmacy where request should be sent: [place], [place] - [facility] - [phone]  - [phone]     Additional details provided by patient: PATIENT WOULD LIKE TO ONLY USE [facility] FROM NOW  ON AND TAKE OFF THE [facility]     Does the patient have less than a 3 day supply:  [x] Yes  [] No                                        TAKE OF [facility]

## 2022-03-21 RX ORDER — LOSARTAN POTASSIUM 100 MG/1
TABLET ORAL
Qty: 90 TABLET | Refills: 0 | Status: SHIPPED | OUTPATIENT
Start: 2022-03-21 | End: 2022-06-17

## 2022-03-28 ENCOUNTER — OFFICE VISIT (OUTPATIENT)
Dept: CARDIOLOGY | Facility: CLINIC | Age: 87
End: 2022-03-28

## 2022-03-28 VITALS
WEIGHT: 132 LBS | SYSTOLIC BLOOD PRESSURE: 124 MMHG | HEART RATE: 56 BPM | BODY MASS INDEX: 22.53 KG/M2 | DIASTOLIC BLOOD PRESSURE: 78 MMHG | RESPIRATION RATE: 16 BRPM | OXYGEN SATURATION: 93 % | HEIGHT: 64 IN

## 2022-03-28 DIAGNOSIS — I25.10 CHRONIC CORONARY ARTERY DISEASE: Primary | ICD-10-CM

## 2022-03-28 DIAGNOSIS — I48.0 PAROXYSMAL ATRIAL FIBRILLATION: ICD-10-CM

## 2022-03-28 DIAGNOSIS — I10 PRIMARY HYPERTENSION: ICD-10-CM

## 2022-03-28 DIAGNOSIS — I73.9 PAD (PERIPHERAL ARTERY DISEASE): ICD-10-CM

## 2022-03-28 PROCEDURE — 93000 ELECTROCARDIOGRAM COMPLETE: CPT | Performed by: INTERNAL MEDICINE

## 2022-03-28 PROCEDURE — 99214 OFFICE O/P EST MOD 30 MIN: CPT | Performed by: INTERNAL MEDICINE

## 2022-04-11 ENCOUNTER — HOSPITAL ENCOUNTER (OUTPATIENT)
Dept: ULTRASOUND IMAGING | Facility: HOSPITAL | Age: 87
End: 2022-04-11

## 2022-04-20 ENCOUNTER — OFFICE VISIT (OUTPATIENT)
Dept: PULMONOLOGY | Facility: CLINIC | Age: 87
End: 2022-04-20

## 2022-04-20 VITALS
HEART RATE: 60 BPM | SYSTOLIC BLOOD PRESSURE: 116 MMHG | BODY MASS INDEX: 22.53 KG/M2 | DIASTOLIC BLOOD PRESSURE: 72 MMHG | WEIGHT: 132 LBS | HEIGHT: 64 IN | OXYGEN SATURATION: 96 %

## 2022-04-20 DIAGNOSIS — G47.34 NOCTURNAL HYPOXIA: ICD-10-CM

## 2022-04-20 DIAGNOSIS — J44.9 CHRONIC OBSTRUCTIVE PULMONARY DISEASE, UNSPECIFIED COPD TYPE: ICD-10-CM

## 2022-04-20 DIAGNOSIS — R06.02 SHORTNESS OF BREATH: Primary | ICD-10-CM

## 2022-04-20 DIAGNOSIS — Z01.811 PREOP PULMONARY/RESPIRATORY EXAM: ICD-10-CM

## 2022-04-20 PROCEDURE — 99204 OFFICE O/P NEW MOD 45 MIN: CPT | Performed by: INTERNAL MEDICINE

## 2022-04-20 NOTE — PROGRESS NOTES
CONSULT NOTE    Requested by:   Adiel Awad MD Yin, Arthur G, MD      Chief Complaint   Patient presents with   • Pre-op Exam     Patient PCP requested clearance for hip replacement surgery.       Subjective:  Mi Tejeda is a 86 y.o. female.     History of Present Illness   Patient comes in today for consultation because of preop evaluation and possible clearance for hip surgery.    Unfortunately the patient's exercise capacity is extremely limited due to hip issues. Uses a walker at home, in a limited fashion.     She used to smoke 1 PPD or so for 55 years, having quit a few years ago.     She is also on oxygen at home, at night, started by one of her physicians many years ago.     The patient tells me that every one in the family smoked although she is not aware of any family history of COPD.    The patient did not have any significant issues with the prior hip surgery, many years ago.    She does not mention any cough or phlegm production in the morning.        The following portions of the patient's history were reviewed and updated as appropriate: allergies, current medications, past family history, past medical history, past social history and past surgical history.    Review of Systems   HENT: Negative for sinus pressure, sinus pain, sneezing and sore throat.    Respiratory: Negative for cough, shortness of breath, wheezing and stridor.    Cardiovascular: Negative for chest pain, palpitations and leg swelling.   All other systems reviewed and are negative.      Past Medical History:   Diagnosis Date   • Abdominal pain     states hx of   • Abnormal electrocardiogram    • Abnormal urinalysis    • Acute UTI    • Anemia    • Anomaly cardiac     REPORTS WILL SHOW ON LEFT VENTRICLE IN A 12 LEAD EKG AS BEING A MI BUT REPORTS SHE HAS NEVER HAD THIS    • Anxiety    • Aphagia     HX OF THIS, REPORTS WAS CLEARED OF A CVA BUT WAS TOLD WAS RELATED TO FATIGUE   • Arthritis    • Asthma    • Atrial fibrillation  "([facility])    • Backache    • Bursitis of right shoulder    • CAD (coronary artery disease)    • Cataract, bilateral     s/p removal   • COPD (chronic obstructive pulmonary disease) ([facility])    • Depression    • Diverticulitis    • Dysphagia     REPORTS RESOLVED AFTER DILITATION   • Fracture     left wrist, 2 fingers on right hand, toes on right foot, right arm   • Full dentures    • Gastric ulcer    • GERD (gastroesophageal reflux disease)    • Gout    • Heart murmur    • High cholesterol    • History of nuclear stress test 2011?   • HTN (hypertension)    • Hypertriglyceridemia    • Hypothyroidism    • Multiple lung nodules    • Osteoarthritis    • Osteoporosis    • Peripheral neuropathy    • Personal history of tobacco use    • Plantar fasciitis     left   • Pneumonia    • Sinus problem    • Sinusitis    • Tear of meniscus of knee     right   • TIA (transient ischemic attack)     denies 5/22/19.  states had aphasia in E.R., but was ruled out.   • Toe pain     \"Calluses were filed out, but is painful\"   • Uterine leiomyoma    • Viral gastroenteritis    • Vitamin D deficiency    • Wears glasses    • Weight loss, non-intentional     80 lb in 5 years       Social History     Tobacco Use   • Smoking status: Former Smoker     Packs/day: 0.00     Years: 65.00     Pack years: 0.00     Types: Cigarettes     Quit date: 10/1/2018     Years since quitting: 3.5   • Smokeless tobacco: Never Used   Substance Use Topics   • Alcohol use: Yes     Comment: Seldom         Objective:  Visit Vitals  /72   Pulse 60   Ht 162.6 cm (64\")   Wt 59.9 kg (132 lb)   LMP  (LMP Unknown)   SpO2 96%   BMI 22.66 kg/m²       Physical Exam  Vitals reviewed.   Constitutional:       Appearance: She is well-developed.   HENT:      Head:      Comments: No acute lesions noted     Nose:      Comments: Mild nasal erythema noted.     Mouth/Throat:      Mouth: Mucous membranes are moist.   Neck:      Vascular: No JVD.   Cardiovascular:      Rate and Rhythm: " Normal rate and regular rhythm.   Pulmonary:      Effort: Pulmonary effort is normal.      Breath sounds: No wheezing or rales.      Comments: Somewhat hyperresonant to percussion.  Somewhat decreased air entry.  Musculoskeletal:      Cervical back: Neck supple.      Right lower leg: No edema.      Left lower leg: No edema.      Comments: Used a wheelchair.    Skin:     General: Skin is warm and dry.   Neurological:      Mental Status: She is alert and oriented to person, place, and time.   Psychiatric:         Mood and Affect: Mood normal.         Behavior: Behavior normal.         Assessment/Plan:  Diagnoses and all orders for this visit:    1. Shortness of breath (Primary)  -     Pulmonary Function Test; Future    2. Chronic obstructive pulmonary disease, unspecified COPD type (HCC)  -     Pulmonary Function Test; Future    3. Preop pulmonary/respiratory exam    4. Nocturnal hypoxia    Other orders  -     tiotropium bromide-olodaterol (STIOLTO RESPIMAT) 2.5-2.5 MCG/ACT aerosol solution inhaler; Inhale 2 puffs Daily.  Dispense: 1 each; Refill: 5        Return in about 10 weeks (around 6/29/2022) for Recheck, PFTs, For Peirre ([place]), ...Also 6-7 mths w/Jana.    DISCUSSION(if any):  Last CT scan was reviewed in great detail with the patient. Images reviewed personally.   Results for orders placed in visit on 02/02/21    CT CHEST WITHOUT CONTRAST DIAGNOSTIC    Narrative  CT CHEST WITHOUT CONTRAST DIAGNOSTIC-    HISTORY: Lung nodules; R91.8-Other nonspecific abnormal finding of lung  field.    COMPARISON: 02/24/2020 and 11/26/2018    TECHNIQUE: Axial CT without IV contrast administration.    FINDINGS:    Small oval nodules in the right upper lobe are stable from prior exam.  Largest nodule measures up to 5 mm. Calcified granuloma is noted within  the right lower lobe. Left lung is clear.    No pleural or pericardial effusion is seen. No adenopathy or mass lesion  is present.    Impression  Stable  subcentimeter right upper lobe nodules highly  likely to be benign nodules such as granulomas. Nodules have been stable for greater than 2 years.      This study was performed with techniques to keep radiation doses as low  as reasonably achievable (ALARA). Individualized dose reduction  techniques using automated exposure control or adjustment of vA and/or  kV according to the patient size were employed.    This report was finalized on 3/4/2021 3:39 PM by Alfie Choi MD.      I have also reviewed her provider's office note that mentions emphysema.  In reviewing cardiology's note, she does appear to have atrial fibrillation, coronary artery disease and peripheral arterial disease.  The note also mentioned aortic regurgitation.    I also reviewed her last echocardiogram and shared the results with her.   Results for orders placed during the hospital encounter of 12/27/19    Adult Transthoracic Echo Complete W/ Cont if Necessary Per Protocol    Interpretation Summary  · Left atrial cavity size is borderline dilated.  · Estimated EF = 66%.  · Left ventricular systolic function is normal.  · There is mild calcification of the aortic valve mainly affecting the non, left and right coronary cusp(s).  · Calculated right ventricular systolic pressure from tricuspid regurgitation is 31 mmHg.    ===========================  ===========================    PFTs were ordered to quantify the degree of COPD.    Laboratory workup was also reviewed which showed   Lab Results   Component Value Date    HGB 12.3 02/16/2022    HGB 11.0 (L) 01/14/2021    HGB 11.4 (L) 08/19/2020   ,    Lab Results   Component Value Date    EOSABS 0.26 02/16/2022    EOSABS 0.62 (H) 01/14/2021    EOSABS 0.13 08/19/2020    & Laboratory workup also showed   Lab Results   Component Value Date    CO2 29.6 (H) 02/16/2022     ===========================  ===========================    Orders as above    Based on history and physical exam, it seems that her  symptoms maybe from obstructive lung disease.     Unfortunately her ET is limited, which makes the assessment of her symptoms somewhat limited.     We will start her on Stiolto. Samples given.     Patient was educated on compliance with, and the correct method of using the pulmonary medicines.     Side effects, of prescribed medicines, discussed.    I have told her that we will made further recommendations, based on clinical response.     Patient was given reading material, as appropriate.     I have encouraged the patient to call or schedule a visit earlier, if there are any concerns.    Will obtain PFTs and perform preop assessment after reviewing PFTs.       Dictated utilizing Dragon dictation.    This document was electronically signed by Eleanor Leone MD on 04/20/22 at 12:39 EDT

## 2022-04-20 NOTE — PROGRESS NOTES
"  CONSULT NOTE    Requested by:   Adiel Awad MD ***  Adiel Awad MD      No chief complaint on file.      Subjective:  Mi Tejeda is a 86 y.o. female.     History of Present Illness   ***    {Common H&P Review Areas:09720}    Review of Systems    Past Medical History:   Diagnosis Date   • Abdominal pain     states hx of   • Abnormal electrocardiogram    • Abnormal urinalysis    • Acute UTI    • Anemia    • Anomaly cardiac     REPORTS WILL SHOW ON LEFT VENTRICLE IN A 12 LEAD EKG AS BEING A MI BUT REPORTS SHE HAS NEVER HAD THIS    • Anxiety    • Aphagia     HX OF THIS, REPORTS WAS CLEARED OF A CVA BUT WAS TOLD WAS RELATED TO FATIGUE   • Arthritis    • Asthma    • Atrial fibrillation (HCC)    • Backache    • Bursitis of right shoulder    • CAD (coronary artery disease)    • Cataract, bilateral     s/p removal   • COPD (chronic obstructive pulmonary disease) (HCC)    • Depression    • Diverticulitis    • Dysphagia     REPORTS RESOLVED AFTER DILITATION   • Fracture     left wrist, 2 fingers on right hand, toes on right foot, right arm   • Full dentures    • Gastric ulcer    • GERD (gastroesophageal reflux disease)    • Gout    • Heart murmur    • High cholesterol    • History of nuclear stress test 2011?   • HTN (hypertension)    • Hypertriglyceridemia    • Hypothyroidism    • Multiple lung nodules    • Osteoarthritis    • Osteoporosis    • Peripheral neuropathy    • Personal history of tobacco use    • Plantar fasciitis     left   • Pneumonia    • Sinus problem    • Sinusitis    • Tear of meniscus of knee     right   • TIA (transient ischemic attack)     denies 5/22/19.  states had aphasia in E.R., but was ruled out.   • Toe pain     \"Calluses were filed out, but is painful\"   • Uterine leiomyoma    • Viral gastroenteritis    • Vitamin D deficiency    • Wears glasses    • Weight loss, non-intentional     80 lb in 5 years       Social History     Tobacco Use   • Smoking status: Former Smoker     Packs/day: " 0.00     Years: 65.00     Pack years: 0.00     Types: Cigarettes     Quit date: 10/1/2018     Years since quitting: 3.5   • Smokeless tobacco: Never Used   Substance Use Topics   • Alcohol use: Yes     Comment: Seldom         Objective:  Visit Vitals  LMP  (LMP Unknown)       Physical Exam  ***      Assessment/Plan:  {Assess/PlanSmartLinks:02034}    No follow-ups on file.    DISCUSSION(if any):    ***      Dictated utilizing Dragon dictation.    This document was electronically signed by JESSE PIERRE MA on 04/20/22 at 11:58 EDT

## 2022-04-29 ENCOUNTER — HOSPITAL ENCOUNTER (OUTPATIENT)
Dept: PULMONOLOGY | Facility: HOSPITAL | Age: 87
Discharge: HOME | End: 2022-04-29
Admitting: INTERNAL MEDICINE

## 2022-04-29 DIAGNOSIS — R06.02 SHORTNESS OF BREATH: ICD-10-CM

## 2022-04-29 DIAGNOSIS — J44.9 CHRONIC OBSTRUCTIVE PULMONARY DISEASE, UNSPECIFIED COPD TYPE: ICD-10-CM

## 2022-04-29 PROCEDURE — 94726 PLETHYSMOGRAPHY LUNG VOLUMES: CPT | Performed by: INTERNAL MEDICINE

## 2022-04-29 PROCEDURE — 94640 AIRWAY INHALATION TREATMENT: CPT

## 2022-04-29 PROCEDURE — 94729 DIFFUSING CAPACITY: CPT

## 2022-04-29 PROCEDURE — A9270 NON-COVERED ITEM OR SERVICE: HCPCS | Performed by: INTERNAL MEDICINE

## 2022-04-29 PROCEDURE — 94729 DIFFUSING CAPACITY: CPT | Performed by: INTERNAL MEDICINE

## 2022-04-29 PROCEDURE — 63710000001 ALBUTEROL SULFATE HFA 108 (90 BASE) MCG/ACT AEROSOL SOLUTION 8.5 G INHALER: Performed by: INTERNAL MEDICINE

## 2022-04-29 PROCEDURE — 94060 EVALUATION OF WHEEZING: CPT

## 2022-04-29 PROCEDURE — 94060 EVALUATION OF WHEEZING: CPT | Performed by: INTERNAL MEDICINE

## 2022-04-29 PROCEDURE — 94726 PLETHYSMOGRAPHY LUNG VOLUMES: CPT

## 2022-04-29 RX ORDER — ALBUTEROL SULFATE 90 UG/1
2 AEROSOL, METERED RESPIRATORY (INHALATION)
Status: DISCONTINUED | OUTPATIENT
Start: 2022-04-29 | End: 2022-04-30 | Stop reason: HOSPADM

## 2022-04-29 RX ADMIN — ALBUTEROL SULFATE 2 PUFF: 90 AEROSOL, METERED RESPIRATORY (INHALATION) at 14:10

## 2022-05-02 ENCOUNTER — HOSPITAL ENCOUNTER (OUTPATIENT)
Dept: ULTRASOUND IMAGING | Facility: HOSPITAL | Age: 87
Discharge: HOME | End: 2022-05-02
Admitting: INTERNAL MEDICINE

## 2022-05-02 DIAGNOSIS — I73.9 PAD (PERIPHERAL ARTERY DISEASE): ICD-10-CM

## 2022-05-02 PROCEDURE — 93925 LOWER EXTREMITY STUDY: CPT

## 2022-05-03 ENCOUNTER — DOCUMENTATION (OUTPATIENT)
Dept: PULMONOLOGY | Facility: CLINIC | Age: 87
End: 2022-05-03

## 2022-05-03 NOTE — PROGRESS NOTES
The patient was seen in the clinic on April 20, 2022 by Dr. Leone.  She was referred by her PCP, Dr. Awad, for pulmonary clearance for hip surgery.    Her PFT was completed 4/29/2022.  PFT shows mild obstruction.  There is no restriction or air trapping suggested.    Patient is moderate risk for the proposed procedure from Pulmonary stand point.    Post Operative recommendations:            * Out of bed to chair, as soon as feasible.            * Albuterol & Atrovent nebulizers Q4hr PRN            * Incentive spirometry every hour.            * Minimize the use of NG tube.            * Keep O2sat at 88% or more, with minimum supplemental O2.            * Minimize anesthesia time, as much as possible             * She may benefit from ABG, pre and/or post Op, if clinically needed.

## 2022-05-13 LAB — TSH SERPL DL<=0.005 MIU/L-ACNC: 0.14 UIU/ML (ref 0.27–4.2)

## 2022-05-23 ENCOUNTER — OFFICE VISIT (OUTPATIENT)
Dept: ORTHOPEDIC SURGERY | Facility: CLINIC | Age: 87
End: 2022-05-23

## 2022-05-23 VITALS — TEMPERATURE: 97.2 F | HEIGHT: 64 IN | WEIGHT: 132 LBS | BODY MASS INDEX: 22.53 KG/M2

## 2022-05-23 DIAGNOSIS — M70.62 GREATER TROCHANTERIC BURSITIS OF BOTH HIPS: ICD-10-CM

## 2022-05-23 DIAGNOSIS — M25.551 ARTHRALGIA OF RIGHT HIP: ICD-10-CM

## 2022-05-23 DIAGNOSIS — M70.61 GREATER TROCHANTERIC BURSITIS OF BOTH HIPS: ICD-10-CM

## 2022-05-23 DIAGNOSIS — M17.11 PRIMARY OSTEOARTHRITIS OF RIGHT KNEE: ICD-10-CM

## 2022-05-23 DIAGNOSIS — M25.561 ARTHRALGIA OF RIGHT KNEE: Primary | ICD-10-CM

## 2022-05-23 DIAGNOSIS — M25.552 ARTHRALGIA OF LEFT HIP: ICD-10-CM

## 2022-05-23 DIAGNOSIS — Z96.642 HISTORY OF TOTAL HIP REPLACEMENT, LEFT: ICD-10-CM

## 2022-05-23 DIAGNOSIS — M16.11 PRIMARY OSTEOARTHRITIS OF RIGHT HIP: ICD-10-CM

## 2022-05-23 PROCEDURE — 99214 OFFICE O/P EST MOD 30 MIN: CPT | Performed by: ORTHOPAEDIC SURGERY

## 2022-05-23 PROCEDURE — 73560 X-RAY EXAM OF KNEE 1 OR 2: CPT | Performed by: ORTHOPAEDIC SURGERY

## 2022-05-23 PROCEDURE — 73521 X-RAY EXAM HIPS BI 2 VIEWS: CPT | Performed by: ORTHOPAEDIC SURGERY

## 2022-05-23 NOTE — PROGRESS NOTES
Subjective   Mi Tejeda is a 86 y.o. female is here today for injection therapy.  Pain of the Right Hip, Pain of the Right Knee, and Pain of the Left Hip (Patient fell in her home about 3 weeks ago, fell onto both knees and hands. She is having bilateral hip and right knee pain since. She did not seek treatment after fall)      CHIEF COMPLAINT:    Right greater than left hip pain and right knee pain after a mechanical fall at home.    History of Present Illness     Patient is an 86 year old woman with balance issues, arthritis and tendency to fall, that fell at her home about 3 weeks ago.  She presents with her daughter today and describes falling forward onto both hands and knees.  Her right hip and knee have had increased pain, and also milder pain at the left hip.  Her activity level has remained baseline with very limited walker assist ambulation.      Patient has chronic generalized osteoarthritis and with end-stage right hip osteoarthritis.  12 years ago she had left total hip replacement and did very well for many years.  Then her right hip deteriorated becoming progressively more painful and stiff limiting her ambulation and activities.  She has had periodic right hip injection therapy with some relief.  She has chronic cardiac and pulmonary medical issues and in the past few year with gradual physical decline.  She has considered right total hip replacement though with concern about increased surgical risks with her medical conditions.       Past Medical History:   Diagnosis Date   • Abdominal pain     states hx of   • Abnormal electrocardiogram    • Abnormal urinalysis    • Acute UTI    • Anemia    • Anomaly cardiac     REPORTS WILL SHOW ON LEFT VENTRICLE IN A 12 LEAD EKG AS BEING A MI BUT REPORTS SHE HAS NEVER HAD THIS    • Anxiety    • Aphagia     HX OF THIS, REPORTS WAS CLEARED OF A CVA BUT WAS TOLD WAS RELATED TO FATIGUE   • Arthritis    • Asthma    • Atrial fibrillation (HCC)    • Backache    •  "Bursitis of right shoulder    • CAD (coronary artery disease)    • Cataract, bilateral     s/p removal   • COPD (chronic obstructive pulmonary disease) (HCC)    • Depression    • Diverticulitis    • Dysphagia     REPORTS RESOLVED AFTER DILITATION   • Fracture     left wrist, 2 fingers on right hand, toes on right foot, right arm   • Full dentures    • Gastric ulcer    • GERD (gastroesophageal reflux disease)    • Gout    • Heart murmur    • High cholesterol    • History of nuclear stress test 2011?   • HTN (hypertension)    • Hypertriglyceridemia    • Hypothyroidism    • Multiple lung nodules    • Osteoarthritis    • Osteoporosis    • Peripheral neuropathy    • Personal history of tobacco use    • Plantar fasciitis     left   • Pneumonia    • Sinus problem    • Sinusitis    • Tear of meniscus of knee     right   • TIA (transient ischemic attack)     denies 5/22/19.  states had aphasia in E.R., but was ruled out.   • Toe pain     \"Calluses were filed out, but is painful\"   • Uterine leiomyoma    • Viral gastroenteritis    • Vitamin D deficiency    • Wears glasses    • Weight loss, non-intentional     80 lb in 5 years        Past Surgical History:   Procedure Laterality Date   • CARDIAC CATHETERIZATION  2007?    no stents   • CARDIAC SURGERY     • CATARACT EXTRACTION Bilateral 2009   • CATARACT EXTRACTION, BILATERAL     • CHOLECYSTECTOMY  2002   • COLONOSCOPY     • CORONARY ARTERY BYPASS GRAFT  1998    2 vessels   • ENDOSCOPY     • ENDOSCOPY N/A 5/3/2017    Procedure: ESOPHAGOGASTRODUODENOSCOPY with biopsies;  Surgeon: Yared Castellanos MD;  Location: [facility] ENDOSCOPY;  Service:    • ENDOSCOPY N/A 5/23/2019    Procedure: ESOPHAGOGASTRODUODENOSCOPY WITH BIOPSY AND BALLOON DILITATION;  Surgeon: Yared Castellanos MD;  Location: [facility] ENDOSCOPY;  Service: Gastroenterology   • MOUTH SURGERY      full extraction   • TOTAL HIP ARTHROPLASTY Left 02/02/2010    PREETI Stanley MD       Allergies   Allergen Reactions   • Ciprofloxacin " "GI Intolerance     Patient states she is not allergic.   • Hazelnut Nausea Only   • Keflex [Cephalexin] Nausea Only   • Niacin Rash     \"it makes my skin burn\"   • Other Nausea Only     Balsalmic Vinegar     • Oxycodone Dizziness     \"it makes me feel dopey and out of my head\"  NOTE: does tolerate hydrocodone and morphine   • Statins Myalgia   • Vicodin [Hydrocodone-Acetaminophen] Hives     Can take hydrocodone, cannot tolerate VICODIN       Review of Systems   Constitutional: Negative for fever.   Musculoskeletal: Positive for arthralgias, back pain and gait problem. Negative for joint swelling.   Skin: Negative for color change, rash and wound.   Neurological: Positive for weakness (generalized, no acute change).   Psychiatric/Behavioral: Negative for confusion.     I have reviewed the medical and surgical history, family history, social history, medications, and/or allergies, and the review of systems of this report.    Objective   Temp 97.2 °F (36.2 °C)   Ht 162.6 cm (64\")   Wt 59.9 kg (132 lb)   BMI 22.66 kg/m²   Physical Exam  Vitals reviewed.   Constitutional:       General: She is not in acute distress.     Appearance: She is well-developed.   Musculoskeletal:      Lumbar back: Negative right straight leg raise test and negative left straight leg raise test.      Right knee: No effusion.      Instability Tests: Medial Ashanti test negative.   Skin:     General: Skin is warm and dry.      Findings: No erythema or rash.   Neurological:      Mental Status: She is alert.   Psychiatric:         Speech: Speech normal.       Skin exam stable with no erythema, ecchymosis or rash.  No new swelling.  No motor or sensory changes.  Distal pulse intact.  Right Knee Exam     Tenderness   The patient is experiencing tenderness in the patella.    Range of Motion   Extension: 0   Flexion: 110     Tests   Ashanti:  Medial - negative   Varus: negative Valgus: negative  Patellar apprehension: negative    Other   Erythema: " absent  Pulse: present  Effusion: no effusion present      Right Hip Exam     Tenderness   The patient is experiencing tenderness in the greater trochanter, posterior and anterior.    Range of Motion   Flexion: 90   External rotation: 20   Internal rotation: 5     Muscle Strength   Abduction: 4/5   Adduction: 4/5   Flexion: 4/5     Tests   Fadir:  Negative FADIR test    Other   Erythema: absent  Scars: absent      Left Hip Exam     Tenderness   The patient is experiencing tenderness in the greater trochanter.    Range of Motion   The patient has normal left hip ROM.  Flexion: 110   External rotation: 30   Internal rotation: 20     Muscle Strength   Abduction: 4/5   Adduction: 5/5   Flexion: 5/5     Tests   Fadir:  Negative FADIR test    Other   Erythema: absent  Scars: present (well healed)      Back Exam     Tests   Straight leg raise right: negative  Straight leg raise left: negative          Neurologic Exam     Mental Status   Attention: normal.   Speech: speech is normal   Level of consciousness: alert  Knowledge: good.     Motor Exam   Overall muscle tone: normal      Procedures    Assessment & Plan     Independent Review of Radiographic Studies:    AP pelvis and lateral of both hips, indication to evaluate bilateral hip pain and status of left total hip prosthesis and joint, and compared with prior imaging, shows no acute fracture or dislocation. No evident proximal femur nor pelvic rami fracture.  Good position and alignment of the left total hip prosthesis with no radiographic signs of loosening.  There is end-stage DJD of the right hip with bone-on-bone cartilage wear.  AP and lateral of the right knee, indication to evaluate pain, with no recent prior imaging available, shows no acute fracture or dislocation.  No patella fracture.  Mild degenerative arthritis of the right knee with joint spaced preserved and good alignment.     Laboratory and Other Studies:  No new results reviewed today.     Medical  Decision Making:    Persistent symptoms primarily of right hip osteoarthritis.  Stable functional left total hip replacement.   Continue care plan with any additional work-up and treatment as outlined below.  Patient presents today with her daughter and we reviewed the relative risks, benefits and merits of elective hip replacement surgery, and she will discuss further with her family to help with her decision.      Diagnoses and all orders for this visit:    1. Arthralgia of right knee (Primary)  -     XR Knee 1 or 2 View Right    2. Arthralgia of right hip  -     XR Hips Bilateral With or Without Pelvis 2 View    3. Arthralgia of left hip  -     XR Hips Bilateral With or Without Pelvis 2 View    4. Primary osteoarthritis of right hip    5. Primary osteoarthritis of right knee    6. History of total hip replacement, left    7. Greater trochanteric bursitis of both hips      Discussion of orthopaedic goals and activities and patient and daughter expressed appreciation.  Guided on proper techniques for mobility, strength, agility and/or conditioning exercises  Ice, heat, and/or modalities as beneficial    Recommendations/Plan:  Brace: No brace was given at today's visit.  Referral: No referrals made at today's visit.  Test/Studies: No additional studies ordered at this time.  Surgery: No surgery proposed at this visit. For intractable painful limiting condition, patient to consider elective surgical options.  Work/Activity Status: Usual activities, routine exercise as tolerated, no strenuous activity.    Return if symptoms worsen or fail to improve, for Recheck.  Patient and daughter are encouraged and agreeable to call or return sooner for any issues or concerns.

## 2022-05-24 ENCOUNTER — APPOINTMENT (OUTPATIENT)
Dept: BONE DENSITY | Facility: HOSPITAL | Age: 87
End: 2022-05-24

## 2022-05-26 ENCOUNTER — APPOINTMENT (OUTPATIENT)
Dept: BONE DENSITY | Facility: HOSPITAL | Age: 87
End: 2022-05-26

## 2022-06-03 ENCOUNTER — APPOINTMENT (OUTPATIENT)
Dept: BONE DENSITY | Facility: HOSPITAL | Age: 87
End: 2022-06-03

## 2022-06-03 PROCEDURE — 77080 DXA BONE DENSITY AXIAL: CPT

## 2022-06-06 DIAGNOSIS — E03.8 OTHER SPECIFIED HYPOTHYROIDISM: ICD-10-CM

## 2022-06-06 RX ORDER — LEVOTHYROXINE SODIUM 88 UG/1
TABLET ORAL
Qty: 30 TABLET | Refills: 0 | Status: SHIPPED | OUTPATIENT
Start: 2022-06-06 | End: 2022-10-10

## 2022-06-06 RX ORDER — FAMOTIDINE 40 MG/1
TABLET, FILM COATED ORAL
Qty: 30 TABLET | Refills: 3 | Status: SHIPPED | OUTPATIENT
Start: 2022-06-06 | End: 2022-10-21

## 2022-06-14 ENCOUNTER — OFFICE VISIT (OUTPATIENT)
Dept: INTERNAL MEDICINE | Facility: CLINIC | Age: 87
End: 2022-06-14

## 2022-06-14 VITALS
TEMPERATURE: 97.3 F | HEIGHT: 64 IN | DIASTOLIC BLOOD PRESSURE: 64 MMHG | SYSTOLIC BLOOD PRESSURE: 126 MMHG | OXYGEN SATURATION: 96 % | BODY MASS INDEX: 23.22 KG/M2 | WEIGHT: 136 LBS | HEART RATE: 52 BPM

## 2022-06-14 DIAGNOSIS — E78.5 HYPERLIPIDEMIA, UNSPECIFIED HYPERLIPIDEMIA TYPE: ICD-10-CM

## 2022-06-14 DIAGNOSIS — F41.9 ANXIETY: ICD-10-CM

## 2022-06-14 DIAGNOSIS — I10 PRIMARY HYPERTENSION: Primary | ICD-10-CM

## 2022-06-14 DIAGNOSIS — J43.8 OTHER EMPHYSEMA: ICD-10-CM

## 2022-06-14 DIAGNOSIS — E03.8 OTHER SPECIFIED HYPOTHYROIDISM: ICD-10-CM

## 2022-06-14 DIAGNOSIS — F32.A DEPRESSION, UNSPECIFIED DEPRESSION TYPE: ICD-10-CM

## 2022-06-14 DIAGNOSIS — M81.0 SENILE OSTEOPOROSIS: ICD-10-CM

## 2022-06-14 DIAGNOSIS — M25.551 RIGHT HIP PAIN: ICD-10-CM

## 2022-06-14 PROCEDURE — 99214 OFFICE O/P EST MOD 30 MIN: CPT | Performed by: INTERNAL MEDICINE

## 2022-06-14 RX ORDER — DULOXETIN HYDROCHLORIDE 30 MG/1
30 CAPSULE, DELAYED RELEASE ORAL DAILY
Qty: 30 CAPSULE | Refills: 1 | Status: SHIPPED | OUTPATIENT
Start: 2022-06-14 | End: 2022-10-25

## 2022-06-14 NOTE — PROGRESS NOTES
Subjective   Mi Tejeda is a 86 y.o. female.     Chief Complaint   Patient presents with   • Follow-up   • Depression     Discus about medication    • Hypertension   • Anxiety       History of Present Illness     Patient here for follow up.     She reports for the past 2 weeks, she has been feeling very fatigued. She reports feeling nauseous, headache, tired, sleepy. She reports it feel flu like but she has not had a temperature. She reports no difficulty sleeping at night, and has been sleeping between meals. Patient is fully vaccinated against COVID-19.     Patient does have history of hypothyroidism and dose was lowered last visit to 88 mcg from 100 mcg due to symptoms of night sweats. Patient then completed lab work in may 5/13/22. She then got results on MyChart which showed TSH was low. She then self increased the dose back to 100 mcg due to thinking low TSH meant needing a higher dose.     Patient has history of hypertension. Taking medication. No concerns. Patient has history of hyperlipidemia, not on statin due to myalgia. Patient hs history of depression/anxiety and would like to start back on duloxetine, she reports remeron makes her groggy. Patient has history of osteoporosis, DEXA completed waiting to see rheumatology. Patient has history of right hip pain, possibility of surgery pending pulmonology and rheumatology assessments.     Current Outpatient Medications:   •  albuterol sulfate HFA (ProAir HFA) 108 (90 Base) MCG/ACT inhaler, Inhale 2 puffs Every 4 (Four) Hours As Needed for Wheezing or Shortness of Air., Disp: 54 g, Rfl: 3  •  aspirin 81 MG tablet, Take 1 tablet by mouth Daily., Disp: 30 tablet, Rfl: 11  •  baclofen (LIORESAL) 10 MG tablet, TAKE ONE TABLET BY MOUTH AT NIGHT AS NEEDED FOR MUSCLE SPAMS, Disp: 30 tablet, Rfl: 0  •  CALCIUM PO, Take  by mouth Daily., Disp: , Rfl:   •  Cholecalciferol (VITAMIN D3) 125 MCG (5000 UT) capsule capsule, TAKE ONE CAPSULE BY MOUTH EVERY DAY, Disp:  100 capsule, Rfl: 3  •  famotidine (PEPCID) 40 MG tablet, TAKE ONE TABLET BY MOUTH EVERY NIGHT AT BEDTIME, Disp: 30 tablet, Rfl: 3  •  ferrous gluconate (FERGON) 324 MG tablet, TAKE 1 TABLET BY MOUTH EVERY DAY WITH BREAKFAST, Disp: 90 tablet, Rfl: 3  •  ferrous sulfate 324 (65 Fe) MG tablet delayed-release EC tablet, Take 324 mg by mouth Daily With Breakfast., Disp: , Rfl:   •  HYDROcodone-acetaminophen (NORCO) 7.5-325 MG per tablet, Take 1 tablet by mouth 2 (Two) Times a Day As Needed for Mild Pain (1-3) or Moderate Pain (4-6)., Disp: , Rfl:   •  levothyroxine (SYNTHROID, LEVOTHROID) 88 MCG tablet, TAKE 1 TABLET BY MOUTH EVERY DAY, Disp: 30 tablet, Rfl: 0  •  losartan (COZAAR) 100 MG tablet, TAKE 1 TABLET BY MOUTH EVERY DAY, Disp: 90 tablet, Rfl: 0  •  metoprolol tartrate (LOPRESSOR) 50 MG tablet, TAKE 1 TABLET BY MOUTH 2 TIMES A DAY, Disp: 180 tablet, Rfl: 3  •  morphine (MS CONTIN) 30 MG 12 hr tablet, Take 30 mg by mouth 2 (Two) Times a Day., Disp: , Rfl:   •  mupirocin (BACTROBAN) 2 % ointment, As Needed., Disp: , Rfl:   •  polyethylene glycol (MiraLax) 17 GM/SCOOP powder, Take 17 g by mouth Daily., Disp: 510 g, Rfl: 2  •  tiotropium bromide-olodaterol (STIOLTO RESPIMAT) 2.5-2.5 MCG/ACT aerosol solution inhaler, Inhale 2 puffs Daily., Disp: 1 each, Rfl: 5  •  vitamin C (ASCORBIC ACID) 500 MG tablet, Take 500 mg by mouth Daily., Disp: , Rfl:   •  Baclofen 5 MG tablet, , Disp: , Rfl:   •  DULoxetine (Cymbalta) 30 MG capsule, Take 1 capsule by mouth Daily., Disp: 30 capsule, Rfl: 1    The following portions of the patient's history were reviewed and updated as appropriate: allergies, current medications, past family history, past medical history, past social history, past surgical history and problem list.    Review of Systems   Constitutional: Negative.    HENT: Negative.    Eyes: Negative.    Respiratory: Negative.    Cardiovascular: Negative.    Gastrointestinal: Negative.    Endocrine: Negative.     Genitourinary: Negative.    Musculoskeletal: Negative.    Skin: Negative.    Allergic/Immunologic: Negative.    Neurological: Negative.    Hematological: Negative.    Psychiatric/Behavioral: Negative.        Objective   Physical Exam  Constitutional:       Appearance: She is well-developed.   Cardiovascular:      Rate and Rhythm: Normal rate and regular rhythm.      Heart sounds: Normal heart sounds.   Pulmonary:      Effort: Pulmonary effort is normal.      Breath sounds: Normal breath sounds.   Abdominal:      General: Bowel sounds are normal.      Palpations: Abdomen is soft.   Musculoskeletal:      Cervical back: Neck supple.   Neurological:      Mental Status: She is alert and oriented to person, place, and time.   Psychiatric:         Behavior: Behavior normal.         All tests have been reviewed.    Assessment & Plan   Diagnoses and all orders for this visit:    Primary hypertension. Continue losartan 100mg and metoprolol 50 mg.     Hyperlipidemia, unspecified hyperlipidemia type. Not on medication due to myalgia and allergic rxn. Diet controlled.     Other emphysema (HCC). Lung study completed, pulmonology appt pending.     Senile osteoporosis. DEXA completed, will see rheumatology    Other specified hypothyroidism. Take levothyroxine 88mg consistently and the recheck bloodwork in 6 weeks.   -     TSH    Right hip pain. Awaiting pulmonology and rheumatology assessments for surgery     Depression, unspecified depression type. Stop taking Remeron, Start Duloxetine 30 mg PO QD  -     DULoxetine (Cymbalta) 30 MG capsule; Take 1 capsule by mouth Daily.    Anxiety. Stop taking Remeron, Start Duloxetine 30 mg PO QD  -     DULoxetine (Cymbalta) 30 MG capsule; Take 1 capsule by mouth Daily.      Follow up 6 wk after labs

## 2022-06-17 RX ORDER — LOSARTAN POTASSIUM 100 MG/1
TABLET ORAL
Qty: 90 TABLET | Refills: 3 | Status: SHIPPED | OUTPATIENT
Start: 2022-06-17

## 2022-06-17 NOTE — TELEPHONE ENCOUNTER
Rx Refill Note  Requested Prescriptions     Pending Prescriptions Disp Refills   • losartan (COZAAR) 100 MG tablet [Pharmacy Med Name: LOSARTAN POTASSIUM 100 MG TAB] 90 tablet 0     Sig: TAKE 1 TABLET BY MOUTH EVERY DAY      Last office visit with prescribing clinician: 6/14/2022      Next office visit with prescribing clinician: 7/25/2022            Mya Alonzo MA  06/17/22, 09:47 EDT

## 2022-06-20 ENCOUNTER — TELEPHONE (OUTPATIENT)
Dept: INTERNAL MEDICINE | Facility: CLINIC | Age: 87
End: 2022-06-20

## 2022-06-20 NOTE — TELEPHONE ENCOUNTER
Caller: Manuel Tejeda    Relationship: Self    Best call back number:     What is the best time to reach you: PATIENT WILL BE BACK HOME AFTER 2P TODAY    Who are you requesting to speak with (clinical staff, provider,  specific staff member): CLINICAL STAFF    Do you know the name of the person who called: MANUEL    What was the call regarding: PATIENT HAS QUESTIONS REGARDING HER THYROID MEDICATION (WHICH STRENGTH TO BE SPECIFIC)  AND ALSO PATIENT HAS SWITCHED PHARMACIES AND I UPDATED THAT IN THE SYSTEM    Do you require a callback: YES

## 2022-06-23 NOTE — TELEPHONE ENCOUNTER
Attempted to contact patient; no answer and unable to leave voicemail.     HUB may deliver message::    Dr. Awad wants patient to take 88 mcg of levothyroxine and follow up as scheduled 07/25/2022

## 2022-07-18 LAB — TSH SERPL DL<=0.005 MIU/L-ACNC: 1.32 UIU/ML (ref 0.27–4.2)

## 2022-07-22 ENCOUNTER — OFFICE VISIT (OUTPATIENT)
Dept: CARDIOLOGY | Facility: CLINIC | Age: 87
End: 2022-07-22

## 2022-07-22 VITALS
HEIGHT: 64 IN | OXYGEN SATURATION: 95 % | HEART RATE: 78 BPM | WEIGHT: 140 LBS | BODY MASS INDEX: 23.9 KG/M2 | DIASTOLIC BLOOD PRESSURE: 70 MMHG | SYSTOLIC BLOOD PRESSURE: 110 MMHG

## 2022-07-22 DIAGNOSIS — I25.10 CHRONIC CORONARY ARTERY DISEASE: Primary | ICD-10-CM

## 2022-07-22 DIAGNOSIS — I48.0 PAROXYSMAL ATRIAL FIBRILLATION: ICD-10-CM

## 2022-07-22 DIAGNOSIS — I77.9 PERIPHERAL ARTERIAL OCCLUSIVE DISEASE: ICD-10-CM

## 2022-07-22 PROCEDURE — 99214 OFFICE O/P EST MOD 30 MIN: CPT | Performed by: INTERNAL MEDICINE

## 2022-07-25 ENCOUNTER — OFFICE VISIT (OUTPATIENT)
Dept: INTERNAL MEDICINE | Facility: CLINIC | Age: 87
End: 2022-07-25

## 2022-07-25 VITALS
OXYGEN SATURATION: 96 % | WEIGHT: 140 LBS | TEMPERATURE: 97.1 F | SYSTOLIC BLOOD PRESSURE: 116 MMHG | BODY MASS INDEX: 23.9 KG/M2 | DIASTOLIC BLOOD PRESSURE: 76 MMHG | HEIGHT: 64 IN | HEART RATE: 55 BPM

## 2022-07-25 DIAGNOSIS — F32.A DEPRESSION, UNSPECIFIED DEPRESSION TYPE: ICD-10-CM

## 2022-07-25 DIAGNOSIS — F41.9 ANXIETY: ICD-10-CM

## 2022-07-25 DIAGNOSIS — M81.0 SENILE OSTEOPOROSIS: Primary | ICD-10-CM

## 2022-07-25 DIAGNOSIS — R60.0 BILATERAL LEG EDEMA: ICD-10-CM

## 2022-07-25 DIAGNOSIS — J43.8 OTHER EMPHYSEMA: ICD-10-CM

## 2022-07-25 PROCEDURE — 99214 OFFICE O/P EST MOD 30 MIN: CPT | Performed by: INTERNAL MEDICINE

## 2022-07-25 RX ORDER — NALOXONE HYDROCHLORIDE 4 MG/.1ML
SPRAY NASAL
COMMUNITY
Start: 2022-06-21

## 2022-07-25 NOTE — PROGRESS NOTES
Subjective   Mi Tejeda is a 86 y.o. female.     Chief Complaint   Patient presents with   • Follow-up     Recent lab results    • Hypertension   • Hypothyroidism       History of Present Illness   Patient here for follow-up. Blood pressure stable medication. The hyperlipidemia stable on diet. Emphysema stable on medication. Osteoporosis patient yet to see rheumatologist. Hypothyroidism controlled after adjusting thyroid medication. Hip pain pending to see surgeon. Anxiety stable without medication. Patient unable to tolerate duloxetine.    Current Outpatient Medications:   •  albuterol sulfate HFA (ProAir HFA) 108 (90 Base) MCG/ACT inhaler, Inhale 2 puffs Every 4 (Four) Hours As Needed for Wheezing or Shortness of Air., Disp: 54 g, Rfl: 3  •  aspirin 81 MG tablet, Take 1 tablet by mouth Daily., Disp: 30 tablet, Rfl: 11  •  baclofen (LIORESAL) 10 MG tablet, TAKE ONE TABLET BY MOUTH AT NIGHT AS NEEDED FOR MUSCLE SPAMS, Disp: 30 tablet, Rfl: 0  •  Baclofen 5 MG tablet, , Disp: , Rfl:   •  CALCIUM PO, Take  by mouth Daily., Disp: , Rfl:   •  Cholecalciferol (VITAMIN D3) 125 MCG (5000 UT) capsule capsule, TAKE ONE CAPSULE BY MOUTH EVERY DAY, Disp: 100 capsule, Rfl: 3  •  DULoxetine (Cymbalta) 30 MG capsule, Take 1 capsule by mouth Daily., Disp: 30 capsule, Rfl: 1  •  famotidine (PEPCID) 40 MG tablet, TAKE ONE TABLET BY MOUTH EVERY NIGHT AT BEDTIME, Disp: 30 tablet, Rfl: 3  •  ferrous gluconate (FERGON) 324 MG tablet, TAKE 1 TABLET BY MOUTH EVERY DAY WITH BREAKFAST, Disp: 90 tablet, Rfl: 3  •  HYDROcodone-acetaminophen (NORCO) 7.5-325 MG per tablet, Take 1 tablet by mouth 2 (Two) Times a Day As Needed for Mild Pain (1-3) or Moderate Pain (4-6)., Disp: , Rfl:   •  levothyroxine (SYNTHROID, LEVOTHROID) 88 MCG tablet, TAKE 1 TABLET BY MOUTH EVERY DAY, Disp: 30 tablet, Rfl: 0  •  losartan (COZAAR) 100 MG tablet, TAKE 1 TABLET BY MOUTH EVERY DAY, Disp: 90 tablet, Rfl: 3  •  metoprolol tartrate (LOPRESSOR) 50 MG tablet,  TAKE 1 TABLET BY MOUTH 2 TIMES A DAY, Disp: 180 tablet, Rfl: 3  •  morphine (MS CONTIN) 30 MG 12 hr tablet, Take 30 mg by mouth 2 (Two) Times a Day., Disp: , Rfl:   •  mupirocin (BACTROBAN) 2 % ointment, As Needed., Disp: , Rfl:   •  polyethylene glycol (MiraLax) 17 GM/SCOOP powder, Take 17 g by mouth Daily., Disp: 510 g, Rfl: 2  •  tiotropium bromide-olodaterol (STIOLTO RESPIMAT) 2.5-2.5 MCG/ACT aerosol solution inhaler, Inhale 2 puffs Daily., Disp: 1 each, Rfl: 5  •  vitamin C (ASCORBIC ACID) 500 MG tablet, Take 500 mg by mouth Daily., Disp: , Rfl:   •  ferrous sulfate 324 (65 Fe) MG tablet delayed-release EC tablet, Take 324 mg by mouth Daily With Breakfast., Disp: , Rfl:   •  Narcan 4 MG/0.1ML nasal spray, , Disp: , Rfl:     The following portions of the patient's history were reviewed and updated as appropriate: allergies, current medications, past family history, past medical history, past social history, past surgical history and problem list.    Review of Systems   Constitutional: Negative.    HENT: Negative.    Eyes: Negative.    Respiratory: Negative.    Cardiovascular: Negative.    Gastrointestinal: Negative.    Endocrine: Negative.    Genitourinary: Negative.    Musculoskeletal: Positive for arthralgias.   Skin: Negative.    Allergic/Immunologic: Negative.    Neurological: Negative.    Hematological: Negative.    Psychiatric/Behavioral: Negative.        Objective   Physical Exam  Cardiovascular:      Rate and Rhythm: Normal rate and regular rhythm.      Heart sounds: Normal heart sounds.   Pulmonary:      Effort: Pulmonary effort is normal.      Breath sounds: Normal breath sounds.   Abdominal:      General: Bowel sounds are normal.   Musculoskeletal:      Cervical back: Neck supple.   Skin:     General: Skin is warm.   Neurological:      Mental Status: She is alert and oriented to person, place, and time.         All tests have been reviewed.    Assessment & Plan   There are no diagnoses linked to this  encounter.          Primary hypertension. Stable on meds Continue losartan 100mg and metoprolol 50 mg.      Hyperlipidemia, unspecified hyperlipidemia type. Not on medication due to myalgia and allergic rxn. Diet controlled.      Other emphysema (HCC). Lung study completed, pulmonology      Senile osteoporosis. DEXA completed, ref to rheumatology     Other specified hypothyroidism. Take levothyroxine 88mg stable and cont med       Right hip pain. Awaiting pulmonology and rheumatology assessments for surgery      Depression,  Anxiety stable without med , unable to take  Remeron, Duloxetine 30 mg PO QD

## 2022-10-10 DIAGNOSIS — E03.8 OTHER SPECIFIED HYPOTHYROIDISM: ICD-10-CM

## 2022-10-10 RX ORDER — LEVOTHYROXINE SODIUM 88 UG/1
TABLET ORAL
Qty: 90 TABLET | Refills: 1 | Status: SHIPPED | OUTPATIENT
Start: 2022-10-10

## 2022-10-10 NOTE — TELEPHONE ENCOUNTER
Rx Refill Note  Requested Prescriptions     Pending Prescriptions Disp Refills   • levothyroxine (SYNTHROID, LEVOTHROID) 88 MCG tablet [Pharmacy Med Name: LEVOTHYROXINE 88 MCG TABLET] 30 tablet 0     Sig: TAKE 1 TABLET BY MOUTH EVERY DAY      Last office visit with prescribing clinician: 7/25/2022      Next office visit with prescribing clinician: 10/25/2022            Mya Alonzo MA  10/10/22, 16:29 EDT     Last TSH    1.320 (WNL)

## 2022-10-21 RX ORDER — FAMOTIDINE 40 MG/1
TABLET, FILM COATED ORAL
Qty: 30 TABLET | Refills: 0 | Status: SHIPPED | OUTPATIENT
Start: 2022-10-21 | End: 2023-01-09

## 2022-10-25 ENCOUNTER — OFFICE VISIT (OUTPATIENT)
Dept: INTERNAL MEDICINE | Facility: CLINIC | Age: 87
End: 2022-10-25

## 2022-10-25 VITALS
BODY MASS INDEX: 24.41 KG/M2 | HEIGHT: 64 IN | HEART RATE: 64 BPM | OXYGEN SATURATION: 98 % | WEIGHT: 143 LBS | TEMPERATURE: 96.9 F | DIASTOLIC BLOOD PRESSURE: 80 MMHG | SYSTOLIC BLOOD PRESSURE: 122 MMHG

## 2022-10-25 DIAGNOSIS — F41.9 ANXIETY: ICD-10-CM

## 2022-10-25 DIAGNOSIS — E78.5 HYPERLIPIDEMIA, UNSPECIFIED HYPERLIPIDEMIA TYPE: ICD-10-CM

## 2022-10-25 DIAGNOSIS — E03.8 OTHER SPECIFIED HYPOTHYROIDISM: ICD-10-CM

## 2022-10-25 DIAGNOSIS — F32.A DEPRESSION, UNSPECIFIED DEPRESSION TYPE: ICD-10-CM

## 2022-10-25 DIAGNOSIS — M81.0 SENILE OSTEOPOROSIS: ICD-10-CM

## 2022-10-25 DIAGNOSIS — I10 PRIMARY HYPERTENSION: Primary | ICD-10-CM

## 2022-10-25 PROCEDURE — 99214 OFFICE O/P EST MOD 30 MIN: CPT | Performed by: INTERNAL MEDICINE

## 2022-10-25 NOTE — PROGRESS NOTES
Subjective   Mi Tejeda is a 86 y.o. female.     Chief Complaint   Patient presents with   • Follow-up   • Hypertension   • Hyperlipidemia   • Hypothyroidism       History of Present Illness   Patient here for follow-up.  Blood pressure stable on medication.  Hyperlipidemia patient unable to tolerate medication and stable now.  Emphysema stable osteoporosis bone density scan.  Hypothyroidism stable on medication.  Patient complains of right hip pain aching in nature weightbearing worse over-the-counter medicine no help pain is chronic.  Patient still complains depressed anxious nervous no suicidal thoughts.    Current Outpatient Medications:   •  albuterol sulfate HFA (ProAir HFA) 108 (90 Base) MCG/ACT inhaler, Inhale 2 puffs Every 4 (Four) Hours As Needed for Wheezing or Shortness of Air., Disp: 54 g, Rfl: 3  •  aspirin 81 MG tablet, Take 1 tablet by mouth Daily., Disp: 30 tablet, Rfl: 11  •  baclofen (LIORESAL) 10 MG tablet, TAKE ONE TABLET BY MOUTH AT NIGHT AS NEEDED FOR MUSCLE SPAMS, Disp: 30 tablet, Rfl: 0  •  CALCIUM PO, Take  by mouth Daily., Disp: , Rfl:   •  Cholecalciferol (VITAMIN D3) 125 MCG (5000 UT) capsule capsule, TAKE ONE CAPSULE BY MOUTH EVERY DAY, Disp: 100 capsule, Rfl: 3  •  DULoxetine (Cymbalta) 30 MG capsule, Take 1 capsule by mouth Daily., Disp: 30 capsule, Rfl: 1  •  famotidine (PEPCID) 40 MG tablet, TAKE ONE TABLET BY MOUTH EVERY NIGHT AT BEDTIME, Disp: 30 tablet, Rfl: 0  •  ferrous gluconate (FERGON) 324 MG tablet, TAKE 1 TABLET BY MOUTH EVERY DAY WITH BREAKFAST, Disp: 90 tablet, Rfl: 3  •  HYDROcodone-acetaminophen (NORCO) 7.5-325 MG per tablet, Take 1 tablet by mouth 2 (Two) Times a Day As Needed for Mild Pain (1-3) or Moderate Pain (4-6)., Disp: , Rfl:   •  levothyroxine (SYNTHROID, LEVOTHROID) 88 MCG tablet, TAKE 1 TABLET BY MOUTH EVERY DAY, Disp: 90 tablet, Rfl: 1  •  losartan (COZAAR) 100 MG tablet, TAKE 1 TABLET BY MOUTH EVERY DAY, Disp: 90 tablet, Rfl: 3  •  metoprolol  tartrate (LOPRESSOR) 50 MG tablet, TAKE 1 TABLET BY MOUTH 2 TIMES A DAY, Disp: 180 tablet, Rfl: 3  •  morphine (MS CONTIN) 30 MG 12 hr tablet, Take 30 mg by mouth 2 (Two) Times a Day., Disp: , Rfl:   •  mupirocin (BACTROBAN) 2 % ointment, As Needed., Disp: , Rfl:   •  Narcan 4 MG/0.1ML nasal spray, , Disp: , Rfl:   •  polyethylene glycol (MiraLax) 17 GM/SCOOP powder, Take 17 g by mouth Daily., Disp: 510 g, Rfl: 2  •  tiotropium bromide-olodaterol (STIOLTO RESPIMAT) 2.5-2.5 MCG/ACT aerosol solution inhaler, Inhale 2 puffs Daily., Disp: 1 each, Rfl: 5  •  vitamin C (ASCORBIC ACID) 500 MG tablet, Take 500 mg by mouth Daily., Disp: , Rfl:   •  Baclofen 5 MG tablet, , Disp: , Rfl:     The following portions of the patient's history were reviewed and updated as appropriate: allergies, current medications, past family history, past medical history, past social history, past surgical history and problem list.    Review of Systems   Constitutional: Negative.    Respiratory: Negative.    Cardiovascular: Negative.    Gastrointestinal: Negative.    Musculoskeletal: Positive for arthralgias.   Skin: Negative.    Neurological: Negative.    Psychiatric/Behavioral: Negative.        Objective   Physical Exam  Cardiovascular:      Rate and Rhythm: Normal rate and regular rhythm.      Heart sounds: Normal heart sounds.   Pulmonary:      Effort: Pulmonary effort is normal.      Breath sounds: Normal breath sounds.   Abdominal:      General: Bowel sounds are normal.   Musculoskeletal:         General: Tenderness present.      Cervical back: Neck supple.   Skin:     General: Skin is warm.   Neurological:      Mental Status: She is alert and oriented to person, place, and time.         All tests have been reviewed.    Assessment & Plan   There are no diagnoses linked to this encounter.          Primary hypertension. Stable on meds Continue losartan 100mg and metoprolol 50 mg.      Hyperlipidemia, Not on medication due to myalgia and  allergic rxn. Diet controlled.      Other emphysema (HCC). Lung study completed, pulmonology      Senile osteoporosis. DEXA completed, ref to rheumatology     Other specified hypothyroidism. Sable on  levothyroxine 88mg stable and cont med     Right hip pain. Awaiting pulmonology and rheumatology assessments for surgery      Depression,  Anxiety stable without med , unable to take  Remeron and Duloxetine 30 mg PO QD, start zoloft 50 daily po     1 mo

## 2022-12-20 ENCOUNTER — TELEPHONE (OUTPATIENT)
Dept: ORTHOPEDIC SURGERY | Facility: CLINIC | Age: 87
End: 2022-12-20

## 2022-12-20 NOTE — TELEPHONE ENCOUNTER
Provider: CRISTHIAN  Caller: MANUEL  Phone Number: [phone]  Reason for Call: PATIENT IS CALLING TO SCHEDULE RIGHT SHOULDER INJECTIONS.    PT ALSO WANTS TO DISCUSES SX FOR HER HIP

## 2023-01-09 ENCOUNTER — TELEMEDICINE (OUTPATIENT)
Dept: INTERNAL MEDICINE | Facility: CLINIC | Age: 88
End: 2023-01-09
Payer: MEDICARE

## 2023-01-09 VITALS — TEMPERATURE: 97.8 F | BODY MASS INDEX: 24.24 KG/M2 | HEIGHT: 64 IN | WEIGHT: 142 LBS

## 2023-01-09 DIAGNOSIS — M25.551 RIGHT HIP PAIN: ICD-10-CM

## 2023-01-09 DIAGNOSIS — M81.0 SENILE OSTEOPOROSIS: ICD-10-CM

## 2023-01-09 DIAGNOSIS — I10 PRIMARY HYPERTENSION: ICD-10-CM

## 2023-01-09 DIAGNOSIS — F32.0 CURRENT MILD EPISODE OF MAJOR DEPRESSIVE DISORDER WITHOUT PRIOR EPISODE: Primary | ICD-10-CM

## 2023-01-09 DIAGNOSIS — K21.00 GASTROESOPHAGEAL REFLUX DISEASE WITH ESOPHAGITIS WITHOUT HEMORRHAGE: ICD-10-CM

## 2023-01-09 DIAGNOSIS — F41.9 ANXIETY: ICD-10-CM

## 2023-01-09 PROCEDURE — 99214 OFFICE O/P EST MOD 30 MIN: CPT | Performed by: INTERNAL MEDICINE

## 2023-01-09 RX ORDER — SERTRALINE HYDROCHLORIDE 25 MG/1
25 TABLET, FILM COATED ORAL DAILY
Qty: 30 TABLET | Refills: 1 | Status: SHIPPED | OUTPATIENT
Start: 2023-01-09

## 2023-01-09 RX ORDER — AMLODIPINE BESYLATE 2.5 MG/1
2.5 TABLET ORAL DAILY
Qty: 30 TABLET | Refills: 1 | Status: SHIPPED | OUTPATIENT
Start: 2023-01-09 | End: 2023-03-06

## 2023-01-09 RX ORDER — OMEPRAZOLE 40 MG/1
40 CAPSULE, DELAYED RELEASE ORAL DAILY
Qty: 30 CAPSULE | Refills: 2 | Status: SHIPPED | OUTPATIENT
Start: 2023-01-09

## 2023-01-09 NOTE — PROGRESS NOTES
Subjective   Mi Tejeda is a 87 y.o. female.     Chief Complaint   Patient presents with   • Medicare Wellness-subsequent   • Nausea     Discuss medication- requesting medication to use PRN   Current symptoms lasting week       History of Present Illness   Patient here for follow-up.  Patient complains nausea for more than 2 weeks no vomiting no diarrhea no constipation no abdominal pain.  No fever chills.  Patient does have burning sensation in the throat and esophagus.  Lying down make it worse Pepcid no help.  Recent blood pressure at home is elevated systolic is around 170.  Patient does have a significant pains in the hip and the knees.  Patient is taking narcotic pain medicine.  Patient still feel anxious nervous and a depressed even though without suicidal ideation.  Patient states that Zoloft 50 mg causes too much drowsiness.  Osteoporosis patient yet to see rheumatologist.    Current Outpatient Medications:   •  albuterol sulfate HFA (ProAir HFA) 108 (90 Base) MCG/ACT inhaler, Inhale 2 puffs Every 4 (Four) Hours As Needed for Wheezing or Shortness of Air., Disp: 54 g, Rfl: 3  •  aspirin 81 MG tablet, Take 1 tablet by mouth Daily., Disp: 30 tablet, Rfl: 11  •  baclofen (LIORESAL) 10 MG tablet, TAKE ONE TABLET BY MOUTH AT NIGHT AS NEEDED FOR MUSCLE SPAMS, Disp: 30 tablet, Rfl: 0  •  CALCIUM PO, Take  by mouth Daily., Disp: , Rfl:   •  Cholecalciferol (VITAMIN D3) 125 MCG (5000 UT) capsule capsule, TAKE ONE CAPSULE BY MOUTH EVERY DAY, Disp: 100 capsule, Rfl: 3  •  ferrous gluconate (FERGON) 324 MG tablet, TAKE 1 TABLET BY MOUTH EVERY DAY WITH BREAKFAST, Disp: 90 tablet, Rfl: 3  •  HYDROcodone-acetaminophen (NORCO) 7.5-325 MG per tablet, Take 1 tablet by mouth 2 (Two) Times a Day As Needed for Mild Pain (1-3) or Moderate Pain (4-6)., Disp: , Rfl:   •  levothyroxine (SYNTHROID, LEVOTHROID) 88 MCG tablet, TAKE 1 TABLET BY MOUTH EVERY DAY, Disp: 90 tablet, Rfl: 1  •  losartan (COZAAR) 100 MG tablet, TAKE 1  TABLET BY MOUTH EVERY DAY, Disp: 90 tablet, Rfl: 3  •  metoprolol tartrate (LOPRESSOR) 50 MG tablet, TAKE 1 TABLET BY MOUTH 2 TIMES A DAY, Disp: 180 tablet, Rfl: 3  •  morphine (MS CONTIN) 30 MG 12 hr tablet, Take 30 mg by mouth 2 (Two) Times a Day., Disp: , Rfl:   •  mupirocin (BACTROBAN) 2 % ointment, As Needed., Disp: , Rfl:   •  Narcan 4 MG/0.1ML nasal spray, , Disp: , Rfl:   •  polyethylene glycol (MiraLax) 17 GM/SCOOP powder, Take 17 g by mouth Daily., Disp: 510 g, Rfl: 2  •  vitamin C (ASCORBIC ACID) 500 MG tablet, Take 500 mg by mouth Daily., Disp: , Rfl:   •  amLODIPine (NORVASC) 2.5 MG tablet, Take 1 tablet by mouth Daily., Disp: 30 tablet, Rfl: 1  •  omeprazole (priLOSEC) 40 MG capsule, Take 1 capsule by mouth Daily., Disp: 30 capsule, Rfl: 2  •  sertraline (Zoloft) 25 MG tablet, Take 1 tablet by mouth Daily., Disp: 30 tablet, Rfl: 1  •  tiotropium bromide-olodaterol (STIOLTO RESPIMAT) 2.5-2.5 MCG/ACT aerosol solution inhaler, Inhale 2 puffs Daily., Disp: 1 each, Rfl: 5    The following portions of the patient's history were reviewed and updated as appropriate: allergies, current medications, past family history, past medical history, past social history, past surgical history and problem list.    Review of Systems   Constitutional: Negative.    Gastrointestinal: Positive for nausea.   Musculoskeletal: Positive for arthralgias.   Psychiatric/Behavioral: Positive for agitation. The patient is nervous/anxious.        Objective   Physical Exam  Pulmonary:      Effort: Pulmonary effort is normal.   Neurological:      Mental Status: She is alert.   Psychiatric:         Mood and Affect: Mood normal.         All tests have been reviewed.    Assessment & Plan   Diagnoses and all orders for this visit:    Current mild episode of major depressive disorder without prior episode (HCC) initiate Zoloft 25 mg since patient is unable to tolerate 50 mg daily.  -     sertraline (Zoloft) 25 MG tablet; Take 1 tablet by mouth  Daily.    Anxiety  -     sertraline (Zoloft) 25 MG tablet; Take 1 tablet by mouth Daily.    Primary hypertension initiate medication follow-up in 3 weeks  -     amLODIPine (NORVASC) 2.5 MG tablet; Take 1 tablet by mouth Daily.    Gastroesophageal reflux disease with esophagitis without hemorrhage initiate PPI discontinue Pepcid  -     omeprazole (priLOSEC) 40 MG capsule; Take 1 capsule by mouth Daily.    Senile osteoporosis encourage patient to schedule with a rheumatologist soon as possible.    Right hip pain continue follow-up with orthopedist and to continue narcotic pain medicine.      3 week       You have chosen to receive care through a telehealth visit.  Do you consent to use a video/audio connection for your medical care today? Yes    Individuals involved in this encounter:    MARIA M Nur Dr.        Patient is at home and provider is off campus

## 2023-01-23 ENCOUNTER — OFFICE VISIT (OUTPATIENT)
Dept: ORTHOPEDIC SURGERY | Facility: CLINIC | Age: 88
End: 2023-01-23
Payer: MEDICARE

## 2023-01-23 VITALS — WEIGHT: 142 LBS | HEIGHT: 64 IN | BODY MASS INDEX: 24.24 KG/M2 | TEMPERATURE: 96.9 F

## 2023-01-23 DIAGNOSIS — M16.11 PRIMARY OSTEOARTHRITIS OF RIGHT HIP: ICD-10-CM

## 2023-01-23 DIAGNOSIS — M25.551 ARTHRALGIA OF RIGHT HIP: ICD-10-CM

## 2023-01-23 DIAGNOSIS — M25.511 CHRONIC RIGHT SHOULDER PAIN: Primary | ICD-10-CM

## 2023-01-23 DIAGNOSIS — G89.29 CHRONIC RIGHT SHOULDER PAIN: Primary | ICD-10-CM

## 2023-01-23 DIAGNOSIS — Z96.642 HISTORY OF TOTAL HIP REPLACEMENT, LEFT: ICD-10-CM

## 2023-01-23 DIAGNOSIS — M75.51 SUBACROMIAL BURSITIS OF RIGHT SHOULDER JOINT: ICD-10-CM

## 2023-01-23 PROCEDURE — 20610 DRAIN/INJ JOINT/BURSA W/O US: CPT | Performed by: ORTHOPAEDIC SURGERY

## 2023-01-23 RX ADMIN — METHYLPREDNISOLONE ACETATE 40 MG: 40 INJECTION, SUSPENSION INTRA-ARTICULAR; INTRALESIONAL; INTRAMUSCULAR; SOFT TISSUE at 15:06

## 2023-01-23 RX ADMIN — LIDOCAINE HYDROCHLORIDE 2 ML: 10 INJECTION, SOLUTION INFILTRATION; PERINEURAL at 15:06

## 2023-01-23 NOTE — PROGRESS NOTES
"Subjective   Mi Tejeda is a 87 y.o. female is here today for injection therapy.  Injections of the Right Shoulder      CHIEF COMPLAINT:    Here for repeat injection therapy right shoulder    History of Present Illness     Since last injection, patient notes substantial relief of symptoms.  No adverse effects of prior injection.  Patient requests repeat injection.     Past Medical History:   Diagnosis Date   • Abdominal pain     states hx of   • Abnormal electrocardiogram    • Abnormal urinalysis    • Acute UTI    • Anemia    • Anomaly cardiac     REPORTS WILL SHOW ON LEFT VENTRICLE IN A 12 LEAD EKG AS BEING A MI BUT REPORTS SHE HAS NEVER HAD THIS    • Anxiety    • Aphagia     HX OF THIS, REPORTS WAS CLEARED OF A CVA BUT WAS TOLD WAS RELATED TO FATIGUE   • Arthritis    • Asthma    • Atrial fibrillation (HCC)    • Backache    • Bursitis of right shoulder    • CAD (coronary artery disease)    • Cataract, bilateral     s/p removal   • COPD (chronic obstructive pulmonary disease) (HCC)    • Depression    • Diverticulitis    • Dysphagia     REPORTS RESOLVED AFTER DILITATION   • Fracture     left wrist, 2 fingers on right hand, toes on right foot, right arm   • Full dentures    • Gastric ulcer    • GERD (gastroesophageal reflux disease)    • Gout    • Heart murmur    • High cholesterol    • History of nuclear stress test 2011?   • HTN (hypertension)    • Hypertriglyceridemia    • Hypothyroidism    • Multiple lung nodules    • Osteoarthritis    • Osteoporosis    • Peripheral neuropathy    • Personal history of tobacco use    • Plantar fasciitis     left   • Pneumonia    • Sinus problem    • Sinusitis    • Tear of meniscus of knee     right   • TIA (transient ischemic attack)     denies 5/22/19.  states had aphasia in E.R., but was ruled out.   • Toe pain     \"Calluses were filed out, but is painful\"   • Uterine leiomyoma    • Viral gastroenteritis    • Vitamin D deficiency    • Wears glasses    • Weight loss, " "non-intentional     80 lb in 5 years        Past Surgical History:   Procedure Laterality Date   • CARDIAC CATHETERIZATION  2007?    no stents   • CARDIAC SURGERY     • CATARACT EXTRACTION Bilateral 2009   • CATARACT EXTRACTION, BILATERAL     • CHOLECYSTECTOMY  2002   • COLONOSCOPY     • CORONARY ARTERY BYPASS GRAFT  1998    2 vessels   • ENDOSCOPY     • ENDOSCOPY N/A 5/3/2017    Procedure: ESOPHAGOGASTRODUODENOSCOPY with biopsies;  Surgeon: Yared Castellanos MD;  Location: [facility] ENDOSCOPY;  Service:    • ENDOSCOPY N/A 5/23/2019    Procedure: ESOPHAGOGASTRODUODENOSCOPY WITH BIOPSY AND BALLOON DILITATION;  Surgeon: Yared Castellanos MD;  Location: [facility] ENDOSCOPY;  Service: Gastroenterology   • MOUTH SURGERY      full extraction   • TOTAL HIP ARTHROPLASTY Left 02/02/2010    PREETI Stanley MD       Allergies   Allergen Reactions   • Ciprofloxacin GI Intolerance     Patient states she is not allergic.   • Hazelnut Nausea Only   • Keflex [Cephalexin] Nausea Only   • Niacin Rash     \"it makes my skin burn\"   • Other Nausea Only     Balsalmic Vinegar     • Oxycodone Dizziness     \"it makes me feel dopey and out of my head\"  NOTE: does tolerate hydrocodone and morphine   • Statins Myalgia   • Vicodin [Hydrocodone-Acetaminophen] Hives     Can take hydrocodone, cannot tolerate VICODIN       Review of Systems   Constitutional: Negative for fever.   Musculoskeletal: Positive for arthralgias, back pain and gait problem (uses manual wheelchair outside of home, uses walker in her home). Negative for neck pain.   Skin: Negative for color change and rash.   Neurological: Positive for weakness (generalized). Negative for numbness.   Psychiatric/Behavioral: Negative for confusion.     I have reviewed the medical and surgical history, family history, social history, medications, and/or allergies, and the review of systems of this report.    Objective   Temp 96.9 °F (36.1 °C)   Ht 162.6 cm (64.02\")   Wt 64.4 kg (142 lb)   BMI 24.36 kg/m² "   Physical Exam  Skin exam stable with no erythema, ecchymosis or rash.  No new swelling.  No motor or sensory changes.  Distal pulse intact.  Ortho Exam  Neurologic Exam    Large Joint Arthrocentesis: R subacromial bursa  Date/Time: 1/23/2023 3:06 PM  Consent given by: patient  Site marked: site marked  Timeout: Immediately prior to procedure a time out was called to verify the correct patient, procedure, equipment, support staff and site/side marked as required   Supporting Documentation  Indications: pain   Procedure Details  Location: shoulder - R subacromial bursa  Preparation: Patient was prepped and draped in the usual sterile fashion  Needle size: 22 G  Approach: lateral  Medications administered: 2 mL lidocaine 1 %; 40 mg methylPREDNISolone acetate 40 MG/ML  Patient tolerance: patient tolerated the procedure well with no immediate complications        Assessment & Plan     Independent Review of Radiographic Studies:    No new imaging done today.    Laboratory and Other Studies:  No new results reviewed today.     Medical Decision Making:    No complications of procedure and treatments.  Fair progress with residual symptoms of right shoulder bursitis.   Continue care plan with any additional work-up and treatment as outlined in recommendations.  Limited progress with persistent or worsening symptoms of right hip end-stage osteoarthritis.   Continue care plan with any additional work-up and treatment as detailed.    Patient presents with her daughter today and they describe her pain, difficulties and limitations with her right hip end-stage osteoarthritis condition.  She is walking just short within a room distance at her home with a walker and concerned for falling episodes due to her hip.  She has a continued good stable functional recovery 12 years from a left total hip replacement.  We discussed that with her comorbidity medical conditions, she will require medical clearance.     She and her daughter  mention a preference for spinal anesthesia and this can be safer given her medical history including COPD.  We did review that in a very small percentage of cases, the spinal might not be able to be administered, for instance with advanced spine degenerative change and deformity.  Patient and daughter stated that in that instance their preference would be for her to not have the procedure.  She is not inclined to have general anesthesia unless for an emergency.    Diagnoses and all orders for this visit:    1. Chronic right shoulder pain (Primary)  -     Large Joint Arthrocentesis: R subacromial bursa    2. Subacromial bursitis of right shoulder joint  -     Large Joint Arthrocentesis: R subacromial bursa    3. Arthralgia of right hip    4. Primary osteoarthritis of right hip    5. History of total hip replacement, left      Discussion of orthopaedic goals and activities and patient expressed appreciation.  Guided on proper techniques for mobility, strength, agility and/or conditioning exercises  Ice, heat, and/or modalities as beneficial  Watch for signs and symptoms of infection  Call or notify for any adverse effect from injection therapy    Recommendations/Plan:  Brace: No brace was given at today's visit.  Referral: No referrals made at today's visit.  Test/Studies: No additional studies ordered at this time.  Surgery: No surgery proposed at this visit.  Work/Activity Status: Usual activities, routine exercise as tolerated, light physical work as tolerated, no strenuous activity.    Return in about 3 months (around 4/23/2023) for Recheck.  Patient and daughter are encouraged and agreeable to call or return sooner for any issues or concerns.

## 2023-01-24 RX ORDER — METHYLPREDNISOLONE ACETATE 40 MG/ML
40 INJECTION, SUSPENSION INTRA-ARTICULAR; INTRALESIONAL; INTRAMUSCULAR; SOFT TISSUE
Status: COMPLETED | OUTPATIENT
Start: 2023-01-23 | End: 2023-01-23

## 2023-01-24 RX ORDER — LIDOCAINE HYDROCHLORIDE 10 MG/ML
2 INJECTION, SOLUTION INFILTRATION; PERINEURAL
Status: COMPLETED | OUTPATIENT
Start: 2023-01-23 | End: 2023-01-23

## 2023-02-02 ENCOUNTER — OFFICE VISIT (OUTPATIENT)
Dept: INTERNAL MEDICINE | Facility: CLINIC | Age: 88
End: 2023-02-02
Payer: MEDICARE

## 2023-02-02 VITALS
BODY MASS INDEX: 23.9 KG/M2 | SYSTOLIC BLOOD PRESSURE: 124 MMHG | TEMPERATURE: 96.8 F | HEIGHT: 64 IN | WEIGHT: 140 LBS | HEART RATE: 51 BPM | OXYGEN SATURATION: 96 % | DIASTOLIC BLOOD PRESSURE: 76 MMHG

## 2023-02-02 DIAGNOSIS — I48.0 PAROXYSMAL ATRIAL FIBRILLATION: Primary | ICD-10-CM

## 2023-02-02 DIAGNOSIS — E78.2 MIXED HYPERLIPIDEMIA: ICD-10-CM

## 2023-02-02 DIAGNOSIS — I25.10 CHRONIC CORONARY ARTERY DISEASE: ICD-10-CM

## 2023-02-02 DIAGNOSIS — E55.9 VITAMIN D DEFICIENCY: ICD-10-CM

## 2023-02-02 DIAGNOSIS — Z00.00 MEDICARE ANNUAL WELLNESS VISIT, SUBSEQUENT: ICD-10-CM

## 2023-02-02 DIAGNOSIS — I10 PRIMARY HYPERTENSION: ICD-10-CM

## 2023-02-02 DIAGNOSIS — K21.00 GASTROESOPHAGEAL REFLUX DISEASE WITH ESOPHAGITIS WITHOUT HEMORRHAGE: ICD-10-CM

## 2023-02-02 DIAGNOSIS — R41.3 MEMORY LOSS: ICD-10-CM

## 2023-02-02 DIAGNOSIS — M25.551 RIGHT HIP PAIN: ICD-10-CM

## 2023-02-02 DIAGNOSIS — M48.00 SPINAL STENOSIS, UNSPECIFIED SPINAL REGION: ICD-10-CM

## 2023-02-02 DIAGNOSIS — R91.8 MULTIPLE PULMONARY NODULES: ICD-10-CM

## 2023-02-02 DIAGNOSIS — F32.0 CURRENT MILD EPISODE OF MAJOR DEPRESSIVE DISORDER WITHOUT PRIOR EPISODE: ICD-10-CM

## 2023-02-02 DIAGNOSIS — F41.9 ANXIETY: ICD-10-CM

## 2023-02-02 DIAGNOSIS — E03.8 OTHER SPECIFIED HYPOTHYROIDISM: ICD-10-CM

## 2023-02-02 DIAGNOSIS — I77.9 PERIPHERAL ARTERIAL OCCLUSIVE DISEASE: ICD-10-CM

## 2023-02-02 DIAGNOSIS — J43.1 PANLOBULAR EMPHYSEMA: ICD-10-CM

## 2023-02-02 PROBLEM — R60.0 BILATERAL LEG EDEMA: Status: RESOLVED | Noted: 2022-07-25 | Resolved: 2023-02-02

## 2023-02-02 PROCEDURE — 1170F FXNL STATUS ASSESSED: CPT | Performed by: INTERNAL MEDICINE

## 2023-02-02 PROCEDURE — 1159F MED LIST DOCD IN RCRD: CPT | Performed by: INTERNAL MEDICINE

## 2023-02-02 PROCEDURE — 1125F AMNT PAIN NOTED PAIN PRSNT: CPT | Performed by: INTERNAL MEDICINE

## 2023-02-02 PROCEDURE — 99397 PER PM REEVAL EST PAT 65+ YR: CPT | Performed by: INTERNAL MEDICINE

## 2023-02-02 PROCEDURE — G0439 PPPS, SUBSEQ VISIT: HCPCS | Performed by: INTERNAL MEDICINE

## 2023-02-02 NOTE — PROGRESS NOTES
Subjective   Mi Tejeda is a 87 y.o. female and is here for a comprehensive physical exam.     Do you take any herbs or supplements that were not prescribed by a doctor? no  Are you taking calcium supplements? no  Are you taking aspirin daily? no      The following portions of the patient's history were reviewed and updated as appropriate: allergies, current medications, past family history, past medical history, past social history, past surgical history and problem list.    Patient Active Problem List   Diagnosis   • Anxiety   • Chronic coronary artery disease   • Chronic obstructive pulmonary disease (HCC)   • Diverticulum of esophagus   • Gastroesophageal reflux disease   • Hyperlipidemia   • Hypertension   • Other specified hypothyroidism   • Multiple pulmonary nodules   • Peripheral arterial occlusive disease (HCC)   • Spinal stenosis   • Temporary cerebral vascular dysfunction   • Uterine leiomyoma   • Vitamin D deficiency   • Memory loss   • Hx of CABG   • Senile osteoporosis   • Unspecified atrial fibrillation (HCC)   • Depression   • Right hip pain       Review of Systems   Constitutional: Negative.    HENT: Negative.    Eyes: Negative.    Respiratory: Negative.    Cardiovascular: Negative.    Gastrointestinal: Negative.    Endocrine: Negative.    Genitourinary: Negative.    Musculoskeletal: Negative.    Skin: Negative.    Allergic/Immunologic: Negative.    Neurological: Negative.    Hematological: Negative.    Psychiatric/Behavioral: Negative.        Physical Exam  Constitutional:       Appearance: Normal appearance. She is well-developed and normal weight.   HENT:      Head: Normocephalic and atraumatic.      Right Ear: Tympanic membrane, ear canal and external ear normal.      Left Ear: Tympanic membrane, ear canal and external ear normal.      Nose: Nose normal.   Eyes:      Conjunctiva/sclera: Conjunctivae normal.      Pupils: Pupils are equal, round, and reactive to light.   Cardiovascular:       Rate and Rhythm: Normal rate and regular rhythm.      Heart sounds: Normal heart sounds.   Pulmonary:      Effort: Pulmonary effort is normal.      Breath sounds: Normal breath sounds.   Abdominal:      General: Bowel sounds are normal.      Palpations: Abdomen is soft.   Genitourinary:     Vagina: Normal.   Musculoskeletal:         General: Normal range of motion.      Cervical back: Normal range of motion and neck supple.   Skin:     General: Skin is warm and dry.   Neurological:      Mental Status: She is alert and oriented to person, place, and time.      Deep Tendon Reflexes: Reflexes are normal and symmetric.   Psychiatric:         Mood and Affect: Mood normal.         Behavior: Behavior normal.         Thought Content: Thought content normal.         Judgment: Judgment normal.         All  tests have been reviewed.    Assessment & Plan          1. Patient Counseling:  --Nutrition: Stressed importance of moderation in sodium/caffeine intake, saturated fat and cholesterol, caloric balance, sufficient intake of fresh fruits, vegetables, fiber, calcium and iron.  --Exercise: Stressed the importance of regular exercise.   --Injury prevention: Discussed safety belts, safety helmets, smoke detector, smoking near bedding or upholstery.   --Dental health: Discussed importance of regular tooth brushing, flossing, and dental visits.  --Immunizations reviewed.  --Discussed benefits of screening colonoscopy.  --After hours service discussed with patient    2. Discussed the patient's BMI with her.

## 2023-02-02 NOTE — PROGRESS NOTES
The ABCs of the Annual Wellness Visit  [facility]    Subjective     Mi Tejeda is a 87 y.o. female who presents for a  Welcome to Medicare Visit.    The following portions of the patient's history were reviewed and   updated as appropriate: allergies, current medications, past family history, past medical history, past social history, past surgical history and problem list.     Compared to one year ago, the patient feels her physical   health is the same.    Compared to one year ago, the patient feels her mental   health is the same.    Recent Hospitalizations:  She was not admitted to the hospital during the last year.       Current Medical Providers:  Patient Care Team:  Adiel Awad MD as PCP - General (Internal Medicine)  Laz Vallejo MD as Consulting Physician (Pain Medicine)  Khoi Wylie MD as Consulting Physician (Cardiology)  Lillian Haro DPM (Podiatry)  Derick Stanley MD as Surgeon (Orthopedic Surgery)    Outpatient Medications Prior to Visit   Medication Sig Dispense Refill   • albuterol sulfate HFA (ProAir HFA) 108 (90 Base) MCG/ACT inhaler Inhale 2 puffs Every 4 (Four) Hours As Needed for Wheezing or Shortness of Air. 54 g 3   • amLODIPine (NORVASC) 2.5 MG tablet Take 1 tablet by mouth Daily. 30 tablet 1   • aspirin 81 MG tablet Take 1 tablet by mouth Daily. 30 tablet 11   • baclofen (LIORESAL) 10 MG tablet TAKE ONE TABLET BY MOUTH AT NIGHT AS NEEDED FOR MUSCLE SPAMS 30 tablet 0   • CALCIUM PO Take  by mouth Daily.     • Cholecalciferol (VITAMIN D3) 125 MCG (5000 UT) capsule capsule TAKE ONE CAPSULE BY MOUTH EVERY  capsule 3   • ferrous gluconate (FERGON) 324 MG tablet TAKE 1 TABLET BY MOUTH EVERY DAY WITH BREAKFAST 90 tablet 3   • HYDROcodone-acetaminophen (NORCO) 7.5-325 MG per tablet Take 1 tablet by mouth 2 (Two) Times a Day As Needed for Mild Pain (1-3) or Moderate Pain (4-6).     • levothyroxine (SYNTHROID, LEVOTHROID) 88 MCG tablet TAKE 1 TABLET  BY MOUTH EVERY DAY 90 tablet 1   • losartan (COZAAR) 100 MG tablet TAKE 1 TABLET BY MOUTH EVERY DAY 90 tablet 3   • metoprolol tartrate (LOPRESSOR) 50 MG tablet TAKE 1 TABLET BY MOUTH 2 TIMES A  tablet 3   • morphine (MS CONTIN) 30 MG 12 hr tablet Take 30 mg by mouth 2 (Two) Times a Day.     • mupirocin (BACTROBAN) 2 % ointment As Needed.     • Narcan 4 MG/0.1ML nasal spray      • omeprazole (priLOSEC) 40 MG capsule Take 1 capsule by mouth Daily. 30 capsule 2   • polyethylene glycol (MiraLax) 17 GM/SCOOP powder Take 17 g by mouth Daily. 510 g 2   • sertraline (Zoloft) 25 MG tablet Take 1 tablet by mouth Daily. 30 tablet 1   • tiotropium bromide-olodaterol (STIOLTO RESPIMAT) 2.5-2.5 MCG/ACT aerosol solution inhaler Inhale 2 puffs Daily. 1 each 5   • vitamin C (ASCORBIC ACID) 500 MG tablet Take 500 mg by mouth Daily.       No facility-administered medications prior to visit.       Opioid medication/s are on active medication list.  and I have evaluated her active treatment plan and pain score trends (see table).  Vitals:    02/02/23 1350   PainSc:   6     I have reviewed the chart for potential of high risk medication and harmful drug interactions in the elderly.            Aspirin is on active medication list. Aspirin use is indicated based on review of current medical condition/s. Pros and cons of this therapy have been discussed today. Benefits of this medication outweigh potential harm.  Patient has been encouraged to continue taking this medication.  .      Patient Active Problem List   Diagnosis   • Anxiety   • Chronic coronary artery disease   • Chronic obstructive pulmonary disease (HCC)   • Diverticulum of esophagus   • Gastroesophageal reflux disease   • Hyperlipidemia   • Hypertension   • Other specified hypothyroidism   • Multiple pulmonary nodules   • Peripheral arterial occlusive disease (HCC)   • Spinal stenosis   • Temporary cerebral vascular dysfunction   • Uterine leiomyoma   • Vitamin D  "deficiency   • Memory loss   • Hx of CABG   • Senile osteoporosis   • Unspecified atrial fibrillation (HCC)   • Depression   • Right hip pain     Advance Care Planning  Advance Directive is on file.  ACP discussion was held with the patient during this visit. Patient has an advance directive in EMR which is still valid.        Objective   Vitals:    23 1350   BP: 124/76   Pulse: 51   Temp: 96.8 °F (36 °C)   SpO2: 96%   Weight: 63.5 kg (140 lb)   Height: 162.6 cm (64.02\")   PainSc:   6     Estimated body mass index is 24.02 kg/m² as calculated from the following:    Height as of this encounter: 162.6 cm (64.02\").    Weight as of this encounter: 63.5 kg (140 lb).    BMI is within normal parameters. No other follow-up for BMI required.      Does the patient have evidence of cognitive impairment?   No         Procedures       HEALTH RISK ASSESSMENT    Smoking Status:  Social History     Tobacco Use   Smoking Status Former   • Packs/day: 0.00   • Years: 65.00   • Pack years: 0.00   • Types: Cigarettes   • Quit date: 10/1/2018   • Years since quittin.3   Smokeless Tobacco Never     Alcohol Consumption:  Social History     Substance and Sexual Activity   Alcohol Use Yes    Comment: Seldom       Fall Risk Screen:    MAYCOLADI Fall Risk Assessment was completed, and patient is at MODERATE risk for falls. Assessment completed on:2023    Depression Screen:   PHQ-2/PHQ-9 Depression Screening 2023   Little Interest or Pleasure in Doing Things 0-->not at all   Feeling Down, Depressed or Hopeless 1-->several days   PHQ-9: Brief Depression Severity Measure Score 1       Health Habits and Functional and Cognitive Screening:  Functional & Cognitive Status 2023   Do you have difficulty preparing food and eating? No   Do you have difficulty bathing yourself, getting dressed or grooming yourself? Yes   Do you have difficulty using the toilet? Yes   Do you have difficulty moving around from place to place? Yes   Do you " have trouble with steps or getting out of a bed or a chair? Yes   Current Diet Well Balanced Diet   Dental Exam Unknown        Dental Exam Comment -   Eye Exam Not up to date        Eye Exam Comment -   Exercise (times per week) 0 times per week   Current Exercises Include No Regular Exercise   Current Exercise Activities Include -   Do you need help using the phone?  No   Are you deaf or do you have serious difficulty hearing?  Yes   Do you need help with transportation? No   Do you need help shopping? Yes   Do you need help preparing meals?  Yes   Do you need help with housework?  Yes   Do you need help with laundry? No   Do you need help taking your medications? No   Do you need help managing money? No   Do you ever drive or ride in a car without wearing a seat belt? No   Have you felt unusual stress, anger or loneliness in the last month? No   Who do you live with? Child   If you need help, do you have trouble finding someone available to you? No   Have you been bothered in the last four weeks by sexual problems? No   Do you have difficulty concentrating, remembering or making decisions? No       Visual Acuity:    No results found.    Age-appropriate Screening Schedule:  Refer to the list below for future screening recommendations based on patient's age, sex and/or medical conditions. Orders for these recommended tests are listed in the plan section. The patient has been provided with a written plan.    Health Maintenance   Topic Date Due   • INFLUENZA VACCINE  03/31/2023 (Originally 8/1/2022)   • ZOSTER VACCINE (2 of 2) 06/05/2024 (Originally 12/20/2016)   • LIPID PANEL  02/16/2023   • DXA SCAN  06/03/2024   • TDAP/TD VACCINES (2 - Td or Tdap) 10/25/2026   • MAMMOGRAM  Discontinued        CMS Preventative Services Quick Reference  Risk Factors Identified During Encounter    None Identified  The above risks/problems have been discussed with the patient.  Pertinent information has been shared with the patient in  the After Visit Summary.  Follow up plans and orders are seen below in the Assessment/Plan Section.    Diagnoses and all orders for this visit:    1. Paroxysmal atrial fibrillation (HCC) (Primary)  -     CBC & Differential  -     Comprehensive Metabolic Panel    2. Peripheral arterial occlusive disease (HCC)    3. Primary hypertension    4. Mixed hyperlipidemia  -     Lipid Panel    5. Chronic coronary artery disease    6. Vitamin D deficiency  -     Vitamin D,25-Hydroxy    7. Other specified hypothyroidism    8. Gastroesophageal reflux disease with esophagitis without hemorrhage    9. Current mild episode of major depressive disorder without prior episode (HCC)    10. Anxiety    11. Right hip pain    12. Memory loss    13. Spinal stenosis, unspecified spinal region    14. Multiple pulmonary nodules    15. Panlobular emphysema (HCC)    16. Medicare annual wellness visit, subsequent  -     TSH  -     Hemoglobin A1c  -     Iron Profile        Follow Up:   Initial Medicare Visit in one year    An After Visit Summary and PPPS were made available to the patient.    Dysphagia and anemia resolved. dexa normal     3 mo follow up

## 2023-02-06 RX ORDER — DOXYCYCLINE HYCLATE 50 MG/1
CAPSULE, GELATIN COATED ORAL
Qty: 30 TABLET | Refills: 2 | Status: SHIPPED | OUTPATIENT
Start: 2023-02-06

## 2023-02-06 NOTE — TELEPHONE ENCOUNTER
Rx Refill Note  Requested Prescriptions     Pending Prescriptions Disp Refills   • ferrous gluconate (FERGON) 324 MG tablet [Pharmacy Med Name: FERROUS GLUCONATE 324 MG TAB] 30 tablet 0     Sig: TAKE 1 TABLET BY MOUTH EVERY DAY WITH BREAKFAST      Last office visit with prescribing clinician: 2/2/2023   Last telemedicine visit with prescribing clinician:  Next office visit with prescribing clinician: 5/2/2023    Last iron labs was in 8/19/20 they were ABNORMAL  Pt has active labs for Iron      Piper Sher MA  02/06/23, 11:19 EST

## 2023-02-24 RX ORDER — METOPROLOL TARTRATE 50 MG/1
TABLET, FILM COATED ORAL
Qty: 180 TABLET | Refills: 1 | Status: SHIPPED | OUTPATIENT
Start: 2023-02-24

## 2023-02-24 NOTE — TELEPHONE ENCOUNTER
Rx Refill Note  Requested Prescriptions     Pending Prescriptions Disp Refills   • metoprolol tartrate (LOPRESSOR) 50 MG tablet [Pharmacy Med Name: METOPROLOL TARTRATE 50 MG TAB] 180 tablet 0     Sig: TAKE 1 TABLET BY MOUTH 2 TIMES A DAY      Last office visit with prescribing clinician: 2/2/2023   Last telemedicine visit with prescribing clinician: 5/2/2023   Next office visit with prescribing clinician: 5/2/2023                         Would you like a call back once the refill request has been completed: [] Yes [] No    If the office needs to give you a call back, can they leave a voicemail: [] Yes [] No    Adriana Bro LPN  02/24/23, 15:39 EST

## 2023-03-06 DIAGNOSIS — I10 PRIMARY HYPERTENSION: ICD-10-CM

## 2023-03-06 RX ORDER — AMLODIPINE BESYLATE 2.5 MG/1
TABLET ORAL
Qty: 90 TABLET | Refills: 3 | Status: SHIPPED | OUTPATIENT
Start: 2023-03-06

## 2023-03-06 NOTE — TELEPHONE ENCOUNTER
Rx Refill Note  Requested Prescriptions     Pending Prescriptions Disp Refills   • amLODIPine (NORVASC) 2.5 MG tablet [Pharmacy Med Name: AMLODIPINE BESYLATE 2.5 MG TAB] 30 tablet 0     Sig: TAKE ONE TABLET BY MOUTH EVERY DAY      Last office visit with prescribing clinician: 2/2/2023     Next office visit with prescribing clinician: 3/8/2023                         Would you like a call back once the refill request has been completed: [] Yes [] No    If the office needs to give you a call back, can they leave a voicemail: [] Yes [] No    Mya Alonzo MA  03/06/23, 17:21 EST

## 2023-03-07 DIAGNOSIS — I10 PRIMARY HYPERTENSION: ICD-10-CM

## 2023-03-08 ENCOUNTER — OFFICE VISIT (OUTPATIENT)
Dept: INTERNAL MEDICINE | Facility: CLINIC | Age: 88
End: 2023-03-08
Payer: MEDICARE

## 2023-03-08 VITALS
TEMPERATURE: 96.9 F | OXYGEN SATURATION: 95 % | HEIGHT: 64 IN | HEART RATE: 61 BPM | WEIGHT: 140 LBS | BODY MASS INDEX: 23.9 KG/M2

## 2023-03-08 DIAGNOSIS — K83.8 DILATED CBD, ACQUIRED: ICD-10-CM

## 2023-03-08 DIAGNOSIS — R10.11 RIGHT UPPER QUADRANT ABDOMINAL PAIN: Primary | ICD-10-CM

## 2023-03-08 PROCEDURE — 99213 OFFICE O/P EST LOW 20 MIN: CPT | Performed by: INTERNAL MEDICINE

## 2023-03-08 RX ORDER — CIPROFLOXACIN 500 MG/1
TABLET, FILM COATED ORAL
COMMUNITY
Start: 2023-03-02

## 2023-03-08 NOTE — PROGRESS NOTES
Subjective   Mi Tejeda is a 87 y.o. female.     Chief Complaint   Patient presents with   • Hospital Follow Up Visit     [facility]    • Abdominal Pain       History of Present Illness   1 week ago went to [facility]. Right side pain dx'd CBD stone pending procedure patient declined then . Pain resolved after abx. No more n/v/d/c. Appetite normal now no fever chills.  Patient declines MRCP due to history of a CABG.  Patient is also hesitated to consent ERCP due to complication.    Current Outpatient Medications:   •  albuterol sulfate HFA (ProAir HFA) 108 (90 Base) MCG/ACT inhaler, Inhale 2 puffs Every 4 (Four) Hours As Needed for Wheezing or Shortness of Air., Disp: 54 g, Rfl: 3  •  amLODIPine (NORVASC) 2.5 MG tablet, TAKE ONE TABLET BY MOUTH EVERY DAY, Disp: 90 tablet, Rfl: 3  •  aspirin 81 MG tablet, Take 1 tablet by mouth Daily., Disp: 30 tablet, Rfl: 11  •  baclofen (LIORESAL) 10 MG tablet, TAKE ONE TABLET BY MOUTH AT NIGHT AS NEEDED FOR MUSCLE SPAMS, Disp: 30 tablet, Rfl: 0  •  CALCIUM PO, Take  by mouth Daily., Disp: , Rfl:   •  Cholecalciferol (VITAMIN D3) 125 MCG (5000 UT) capsule capsule, TAKE ONE CAPSULE BY MOUTH EVERY DAY, Disp: 100 capsule, Rfl: 3  •  ferrous gluconate (FERGON) 324 MG tablet, TAKE 1 TABLET BY MOUTH EVERY DAY WITH BREAKFAST, Disp: 30 tablet, Rfl: 2  •  HYDROcodone-acetaminophen (NORCO) 7.5-325 MG per tablet, Take 1 tablet by mouth 2 (Two) Times a Day As Needed for Mild Pain or Moderate Pain., Disp: , Rfl:   •  levothyroxine (SYNTHROID, LEVOTHROID) 88 MCG tablet, TAKE 1 TABLET BY MOUTH EVERY DAY, Disp: 90 tablet, Rfl: 1  •  losartan (COZAAR) 100 MG tablet, TAKE 1 TABLET BY MOUTH EVERY DAY, Disp: 90 tablet, Rfl: 3  •  metoprolol tartrate (LOPRESSOR) 50 MG tablet, TAKE 1 TABLET BY MOUTH 2 TIMES A DAY, Disp: 180 tablet, Rfl: 1  •  morphine (MS CONTIN) 30 MG 12 hr tablet, Take 1 tablet by mouth 2 (Two) Times a Day., Disp: , Rfl:   •  mupirocin (BACTROBAN) 2 % ointment, As Needed.,  Disp: , Rfl:   •  Narcan 4 MG/0.1ML nasal spray, , Disp: , Rfl:   •  omeprazole (priLOSEC) 40 MG capsule, Take 1 capsule by mouth Daily., Disp: 30 capsule, Rfl: 2  •  polyethylene glycol (MiraLax) 17 GM/SCOOP powder, Take 17 g by mouth Daily., Disp: 510 g, Rfl: 2  •  sertraline (Zoloft) 25 MG tablet, Take 1 tablet by mouth Daily., Disp: 30 tablet, Rfl: 1  •  tiotropium bromide-olodaterol (STIOLTO RESPIMAT) 2.5-2.5 MCG/ACT aerosol solution inhaler, Inhale 2 puffs Daily., Disp: 1 each, Rfl: 5  •  vitamin C (ASCORBIC ACID) 500 MG tablet, Take 1 tablet by mouth Daily., Disp: , Rfl:   •  ciprofloxacin (CIPRO) 500 MG tablet, , Disp: , Rfl:     The following portions of the patient's history were reviewed and updated as appropriate: allergies, current medications, past family history, past medical history, past social history, past surgical history and problem list.    Review of Systems   Constitutional: Negative.    Eyes:        No jaundice   Respiratory: Negative.    Cardiovascular: Negative.    Gastrointestinal: Negative.  Negative for abdominal pain, constipation, diarrhea, nausea and vomiting.   Skin: Negative.    Psychiatric/Behavioral: Negative.        Objective   Physical Exam  Constitutional:       Appearance: She is well-developed.   Cardiovascular:      Rate and Rhythm: Normal rate and regular rhythm.      Heart sounds: Normal heart sounds.   Pulmonary:      Effort: Pulmonary effort is normal.      Breath sounds: Normal breath sounds.   Abdominal:      General: Bowel sounds are normal. There is no distension.      Palpations: Abdomen is soft.      Tenderness: There is no abdominal tenderness. There is no guarding or rebound.      Hernia: No hernia is present.   Musculoskeletal:      Cervical back: Neck supple.   Neurological:      Mental Status: She is alert and oriented to person, place, and time.   Psychiatric:         Behavior: Behavior normal.         All tests have been reviewed.    Assessment & Plan    Diagnoses and all orders for this visit:    Right upper quadrant abdominal pain resolved status post antibiotics  -     CBC & Differential  -     Comprehensive Metabolic Panel    Dilated cbd, acquired per patient unknown reason patient did have history of cholecystectomy.  Patient states  Thought dilated CBD was more than cholecystectomy could cause    do lab and obtain record we have no any records from the hospital [facility].    Patient to discuss with dr franco to follow up ? ERCP since patient refuses MRCP

## 2023-03-10 RX ORDER — AMLODIPINE BESYLATE 2.5 MG/1
TABLET ORAL
Qty: 30 TABLET | Refills: 0 | OUTPATIENT
Start: 2023-03-10

## 2023-03-31 DIAGNOSIS — J43.8 OTHER EMPHYSEMA: ICD-10-CM

## 2023-03-31 RX ORDER — ALBUTEROL SULFATE 90 UG/1
AEROSOL, METERED RESPIRATORY (INHALATION)
Qty: 18 G | Refills: 0 | Status: SHIPPED | OUTPATIENT
Start: 2023-03-31

## 2023-04-10 DIAGNOSIS — E03.8 OTHER SPECIFIED HYPOTHYROIDISM: ICD-10-CM

## 2023-04-10 RX ORDER — LEVOTHYROXINE SODIUM 88 UG/1
TABLET ORAL
Qty: 90 TABLET | Refills: 1 | Status: SHIPPED | OUTPATIENT
Start: 2023-04-10

## 2023-04-15 DIAGNOSIS — J43.8 OTHER EMPHYSEMA: ICD-10-CM

## 2023-04-17 ENCOUNTER — TELEPHONE (OUTPATIENT)
Dept: INTERNAL MEDICINE | Facility: CLINIC | Age: 88
End: 2023-04-17
Payer: MEDICARE

## 2023-04-17 NOTE — TELEPHONE ENCOUNTER
Caller: Mi Tejeda    Relationship: Self    Best call back number: [phone]     What medication are you requesting: ANTIBIOTIC, DIERETIC FOR SWELLING IN FEET    What are your current symptoms: FEVER, SWELLING IN THE FEET    How long have you been experiencing symptoms: SINCE Thursday NIGHT    Have you had these symptoms before:    [] Yes  [] No    Have you been treated for these symptoms before:   [] Yes  [] No    If a prescription is needed, what is your preferred pharmacy and phone number:  [place] [address] - [phone]  - [phone]         Additional notes:

## 2023-04-18 RX ORDER — ALBUTEROL SULFATE 90 UG/1
AEROSOL, METERED RESPIRATORY (INHALATION)
Qty: 18 G | Refills: 0 | Status: SHIPPED | OUTPATIENT
Start: 2023-04-18

## 2023-04-18 NOTE — TELEPHONE ENCOUNTER
Rx Refill Note  Requested Prescriptions     Pending Prescriptions Disp Refills   • albuterol sulfate  (90 Base) MCG/ACT inhaler [Pharmacy Med Name: ALBUTEROL SUL HFA 90 MCG INH] 18 g 0     Sig: INHALE 2 PUFFS EVERY 4 HOURS AS NEEDED FOR WHEEZING OR SHORTNESS OF AIR      Last office visit with prescribing clinician: 3/8/2023   Last telemedicine visit with prescribing clinician: 4/17/2023   Next office visit with prescribing clinician: 4/17/2023                         Would you like a call back once the refill request has been completed: [] Yes [] No    If the office needs to give you a call back, can they leave a voicemail: [] Yes [] No    Mya Alonzo MA  04/18/23, 12:09 EDT

## 2023-04-24 ENCOUNTER — OFFICE VISIT (OUTPATIENT)
Dept: ORTHOPEDIC SURGERY | Facility: CLINIC | Age: 88
End: 2023-04-24
Payer: MEDICARE

## 2023-04-24 VITALS
SYSTOLIC BLOOD PRESSURE: 162 MMHG | WEIGHT: 140 LBS | HEIGHT: 64 IN | BODY MASS INDEX: 23.9 KG/M2 | DIASTOLIC BLOOD PRESSURE: 58 MMHG | HEART RATE: 68 BPM

## 2023-04-24 DIAGNOSIS — G89.29 CHRONIC RIGHT SHOULDER PAIN: Primary | ICD-10-CM

## 2023-04-24 DIAGNOSIS — M16.11 PRIMARY OSTEOARTHRITIS OF RIGHT HIP: ICD-10-CM

## 2023-04-24 DIAGNOSIS — M25.511 CHRONIC RIGHT SHOULDER PAIN: Primary | ICD-10-CM

## 2023-04-24 DIAGNOSIS — Z96.642 HISTORY OF TOTAL HIP REPLACEMENT, LEFT: ICD-10-CM

## 2023-04-24 DIAGNOSIS — M75.51 SUBACROMIAL BURSITIS OF RIGHT SHOULDER JOINT: ICD-10-CM

## 2023-04-24 DIAGNOSIS — M25.551 ARTHRALGIA OF RIGHT HIP: ICD-10-CM

## 2023-04-24 RX ORDER — HYDROCODONE BITARTRATE AND ACETAMINOPHEN 10; 325 MG/1; MG/1
TABLET ORAL
COMMUNITY
Start: 2023-03-29

## 2023-04-24 RX ADMIN — LIDOCAINE HYDROCHLORIDE 2 ML: 20 INJECTION, SOLUTION INFILTRATION; PERINEURAL at 14:30

## 2023-04-24 RX ADMIN — METHYLPREDNISOLONE ACETATE 40 MG: 40 INJECTION, SUSPENSION INTRA-ARTICULAR; INTRALESIONAL; INTRAMUSCULAR; SOFT TISSUE at 14:30

## 2023-04-24 NOTE — PROGRESS NOTES
Subjective   Mi Tejeda is a 87 y.o. female is here today for injection therapy.  Follow-up and Injections of the Right Shoulder (Requesting repeat injection, last one 1/23/23 provided significant relief)      CHIEF COMPLAINT:    Here for repeat right shoulder injection therapy    History of Present Illness     Since last injection, patient notes substantial relief of symptoms.  No adverse effects of prior injection.  Patient requests repeat injection.     Patient again mentions wanting to proceed with right total hip replacement.  Again we reviewed that she prefers spinal anesthesia due to chronic COPD.  Together with her daughter this was reviewed again today and also in detail previously as noted in her January, 2023 office visit.  Patient has a history of coronary artery disease and atrial fibrillation and she will require cardiac pre-op evaluation and risk assessment prior to elective total hip replacement.  She states that she has an upcoming appointment with her cardiologist Dr. Wylie.    Past Medical History:   Diagnosis Date   • Abdominal pain     states hx of   • Abnormal electrocardiogram    • Abnormal urinalysis    • Acute UTI    • Anemia    • Anomaly cardiac     REPORTS WILL SHOW ON LEFT VENTRICLE IN A 12 LEAD EKG AS BEING A MI BUT REPORTS SHE HAS NEVER HAD THIS    • Anxiety    • Aphagia     HX OF THIS, REPORTS WAS CLEARED OF A CVA BUT WAS TOLD WAS RELATED TO FATIGUE   • Arthritis    • Asthma    • Atrial fibrillation    • Backache    • Bursitis of right shoulder    • CAD (coronary artery disease)    • Cataract, bilateral     s/p removal   • COPD (chronic obstructive pulmonary disease)    • Depression    • Diverticulitis    • Dysphagia     REPORTS RESOLVED AFTER DILITATION   • Fracture     left wrist, 2 fingers on right hand, toes on right foot, right arm   • Full dentures    • Gastric ulcer    • GERD (gastroesophageal reflux disease)    • Gout    • Heart murmur    • High cholesterol    • History  "of nuclear stress test 2011?   • HTN (hypertension)    • Hypertriglyceridemia    • Hypothyroidism    • Multiple lung nodules    • Osteoarthritis    • Osteoporosis    • Peripheral neuropathy    • Personal history of tobacco use    • Plantar fasciitis     left   • Pneumonia    • Sinus problem    • Sinusitis    • Tear of meniscus of knee     right   • TIA (transient ischemic attack)     denies 5/22/19.  states had aphasia in E.R., but was ruled out.   • Toe pain     \"Calluses were filed out, but is painful\"   • Uterine leiomyoma    • Viral gastroenteritis    • Vitamin D deficiency    • Wears glasses    • Weight loss, non-intentional     80 lb in 5 years        Past Surgical History:   Procedure Laterality Date   • CARDIAC CATHETERIZATION  2007?    no stents   • CARDIAC SURGERY     • CATARACT EXTRACTION Bilateral 2009   • CATARACT EXTRACTION, BILATERAL     • CHOLECYSTECTOMY  2002   • COLONOSCOPY     • CORONARY ARTERY BYPASS GRAFT  1998    2 vessels   • ENDOSCOPY     • ENDOSCOPY N/A 5/3/2017    Procedure: ESOPHAGOGASTRODUODENOSCOPY with biopsies;  Surgeon: Yared Castellanos MD;  Location: [facility] ENDOSCOPY;  Service:    • ENDOSCOPY N/A 5/23/2019    Procedure: ESOPHAGOGASTRODUODENOSCOPY WITH BIOPSY AND BALLOON DILITATION;  Surgeon: Yared Castellanos MD;  Location: [facility] ENDOSCOPY;  Service: Gastroenterology   • MOUTH SURGERY      full extraction   • TOTAL HIP ARTHROPLASTY Left 02/02/2010    PREETI Stanley MD       Allergies   Allergen Reactions   • Ciprofloxacin GI Intolerance     Patient states she is not allergic.   • Hazelnut Nausea Only   • Keflex [Cephalexin] Nausea Only   • Niacin Rash     \"it makes my skin burn\"   • Other Nausea Only     Balsalmic Vinegar     • Oxycodone Dizziness     \"it makes me feel dopey and out of my head\"  NOTE: does tolerate hydrocodone and morphine   • Statins Myalgia   • Vicodin [Hydrocodone-Acetaminophen] Hives     Can take hydrocodone, cannot tolerate VICODIN       Review of Systems " "  Constitutional: Negative for fever.   Musculoskeletal: Positive for arthralgias and gait problem. Negative for joint swelling.   Skin: Negative for color change and rash.   Psychiatric/Behavioral: Negative for confusion.     I have reviewed the medical and surgical history, family history, social history, medications, and/or allergies, and the review of systems of this report.    Objective   /58 (BP Location: Left arm, Patient Position: Sitting, Cuff Size: Adult)   Pulse 68   Ht 162.6 cm (64.02\")   Wt 63.5 kg (140 lb)   BMI 24.02 kg/m²   Physical Exam  Skin exam stable with no erythema, ecchymosis or rash.  No new swelling.  No motor or sensory changes.  Distal pulse intact.  Ortho Exam  Neurologic Exam    Large Joint Arthrocentesis: R subacromial bursa  Date/Time: 4/24/2023 2:30 PM  Consent given by: patient  Site marked: site marked  Timeout: Immediately prior to procedure a time out was called to verify the correct patient, procedure, equipment, support staff and site/side marked as required   Supporting Documentation  Indications: pain   Procedure Details  Location: shoulder - R subacromial bursa  Preparation: Patient was prepped and draped in the usual sterile fashion  Needle size: 22 G  Approach: lateral  Medications administered: 2 mL lidocaine 2%; 40 mg methylPREDNISolone acetate 40 MG/ML  Patient tolerance: patient tolerated the procedure well with no immediate complications        Assessment & Plan     Independent Review of Radiographic Studies:    No new imaging done today.    Laboratory and Other Studies:  No new results reviewed today.     Medical Decision Making:    No complications of procedure and treatments.  Fair progress with residual symptoms.   Continue care plan with any additional work-up and treatment as outlined in recommendations.    Diagnoses and all orders for this visit:    1. Chronic right shoulder pain (Primary)  -     Large Joint Arthrocentesis: R subacromial bursa    2. " Subacromial bursitis of right shoulder joint  -     Large Joint Arthrocentesis: R subacromial bursa    3. Arthralgia of right hip    4. Primary osteoarthritis of right hip    5. History of total hip replacement, left      Discussion of orthopaedic goals and activities and patient expressed appreciation.  Guided on proper techniques for mobility, strength, agility and/or conditioning exercises  Ice, heat, and/or modalities as beneficial  Watch for signs and symptoms of infection  Call or notify for any adverse effect from injection therapy    Recommendations/Plan:  Brace: No brace was given at today's visit.  Referral: No referrals made at today's visit.  Test/Studies: No additional studies ordered at this time.  Work/Activity Status: Usual activities, routine exercise as tolerated, no strenuous activity.    Return in about 3 months (around 7/24/2023) for Recheck.  Patient and daughter are encouraged and agreeable to call or return sooner for any issues or concerns.

## 2023-04-26 RX ORDER — LIDOCAINE HYDROCHLORIDE 20 MG/ML
2 INJECTION, SOLUTION INFILTRATION; PERINEURAL
Status: COMPLETED | OUTPATIENT
Start: 2023-04-24 | End: 2023-04-24

## 2023-04-26 RX ORDER — METHYLPREDNISOLONE ACETATE 40 MG/ML
40 INJECTION, SUSPENSION INTRA-ARTICULAR; INTRALESIONAL; INTRAMUSCULAR; SOFT TISSUE
Status: COMPLETED | OUTPATIENT
Start: 2023-04-24 | End: 2023-04-24

## 2023-04-27 DIAGNOSIS — J43.8 OTHER EMPHYSEMA: ICD-10-CM

## 2023-04-27 RX ORDER — ALBUTEROL SULFATE 90 UG/1
AEROSOL, METERED RESPIRATORY (INHALATION)
Qty: 18 G | Refills: 0 | Status: SHIPPED | OUTPATIENT
Start: 2023-04-27

## 2023-04-27 RX ORDER — ALBUTEROL SULFATE 90 UG/1
AEROSOL, METERED RESPIRATORY (INHALATION)
Qty: 18 G | Refills: 0 | OUTPATIENT
Start: 2023-04-27

## 2023-04-27 NOTE — TELEPHONE ENCOUNTER
Rx Refill Note  Requested Prescriptions     Pending Prescriptions Disp Refills   • albuterol sulfate  (90 Base) MCG/ACT inhaler [Pharmacy Med Name: ALBUTEROL SUL HFA 90 MCG INH] 18 g 0     Sig: INHALE 2 PUFFS EVERY 4 HOURS AS NEEDED FOR WHEEZING OR SHORTNESS OF AIR      Last office visit with prescribing clinician: 3/8/2023   Next office visit with prescribing clinician: 5/17/2023    Was signed 4/27/23   Will deny for DUPLICATE       Piper Sher MA  04/27/23, 12:18 EDT

## 2023-05-10 ENCOUNTER — HOSPITAL ENCOUNTER (OUTPATIENT)
Dept: CT IMAGING | Facility: HOSPITAL | Age: 88
Discharge: HOME | End: 2023-05-10
Admitting: INTERNAL MEDICINE
Payer: MEDICARE

## 2023-05-10 PROCEDURE — 71250 CT THORAX DX C-: CPT

## 2023-05-12 LAB
25(OH)D3+25(OH)D2 SERPL-MCNC: 38.3 NG/ML (ref 30–100)
ABN OPTION 3: NORMAL
ALBUMIN SERPL-MCNC: 3.3 G/DL (ref 3.5–5.2)
ALBUMIN/GLOB SERPL: 1.1 G/DL
ALP SERPL-CCNC: 125 U/L (ref 39–117)
ALT SERPL-CCNC: 16 U/L (ref 1–33)
AST SERPL-CCNC: 30 U/L (ref 1–32)
BASOPHILS # BLD AUTO: 0.03 10*3/MM3 (ref 0–0.2)
BASOPHILS NFR BLD AUTO: 0.4 % (ref 0–1.5)
BILIRUB SERPL-MCNC: 0.3 MG/DL (ref 0–1.2)
BUN SERPL-MCNC: 34 MG/DL (ref 8–23)
BUN/CREAT SERPL: 30.6 (ref 7–25)
CALCIUM SERPL-MCNC: 9.5 MG/DL (ref 8.6–10.5)
CHLORIDE SERPL-SCNC: 106 MMOL/L (ref 98–107)
CHOLEST SERPL-MCNC: 162 MG/DL (ref 0–200)
CO2 SERPL-SCNC: 29.6 MMOL/L (ref 22–29)
CREAT SERPL-MCNC: 1.11 MG/DL (ref 0.57–1)
EGFRCR SERPLBLD CKD-EPI 2021: 48.2 ML/MIN/1.73
EOSINOPHIL # BLD AUTO: 0.29 10*3/MM3 (ref 0–0.4)
EOSINOPHIL NFR BLD AUTO: 4 % (ref 0.3–6.2)
ERYTHROCYTE [DISTWIDTH] IN BLOOD BY AUTOMATED COUNT: 13.8 % (ref 12.3–15.4)
GLOBULIN SER CALC-MCNC: 3.1 GM/DL
GLUCOSE SERPL-MCNC: 95 MG/DL (ref 65–99)
HCT VFR BLD AUTO: 31.3 % (ref 34–46.6)
HDLC SERPL-MCNC: 31 MG/DL (ref 40–60)
HGB BLD-MCNC: 10 G/DL (ref 12–15.9)
IMM GRANULOCYTES # BLD AUTO: 0.03 10*3/MM3 (ref 0–0.05)
IMM GRANULOCYTES NFR BLD AUTO: 0.4 % (ref 0–0.5)
IRON SATN MFR SERPL: 45 % (ref 20–50)
IRON SERPL-MCNC: 194 MCG/DL (ref 37–145)
LDLC SERPL CALC-MCNC: 105 MG/DL (ref 0–100)
LYMPHOCYTES # BLD AUTO: 1.88 10*3/MM3 (ref 0.7–3.1)
LYMPHOCYTES NFR BLD AUTO: 26 % (ref 19.6–45.3)
MCH RBC QN AUTO: 27.9 PG (ref 26.6–33)
MCHC RBC AUTO-ENTMCNC: 31.9 G/DL (ref 31.5–35.7)
MCV RBC AUTO: 87.4 FL (ref 79–97)
MONOCYTES # BLD AUTO: 0.7 10*3/MM3 (ref 0.1–0.9)
MONOCYTES NFR BLD AUTO: 9.7 % (ref 5–12)
NEUTROPHILS # BLD AUTO: 4.3 10*3/MM3 (ref 1.7–7)
NEUTROPHILS NFR BLD AUTO: 59.5 % (ref 42.7–76)
NRBC BLD AUTO-RTO: 0 /100 WBC (ref 0–0.2)
PLATELET # BLD AUTO: 231 10*3/MM3 (ref 140–450)
POTASSIUM SERPL-SCNC: 4.9 MMOL/L (ref 3.5–5.2)
PROT SERPL-MCNC: 6.4 G/DL (ref 6–8.5)
RBC # BLD AUTO: 3.58 10*6/MM3 (ref 3.77–5.28)
SODIUM SERPL-SCNC: 144 MMOL/L (ref 136–145)
TIBC SERPL-MCNC: 429 MCG/DL
TRIGL SERPL-MCNC: 148 MG/DL (ref 0–150)
TSH SERPL DL<=0.005 MIU/L-ACNC: 3.17 UIU/ML (ref 0.27–4.2)
UIBC SERPL-MCNC: 235 MCG/DL (ref 112–346)
VLDLC SERPL CALC-MCNC: 26 MG/DL (ref 5–40)
WBC # BLD AUTO: 7.23 10*3/MM3 (ref 3.4–10.8)

## 2023-05-15 DIAGNOSIS — J43.8 OTHER EMPHYSEMA: ICD-10-CM

## 2023-05-15 RX ORDER — ALBUTEROL SULFATE 90 UG/1
AEROSOL, METERED RESPIRATORY (INHALATION)
Qty: 18 G | Refills: 1 | Status: SHIPPED | OUTPATIENT
Start: 2023-05-15

## 2023-05-15 NOTE — TELEPHONE ENCOUNTER
Rx Refill Note  Requested Prescriptions     Pending Prescriptions Disp Refills   • albuterol sulfate  (90 Base) MCG/ACT inhaler [Pharmacy Med Name: ALBUTEROL SUL HFA 90 MCG INH] 18 g 0     Sig: INHALE 2 PUFFS EVERY 4 HOURS AS NEEDED FOR WHEEZING OR SHORTNESS OF AIR      Last office visit with prescribing clinician: 3/8/2023  Next office visit with prescribing clinician: 5/17/2023   {    Piper Sher MA  05/15/23, 09:40 EDT

## 2023-05-17 ENCOUNTER — OFFICE VISIT (OUTPATIENT)
Dept: INTERNAL MEDICINE | Facility: CLINIC | Age: 88
End: 2023-05-17
Payer: MEDICARE

## 2023-05-17 VITALS
HEIGHT: 64 IN | BODY MASS INDEX: 24.07 KG/M2 | WEIGHT: 141 LBS | HEART RATE: 65 BPM | OXYGEN SATURATION: 95 % | DIASTOLIC BLOOD PRESSURE: 78 MMHG | TEMPERATURE: 98 F | SYSTOLIC BLOOD PRESSURE: 140 MMHG

## 2023-05-17 DIAGNOSIS — R50.9 FEVER, UNSPECIFIED FEVER CAUSE: Primary | ICD-10-CM

## 2023-05-17 DIAGNOSIS — K83.8 COMMON BILE DUCT DILATION: ICD-10-CM

## 2023-05-17 DIAGNOSIS — R60.0 BILATERAL LEG EDEMA: ICD-10-CM

## 2023-05-17 DIAGNOSIS — D64.9 ANEMIA, UNSPECIFIED TYPE: ICD-10-CM

## 2023-05-17 LAB
BILIRUB BLD-MCNC: NEGATIVE MG/DL
CLARITY, POC: CLEAR
COLOR UR: YELLOW
EXPIRATION DATE: ABNORMAL
EXPIRATION DATE: NORMAL
FLUAV AG UPPER RESP QL IA.RAPID: NOT DETECTED
FLUBV AG UPPER RESP QL IA.RAPID: NOT DETECTED
GLUCOSE UR STRIP-MCNC: NEGATIVE MG/DL
INTERNAL CONTROL: NORMAL
KETONES UR QL: NEGATIVE
LEUKOCYTE EST, POC: NEGATIVE
Lab: ABNORMAL
Lab: NORMAL
NITRITE UR-MCNC: NEGATIVE MG/ML
PH UR: 6 [PH] (ref 5–8)
PROT UR STRIP-MCNC: ABNORMAL MG/DL
RBC # UR STRIP: NEGATIVE /UL
SARS-COV-2 AG UPPER RESP QL IA.RAPID: NOT DETECTED
SP GR UR: 1.02 (ref 1–1.03)
UROBILINOGEN UR QL: NORMAL

## 2023-05-17 PROCEDURE — 1159F MED LIST DOCD IN RCRD: CPT | Performed by: INTERNAL MEDICINE

## 2023-05-17 PROCEDURE — 1160F RVW MEDS BY RX/DR IN RCRD: CPT | Performed by: INTERNAL MEDICINE

## 2023-05-17 PROCEDURE — 99214 OFFICE O/P EST MOD 30 MIN: CPT | Performed by: INTERNAL MEDICINE

## 2023-05-17 PROCEDURE — 81003 URINALYSIS AUTO W/O SCOPE: CPT | Performed by: INTERNAL MEDICINE

## 2023-05-17 PROCEDURE — 87428 SARSCOV & INF VIR A&B AG IA: CPT | Performed by: INTERNAL MEDICINE

## 2023-05-17 NOTE — PROGRESS NOTES
Subjective   Mi Tejeda is a 87 y.o. female.     Chief Complaint   Patient presents with   • Atrial Fibrillation   • Hypertension   • Fever       History of Present Illness   Patient complains of fever at home for 2 to 3 weeks 100 point 2 in the evening.  Patient denies any urinary symptoms or upper respiratory symptoms.  Patient states last week due to more activities both lower extremities edema and that this week is a lot better.  Still complains of right upper quadrant pain comes and goes.  Patient did have a common bile duct dilation history of a refusing to have a MRCP.  Blood pressure stable on medication atrial fibrillation stable on medication.  Patient is also anemic on blood tests are not is very high    Current Outpatient Medications:   •  albuterol sulfate  (90 Base) MCG/ACT inhaler, INHALE 2 PUFFS EVERY 4 HOURS AS NEEDED FOR WHEEZING OR SHORTNESS OF AIR, Disp: 18 g, Rfl: 1  •  amLODIPine (NORVASC) 2.5 MG tablet, TAKE ONE TABLET BY MOUTH EVERY DAY, Disp: 90 tablet, Rfl: 3  •  aspirin 81 MG tablet, Take 1 tablet by mouth Daily., Disp: 30 tablet, Rfl: 11  •  baclofen (LIORESAL) 10 MG tablet, TAKE ONE TABLET BY MOUTH AT NIGHT AS NEEDED FOR MUSCLE SPAMS, Disp: 30 tablet, Rfl: 0  •  CALCIUM PO, Take  by mouth Daily., Disp: , Rfl:   •  Cholecalciferol (VITAMIN D3) 125 MCG (5000 UT) capsule capsule, TAKE ONE CAPSULE BY MOUTH EVERY DAY, Disp: 100 capsule, Rfl: 3  •  ferrous gluconate (FERGON) 324 MG tablet, TAKE 1 TABLET BY MOUTH EVERY DAY WITH BREAKFAST, Disp: 30 tablet, Rfl: 2  •  HYDROcodone-acetaminophen (NORCO)  MG per tablet, , Disp: , Rfl:   •  levothyroxine (SYNTHROID, LEVOTHROID) 88 MCG tablet, TAKE 1 TABLET BY MOUTH EVERY DAY, Disp: 90 tablet, Rfl: 1  •  losartan (COZAAR) 100 MG tablet, TAKE 1 TABLET BY MOUTH EVERY DAY, Disp: 90 tablet, Rfl: 3  •  metoprolol tartrate (LOPRESSOR) 50 MG tablet, TAKE 1 TABLET BY MOUTH 2 TIMES A DAY, Disp: 180 tablet, Rfl: 1  •  morphine (MS CONTIN) 30  MG 12 hr tablet, Take 1 tablet by mouth 2 (Two) Times a Day., Disp: , Rfl:   •  Narcan 4 MG/0.1ML nasal spray, , Disp: , Rfl:   •  omeprazole (priLOSEC) 40 MG capsule, Take 1 capsule by mouth Daily., Disp: 30 capsule, Rfl: 2  •  polyethylene glycol (MiraLax) 17 GM/SCOOP powder, Take 17 g by mouth Daily., Disp: 510 g, Rfl: 2  •  sertraline (Zoloft) 25 MG tablet, Take 1 tablet by mouth Daily., Disp: 30 tablet, Rfl: 1  •  tiotropium bromide-olodaterol (STIOLTO RESPIMAT) 2.5-2.5 MCG/ACT aerosol solution inhaler, Inhale 2 puffs Daily., Disp: 1 each, Rfl: 5  •  vitamin C (ASCORBIC ACID) 500 MG tablet, Take 1 tablet by mouth Daily., Disp: , Rfl:     The following portions of the patient's history were reviewed and updated as appropriate: allergies, current medications, past family history, past medical history, past social history, past surgical history and problem list.    Review of Systems   Constitutional: Negative.    Respiratory: Negative.    Cardiovascular: Negative.    Gastrointestinal: Positive for abdominal pain.   Musculoskeletal: Negative.    Skin: Negative.    Neurological: Negative.    Psychiatric/Behavioral: Negative.        Objective   Physical Exam  Cardiovascular:      Rate and Rhythm: Normal rate and regular rhythm.      Heart sounds: Normal heart sounds.   Pulmonary:      Effort: Pulmonary effort is normal.      Breath sounds: Normal breath sounds.   Abdominal:      General: Bowel sounds are normal.      Tenderness: There is abdominal tenderness (baseline abd tenderness for years per patient ).   Musculoskeletal:      Cervical back: Neck supple.   Skin:     General: Skin is warm.   Neurological:      Mental Status: She is alert and oriented to person, place, and time.         All tests have been reviewed.    Assessment & Plan   Diagnoses and all orders for this visit:    Fever, unspecified fever cause urine test unremarkable COVID test flu test negative.  Family is going to double check to make sure the  thermometer is accurate and that will let us know.    Bilateral leg edema improved at this week asked patient to elevate legs.    Common bile duct dilation  -     Cancel: Ambulatory Referral to Gastroenterology  -     Cancel: Ambulatory Referral to Gastroenterology  -     Ambulatory Referral to Gastroenterology  -     POCT urinalysis dipstick, automated    Anemia, unspecified type  -     C-reactive Protein  -     Sedimentation Rate  -     CBC & Differential  -     Folate  -     Haptoglobin  -     Lactate Dehydrogenase  -     Reticulocytes  -     TSH  -     Vitamin B12  -     Ambulatory Referral to Gastroenterology    d/c iron pill     1 months follow-up

## 2023-05-20 LAB
BASOPHILS # BLD AUTO: 0.04 10*3/MM3 (ref 0–0.2)
BASOPHILS NFR BLD AUTO: 0.6 % (ref 0–1.5)
CRP SERPL-MCNC: 0.44 MG/DL (ref 0–0.5)
EOSINOPHIL # BLD AUTO: 0.43 10*3/MM3 (ref 0–0.4)
EOSINOPHIL NFR BLD AUTO: 6.1 % (ref 0.3–6.2)
ERYTHROCYTE [DISTWIDTH] IN BLOOD BY AUTOMATED COUNT: 14.3 % (ref 12.3–15.4)
ERYTHROCYTE [SEDIMENTATION RATE] IN BLOOD BY WESTERGREN METHOD: 37 MM/HR (ref 0–30)
FOLATE SERPL-MCNC: >20 NG/ML (ref 4.78–24.2)
HAPTOGLOB SERPL-MCNC: 182 MG/DL (ref 41–333)
HCT VFR BLD AUTO: 32 % (ref 34–46.6)
HGB BLD-MCNC: 10.3 G/DL (ref 12–15.9)
IMM GRANULOCYTES # BLD AUTO: 0.03 10*3/MM3 (ref 0–0.05)
IMM GRANULOCYTES NFR BLD AUTO: 0.4 % (ref 0–0.5)
LDH SERPL L TO P-CCNC: 145 U/L (ref 135–214)
LYMPHOCYTES # BLD AUTO: 2.37 10*3/MM3 (ref 0.7–3.1)
LYMPHOCYTES NFR BLD AUTO: 33.4 % (ref 19.6–45.3)
MCH RBC QN AUTO: 28.7 PG (ref 26.6–33)
MCHC RBC AUTO-ENTMCNC: 32.2 G/DL (ref 31.5–35.7)
MCV RBC AUTO: 89.1 FL (ref 79–97)
MONOCYTES # BLD AUTO: 0.63 10*3/MM3 (ref 0.1–0.9)
MONOCYTES NFR BLD AUTO: 8.9 % (ref 5–12)
NEUTROPHILS # BLD AUTO: 3.6 10*3/MM3 (ref 1.7–7)
NEUTROPHILS NFR BLD AUTO: 50.6 % (ref 42.7–76)
NRBC BLD AUTO-RTO: 0 /100 WBC (ref 0–0.2)
PLATELET # BLD AUTO: 241 10*3/MM3 (ref 140–450)
RBC # BLD AUTO: 3.59 10*6/MM3 (ref 3.77–5.28)
RETICS/RBC NFR AUTO: 1.68 % (ref 0.7–1.9)
TSH SERPL DL<=0.005 MIU/L-ACNC: 2.69 UIU/ML (ref 0.27–4.2)
VIT B12 SERPL-MCNC: 581 PG/ML (ref 211–946)
WBC # BLD AUTO: 7.1 10*3/MM3 (ref 3.4–10.8)

## 2023-05-24 ENCOUNTER — TELEPHONE (OUTPATIENT)
Dept: CARDIOLOGY | Facility: CLINIC | Age: 88
End: 2023-05-24

## 2023-05-24 NOTE — TELEPHONE ENCOUNTER
DELETE AFTER REVIEWING: Telephone encounter to be sent to the clinical pool     Caller: Christiane Olivarez    Relationship to patient: Emergency Contact    Best call back number: [phone]    Chief complaint: WAIT TIME FOR APPOINTMENT    Type of visit: FU    Requested date: ASAP     If rescheduling, when is the original appointment: 05.24.2023    Additional notes: PATIENT HAS FEVER/CHILLS HAS TO RESCHEDULE 1ST AVAILABLE IS 07.31 (SCHEDULED) REQUESTING SOONER SHE HAS SURGERY ON 06.26.2023 HIP REPLACEMENT. NEEDS TO BE SEEN BEFORE THEN NO OPENING AVAILABLE

## 2023-06-07 ENCOUNTER — PREP FOR SURGERY (OUTPATIENT)
Dept: ORTHOPEDIC SURGERY | Facility: CLINIC | Age: 88
End: 2023-06-07
Payer: MEDICARE

## 2023-06-07 DIAGNOSIS — M16.11 PRIMARY OSTEOARTHRITIS OF RIGHT HIP: ICD-10-CM

## 2023-06-07 DIAGNOSIS — M25.551 ARTHRALGIA OF RIGHT HIP: ICD-10-CM

## 2023-06-07 DIAGNOSIS — Z01.818 PREOP EXAMINATION: Primary | ICD-10-CM

## 2023-06-07 RX ORDER — FAMOTIDINE 10 MG/ML
20 INJECTION, SOLUTION INTRAVENOUS
OUTPATIENT
Start: 2023-06-29 | End: 2023-06-30

## 2023-06-07 RX ORDER — FAMOTIDINE 10 MG/ML
20 INJECTION, SOLUTION INTRAVENOUS
Status: CANCELLED | OUTPATIENT
Start: 2023-06-08 | End: 2023-06-09

## 2023-06-08 PROBLEM — Z01.818 PREOP EXAMINATION: Status: ACTIVE | Noted: 2023-06-08

## 2023-06-13 DIAGNOSIS — I10 PRIMARY HYPERTENSION: ICD-10-CM

## 2023-06-13 RX ORDER — AMLODIPINE BESYLATE 2.5 MG/1
TABLET ORAL
Qty: 90 TABLET | Refills: 3 | Status: SHIPPED | OUTPATIENT
Start: 2023-06-13

## 2023-06-14 ENCOUNTER — OFFICE VISIT (OUTPATIENT)
Dept: INTERNAL MEDICINE | Facility: CLINIC | Age: 88
End: 2023-06-14
Payer: MEDICARE

## 2023-06-14 VITALS
BODY MASS INDEX: 23.9 KG/M2 | WEIGHT: 140 LBS | HEART RATE: 105 BPM | SYSTOLIC BLOOD PRESSURE: 110 MMHG | DIASTOLIC BLOOD PRESSURE: 70 MMHG | TEMPERATURE: 98.2 F | HEIGHT: 64 IN | OXYGEN SATURATION: 95 %

## 2023-06-14 DIAGNOSIS — I10 PRIMARY HYPERTENSION: Primary | ICD-10-CM

## 2023-06-14 DIAGNOSIS — I48.0 PAROXYSMAL ATRIAL FIBRILLATION: ICD-10-CM

## 2023-06-14 DIAGNOSIS — R60.0 BILATERAL LEG EDEMA: ICD-10-CM

## 2023-06-14 DIAGNOSIS — D53.8 OTHER SPECIFIED NUTRITIONAL ANEMIAS: ICD-10-CM

## 2023-06-14 DIAGNOSIS — K83.8 DILATED CBD, ACQUIRED: ICD-10-CM

## 2023-06-14 DIAGNOSIS — E78.2 MIXED HYPERLIPIDEMIA: ICD-10-CM

## 2023-06-14 PROBLEM — Z01.818 PREOP EXAMINATION: Status: RESOLVED | Noted: 2023-06-08 | Resolved: 2023-06-14

## 2023-06-14 PROBLEM — R10.11 RIGHT UPPER QUADRANT ABDOMINAL PAIN: Status: RESOLVED | Noted: 2023-03-08 | Resolved: 2023-06-14

## 2023-06-14 PROCEDURE — 1159F MED LIST DOCD IN RCRD: CPT | Performed by: INTERNAL MEDICINE

## 2023-06-14 PROCEDURE — 1160F RVW MEDS BY RX/DR IN RCRD: CPT | Performed by: INTERNAL MEDICINE

## 2023-06-14 PROCEDURE — 99213 OFFICE O/P EST LOW 20 MIN: CPT | Performed by: INTERNAL MEDICINE

## 2023-06-14 RX ORDER — METOPROLOL TARTRATE 100 MG/1
100 TABLET ORAL 2 TIMES DAILY
COMMUNITY
Start: 2023-05-30

## 2023-06-14 RX ORDER — LEVOFLOXACIN 250 MG/1
250 TABLET ORAL DAILY
COMMUNITY
Start: 2023-05-30

## 2023-06-14 NOTE — PROGRESS NOTES
Subjective   Mi Tejeda is a 87 y.o. female.     Chief Complaint   Patient presents with    Hypertension    Hyperlipidemia    Atrial Fibrillation     Was in ER for Atrial Fibrillation, fever, and body aches       History of Present Illness   Patient here for follow-up.  Recent ER visit for atrial fibrillation.  Rate controlled after Toprol dose increased.  Blood pressure stable on medication hyperlipidemia stable on medication fever resolved leg edema improved.  Anemia still patient is going to see GI doctor and bile duct dilation patient is going to see GI doctor.  Hip pain patient is a seeing orthopedist.    Current Outpatient Medications:     albuterol sulfate  (90 Base) MCG/ACT inhaler, INHALE 2 PUFFS EVERY 4 HOURS AS NEEDED FOR WHEEZING OR SHORTNESS OF AIR, Disp: 18 g, Rfl: 1    amLODIPine (NORVASC) 2.5 MG tablet, TAKE ONE TABLET BY MOUTH EVERY DAY, Disp: 90 tablet, Rfl: 3    aspirin 81 MG tablet, Take 1 tablet by mouth Daily., Disp: 30 tablet, Rfl: 11    baclofen (LIORESAL) 10 MG tablet, TAKE ONE TABLET BY MOUTH AT NIGHT AS NEEDED FOR MUSCLE SPAMS, Disp: 30 tablet, Rfl: 0    CALCIUM PO, Take  by mouth Daily., Disp: , Rfl:     Cholecalciferol (VITAMIN D3) 125 MCG (5000 UT) capsule capsule, TAKE ONE CAPSULE BY MOUTH EVERY DAY, Disp: 100 capsule, Rfl: 3    ferrous gluconate (FERGON) 324 MG tablet, TAKE 1 TABLET BY MOUTH EVERY DAY WITH BREAKFAST, Disp: 30 tablet, Rfl: 2    HYDROcodone-acetaminophen (NORCO)  MG per tablet, , Disp: , Rfl:     levoFLOXacin (LEVAQUIN) 250 MG tablet, Take 1 tablet by mouth Daily., Disp: , Rfl:     levothyroxine (SYNTHROID, LEVOTHROID) 88 MCG tablet, TAKE 1 TABLET BY MOUTH EVERY DAY, Disp: 90 tablet, Rfl: 1    losartan (COZAAR) 100 MG tablet, TAKE 1 TABLET BY MOUTH EVERY DAY, Disp: 90 tablet, Rfl: 3    metoprolol tartrate (LOPRESSOR) 100 MG tablet, Take 1 tablet by mouth 2 (Two) Times a Day., Disp: , Rfl:     metoprolol tartrate (LOPRESSOR) 50 MG tablet, TAKE 1  TABLET BY MOUTH 2 TIMES A DAY, Disp: 180 tablet, Rfl: 1    morphine (MS CONTIN) 30 MG 12 hr tablet, Take 1 tablet by mouth 2 (Two) Times a Day., Disp: , Rfl:     Narcan 4 MG/0.1ML nasal spray, , Disp: , Rfl:     omeprazole (priLOSEC) 40 MG capsule, Take 1 capsule by mouth Daily., Disp: 30 capsule, Rfl: 2    polyethylene glycol (MiraLax) 17 GM/SCOOP powder, Take 17 g by mouth Daily., Disp: 510 g, Rfl: 2    sertraline (Zoloft) 25 MG tablet, Take 1 tablet by mouth Daily., Disp: 30 tablet, Rfl: 1    tiotropium bromide-olodaterol (STIOLTO RESPIMAT) 2.5-2.5 MCG/ACT aerosol solution inhaler, Inhale 2 puffs Daily., Disp: 1 each, Rfl: 5    vitamin C (ASCORBIC ACID) 500 MG tablet, Take 1 tablet by mouth Daily., Disp: , Rfl:     The following portions of the patient's history were reviewed and updated as appropriate: allergies, current medications, past family history, past medical history, past social history, past surgical history, and problem list.    Review of Systems   Constitutional: Negative.    Respiratory: Negative.     Cardiovascular: Negative.    Gastrointestinal: Negative.    Musculoskeletal:  Positive for arthralgias.   Skin: Negative.    Neurological: Negative.    Psychiatric/Behavioral: Negative.       Objective   Physical Exam  Cardiovascular:      Rate and Rhythm: Normal rate and regular rhythm.      Heart sounds: Normal heart sounds.   Pulmonary:      Effort: Pulmonary effort is normal.      Breath sounds: Normal breath sounds.   Abdominal:      General: Bowel sounds are normal.   Musculoskeletal:      Cervical back: Neck supple.   Skin:     General: Skin is warm.   Neurological:      Mental Status: She is alert and oriented to person, place, and time.       All tests have been reviewed.    Assessment & Plan   Diagnoses and all orders for this visit:    Primary hypertension stable amlodipine 5 mg daily continue medication    Fever, resolved      Bilateral leg edema improved at this week asked patient to  elevate legs.    Atrial fibrillation stable on medication continue follow-up with cardiology    Hip arthritis patient is seeing orthopedist possible surgery.     Common bile duct dilation  -     Ambulatory Referral to Gastroenterology  -     POCT urinalysis dipstick, automated     Anemia, lab workup unremarkable   -     Ambulatory Referral to Gastroenterology

## 2023-06-20 PROBLEM — Z01.818 PREOP EXAMINATION: Status: ACTIVE | Noted: 2023-06-08

## 2023-06-21 ENCOUNTER — TELEPHONE (OUTPATIENT)
Dept: PREADMISSION TESTING | Facility: HOSPITAL | Age: 88
End: 2023-06-21

## 2023-06-21 ENCOUNTER — TELEPHONE (OUTPATIENT)
Dept: CARDIOLOGY | Facility: CLINIC | Age: 88
End: 2023-06-21

## 2023-06-21 NOTE — TELEPHONE ENCOUNTER
Caller: DANIELLA REYESMOND    Relationship: Provider    Best call back number: [phone]    What is the best time to reach you: ANY    Who are you requesting to speak with (clinical staff, provider,  specific staff member): CLINICAL      What was the call regarding:[facility] [place] IS TRYING TO GET PT SEEN SOONER, HER NEXT APPT WITH DR. KIMBROUGH IS ON 07.31.23, BUT HER SURGERY IS SCHEDULED FOR 06.29.23. PLEASE REACH OUT TO THEM IF WE CAN MOVE PT UP SOONER.

## 2023-07-31 ENCOUNTER — OFFICE VISIT (OUTPATIENT)
Dept: CARDIOLOGY | Facility: CLINIC | Age: 88
End: 2023-07-31
Payer: MEDICARE

## 2023-07-31 VITALS
HEART RATE: 70 BPM | DIASTOLIC BLOOD PRESSURE: 68 MMHG | WEIGHT: 140 LBS | OXYGEN SATURATION: 95 % | SYSTOLIC BLOOD PRESSURE: 108 MMHG | HEIGHT: 64 IN | BODY MASS INDEX: 23.9 KG/M2

## 2023-07-31 DIAGNOSIS — I25.10 CHRONIC CORONARY ARTERY DISEASE: ICD-10-CM

## 2023-07-31 DIAGNOSIS — I48.0 PAROXYSMAL ATRIAL FIBRILLATION: Primary | ICD-10-CM

## 2023-07-31 DIAGNOSIS — I10 PRIMARY HYPERTENSION: ICD-10-CM

## 2023-07-31 DIAGNOSIS — N18.30 STAGE 3 CHRONIC KIDNEY DISEASE, UNSPECIFIED WHETHER STAGE 3A OR 3B CKD: ICD-10-CM

## 2023-07-31 DIAGNOSIS — I77.9 PERIPHERAL ARTERIAL OCCLUSIVE DISEASE: ICD-10-CM

## 2023-07-31 PROCEDURE — 99214 OFFICE O/P EST MOD 30 MIN: CPT | Performed by: INTERNAL MEDICINE

## 2023-07-31 RX ORDER — AMMONIUM LACTATE 12 G/100G
LOTION TOPICAL
COMMUNITY
Start: 2023-07-25

## 2023-07-31 RX ORDER — DOXYCYCLINE HYCLATE 100 MG
1 TABLET ORAL EVERY 12 HOURS SCHEDULED
COMMUNITY
Start: 2023-07-05

## 2023-07-31 RX ORDER — FUROSEMIDE 20 MG/1
TABLET ORAL
COMMUNITY
Start: 2023-07-24

## 2023-07-31 RX ORDER — SULFAMETHOXAZOLE AND TRIMETHOPRIM 800; 160 MG/1; MG/1
TABLET ORAL
COMMUNITY
Start: 2023-07-24

## 2023-07-31 RX ORDER — POTASSIUM CHLORIDE 1500 MG/1
TABLET, EXTENDED RELEASE ORAL
COMMUNITY
Start: 2023-07-28

## 2023-08-04 ENCOUNTER — TELEPHONE (OUTPATIENT)
Dept: CARDIOLOGY | Facility: CLINIC | Age: 88
End: 2023-08-04
Payer: MEDICARE

## 2023-09-13 ENCOUNTER — OFFICE VISIT (OUTPATIENT)
Dept: CARDIOLOGY | Facility: CLINIC | Age: 88
End: 2023-09-13
Payer: MEDICARE

## 2023-09-13 VITALS
WEIGHT: 140 LBS | HEART RATE: 84 BPM | DIASTOLIC BLOOD PRESSURE: 68 MMHG | BODY MASS INDEX: 23.9 KG/M2 | RESPIRATION RATE: 18 BRPM | SYSTOLIC BLOOD PRESSURE: 118 MMHG | OXYGEN SATURATION: 96 % | HEIGHT: 64 IN

## 2023-09-13 DIAGNOSIS — I25.10 CHRONIC CORONARY ARTERY DISEASE: ICD-10-CM

## 2023-09-13 DIAGNOSIS — I77.9 PERIPHERAL ARTERIAL OCCLUSIVE DISEASE: ICD-10-CM

## 2023-09-13 DIAGNOSIS — I10 PRIMARY HYPERTENSION: ICD-10-CM

## 2023-09-13 DIAGNOSIS — N18.30 STAGE 3 CHRONIC KIDNEY DISEASE, UNSPECIFIED WHETHER STAGE 3A OR 3B CKD: ICD-10-CM

## 2023-09-13 DIAGNOSIS — I48.19 PERSISTENT ATRIAL FIBRILLATION: Primary | ICD-10-CM

## 2023-09-13 RX ORDER — HYDROXYZINE HYDROCHLORIDE 25 MG/1
25 TABLET, FILM COATED ORAL 3 TIMES DAILY PRN
COMMUNITY

## 2023-09-13 RX ORDER — AMIODARONE HYDROCHLORIDE 200 MG/1
200 TABLET ORAL DAILY
Qty: 60 TABLET | Refills: 1 | Status: SHIPPED | OUTPATIENT
Start: 2023-09-13

## 2023-09-13 NOTE — PROGRESS NOTES
"             [facility] Cardiology Office Follow Up Note    Mi Tejeda  [id number]  2023    Primary Care Provider: Jomar Freeman DO    Chief Complaint: Palpitations    History of Present Illness:   Mrs. Mi Tejeda is a 87 y.o. female being seen by Cardiology for routine follow-up.  The patient has a past medical history significant for dyslipidemia, hypertension, GERD, hypothyroidism, and COPD with a history of prior tobacco use.  Her cardiac history includes coronary artery disease, with a prior history of two-vessel coronary artery bypass grafting in .  She also has a history of mild to moderate aortic insufficiency.  Her history includes paroxysmal atrial fibrillation as well as peripheral arterial disease reportedly with prior intervention.  At the time of her last appointment, the patient reported increased palpitations.  She underwent repeat outpatient cardiac monitoring in , which showed a 100% AF burden with an average ventricular rate of 83 bpm and no significant RVR.  Today, she continues to report frequent episodes of palpitations.  She describes her palpitations as a \"fluttering\" sensation.  She denies any significant associated chest pain.  No recent difficulty with lower extremity swelling, orthopnea, or PND.  No other specific complaints today.     Past Cardiac Testin. Last Coronary Angio: Remote  2. Prior Stress Testing: Remote  3. Last Echo:               1.  2015                          1.  The patient had normal chamber dimensions with borderline left atrial enlargement with mild left ventricular enlargement with normal global LV systolic function with paradoxical septal motion with estimated ejection fraction of 60%.                            2. The patient had PAP of 24 mmHg. Right atrial pressure was estimated around 10 mmHg.  RV dimensions and RV function normal.                            3.  The patient had mild-to-moderate aortic, " "trivial-to-mild mitral and trivial tricuspid insufficiency.                             4.  Trivial pericardial effusion was suggested.                2.  10/19/2020                          1.  Normal left ventricular systolic function, LVEF 60-65%                          2.  Moderate aortic regurgitation                          3.  Mild MR and TR                          4.  Mild right ventricular dilation with normal RV systolic function  4. Prior Holter Monitor:   1.  14-day Holter 1/21  1.  The predominant rhythm is sinus bradycardia with an average heart rate 58 bpm.  2.  No sustained supraventricular or ventricular arrhythmias.  3.  Normal atrioventricular conduction.  4.  Occasional supraventricular ectopy with isolated PACs and limited runs of SVT with the longest being 31 beats in duration.  [place] 1.9%.  5.  Occasional ventricular ectopy with isolated PVCs and runs of bigeminy, burden 2.7%.  6.  Three symptomatic episodes of \"palpitations\" with 2/3 episodes corresponding to NSR or mild sinus bradycardia.  One episode corresponding to an isolated PVC.  2.  14-day Holter 9/23  1.  The predominant rhythm is atrial fibrillation with a 100% AF burden.  Max ventricular weight 91 bpm, average 83 bpm.  2.  Occasional pauses with the longest being 2.3 seconds in duration.  3.  No symptom diary submitted.    Review of Systems:   Review of Systems   Constitutional:  Negative for activity change, appetite change, chills, diaphoresis, fatigue, fever, unexpected weight gain and unexpected weight loss.   Eyes:  Negative for blurred vision and double vision.   Respiratory:  Negative for cough, chest tightness, shortness of breath and wheezing.    Cardiovascular:  Positive for palpitations. Negative for chest pain and leg swelling.   Gastrointestinal:  Negative for abdominal pain, anal bleeding, blood in stool and GERD.   Endocrine: Negative for cold intolerance and heat intolerance.   Genitourinary:  Negative for " hematuria.   Neurological:  Negative for dizziness, syncope, weakness and light-headedness.   Hematological:  Does not bruise/bleed easily.   Psychiatric/Behavioral:  Negative for depressed mood and stress. The patient is not nervous/anxious.      I have reviewed and/or updated the patient's past medical, past surgical, family, social history, problem list and allergies as appropriate.     Medications:     Current Outpatient Medications:     hydrOXYzine (ATARAX) 25 MG tablet, Take 1 tablet by mouth 3 (Three) Times a Day As Needed for Itching. Pt unsure of dosage, Disp: , Rfl:     albuterol sulfate  (90 Base) MCG/ACT inhaler, INHALE 2 PUFFS EVERY 4 HOURS AS NEEDED FOR WHEEZING OR SHORTNESS OF AIR, Disp: 18 g, Rfl: 1    amiodarone (PACERONE) 200 MG tablet, Take 1 tablet by mouth Daily., Disp: 60 tablet, Rfl: 1    ammonium lactate (LAC-HYDRIN) 12 % lotion, APPLY TO THE AFFECTED AREA EVERY DAY AS NEEDED, Disp: , Rfl:     aspirin 81 MG tablet, Take 1 tablet by mouth Daily., Disp: 30 tablet, Rfl: 11    baclofen (LIORESAL) 10 MG tablet, TAKE ONE TABLET BY MOUTH AT NIGHT AS NEEDED FOR MUSCLE SPAMS, Disp: 30 tablet, Rfl: 0    CALCIUM PO, Take  by mouth Daily., Disp: , Rfl:     Cholecalciferol (VITAMIN D3) 125 MCG (5000 UT) capsule capsule, TAKE ONE CAPSULE BY MOUTH EVERY DAY, Disp: 100 capsule, Rfl: 3    doxycycline (VIBRAMYICN) 100 MG tablet, Take 1 tablet by mouth Every 12 (Twelve) Hours. (Patient not taking: Reported on 7/31/2023), Disp: , Rfl:     ferrous gluconate (FERGON) 324 MG tablet, TAKE 1 TABLET BY MOUTH EVERY DAY WITH BREAKFAST, Disp: 30 tablet, Rfl: 2    furosemide (LASIX) 20 MG tablet, TAKE 1 TABLET BY MOUTH EVERY DAY FOR 21 DAYS AS NEEDED, Disp: , Rfl:     HYDROcodone-acetaminophen (NORCO)  MG per tablet, , Disp: , Rfl:     levothyroxine (SYNTHROID, LEVOTHROID) 88 MCG tablet, TAKE 1 TABLET BY MOUTH EVERY DAY, Disp: 90 tablet, Rfl: 1    losartan (COZAAR) 100 MG tablet, Take 1 tablet by mouth  "Daily., Disp: 90 tablet, Rfl: 3    metoprolol tartrate (LOPRESSOR) 100 MG tablet, Take 1 tablet by mouth 2 (Two) Times a Day., Disp: , Rfl:     metoprolol tartrate (LOPRESSOR) 50 MG tablet, TAKE 1 TABLET BY MOUTH 2 TIMES A DAY, Disp: 180 tablet, Rfl: 1    morphine (MS CONTIN) 30 MG 12 hr tablet, Take 1 tablet by mouth 2 (Two) Times a Day., Disp: , Rfl:     mupirocin (BACTROBAN) 2 % ointment, Apply  topically to the appropriate area as directed See Admin Instructions. Apply topically to the affected area twice daily, Disp: , Rfl:     Narcan 4 MG/0.1ML nasal spray, , Disp: , Rfl:     omeprazole (priLOSEC) 40 MG capsule, Take 1 capsule by mouth Daily., Disp: 30 capsule, Rfl: 2    polyethylene glycol (MiraLax) 17 GM/SCOOP powder, Take 17 g by mouth Daily. (Patient not taking: Reported on 7/31/2023), Disp: 510 g, Rfl: 2    potassium chloride ER (K-TAB) 20 MEQ tablet controlled-release ER tablet, TAKE 1 TABLET BY MOUTH EVERY DAY FOR 21 DAYS AS DIRECTED, Disp: , Rfl:     sertraline (Zoloft) 25 MG tablet, Take 1 tablet by mouth Daily. (Patient not taking: Reported on 7/31/2023), Disp: 30 tablet, Rfl: 1    sulfamethoxazole-trimethoprim (BACTRIM DS,SEPTRA DS) 800-160 MG per tablet, TAKE 1 TABLET BY MOUTH EVERY 12 HOURS FOR 7 DAYS AS DIRECTED, Disp: , Rfl:     tiotropium bromide-olodaterol (STIOLTO RESPIMAT) 2.5-2.5 MCG/ACT aerosol solution inhaler, Inhale 2 puffs Daily. (Patient not taking: Reported on 7/31/2023), Disp: 1 each, Rfl: 5    vitamin C (ASCORBIC ACID) 500 MG tablet, Take 1 tablet by mouth Daily. (Patient not taking: Reported on 7/31/2023), Disp: , Rfl:     Physical Exam:  Vital Signs:   Vitals:    09/13/23 1451   BP: 118/68   BP Location: Left arm   Patient Position: Sitting   Pulse: 84   Resp: 18   SpO2: 96%  Comment: 3 LPM   Weight: 63.5 kg (140 lb)  Comment: unable to weigh due to being in wheelchair   Height: 162.6 cm (64\")       Physical Exam  Constitutional:       General: She is not in acute distress.     " Appearance: Normal appearance. She is well-developed. She is not diaphoretic.   HENT:      Head: Normocephalic and atraumatic.   Eyes:      General: No scleral icterus.     Pupils: Pupils are equal, round, and reactive to light.   Neck:      Trachea: No tracheal deviation.   Cardiovascular:      Rate and Rhythm: Normal rate. Rhythm irregular.      Heart sounds: Murmur heard.     No friction rub. No gallop.      Comments: Normal JVD.  Pulmonary:      Effort: Pulmonary effort is normal. No respiratory distress.      Breath sounds: Normal breath sounds. No stridor. No wheezing or rales.      Comments: On chronic supplemental O2 via nasal cannula  Chest:      Chest wall: No tenderness.   Abdominal:      General: Bowel sounds are normal. There is no distension.      Palpations: Abdomen is soft.      Tenderness: There is no abdominal tenderness. There is no guarding or rebound.   Musculoskeletal:         General: No swelling. Normal range of motion.      Cervical back: Neck supple. No tenderness.   Lymphadenopathy:      Cervical: No cervical adenopathy.   Skin:     General: Skin is warm and dry.      Findings: No erythema.   Neurological:      General: No focal deficit present.      Mental Status: She is alert and oriented to person, place, and time.   Psychiatric:         Mood and Affect: Mood normal.         Behavior: Behavior normal.       Results Review:   I reviewed the patient's new clinical results.  I personally viewed and interpreted the patient's EKG/Telemetry data      ECG 12 Lead    Date/Time: 9/13/2023 3:10 PM  Performed by: Khoi Wylie MD  Authorized by: Khoi Wylie MD   Comparison: compared with previous ECG   Comparison to previous ECG: Atrial fibrillation has replaced sinus rhythm compared to ECG 3/28/2022        Assessment / Plan:     1.  Persistent atrial fibrillation  --History of paroxysmal atrial fibrillation with rapid ventricular rates  -- Recent outpatient cardiac monitoring shows  100% AF burden, rate controlled  -- Remains symptomatic despite rate control  -- Discussed rate control strategy versus options for attempting restoration of sinus rhythm including an oral amiodarone loading plus/cardioversion, and the patient is opted to try oral amiodarone loading to attempt chemical cardioversion  --Despite elevated [id number], the patient continues to decline anticoagulation at this time despite the risks of CVA  --Continue metoprolol for rate control  --Continue aspirin  --Follow-up in 2 weeks to reassess rhythm following oral amiodarone loading     2.  Coronary artery disease  --Known history of coronary artery disease, status post two-vessel CABG in 1998  -- No recent anginal symptoms  --Continue daily aspirin  --Not on statin due to history of intolerance secondary to myalgias     3.   Peripheral arterial disease  -- Arterial duplex in 5/22 suggestive of aortoiliac inflow disease  -- Has findings of chronic limb ischemia on exam, however declines further evaluation of PAD at this time  --Continue aspirin and statin     4.  Essential hypertension  -- BP lotus well controlled     5.  Aortic regurgitation  --Remains moderate on echocardiogram in 10/2020  --Asymptomatic  --Will order repeat echocardiogram for surveillance on next follow-up     6.  Stage III CKD  -- GFR 48 on last assessment in 5/23    Follow Up:   Return in about 2 weeks (around 9/27/2023).      Thank you for allowing me to participate in the care of your patient. Please to not hesitate to contact me with additional questions or concerns.     LATRICE Wylie MD  Interventional Cardiology   09/13/2023  14:51 EDT

## 2023-10-04 ENCOUNTER — TELEMEDICINE (OUTPATIENT)
Dept: GASTROENTEROLOGY | Facility: CLINIC | Age: 88
End: 2023-10-04
Payer: MEDICARE

## 2023-10-04 DIAGNOSIS — D64.9 NORMOCYTIC ANEMIA: Chronic | ICD-10-CM

## 2023-10-04 DIAGNOSIS — T40.2X5A THERAPEUTIC OPIOID INDUCED CONSTIPATION: Chronic | ICD-10-CM

## 2023-10-04 DIAGNOSIS — K59.03 THERAPEUTIC OPIOID INDUCED CONSTIPATION: Chronic | ICD-10-CM

## 2023-10-04 DIAGNOSIS — Q39.6 DIVERTICULUM OF ESOPHAGUS: Chronic | ICD-10-CM

## 2023-10-04 DIAGNOSIS — K21.9 GASTROESOPHAGEAL REFLUX DISEASE WITHOUT ESOPHAGITIS: Primary | Chronic | ICD-10-CM

## 2023-10-04 RX ORDER — OMEPRAZOLE 40 MG/1
40 CAPSULE, DELAYED RELEASE ORAL DAILY
Qty: 30 CAPSULE | Refills: 11 | Status: SHIPPED | OUTPATIENT
Start: 2023-10-04

## 2023-10-04 NOTE — PATIENT INSTRUCTIONS
Antireflux measures: Avoid fried, fatty foods, alcohol, chocolate, coffee, tea,  soft drinks, peppermint and spearmint, spicy foods, tomatoes and tomato based foods, onion based foods, and smoking.  Other antireflux measures include weight reduction if overweight, avoiding tight clothing around the abdomen, elevating the head of the bed 6 inches with blocks under the head board, and don't drink or eat before going to bed and avoid lying down immediately after meals.  Omeprazole 40 mg 1 po daily in the am 30 minutes before breakfast.  Recommended to take Levothyroxine first in the am upon waking, wait 30 minutes, then take Omeprazole 40 mg, wait 30 minutes, then eat breakfast and take other medications.  Eat relatively soft diet, eat in upright position and chew food well.    Drink water after 2-3 bites and take medications with liberal amounts of water.   After eating and taking medications, remain in upright position for 5-10 minutes.  High fiber, low fat diet with liberal water intake.   May take Miralax as needed for constipation.   Follow up: The patient wants to call back, she does not want to schedule 6 month follow up appointment at this time.

## 2023-10-04 NOTE — PROGRESS NOTES
Follow Up Note     Date: 10/04/2023   Patient Name: Mi Tejeda  MRN: [id number]  : 1935     Primary Care Provider: Jomar Freeman DO     Chief Complaint   Patient presents with    Follow-up    Heartburn     You have chosen to receive care through a telehealth visit.  Do you consent to use a video/audio connection for your medical care today? Yes     History of present illness:   10/4/2023  Mi Tejeda is a 87 y.o. female who is here today for follow up for reflux. She ran out of her Omeprazole and her reflux has been severe. She has a lot of regurgitation. No trouble swallowing. Constipation has been doing very well. She is having daily soft bowel movements. Denies abdominal pain, nausea or vomiting. She denies any GI bleeding.     Interval History:  2021  Mi Tejeda is a 85 y.o. female who is here today for follow up regarding dysphagia and heartburn. Patient reports that dysphagia is still present and manageable. She has regurgitation of foods when severe dysphagia present, this is only occurring about one time every couple of weeks. She is still able to eat regular meals. She has an Ensure nutritional shake for breakfast, a small lunch with a good sized dinner daily. She still has a relatively good appetite per her report. She does not want to have EGD at this time. She is still taking omeprazole 40 mg once daily in the morning and famotidine 40 mg at night. She feels that she has had a dramatic improvement in her acid reflux complaints, especially at night, after the addition of famotidine. She has upcoming appointment with her PCP soon and will have labs at that time. She denies any current abdominal pain, nausea, vomiting, change in her bowel habits. She states that constipation is well controlled currently and only takes MiraLAX as needed. She has maintained her weight at 130 lbs over the past 1 year.    2020  The patient has a history of reflux off and on for the  last several years.  The reflux is moderately severe.  Symptoms are described as retrosternal burning sensation, and indigestion.  There is history of occasional regurgitative symptoms.  Frequency being several times per week.  The symptoms are worse at night.  The patient takes acid suppressive therapy with reasonable control of his symptoms.  The patient had associated dysphagia.  This significantly improved.  The patient denies further abdominal pain.  There is no nausea or vomiting.  There is history of constipation.  The patient has gained 3 pounds since May 2019.  There is no history of liver or pancreatic disease.  She had undergone a colonoscopy in 2013 by surgical service-Dr. Thompson.  The patient was found to have polyps-tubular adenoma.    Subjective      Past Medical History:   Diagnosis Date    Abdominal pain     states hx of    Abnormal electrocardiogram     Abnormal urinalysis     Acute UTI     Anemia     Anomaly cardiac     REPORTS WILL SHOW ON LEFT VENTRICLE IN A 12 LEAD EKG AS BEING A MI BUT REPORTS SHE HAS NEVER HAD THIS     Anxiety     Aphagia     HX OF THIS, REPORTS WAS CLEARED OF A CVA BUT WAS TOLD WAS RELATED TO FATIGUE    Arthritis     Asthma     Atrial fibrillation     Backache     Bursitis of right shoulder     CAD (coronary artery disease)     Cataract, bilateral     s/p removal    COPD (chronic obstructive pulmonary disease)     oxygen dependent    Depression     Diverticulitis     Dysphagia     REPORTS RESOLVED AFTER DILITATION    Fracture     left wrist, 2 fingers on right hand, toes on right foot, right arm    Full dentures     Gastric ulcer     GERD (gastroesophageal reflux disease)     Gout     Heart murmur     High cholesterol     History of nuclear stress test 2011?    HTN (hypertension)     Hx of being hospitalized 05/26/2023    ED to hospital admission for Parox. Afib & difficulty breathing - [facility] documents scanned to media tab    Hx of echocardiogram 05/28/2023    At  "[facility] - scanned into media    Hypertriglyceridemia     Hypothyroidism     Multiple lung nodules     Osteoarthritis     Osteoporosis     Peripheral neuropathy     Personal history of tobacco use     Plantar fasciitis     left    Pneumonia     Sinus problem     Sinusitis     Tear of meniscus of knee     right    TIA (transient ischemic attack)     denies 5/22/19.  states had aphasia in [facility].R[facility] but was ruled out.    Toe pain     \"Calluses were filed out, but is painful\"    Uterine leiomyoma     Viral gastroenteritis     Vitamin D deficiency     Wears glasses     Weight loss, non-intentional     80 lb in 5 years     Past Surgical History:   Procedure Laterality Date    CARDIAC CATHETERIZATION  2007?    no stents    CARDIAC SURGERY      CATARACT EXTRACTION Bilateral 2009    CATARACT EXTRACTION, BILATERAL      CHOLECYSTECTOMY  2002    COLONOSCOPY      CORONARY ARTERY BYPASS GRAFT  1998    2 vessels    ENDOSCOPY      ENDOSCOPY N/A 5/3/2017    Procedure: ESOPHAGOGASTRODUODENOSCOPY with biopsies;  Surgeon: Yared Castellanos MD;  Location: [facility] ENDOSCOPY;  Service:     ENDOSCOPY N/A 5/23/2019    Procedure: ESOPHAGOGASTRODUODENOSCOPY WITH BIOPSY AND BALLOON DILITATION;  Surgeon: Yared Castellanos MD;  Location: [facility] ENDOSCOPY;  Service: Gastroenterology    MOUTH SURGERY      full extraction    TOTAL HIP ARTHROPLASTY Left 02/02/2010    PREETI Stanley MD     Family History   Problem Relation Age of Onset    Cancer Mother         uterus    Cancer Sister         colon; kidney    Hypertension Sister     Stroke Sister     Cancer Brother         prostate    Arthritis Other     Cancer Other     Kidney disease Other     Stroke Other     Hypertension Other     Gout Other     Colon cancer Sister     Cancer Sister         kidney    Cirrhosis Neg Hx     Liver disease Neg Hx     Liver cancer Neg Hx     Stomach cancer Neg Hx      Social History     Socioeconomic History    Marital status:    Tobacco Use    Smoking status: Former    "  Packs/day: 0.00     Years: 65.00     Pack years: 0.00     Types: Cigarettes     Quit date: 10/1/2018     Years since quittin.0     Passive exposure: Past    Smokeless tobacco: Never   Vaping Use    Vaping Use: Never used   Substance and Sexual Activity    Alcohol use: Yes     Comment: Seldom    Drug use: No    Sexual activity: Defer       Current Outpatient Medications:     albuterol sulfate  (90 Base) MCG/ACT inhaler, INHALE 2 PUFFS EVERY 4 HOURS AS NEEDED FOR WHEEZING OR SHORTNESS OF AIR, Disp: 18 g, Rfl: 1    amiodarone (PACERONE) 200 MG tablet, Take 1 tablet by mouth Daily., Disp: 60 tablet, Rfl: 1    ammonium lactate (LAC-HYDRIN) 12 % lotion, APPLY TO THE AFFECTED AREA EVERY DAY AS NEEDED, Disp: , Rfl:     aspirin 81 MG tablet, Take 1 tablet by mouth Daily., Disp: 30 tablet, Rfl: 11    baclofen (LIORESAL) 10 MG tablet, TAKE ONE TABLET BY MOUTH AT NIGHT AS NEEDED FOR MUSCLE SPAMS, Disp: 30 tablet, Rfl: 0    CALCIUM PO, Take  by mouth Daily., Disp: , Rfl:     Cholecalciferol (VITAMIN D3) 125 MCG (5000 UT) capsule capsule, TAKE ONE CAPSULE BY MOUTH EVERY DAY, Disp: 100 capsule, Rfl: 3    furosemide (LASIX) 20 MG tablet, TAKE 1 TABLET BY MOUTH EVERY DAY FOR 21 DAYS AS NEEDED, Disp: , Rfl:     HYDROcodone-acetaminophen (NORCO)  MG per tablet, , Disp: , Rfl:     hydrOXYzine (ATARAX) 25 MG tablet, Take 1 tablet by mouth 3 (Three) Times a Day As Needed for Itching. Pt unsure of dosage, Disp: , Rfl:     levothyroxine (SYNTHROID, LEVOTHROID) 88 MCG tablet, TAKE 1 TABLET BY MOUTH EVERY DAY, Disp: 90 tablet, Rfl: 1    losartan (COZAAR) 100 MG tablet, Take 1 tablet by mouth Daily., Disp: 90 tablet, Rfl: 3    metoprolol tartrate (LOPRESSOR) 100 MG tablet, Take 1 tablet by mouth 2 (Two) Times a Day., Disp: , Rfl:     metoprolol tartrate (LOPRESSOR) 50 MG tablet, TAKE 1 TABLET BY MOUTH 2 TIMES A DAY, Disp: 180 tablet, Rfl: 1    morphine (MS CONTIN) 30 MG 12 hr tablet, Take 1 tablet by mouth 2 (Two) Times a  "Day., Disp: , Rfl:     mupirocin (BACTROBAN) 2 % ointment, Apply  topically to the appropriate area as directed See Admin Instructions. Apply topically to the affected area twice daily, Disp: , Rfl:     Narcan 4 MG/0.1ML nasal spray, , Disp: , Rfl:     omeprazole (priLOSEC) 40 MG capsule, Take 1 capsule by mouth Daily., Disp: 30 capsule, Rfl: 11    potassium chloride ER (K-TAB) 20 MEQ tablet controlled-release ER tablet, TAKE 1 TABLET BY MOUTH EVERY DAY FOR 21 DAYS AS DIRECTED, Disp: , Rfl:     polyethylene glycol (MiraLax) 17 GM/SCOOP powder, Take 17 g by mouth Daily. (Patient not taking: Reported on 7/31/2023), Disp: 510 g, Rfl: 2    Allergies   Allergen Reactions    Ciprofloxacin GI Intolerance     Patient states she is not allergic.    Hazelnut Nausea Only    Keflex [Cephalexin] Nausea Only    Niacin Rash     \"it makes my skin burn\"    Other Nausea Only     Balsalmic Vinegar      Oxycodone Dizziness     \"it makes me feel dopey and out of my head\"  NOTE: does tolerate hydrocodone and morphine    Statins Myalgia    Vicodin [Hydrocodone-Acetaminophen] Hives     Can take hydrocodone, cannot tolerate VICODIN     The following portions of the patient's history were reviewed and updated as appropriate: allergies, current medications, past family history, past medical history, past social history, past surgical history and problem list.  Objective     Physical Exam  Constitutional:       General: She is awake. She is not in acute distress.     Appearance: Normal appearance. She is not ill-appearing.   HENT:      Head: Normocephalic.   Pulmonary:      Effort: Pulmonary effort is normal. No tachypnea or respiratory distress.   Neurological:      Mental Status: She is alert.   Psychiatric:         Mood and Affect: Mood normal.         Speech: Speech normal.         Behavior: Behavior is cooperative.         Thought Content: Thought content normal.     Results Review:   I reviewed the patient's new clinical results.    No " visits with results within 90 Day(s) from this visit.   Latest known visit with results is:   Office Visit on 05/17/2023   Component Date Value Ref Range Status    SARS Antigen 05/17/2023 Not Detected  Not Detected, Presumptive Negative Final    Influenza A Antigen JUANPABLO 05/17/2023 Not Detected  Not Detected Final    Influenza B Antigen JUANPABLO 05/17/2023 Not Detected  Not Detected Final    Internal Control 05/17/2023 Passed  Passed Final    Lot Number 05/17/2023 [id number]   Final    Expiration Date 05/17/2023 3/16/24   Final    C-Reactive Protein 05/19/2023 0.44  0.00 - 0.50 mg/dL Final    Sed Rate 05/19/2023 37 (H)  0 - 30 mm/hr Final    WBC 05/19/2023 7.10  3.40 - 10.80 10*3/mm3 Final    RBC 05/19/2023 3.59 (L)  3.77 - 5.28 10*6/mm3 Final    Hemoglobin 05/19/2023 10.3 (L)  12.0 - 15.9 g/dL Final    Hematocrit 05/19/2023 32.0 (L)  34.0 - 46.6 % Final    MCV 05/19/2023 89.1  79.0 - 97.0 fL Final    MCH 05/19/2023 28.7  26.6 - 33.0 pg Final    MCHC 05/19/2023 32.2  31.5 - 35.7 g/dL Final    RDW 05/19/2023 14.3  12.3 - 15.4 % Final    Platelets 05/19/2023 241  140 - 450 10*3/mm3 Final    Folate 05/19/2023 >20.00  4.78 - 24.20 ng/mL Final    Results may be falsely increased if patient taking Biotin.    Haptoglobin 05/19/2023 182  41 - 333 mg/dL Final    LDH 05/19/2023 145  135 - 214 U/L Final    Reticulocyte Absolute 05/19/2023 1.68  0.70 - 1.90 % Final    TSH 05/19/2023 2.690  0.270 - 4.200 uIU/mL Final    Vitamin B-12 05/19/2023 581  211 - 946 pg/mL Final    Results may be falsely increased if patient taking Biotin.      Dated 5/12/2023 Iron 194, Iron saturation 45%, TIBC 429, UIBC 235     FL Esophagram Complete Single Contrast     Result Date: 4/6/2021  1. Severe dysmotility. 2. Diverticuli as described in the midthoracic esophagus and in the proximal duodenum. 3. Smooth narrowing of the distal esophagus, but 13 mm tablet passes through this region. 4. Moderate gastroesophageal reflux to thoracic inlet.       CT Chest wo  contrast diagnostic   5/10/2023  There is no suspicious axillary adenopathy. There is no  suspicious hilar or mediastinal adenopathy. The heart is proper size.  There is no pericardial or pleural effusion.Again noted are  subcentimeter, stable, noncalcified pulmonary nodules in the right upper  lobe. These have been stable back to 11/26/2018. Limited images of the  upper abdomen demonstrate cholecystectomy clips and vascular  calcifications. Sternotomy wires noted.    Colonoscopy Dated April 10, 2013 by Dr. Thompson from surgical service   - Colon polyp.   - Arteriovenous malformations, cecum.   - Diverticulosis coli.   - Colon, polyp at 10 cm, biopsy revealed tubular adenoma.     EGD Dated May 23, 2019   - Esophageal rings.    - Schatzki's ring.  Status post serial dilation to 18 mm.    - Small sliding hiatal hernia.    - Gastric diverticulum at the fundus.    - Small gastric polyps.    - Erythematous gastritis.    - Small submucosal nodule in the second portion of duodenum.    - Tortuosity and tertiary contractions of esophageal body were noted.    - Distal esophagus was noted to be spastic and resistant to insufflation.  However a persistent insufflation this opens.    - Duodenum, submucosal nodule, biopsy revealed duodenal type mucosa with no significant histopathologic abnormalities.  Negative for specific microorganisms.  Negative for dysplasia or malignancy.  The endoscopic impression of a submucosal nodule is noted.  The specimen shows duodenal type mucosa with no significant submucosa present for evaluation.  Duodenum, biopsy revealed duodenal type mucosa with no significant histopathologic abnormalities.  Antrum, body and fundus, biopsies revealed gastric mucosa with reactive (chemical) gastropathy.  Negative for intestinal metaplasia or dysplasia.  No Helicobacter pylori-like organisms seen.  Stomach, body, polyps, biopsy revealed fundic gland polyps.  Negative for intestinal metaplasia or dysplasia.  No  Helicobacter pylori-like organisms seen.    Assessment / Plan      1. Gastroesophageal reflux disease without esophagitis  2. Diverticulum of esophagus  She has a history of reflux for several years that was reasonably controlled with omeprazole 40 mg daily until she ran out.  Reflux has been severe since running out, she has a lot of regurgitation.  No difficulty swallowing at this time.  EGD dated 5/23/2019 gastric diverticulum at the fundus.  Fluoroscopic esophagram dated 4/6/2021 with severe dysmotility, moderate reflux, and diverticula in the mid thoracic esophagus and in the proximal duodenum.  Smooth narrowing of the distal esophagus was noted, but 13 mm tablet passed through the region. She declined EGD for further evaluation in 2020 and in 2021.  Antireflux measures discussed in detail.  Advised patient to remain upright 5 to 10-minutes after eating, drinking and take medications.  Omeprazole 40 mg 1 p.o. daily.    - omeprazole (priLOSEC) 40 MG capsule; Take 1 capsule by mouth Daily.  Dispense: 30 capsule; Refill: 11    3. Therapeutic opioid induced constipation  She has taken MiraLAX as needed with reasonable control of constipation in the past.  Constipation is doing well at this time, she is having a soft bowel movement daily.  Denies any GI bleeding.  She is on opioids long-term.  TSH normal.  Continue MiraLAX as needed.    4. Normocytic anemia  She has a history of mild normocytic anemia for several years that is stable. Patient denies any GI bleeding. Recent labs with iron level elevated at 194, iron saturation 45%, B12 level normal.  Upon review of records, she was taking oral iron supplements at that time, but was advised to stop them by her PCP.  Colonoscopy in 2013 with polyp removed, AVM was noted in the cecum.  EGD in 2019 with no evidence of GI bleeding.  CT of chest dated 5/10/2023 stable.  Suspect anemia of chronic disease.  We will monitor for now, patient does not wish to pursue colonoscopy  or EGD at this time.    Patient Instructions   Antireflux measures: Avoid fried, fatty foods, alcohol, chocolate, coffee, tea,  soft drinks, peppermint and spearmint, spicy foods, tomatoes and tomato based foods, onion based foods, and smoking.  Other antireflux measures include weight reduction if overweight, avoiding tight clothing around the abdomen, elevating the head of the bed 6 inches with blocks under the head board, and don't drink or eat before going to bed and avoid lying down immediately after meals.  Omeprazole 40 mg 1 po daily in the am 30 minutes before breakfast.  Recommended to take Levothyroxine first in the am upon waking, wait 30 minutes, then take Omeprazole 40 mg, wait 30 minutes, then eat breakfast and take other medications.  Eat relatively soft diet, eat in upright position and chew food well.    Drink water after 2-3 bites and take medications with liberal amounts of water.   After eating and taking medications, remain in upright position for 5-10 minutes.  High fiber, low fat diet with liberal water intake.   May take Miralax as needed for constipation.   Follow up: The patient wants to call back, she does not want to schedule 6 month follow up appointment at this time.     Anay Smith, APRN  10/4/2023    Please note that portions of this note were completed with a voice recognition program.

## 2023-11-17 ENCOUNTER — TELEPHONE (OUTPATIENT)
Dept: CARDIOLOGY | Facility: CLINIC | Age: 88
End: 2023-11-17
Payer: MEDICARE

## 2023-11-17 NOTE — TELEPHONE ENCOUNTER
Caller: Mi Tejeda    Relationship to patient: Self    Best call back number: [phone]    Chief complaint:     Type of visit:  2 WEEK FOLLOW UP VISIT    Requested date:  ASAP    If rescheduling, when is the original appointment:  10-26-23    Additional notes:   PATIENT MISSED APPOINTMENT FOR 2 WK FUP FOR AORTIC REGURG AND PAF-STARTED AMIO-NEEDS EKG,. PLEASE REACH OUT TO PATIENT TO SCHEDULE.

## 2024-02-06 ENCOUNTER — TELEPHONE (OUTPATIENT)
Dept: ORTHOPEDIC SURGERY | Facility: CLINIC | Age: 89
End: 2024-02-06

## 2024-02-06 NOTE — TELEPHONE ENCOUNTER
Caller: MANUEL METCALF    Relationship to Patient: the patient    Phone Number: [phone] (     Reason for Call: PATIENT CALLED IN WANTING TO GET VIDEO VISIT.

## 2024-02-09 ENCOUNTER — HOSPITAL ENCOUNTER (OUTPATIENT)
Dept: GENERAL RADIOLOGY | Facility: HOSPITAL | Age: 89
Discharge: HOME | End: 2024-02-09
Payer: MEDICARE

## 2024-02-09 ENCOUNTER — OFFICE VISIT (OUTPATIENT)
Dept: ORTHOPEDIC SURGERY | Facility: CLINIC | Age: 89
End: 2024-02-09
Payer: MEDICARE

## 2024-02-09 VITALS — BODY MASS INDEX: 23.9 KG/M2 | TEMPERATURE: 96.7 F | WEIGHT: 140 LBS | HEIGHT: 64 IN

## 2024-02-09 DIAGNOSIS — Z96.642 HISTORY OF TOTAL HIP REPLACEMENT, LEFT: ICD-10-CM

## 2024-02-09 DIAGNOSIS — M16.11 PRIMARY OSTEOARTHRITIS OF RIGHT HIP: ICD-10-CM

## 2024-02-09 DIAGNOSIS — M25.551 ARTHRALGIA OF RIGHT HIP: ICD-10-CM

## 2024-02-09 DIAGNOSIS — M16.11 PRIMARY OSTEOARTHRITIS OF RIGHT HIP: Primary | ICD-10-CM

## 2024-02-09 PROCEDURE — 25010000002 LIDOCAINE 1 % SOLUTION: Performed by: ORTHOPAEDIC SURGERY

## 2024-02-09 PROCEDURE — 25010000002 METHYLPREDNISOLONE PER 80 MG: Performed by: ORTHOPAEDIC SURGERY

## 2024-02-09 PROCEDURE — 77002 NEEDLE LOCALIZATION BY XRAY: CPT

## 2024-02-09 RX ORDER — FAMOTIDINE 40 MG/1
1 TABLET, FILM COATED ORAL DAILY
COMMUNITY
Start: 2023-12-12

## 2024-02-09 RX ORDER — METHYLPREDNISOLONE ACETATE 80 MG/ML
80 INJECTION, SUSPENSION INTRA-ARTICULAR; INTRALESIONAL; INTRAMUSCULAR; SOFT TISSUE ONCE
Status: COMPLETED | OUTPATIENT
Start: 2024-02-09 | End: 2024-02-09

## 2024-02-09 RX ORDER — MORPHINE SULFATE 30 MG/1
1 CAPSULE, EXTENDED RELEASE ORAL EVERY 12 HOURS SCHEDULED
COMMUNITY
Start: 2023-12-19

## 2024-02-09 RX ORDER — AMLODIPINE BESYLATE 2.5 MG/1
2.5 TABLET ORAL DAILY
COMMUNITY
Start: 2024-01-12

## 2024-02-09 RX ORDER — FERROUS GLUCONATE 324(38)MG
1 TABLET ORAL DAILY
COMMUNITY
Start: 2023-12-29

## 2024-02-09 RX ORDER — HYDROCODONE BITARTRATE AND ACETAMINOPHEN 7.5; 325 MG/1; MG/1
1 TABLET ORAL EVERY 12 HOURS SCHEDULED
COMMUNITY
Start: 2024-01-22

## 2024-02-09 RX ORDER — LIDOCAINE HYDROCHLORIDE 10 MG/ML
10 INJECTION, SOLUTION INFILTRATION; PERINEURAL ONCE
Status: COMPLETED | OUTPATIENT
Start: 2024-02-09 | End: 2024-02-09

## 2024-02-09 RX ORDER — LIDOCAINE 50 MG/G
PATCH TOPICAL
COMMUNITY
Start: 2024-01-13

## 2024-02-09 RX ADMIN — LIDOCAINE HYDROCHLORIDE 10 ML: 10 INJECTION, SOLUTION INFILTRATION; PERINEURAL at 11:39

## 2024-02-09 RX ADMIN — METHYLPREDNISOLONE ACETATE 80 MG: 80 INJECTION, SUSPENSION INTRA-ARTICULAR; INTRALESIONAL; INTRAMUSCULAR; SOFT TISSUE at 11:40

## 2024-02-09 NOTE — POST-PROCEDURE NOTE
[facility]  [address], [address]  [place] [address]  [phone]        PROCEDURE REPORT        DIAGNOSIS:  Right hip osteoarthritis, symptomatic    PROCEDURE: Right  hip injection under flouroscopy      Mi Tejeda with date of birth 1935 presents to [facility] Radiology Department today for injection therapy.        Patient presents to [facility] Radiology Department Flouroscopy Suite on 2/9/2024 for planned elective right hip injection under flouroscopy for symptomatic osteoarthritis.    Procedure:     After consent was obtained, and using ethyl chloride topical local anesthetic, the right hip was then prepped and draped with sterile technique. With an anterior hip approach, flouroscopy guidance, and care to stay lateral of the femoral artery, the hip joint was entered via a 20 gauge spinal needle.  An image was saved of the needle within the hip joint space.  A mixture of 80 mg methylprednisolone in one ml plus 5 ml of 1% plain Lidocaine was injected and the needle withdrawn and a band aid applied. The procedure was well tolerated and without complication.  The patient did remain stable and with baseline ambulation. The patient is asked to avoid stressful physical activity for a day or two before resuming full regular activities.  There might be some soreness initially and patient to call for any adverse effect of the injection treatment.  Call or return to clinic if any fever, swelling, persistent pain, lack of anticipated improvement or other symptoms or concerns.    Impression: Symptomatic right hip osteoarthritis.      Recommendations/Plan:      Treatment and patient advice as noted here and in office visit report.  Orthopedic activities and goals reviewed and patient expressed appreciation.  Call or notify for any adverse effect from injection therapy.    Exercise: As tolerated.  No strenuous activity for a few days as appropriate.  Activity:  May  perform usual activities as tolerated.      Patient will return to our clinic at scheduled appointment.  Patient agreeable to call or return sooner for any concerns.

## 2024-02-09 NOTE — PROGRESS NOTES
Subjective   Patient ID: Mi Tejeda is a 88 y.o. right hand dominant female is here today for orthopaedic evaluation of recovery progress with treatment.  Pain of the Right Hip (Wants to discuss surgery)       CHIEF COMPLAINT:    Chronic right hip advanced arthritis pain.    History of Present Illness     Progress Note:  Patient reports that with treatments and since the last visit, overall right hip pain is persistent,  mobility is limited, strength is unchanged.  The patient is not currently taking pain medication.   She has had home health physical therapy.  Previous mage guided hip injection therapy has been effective.  Since last visit, patient has been ambulating limited room to room distances with a walker.  Patient does not present with recent studies for review today.      We again discussed the relative risks, benefits and merits of non-surgical and elective surgical treatments for her right hip.  With her advanced age and chronic medical conditions, she is at increased risk for any elective anesthesia and hip replacement surgery.  She states that if she could reduce the hip pain that would be sufficient.  Reviewed treatments together with her son present for her visit today.  She is agreeable to  proceeding with image guided hip injectio therapy and can repeat approximately every 4 - 6 months as long as effective.  She will reserved hip replacement surgery as a last resort if needed.  Patient also has a history of left total hip replacement 14 years ago with continued excellent recovery and no left hip pain or any issues.    Past Medical History:   Diagnosis Date    Abdominal pain     states hx of    Abnormal electrocardiogram     Abnormal urinalysis     Acute UTI     Anemia     Anomaly cardiac     REPORTS WILL SHOW ON LEFT VENTRICLE IN A 12 LEAD EKG AS BEING A MI BUT REPORTS SHE HAS NEVER HAD THIS     Anxiety     Aphagia     HX OF THIS, REPORTS WAS CLEARED OF A CVA BUT WAS TOLD WAS RELATED TO  "FATIGUE    Arthritis     Asthma     Atrial fibrillation     Backache     Bursitis of right shoulder     CAD (coronary artery disease)     Cataract, bilateral     s/p removal    Cellulitis of both lower extremities     COPD (chronic obstructive pulmonary disease)     oxygen dependent    Depression     Diverticulitis     Dysphagia     REPORTS RESOLVED AFTER DILITATION    Fracture     left wrist, 2 fingers on right hand, toes on right foot, right arm    Full dentures     Gastric ulcer     GERD (gastroesophageal reflux disease)     Gout     Heart murmur     High cholesterol     History of nuclear stress test 2011?    HTN (hypertension)     Hx of being hospitalized 05/26/2023    ED to hospital admission for Parox. Afib & difficulty breathing - [facility] documents scanned to media tab    Hx of echocardiogram 05/28/2023    At [facility] - scanned into media    Hypertriglyceridemia     Hypothyroidism     Multiple lung nodules     Osteoarthritis     Osteoporosis     Peripheral neuropathy     Personal history of tobacco use     Plantar fasciitis     left    Pneumonia     Sinus problem     Sinusitis     Tear of meniscus of knee     right    TIA (transient ischemic attack)     denies 5/22/19.  states had aphasia in E.R., but was ruled out.    Toe pain     \"Calluses were filed out, but is painful\"    Uterine leiomyoma     Viral gastroenteritis     Vitamin D deficiency     Wears glasses     Weight loss, non-intentional     80 lb in 5 years        Past Surgical History:   Procedure Laterality Date    CARDIAC CATHETERIZATION  2007?    no stents    CARDIAC SURGERY      CATARACT EXTRACTION Bilateral 2009    CATARACT EXTRACTION, BILATERAL      CHOLECYSTECTOMY  2002    COLONOSCOPY      CORONARY ARTERY BYPASS GRAFT  1998    2 vessels    ENDOSCOPY      ENDOSCOPY N/A 5/3/2017    Procedure: ESOPHAGOGASTRODUODENOSCOPY with biopsies;  Surgeon: Yared Castellanos MD;  Location: [facility] ENDOSCOPY;  Service:     ENDOSCOPY N/A 5/23/2019    " "Procedure: ESOPHAGOGASTRODUODENOSCOPY WITH BIOPSY AND BALLOON DILITATION;  Surgeon: Yared Castellanos MD;  Location: [facility] ENDOSCOPY;  Service: Gastroenterology    MOUTH SURGERY      full extraction    TOTAL HIP ARTHROPLASTY Left 2010    PREETI Stanley MD        Family History   Problem Relation Age of Onset    Cancer Mother         uterus    Cancer Sister         colon; kidney    Hypertension Sister     Stroke Sister     Cancer Brother         prostate    Arthritis Other     Cancer Other     Kidney disease Other     Stroke Other     Hypertension Other     Gout Other     Colon cancer Sister     Cancer Sister         kidney    Cirrhosis Neg Hx     Liver disease Neg Hx     Liver cancer Neg Hx     Stomach cancer Neg Hx         Social History     Socioeconomic History    Marital status:    Tobacco Use    Smoking status: Former     Packs/day: 0.00     Years: 65.00     Additional pack years: 0.00     Total pack years: 0.00     Types: Cigarettes     Quit date: 10/1/2018     Years since quittin.3     Passive exposure: Past    Smokeless tobacco: Never   Vaping Use    Vaping Use: Never used   Substance and Sexual Activity    Alcohol use: Yes     Comment: Seldom    Drug use: No    Sexual activity: Defer       Allergies   Allergen Reactions    Ciprofloxacin GI Intolerance     Patient states she is not allergic.    Hazelnut Nausea Only    Keflex [Cephalexin] Nausea Only    Niacin Rash     \"it makes my skin burn\"    Other Nausea Only     Balsalmic Vinegar      Oxycodone Dizziness     \"it makes me feel dopey and out of my head\"  NOTE: does tolerate hydrocodone and morphine    Statins Myalgia    Vicodin [Hydrocodone-Acetaminophen] Hives     Can take hydrocodone, cannot tolerate VICODIN       Review of Systems   Constitutional:  Negative for fever.   Musculoskeletal:  Positive for arthralgias and gait problem. Negative for joint swelling.   Skin:  Negative for color change and rash.   Neurological:  Positive for " "weakness (generalized).   Psychiatric/Behavioral:  Negative for confusion.        I have reviewed the medical and surgical history, family history, social history, medications, and/or allergies, and the review of systems of this report.    Objective   Temp 96.7 °F (35.9 °C)   Ht 162.6 cm (64\")   Wt 63.5 kg (140 lb) Comment: pt reported, states she cannot stand to weigh  BMI 24.03 kg/m²   Physical Exam  Ortho Exam  She has pain with gentle active assisted right hip movement.  There is joint crepitus and limited hip flexion and rotation.  Patient also complains of right knee pain .  There is no knee swelling or effusion.  With active knee flexion and extension there is no knee pain.  With hip mobility, both hip and medial knee pain is elicited consistent with pain referred to the knee from the hip condition.  No calf pain.  Tersea sign negative.   Neurologic Exam    Assessment & Plan     Independent Review of Radiographic Studies:    No new imaging in office today.  AP right hip image taken later today while administering the right hip injection.  The image shows end-stage osteoarthritis of the right hip with subchondral cystic changes and bone-on-bone joint deterioration.  No acute fracture and no substantial change from prior radiographs.    Laboratory and Other Studies:  No new results reviewed today.     Medical Decision Making:    No complications of procedure and treatments.  Limited progress with persistent symptoms of end-stage right hip osteoarthritis.  Continue care plan with work up and treatment as noted.  Plan right hip flouroscopy image guided injection therapy today.  Continued excellent recovery after left total hip replacement in 2010    Procedures    Diagnoses and all orders for this visit:    1. Primary osteoarthritis of right hip (Primary)  -     XR Hip With or Without Pelvis 2 - 3 View Right; Future  -     FL Injection Hip Right; Future    2. Arthralgia of right hip    3. History of total hip " replacement, left       Discussion of orthopaedic goals and activities and patient expressed appreciation.  Risk, benefits, and merits of treatment alternatives reviewed with the patient and questions answered  Regular exercise as tolerated  Guided on proper techniques for mobility and conditioning exercises  Ice, heat, and/or modalities as beneficial  Call or notify for any adverse effect from injection therapy  Home exercise program ongoing    Recommendations/Plan:  Exercise, medications, injections, other patient advice, and return appointment as noted.  Brace: No brace was given at today's visit.  Referral: No referrals made at today's visit.  Test/Studies: No additional studies ordered at this time.  Surgery: Plan non-surgical treatment for current orthopaedic condition. For intractable painful limiting condition, patient to consider elective surgical options.  Work/Activity Status: Usual activities, routine exercise as tolerated, no strenuous activity.    Return in about 4 months (around 6/9/2024) for Recheck.  Patient is encouraged and agreeable to call or return sooner for any issues or concerns.

## 2025-07-11 NOTE — POST-PROCEDURE NOTE
[facility]  [address], [address]  [place] [address]  [phone]        PROCEDURE REPORT        DIAGNOSIS:  Right hip osteoarthritis, symptomatic    PROCEDURE: Right  hip injection under flouroscopy      Mi Tejeda with date of birth 1935 presents to [facility] Radiology Department today for injection therapy.        Patient presents to [facility] Radiology Department Flouroscopy Suite on 7-20-18 for planned elective right hip injection under flouroscopy for symptomatic osteoarthritis.    Procedure:     After consent was obtained, and using ethyl chloride topical local anesthetic, the right hip was then prepped and draped with sterile technique. With an anterior hip approach, flouroscopy guidance, and care to stay lateral of the femoral artery, the hip joint was entered via a 20 gauge spinal needle.  A mixture of 80 mg methylprednisolone in one ml plus 9 ml of 1% plain Lidocaine was injected and the needle withdrawn. The procedure was well tolerated and without complication. The patient noted relief of focal hip joint pain.  The patient did remain stable and with baseline ambulation. The patient is asked to rest the joint for a few more days before resuming full regular activities. It may be painful for the first few days. Watch for fever, skin issues, increased swelling or persistent pain in the joint. Call or return to clinic if such symptoms occur, other concerns or if there is lack of improvement as anticipated.    Impression: Symptomatic right hip osteoarthritis.      Recommendations/Plan:      Treatment and patient advice as noted here and in office visit report.  Orthopedic activities reviewed and patient expressed appreciation.  Discussion of orthopedic goals.   Risk, benefits, and merits of treatment options reviewed and questions answered.  Call or notify for any adverse effect from injection therapy.    Exercise: As tolerated.  No strenuous  Spoke with [facility] in [facility], patient does have available Zanaflex 4mg prescription to .   activity for a few days as appropriate.  Brace:  No brace was given at today's visit  Referral: No referrals made at today's visit  Studies: No additional studies ordered.  Surgery: No surgery proposed at this visit.  Activity:  May perform usual activities as tolerated.      Patient will return to our clinic at scheduled appointment.  Patient agreeable to call or return sooner for any concerns.

## 2025-08-01 ENCOUNTER — OFFICE VISIT (OUTPATIENT)
Dept: ORTHOPEDIC SURGERY | Facility: CLINIC | Age: OVER 89
End: 2025-08-01
Payer: MEDICARE

## 2025-08-01 VITALS
DIASTOLIC BLOOD PRESSURE: 82 MMHG | BODY MASS INDEX: 26.46 KG/M2 | HEIGHT: 64 IN | SYSTOLIC BLOOD PRESSURE: 130 MMHG | WEIGHT: 155 LBS

## 2025-08-01 DIAGNOSIS — M75.51 SUBACROMIAL BURSITIS OF RIGHT SHOULDER JOINT: ICD-10-CM

## 2025-08-01 DIAGNOSIS — M16.11 PRIMARY OSTEOARTHRITIS OF RIGHT HIP: ICD-10-CM

## 2025-08-01 DIAGNOSIS — Z96.642 HISTORY OF TOTAL HIP REPLACEMENT, LEFT: ICD-10-CM

## 2025-08-01 DIAGNOSIS — M25.551 ARTHRALGIA OF RIGHT HIP: Primary | ICD-10-CM

## 2025-08-01 DIAGNOSIS — M19.011 OSTEOARTHRITIS OF GLENOHUMERAL JOINT, RIGHT: ICD-10-CM

## 2025-08-01 DIAGNOSIS — M25.511 ARTHRALGIA OF RIGHT SHOULDER REGION: ICD-10-CM

## 2025-08-01 RX ORDER — LIDOCAINE HYDROCHLORIDE 20 MG/ML
2 INJECTION, SOLUTION INFILTRATION; PERINEURAL
Status: COMPLETED | OUTPATIENT
Start: 2025-08-01 | End: 2025-08-01

## 2025-08-01 RX ORDER — METHYLPREDNISOLONE ACETATE 40 MG/ML
40 INJECTION, SUSPENSION INTRA-ARTICULAR; INTRALESIONAL; INTRAMUSCULAR; SOFT TISSUE
Status: COMPLETED | OUTPATIENT
Start: 2025-08-01 | End: 2025-08-01

## 2025-08-01 RX ORDER — HYDROCODONE BITARTRATE AND ACETAMINOPHEN 10; 325 MG/1; MG/1
TABLET ORAL
COMMUNITY
Start: 2023-10-03

## 2025-08-01 RX ORDER — QUETIAPINE FUMARATE 25 MG/1
TABLET, FILM COATED ORAL
COMMUNITY

## 2025-08-01 RX ORDER — ROPINIROLE 0.5 MG/1
0.5 TABLET, FILM COATED ORAL 2 TIMES DAILY
COMMUNITY

## 2025-08-01 RX ORDER — HYDRALAZINE HYDROCHLORIDE 50 MG/1
TABLET, FILM COATED ORAL
COMMUNITY
Start: 2025-07-20

## 2025-08-01 RX ORDER — SPIRONOLACTONE 25 MG/1
1 TABLET ORAL DAILY
COMMUNITY
Start: 2025-06-12

## 2025-08-01 RX ORDER — BUSPIRONE HYDROCHLORIDE 5 MG/1
5 TABLET ORAL 2 TIMES DAILY
COMMUNITY

## 2025-08-01 RX ADMIN — METHYLPREDNISOLONE ACETATE 40 MG: 40 INJECTION, SUSPENSION INTRA-ARTICULAR; INTRALESIONAL; INTRAMUSCULAR; SOFT TISSUE at 11:33

## 2025-08-01 RX ADMIN — LIDOCAINE HYDROCHLORIDE 2 ML: 20 INJECTION, SOLUTION INFILTRATION; PERINEURAL at 11:33

## 2025-08-01 NOTE — PROGRESS NOTES
Subjective   Mi Tejeda is a 89 y.o. female is here today for injection therapy.  Injections of the Right Shoulder (Last injection 4/26/2023.)      CHIEF COMPLAINT:    Here for repeat injection therapy    History of Present Illness     Patient presents with her daughter for right shoulder update imaging and injection therapy.  Since last injection, patient notes moderate relief of symptoms.  No adverse effects of prior injection.  Patient requests repeat injection.  Patient and daughter present also request repeat injection therapy for right hip advanced osteoarthritis.  Patient also has a history of left total hip replacement 15 years ago with continued stable recovery.  Patient walks with a walker only small distances in the home, otherwise uses a wheelchair.  Patient had considered right total hip in the past though did not proceed due to chronic medical conditions, comorbidity and increased anesthesia and surgical risks.    Past Medical History:   Diagnosis Date    Abdominal pain     states hx of    Abnormal electrocardiogram     Abnormal urinalysis     Acute UTI     Anemia     Anomaly cardiac     REPORTS WILL SHOW ON LEFT VENTRICLE IN A 12 LEAD EKG AS BEING A MI BUT REPORTS SHE HAS NEVER HAD THIS     Anxiety     Aphagia     HX OF THIS, REPORTS WAS CLEARED OF A CVA BUT WAS TOLD WAS RELATED TO FATIGUE    Arthritis     Asthma     Atrial fibrillation     Backache     Bursitis of right shoulder     CAD (coronary artery disease)     Cataract, bilateral     s/p removal    Cellulitis of both lower extremities     COPD (chronic obstructive pulmonary disease)     oxygen dependent    Depression     Diverticulitis     Dysphagia     REPORTS RESOLVED AFTER DILITATION    Fracture     left wrist, 2 fingers on right hand, toes on right foot, right arm    Full dentures     Gastric ulcer     GERD (gastroesophageal reflux disease)     Gout     Heart murmur     High cholesterol     History of nuclear stress test 2011?    HTN  "(hypertension)     Hx of being hospitalized 05/26/2023    ED to hospital admission for Parox. Afib & difficulty breathing - [facility] documents scanned to media tab    Hx of echocardiogram 05/28/2023    At [facility] - scanned into media    Hypertriglyceridemia     Hypothyroidism     Multiple lung nodules     Osteoarthritis     Osteoporosis     Peripheral neuropathy     Personal history of tobacco use     Plantar fasciitis     left    Pneumonia     Sinus problem     Sinusitis     Tear of meniscus of knee     right    TIA (transient ischemic attack)     denies 5/22/19.  states had aphasia in E.R., but was ruled out.    Toe pain     \"Calluses were filed out, but is painful\"    Uterine leiomyoma     Viral gastroenteritis     Vitamin D deficiency     Wears glasses     Weight loss, non-intentional     80 lb in 5 years        Past Surgical History:   Procedure Laterality Date    CARDIAC CATHETERIZATION  2007?    no stents    CARDIAC SURGERY      CATARACT EXTRACTION Bilateral 2009    CATARACT EXTRACTION, BILATERAL      CHOLECYSTECTOMY  2002    COLONOSCOPY      CORONARY ARTERY BYPASS GRAFT  1998    2 vessels    ENDOSCOPY      ENDOSCOPY N/A 5/3/2017    Procedure: ESOPHAGOGASTRODUODENOSCOPY with biopsies;  Surgeon: Yared Castellanos MD;  Location: [facility] ENDOSCOPY;  Service:     ENDOSCOPY N/A 5/23/2019    Procedure: ESOPHAGOGASTRODUODENOSCOPY WITH BIOPSY AND BALLOON DILITATION;  Surgeon: Yared Castellanos MD;  Location: [facility] ENDOSCOPY;  Service: Gastroenterology    MOUTH SURGERY      full extraction    TOTAL HIP ARTHROPLASTY Left 02/02/2010    PREETI Stanley MD       Allergies   Allergen Reactions    Ciprofloxacin GI Intolerance     Patient states she is not allergic.    Hazelnut Nausea Only    Keflex [Cephalexin] Nausea Only    Niacin Rash     \"it makes my skin burn\"    Other Nausea Only     Balsalmic Vinegar      Oxycodone Dizziness     \"it makes me feel dopey and out of my head\"  NOTE: does tolerate hydrocodone and " "morphine    Statins Myalgia    Vicodin [Hydrocodone-Acetaminophen] Hives     Can take hydrocodone, cannot tolerate VICODIN       Review of Systems   Constitutional:  Negative for fever.   HENT:  Positive for hearing loss (She reports ear tinnitus and hearing loss.  She did hear her daughter and myself fairly well today.  She was treated with and ENT specialist with ear tubes placement and a hearing aid.  No definite improvement.  She has follow up with ENT this month).    Musculoskeletal:  Positive for arthralgias and gait problem. Negative for joint swelling.   Skin:  Negative for color change and rash.   Psychiatric/Behavioral:  Negative for confusion.      I have reviewed the medical and surgical history, family history, social history, medications, and/or allergies, and the review of systems of this report.    Objective   /82   Ht 162.6 cm (64.02\")   Wt 70.3 kg (155 lb)   BMI 26.59 kg/m²   Physical Exam  Skin exam stable with no erythema, ecchymosis or rash.  No new swelling.  No motor or sensory changes.  Distal pulse intact.    Large Joint Arthrocentesis: R subacromial bursa  Date/Time: 8/1/2025 11:33 AM  Consent given by: patient  Site marked: site marked  Timeout: Immediately prior to procedure a time out was called to verify the correct patient, procedure, equipment, support staff and site/side marked as required   Supporting Documentation  Indications: pain   Procedure Details  Location: shoulder - R subacromial bursa  Preparation: Patient was prepped and draped in the usual sterile fashion  Needle size: 23 G  Approach: lateral  Medications administered: 40 mg methylPREDNISolone acetate 40 MG/ML; 2 mL lidocaine 2%  Patient tolerance: patient tolerated the procedure well with no immediate complications        Assessment & Plan     Independent Review of Radiographic Studies:    AP, Grashey AP and Y lateral views of the right shoulder taken in the office today, indication to evaluate pain and with " prior comparison views, shows no acute fracture or dislocation evident.  Also, indication to evaluate joint condition, with prior comparison views, shows subacromial impingement with acromial spurs and acromioclavicular osteoarthritis, and shows evident chronic advanced glenohumeral osteoarthritis with 1 - 2 mm of joint space remaining.  There is a small inferior humerus head arthritis spur.  When compared with right shoulder radiographs from 2020, the subacromial spurs are more pronounced, the glenohumeral joint space is notably more narrowed and the inferior humerus head spur is not seen on the 2020 imaging consistent with some increased shoulder osteoarthritis in the interim.    Laboratory and Other Studies:  No new results reviewed today.     Medical Decision Making:    Fair progress with residual symptoms.   Continue care plan and treatment as noted.    Diagnoses and all orders for this visit:    1. Arthralgia of right hip (Primary)  -     XR Shoulder 2+ View Right  -     FL guided pain management large joint; Future    2. Arthralgia of right shoulder region  -     Large Joint Arthrocentesis: R subacromial bursa  -     lidocaine (XYLOCAINE) 2% injection 2 mL  -     methylPREDNISolone acetate (DEPO-medrol) injection 40 mg    3. Subacromial bursitis of right shoulder joint  -     Large Joint Arthrocentesis: R subacromial bursa  -     lidocaine (XYLOCAINE) 2% injection 2 mL  -     methylPREDNISolone acetate (DEPO-medrol) injection 40 mg    4. Osteoarthritis of glenohumeral joint, right    5. Primary osteoarthritis of right hip    6. History of total hip replacement, left      Discussion of orthopaedic goals and activities and patient expressed appreciation.  Regular exercise as tolerated  Ice, heat, and/or modalities as beneficial  Watch for signs and symptoms of infection  Call or notify for any adverse effect from injection therapy    Recommendations/Plan:  Brace: No brace was given at today's  visit.  Test/Studies: No additional studies ordered at this time.  Procedures:  Right hip image guided injection scheduled in the next couple weeks.  Work/Activity Status: Usual activities, routine exercise as tolerated, no strenuous activity.    Return in about 4 months (around 12/1/2025) for Recheck.  Patient is encouraged and agreeable to call or return sooner for any issues or concerns.

## 2025-08-14 ENCOUNTER — HOSPITAL ENCOUNTER (OUTPATIENT)
Dept: GENERAL RADIOLOGY | Facility: HOSPITAL | Age: OVER 89
Discharge: HOME | End: 2025-08-14
Payer: MEDICARE

## 2025-08-14 DIAGNOSIS — M25.551 ARTHRALGIA OF RIGHT HIP: ICD-10-CM

## 2025-08-14 PROCEDURE — 25010000002 METHYLPREDNISOLONE PER 80 MG: Performed by: ORTHOPAEDIC SURGERY

## 2025-08-14 PROCEDURE — 25010000002 LIDOCAINE 1 % SOLUTION: Performed by: ORTHOPAEDIC SURGERY

## 2025-08-14 PROCEDURE — 77002 NEEDLE LOCALIZATION BY XRAY: CPT

## 2025-08-14 RX ORDER — METHYLPREDNISOLONE ACETATE 80 MG/ML
80 INJECTION, SUSPENSION INTRA-ARTICULAR; INTRALESIONAL; INTRAMUSCULAR; SOFT TISSUE ONCE
Status: COMPLETED | OUTPATIENT
Start: 2025-08-14 | End: 2025-08-14

## 2025-08-14 RX ORDER — LIDOCAINE HYDROCHLORIDE 10 MG/ML
5 INJECTION, SOLUTION INFILTRATION; PERINEURAL ONCE
Status: COMPLETED | OUTPATIENT
Start: 2025-08-14 | End: 2025-08-14

## 2025-08-14 RX ADMIN — METHYLPREDNISOLONE ACETATE 80 MG: 80 INJECTION, SUSPENSION INTRA-ARTICULAR; INTRALESIONAL; INTRAMUSCULAR; SOFT TISSUE at 16:16

## 2025-08-14 RX ADMIN — LIDOCAINE HYDROCHLORIDE 5 ML: 10 INJECTION, SOLUTION INFILTRATION; PERINEURAL at 16:15

## (undated) DEVICE — Device

## (undated) DEVICE — FRCP BIOP COLD ENDOJAW ALLGTR W/NDL 2.8X2300MM BLU

## (undated) DEVICE — ENDOGATOR AUXILIARY WATER JET CONNECTOR: Brand: ENDOGATOR

## (undated) DEVICE — CONMED SCOPE SAVER BITE BLOCK, 20X27 MM: Brand: SCOPE SAVER

## (undated) DEVICE — 2000CC GUARDIAN II: Brand: GUARDIAN

## (undated) DEVICE — Device: Brand: DEFENDO AIR/WATER/SUCTION AND BIOPSY VALVE

## (undated) DEVICE — DEV INFL ALLIANCE2 SYS

## (undated) DEVICE — BALN DIL ESOPH CRE CONTRL RADL 15/18MM 8CM BX5